# Patient Record
Sex: FEMALE | Race: WHITE | NOT HISPANIC OR LATINO | Employment: UNEMPLOYED | URBAN - METROPOLITAN AREA
[De-identification: names, ages, dates, MRNs, and addresses within clinical notes are randomized per-mention and may not be internally consistent; named-entity substitution may affect disease eponyms.]

---

## 2019-06-17 LAB — MAMMOGRAM: NORMAL

## 2020-05-16 ENCOUNTER — TRANSFERRED RECORDS (OUTPATIENT)
Dept: HEALTH INFORMATION MANAGEMENT | Facility: CLINIC | Age: 55
End: 2020-05-16

## 2020-05-18 ENCOUNTER — TRANSFERRED RECORDS (OUTPATIENT)
Dept: HEALTH INFORMATION MANAGEMENT | Facility: CLINIC | Age: 55
End: 2020-05-18

## 2020-09-30 ENCOUNTER — TRANSFERRED RECORDS (OUTPATIENT)
Dept: HEALTH INFORMATION MANAGEMENT | Facility: CLINIC | Age: 55
End: 2020-09-30

## 2020-10-02 ENCOUNTER — TRANSFERRED RECORDS (OUTPATIENT)
Dept: HEALTH INFORMATION MANAGEMENT | Facility: CLINIC | Age: 55
End: 2020-10-02

## 2020-11-23 ENCOUNTER — TRANSFERRED RECORDS (OUTPATIENT)
Dept: HEALTH INFORMATION MANAGEMENT | Facility: CLINIC | Age: 55
End: 2020-11-23

## 2020-11-24 ENCOUNTER — MEDICAL CORRESPONDENCE (OUTPATIENT)
Dept: HEALTH INFORMATION MANAGEMENT | Facility: CLINIC | Age: 55
End: 2020-11-24

## 2020-11-25 ENCOUNTER — REFERRAL (OUTPATIENT)
Dept: TRANSPLANT | Facility: CLINIC | Age: 55
End: 2020-11-25

## 2020-11-25 DIAGNOSIS — K70.30 ALCOHOLIC CIRRHOSIS (H): Primary | ICD-10-CM

## 2020-11-25 DIAGNOSIS — K74.60 CIRRHOSIS (H): ICD-10-CM

## 2020-11-25 NOTE — LETTER
LIVER TRANSPLANT  EVALUATION SCHEDULE    Patient:   Viviana Schaefer  MR#:    [id number]  Coordinator:  Jose Rafael      [phone]  :     Annika               [phone]  [facility]  Date(s):    December 15, 2020    This is your evaluation schedule, please follow dates and times.  You will   receive reminder phone calls for other tests, but please follow this schedule  only!  If you have any questions about dates and times, please call us on  number listed above.  Thank you, Transplant Services       Day/Date:    Tuesday, December 15, 2020  Time Location Activity   9:00 AM Telephone Visit: You will receive a Telephone Call to: [phone] Appointment with Dr. Bundy,  Hepatologist   10:00 AM Telephone Visit: You will receive a Telephone Call to: [phone] Appointment with Dr. Willett,  Transplant Surgeon   10:30 AM Telephone Visit: You will receive a Telephone Call to: [phone] Appointment with Ashley Dailey,  Registered Dietitian   11:00 PM Telephone Visit: You will receive a Telephone Call to: [phone] Appointment with Brooklyn Swanson,

## 2020-11-25 NOTE — LETTER
LIVER TRANSPLANT  EVALUATION SCHEDULE    Patient:   Viviana Schaefer  MR#:    [id number]  Coordinator:  Jose Rafael       [phone]  :     Annika               [phone]  [facility]  Date(s):    January 19, 2021 & January 25, 2021 March 1, 2021    This is your evaluation schedule, please follow dates and times.  You will   receive reminder phone calls for other tests, but please follow this schedule  only!  If you have any questions about dates and times, please call us on  number listed above.  Thank you, Transplant Services     *NO FOOD or DRINK AFTER 8:00 AM*  (You may only have small amounts of water)    Day/Date:    Tuesday, January 19, 2020  Time Location Activity   2:10 PM Imaging and Lab testing  ([facility] floor [facility]  [address] [place] [address] ) EKG   2:30 PM Imaging and Lab Testing  ([address] [place] [address] and Surgery [place] ) Dexa Scan HOLD Calcium pills & supplements 48 hours before test!   3:00 PM Imaging and Lab testing  ([facility] floor [facility] ) Chest X-ray    3:20 PM Imaging and Lab testing  ([facility] floor [facility] ) CT Chest   3:45 PM Imaging and Lab testing  ([facility] floor [facility] ) Blood tests    4:00 PM Imaging and Lab testing  ([facility] floor [facility] ) Liver Ultrasound with dopplers=NO FOOD or DRINK8 HOURS BEFORE TEST!!!   5:00 PM Imaging and Lab testing  ([address] [place] [address] and Surgery Center ) MRI             Day/Date:    Monday, January 25, 2021  Time Location Activity   11:30 AM Telephone Visit: You will receive a telephone call to: [phone] Appointment with Dr. Parham,  Cardiology           Day/Date:    Monday, March 1, 2021  Time Location Activity   10:50 AM Transplant Services  (3rd floor [facility]) Appointment with Dr. Willett,  Transplant Surgeon   11:30 AM Medicine Specialties  ([facility] floor [facility]) Appointment  with Dr. Bundy,  Hepatologist

## 2020-11-25 NOTE — LETTER
11/27/2020    Viviana Schaefer  [address] [facility] [address]      Dear Viviana,    Thank you for your interest in the Transplant Center at [facility], [facility]. We look forward to being a part of your care team and assisting you through the transplant process.    As we discussed, your transplant coordinator is Landon Cleaning Jr. (Liver).  You may call your coordinator at any time with questions or concerns call [phone].    Please complete the following.    1. Fill out and return the enclosed forms    Authorization for Electronic Communication    Authorization to Discuss Protected Health Information    Authorization for Release of Protected Health Information    Authorization for Care Everywhere Release of Information    2. Sign up for:    MEETiiN, access to your electronic medical record (see enclosed pamphlet)    [email or web address], a transplant education website    You can use these tools to learn more about your transplant, communicate with your care team, and track your medical details      Sincerely,  Solid Organ Transplant  [facility], [facility]    cc: Referring Physician and PCP

## 2020-11-27 VITALS — BODY MASS INDEX: 46.38 KG/M2 | WEIGHT: 252 LBS | HEIGHT: 62 IN

## 2020-11-27 PROBLEM — K70.11 ALCOHOLIC HEPATITIS WITH ASCITES (H): Status: ACTIVE | Noted: 2020-05-16

## 2020-11-27 PROBLEM — R31.0 GROSS HEMATURIA: Status: ACTIVE | Noted: 2019-03-13

## 2020-11-27 PROBLEM — K76.9 PLEURAL EFFUSION ASSOCIATED WITH HEPATIC DISORDER: Status: ACTIVE | Noted: 2019-03-12

## 2020-11-27 PROBLEM — E87.1 ACUTE HYPONATREMIA: Status: ACTIVE | Noted: 2019-04-02

## 2020-11-27 PROBLEM — R09.89 PULMONARY CONGESTION: Status: ACTIVE | Noted: 2020-05-16

## 2020-11-27 PROBLEM — E53.8 FOLIC ACID DEFICIENCY: Status: ACTIVE | Noted: 2019-03-26

## 2020-11-27 PROBLEM — K72.10 END STAGE LIVER DISEASE (H): Status: ACTIVE | Noted: 2020-09-30

## 2020-11-27 PROBLEM — R06.09 EXERTIONAL DYSPNEA: Status: ACTIVE | Noted: 2020-05-16

## 2020-11-27 PROBLEM — R06.02 SOB (SHORTNESS OF BREATH): Status: ACTIVE | Noted: 2020-09-30

## 2020-11-27 PROBLEM — R01.1 HEART MURMUR: Status: ACTIVE | Noted: 2020-05-16

## 2020-11-27 PROBLEM — G25.81 RESTLESS LEG SYNDROME: Status: ACTIVE | Noted: 2019-07-15

## 2020-11-27 PROBLEM — J91.8 PLEURAL EFFUSION ASSOCIATED WITH HEPATIC DISORDER: Status: ACTIVE | Noted: 2019-03-12

## 2020-11-27 SDOH — HEALTH STABILITY: MENTAL HEALTH: HOW OFTEN DO YOU HAVE A DRINK CONTAINING ALCOHOL?: NOT ASKED

## 2020-11-27 SDOH — HEALTH STABILITY: MENTAL HEALTH: HOW OFTEN DO YOU HAVE 6 OR MORE DRINKS ON ONE OCCASION?: NOT ASKED

## 2020-11-27 SDOH — HEALTH STABILITY: MENTAL HEALTH: HOW MANY STANDARD DRINKS CONTAINING ALCOHOL DO YOU HAVE ON A TYPICAL DAY?: NOT ASKED

## 2020-11-27 ASSESSMENT — MIFFLIN-ST. JEOR: SCORE: 1696.31

## 2020-11-27 NOTE — TELEPHONE ENCOUNTER
Patient was asked the following questions during liver intake call.     Referring Provider:Dr. Paula Irwin   Referring Diagnosis: Alcoholic Cirrhosis of the Liver  PCP: Dr. Anahi Grant     1)Do you know why you have liver disease: Yes             If Alcoholic Cirrhosis is present when was your last drink: May 2020             Have you ever been through treatment for alcohol: NA  2) Presence of Ascites: Yes Paracentesis: Yes  3) Presence of Hepatic Encephalopathy: Yes Medications: Yes  4) History of GI Bleeding: No  5) Oxygen Use: None  6) EGD: Yes Where: [facility] When: 1/2/2020  7) Colonoscopy: No   8) MELD Score: No Current Labs   9) Insurance information: [facility]      Policy lock: Self       Subscriber/policy/ID number: [id number]      Group Number: [id number]    Referral intake process completed.  Patient is aware that after financial approval is received, medical records will be requested.   Patient confirmed for a callback from transplant coordinator on 12/2/2020.   Tentative evaluation date TBD.    Confirmed coordinator will discuss evaluation process in more detail at the time of their call.   Patient is aware of the need to arrange age appropriate cancer screening, vaccinations, and dental care.  Reminded patient to complete questionnaire, complete medical records release, and review packet prior to evaluation visit .  Assessed patient for special needs (ie--wheelchair, assistance, guardian, and ):  Walker and Wheelchair   Patient instructed to call [phone] with questions.     Patient gave verbal consent during intake call to obtain medical records and documents outside of [facility]:  Yes    MILA Wilburn, LPN   Solid Organ Transplant

## 2020-12-01 NOTE — TELEPHONE ENCOUNTER
54yo with hx cirrhosis referred for transplant evaluation by Paula Irwin at [facility]. Pt reports having been dx with liver disease ~3/2019 when she was admitted with jaundice and abd distention. Pt has a hx alcohol abuse and obesity. She reports having gone through alcohol treatment in 2002, and described long (~10 year) periods of sobriety between relapses. Pt denies drug use and quit smoking 12 years ago (prior intermittent smoker)  Pt was referred to the [facility] for txp evaluation but has requested [facility] due to location. Pt lives alone. She has a hired care assistant who is per primary source of support. Pt does not have any family that live locally. Pt has not been able to work for multiple years due to her health.    MELD: 20, labs done 11/3/20 at [facility] (see CE)  Imaging: MRI done 9/30/30 at [facility], requested images be pushed  Ascites: on diuretics, hx para and thora, last thora 10/2020, last para 3/2019  HE: none, not on lactulose nor Xifaxan  GIB: no hx  EGD: last 1/2020 at [facility] (see CE)  Colonoscopy: never, she will review with PCP    Medical and Surgical Hx: Snap Shot updated  BMI >40, pt confirmed she is working on weight loss and has recently lost 20lbs. Importance of ongoing weight loss discussed.     Plan:   Virtual transplant evaluation with Dr. Bundy week of 12/15/20 including CD assessment. Pt okay with schedule being emailed.   Pt confirmed she will sign up for NERI asap, help line number provided.   [email or web address] reviewed. Pt confirmed she will watch pre liver transplant videos prior to evaluation.   Pt verbalized understanding and comfort with above plan, contact information for coordinator provided. Pt denied questions.

## 2020-12-07 ENCOUNTER — HOSPITAL ENCOUNTER (OUTPATIENT)
Dept: BEHAVIORAL HEALTH | Facility: CLINIC | Age: 55
Discharge: HOME | End: 2020-12-07
Attending: STUDENT IN AN ORGANIZED HEALTH CARE EDUCATION/TRAINING PROGRAM | Admitting: STUDENT IN AN ORGANIZED HEALTH CARE EDUCATION/TRAINING PROGRAM
Payer: COMMERCIAL

## 2020-12-07 VITALS — HEIGHT: 62 IN | BODY MASS INDEX: 46.01 KG/M2 | WEIGHT: 250 LBS

## 2020-12-07 DIAGNOSIS — K74.60 CIRRHOSIS (H): ICD-10-CM

## 2020-12-07 PROCEDURE — H0001 ALCOHOL AND/OR DRUG ASSESS: HCPCS | Mod: 95

## 2020-12-07 RX ORDER — FOLIC ACID 1 MG/1
1 TABLET ORAL EVERY MORNING
COMMUNITY
Start: 2020-10-23

## 2020-12-07 RX ORDER — SPIRONOLACTONE 50 MG/1
TABLET, FILM COATED ORAL
COMMUNITY
Start: 2020-10-23 | End: 2020-12-15

## 2020-12-07 RX ORDER — FUROSEMIDE 40 MG
60 TABLET ORAL EVERY MORNING
COMMUNITY
Start: 2020-05-19 | End: 2022-03-09

## 2020-12-07 RX ORDER — FERROUS SULFATE 325(65) MG
TABLET ORAL
COMMUNITY
Start: 2020-10-27 | End: 2020-12-15

## 2020-12-07 RX ORDER — FUROSEMIDE 40 MG
TABLET ORAL
COMMUNITY
Start: 2020-10-23 | End: 2020-12-15

## 2020-12-07 RX ORDER — FERROUS SULFATE 325(65) MG
TABLET ORAL
COMMUNITY
Start: 2020-07-10

## 2020-12-07 RX ORDER — FOLIC ACID 1 MG/1
1 TABLET ORAL
COMMUNITY
Start: 2020-07-10 | End: 2020-12-15

## 2020-12-07 RX ORDER — SPIRONOLACTONE 50 MG/1
50 TABLET, FILM COATED ORAL EVERY MORNING
COMMUNITY
Start: 2020-10-23

## 2020-12-07 RX ORDER — PREGABALIN 100 MG/1
100 CAPSULE ORAL 2 TIMES DAILY PRN
COMMUNITY
Start: 2020-11-11

## 2020-12-07 RX ORDER — PREGABALIN 50 MG/1
50 CAPSULE ORAL
COMMUNITY
Start: 2020-11-11 | End: 2020-12-15

## 2020-12-07 ASSESSMENT — COLUMBIA-SUICIDE SEVERITY RATING SCALE - C-SSRS
5. HAVE YOU STARTED TO WORK OUT OR WORKED OUT THE DETAILS OF HOW TO KILL YOURSELF? DO YOU INTEND TO CARRY OUT THIS PLAN?: NO
5. HAVE YOU STARTED TO WORK OUT OR WORKED OUT THE DETAILS OF HOW TO KILL YOURSELF? DO YOU INTEND TO CARRY OUT THIS PLAN?: NO
2. HAVE YOU ACTUALLY HAD ANY THOUGHTS OF KILLING YOURSELF?: NO
TOTAL  NUMBER OF ABORTED OR SELF INTERRUPTED ATTEMPTS PAST 3 MONTHS: NO
1. IN THE PAST MONTH, HAVE YOU WISHED YOU WERE DEAD OR WISHED YOU COULD GO TO SLEEP AND NOT WAKE UP?: NO
6. HAVE YOU EVER DONE ANYTHING, STARTED TO DO ANYTHING, OR PREPARED TO DO ANYTHING TO END YOUR LIFE?: NO
REASONS FOR IDEATION PAST MONTH: DOES NOT APPLY
4. HAVE YOU HAD THESE THOUGHTS AND HAD SOME INTENTION OF ACTING ON THEM?: NO
ATTEMPT LIFETIME: NO
TOTAL  NUMBER OF INTERRUPTED ATTEMPTS PAST 3 MONTHS: NO
ATTEMPT PAST THREE MONTHS: NO
REASONS FOR IDEATION LIFETIME: MOSTLY TO END OR STOP THE PAIN (YOU COULDN'T GO ON LIVING WITH THE PAIN OR HOW YOU WERE FEELING)
TOTAL  NUMBER OF INTERRUPTED ATTEMPTS LIFETIME: NO
1. IN THE PAST MONTH, HAVE YOU WISHED YOU WERE DEAD OR WISHED YOU COULD GO TO SLEEP AND NOT WAKE UP?: YES
3. HAVE YOU BEEN THINKING ABOUT HOW YOU MIGHT KILL YOURSELF?: NO
TOTAL  NUMBER OF ABORTED OR SELF INTERRUPTED ATTEMPTS PAST LIFETIME: NO
6. HAVE YOU EVER DONE ANYTHING, STARTED TO DO ANYTHING, OR PREPARED TO DO ANYTHING TO END YOUR LIFE?: NO
2. HAVE YOU ACTUALLY HAD ANY THOUGHTS OF KILLING YOURSELF LIFETIME?: NO
4. HAVE YOU HAD THESE THOUGHTS AND HAD SOME INTENTION OF ACTING ON THEM?: NO

## 2020-12-07 ASSESSMENT — MIFFLIN-ST. JEOR: SCORE: 1687.24

## 2020-12-07 ASSESSMENT — PAIN SCALES - GENERAL: PAINLEVEL: NO PAIN (0)

## 2020-12-07 NOTE — TELEPHONE ENCOUNTER
Spoke with the patient and confirmed Evals: Pre-Liver Eval appointments on 12/15/20.  Informed patient an itinerary can be accessed via [employer], and will be sent via email.
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
## 2020-12-08 NOTE — ADDENDUM NOTE
Encounter [facility] on: 12/8/2020 8:37 AM   Actions taken: Clinical Note Signed

## 2020-12-09 NOTE — TELEPHONE ENCOUNTER
RECORDS RECEIVED FROM: Internal   Appt Date: 12.15.2020    NOTES STATUS DETAILS   OFFICE NOTE from referring provider Internal 11.25.2020 Consult Razia Bundy MD   OFFICE NOTES from other specialists Care Everywhere 11.23.2020 Paula Irwin, BRAVO, CNP    10.08.2020 Anahi Grant DO      09.09.2019 Luis Serrano MD      03.26.2019 Mae Olmos DO     DISCHARGE SUMMARY from hospital Care Everywhere 09.30.2020 [facility]    05.16.2020 [facility]    04.02.2019 [facility]    03.12.2019   MEDICATION LIST Internal / CE    LIVER BIOSPY (IF APPLICABLE)      PATHOLOGY REPORTS  N/A    IMAGING     ENDOSCOPY (IF AVAILABLE) N/A    COLONOSCOPY (IF AVAILABLE) N/A    ULTRASOUND LIVER Care Everywhere 09.30.2020, 04.02.2019  US Abd Limited    08.17.2020, 05.17.2020,  03.13.2019 US Abd W Complete Doppler    01.26.2020 US Abd Complete    10.01.2019 US Abd RUQ Organs     03.1.13.2019Ultrasound-guided paracentesis   CT OF ABDOMEN Care Everywhere 09.30.2020 CT Abd Pelvis   MRI OF LIVER Care Everywhere 09.30.2020 MRI Liver  W/ WO    FIBROSCAN, US ELASTOGRAPHY, FIBROSIS SCAN, MR ELASTOGRAPHY N/A    LABS     HEPATIC PANEL (LIVER PANEL) Care Everywhere 11.03.2020   BASIC METABOLIC PANEL Care Everywhere 11.03.2020   COMPLETE METABOLIC PANEL Care Everywhere 11.03.2020   COMPLETE BLOOD COUNT (CBC) Care Everywhere 11.03.2020   INTERNATIONAL NORMALIZED RATIO (INR) Care Everywhere 11.03.2020   HEPATITIS C ANTIBODY Care Everywhere 03.19.2019   HEPATITIS C VIRAL LOAD/PCR N/A    HEPATITIS C GENOTYPE N/A    HEPATITIS B SURFACE ANTIGEN N/A    HEPATITIS B SURFACE ANTIBODY N/A    HEPATITIS B DNA QUANT LEVEL N/A    HEPATITIS B CORE ANTIBODY Care Everywhere 03.13.2019     Action 12.09.2020 RM   Action Taken Sent fax request to [employer] for images, fax to [phone] pending for images.     12.11.2020 All images received and uploaded to pt chart.

## 2020-12-12 ENCOUNTER — HEALTH MAINTENANCE LETTER (OUTPATIENT)
Age: 55
End: 2020-12-12

## 2020-12-13 NOTE — PROGRESS NOTES
"Called patient , no answer, left message , I will try to call patient again in 10/ 15 mins.  Sherrell Martinez, HAILEY     Start 9/7/    [facility]     Date of Visit: 12/15/2020    Reason for referral:     Subjective: Ms. 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    Diagnosed with alcoholic related liver disease 3/2019 after being admitted to [facility] with jaundice and abdominal distention. CT showed cirrhosis without a mass, ascites and a pleural effusion. No prior history of liver disease - states she had an US fall 2018, was not told what it showed, but later was told she had \"fatty liver\". Denies being told LFTs elevated in the past. Underwent thoracentesis and paracentesis that were negative for SBP. She apparently had normal A1AT, AMA, AMA, Ferritin. Ceruloplasmin was low at 14, 24 hour urine copper < 1.0 micrograms/L. No KF rings seen with ophthalmology. She was started on diuretics, fluid resolved with lasix 40/nik 50. She tells me that she was diagnosed with PORTER. After this hospitalization, was sober for until Fall 2019 - had a few drinks and stopped. Drank again 5/2020 - had a couple of drinks celebrating son's Bday.     States she abused alcohol after each divorce (1998, 2012 - drinking \"a lot\"), and then would drink regularly after that - ranging from nothing to a few drinks week. Last drink. No DUIs. Did outpatient treatment (2003) because ex- felt she was drinking too much, states that he projected his drinking onto her, did this to appease him. And then AA for several years. Was sober between 0255-1782, started drinking 6 months after her divorce. She has had shakes, no withdrawal. States she was drinking socially prior to 3/2019.      Readmitted 5/2020 with jaundice and abdominal distention. Na was 118, down from 140 1/2020. " "Thought to have alcoholic hepatitis - TBR 10.4 (3 1/2020), , ALT 38, Alk Phos 102, INR 1.9. Patient reported she had been sober except for two mikes hard lemonades prior to admission. Started on steroids. Diuretics held (no ascites) and Na improved. Diuretics restarted at lower dose as an outpatient (lasix 20, nik 25).     BR increased again 8/2020 to 11.5, up from 3.8 6/2020. US with Doppler showed cirrhosis. She denied alcohol use. She was readmitted 9/2020 with SOB, found to have a large pleural effusion - tapped felt to be HH. MELD 26. CT showed an indeterminate liver lesion - 15 mm. Diuretics held for KWABENA, then resumed.     11/3/2020 labs  INR 1.9  Na 138  Creatinine 0.70  TBR 6.0  Alk Phos 106  AST 56  Albumin 3.1  Hgb 11.4   .6  MELD Na 21    Ascites/HH  - On lasix 20/nik 50  - MRI Liver 9/30/2020 showed large HH  - Thinks fluid is better \"feeling quite well\", breathing feels ok    HE   - No history of this, does not recall taking lactulose - maybe took in 2019    Some days she is nauseated    EV  - Last EGD 1/2020 showed normal esophagus, small HH, mil congestive hepatopathy  - No GIB    Obesity/Weight loss  - 9/2020 was 272 lbs, now 252 lbs - states he is not eating as much, does not have much of an appetite. Current BMI ~ 45     Mobility   - Generally uses walker when walking long distances, uses wheelchair for long distances. Not using walker for the past two weeks at home.     On lasix   Recommendations:      1.   Attend 6 individual psychotherapy sessions that offer a chemical dependency component to it.  2. Abstain from all mood-altering chemicals unless prescribed by a licensed provider.   3)  Attend, at minimum, 2 weekly support group meetings, such as 12 step based (AA/NA), SMART Recovery, Health Realizations, and/or Refuge Recovery meetings.       Liver Lesion  - MRI Liver 9/30/2020: Cirrhotic liver, 15 mm lesion in Segment 4/8 - small focus of steatosis v. HCC  - Due for repeat " "MRI 1/2021    Ascites    HE    Varices    HCC screening    Co morbidities  - Cervical Dysplasia - complete hysterectomy at age 41 - only has R ovary. Had cyst on left ovary. Wanted removed because done of L ovarian cyst, cervical dysplasia, done with childbearing  - Depression - has contributed to drinking in the past. Has done counseling in the past, has been on medications in the past (did not think medications were helpful - only klonopin). States it is \"not good\", does not leave the house much because of anxiety related to being around crowds, has been going on her entire life. History of panic attacks  - HSV  - Medullary Sponge Kidney - had lithotripsy x 5 for stones in the past  - RLS  - No known history of heart disease, MI. Has a \"heart murmur\"  - No history of lung disease  - No other history of cancer  - Never had colonoscopy     ROS: 14 point ROS negative except for positives noted in HPI.    PMHx:  Past Medical History:   Diagnosis Date     Arthritis      Congestive heart failure (H)     Cervical CA      Coronary artery disease     Heart Murmer     Depressive disorder      Hyponatremia        PSHx:  Past Surgical History:   Procedure Laterality Date     ABDOMEN SURGERY       CHOLECYSTECTOMY  2001    patient reported     GI SURGERY       GYN SURGERY       HYSTERECTOMY  2009    including cervix     LITHOTRIPSY      multiple times, patient reported       FamHx:  Family History   Problem Relation Age of Onset     Depression Sister      Anxiety Disorder Sister      Substance Abuse Sister      Depression Sister      Anxiety Disorder Sister      Depression Sister      Anxiety Disorder Sister    No history of liver disease    SocHx:  Social History     Socioeconomic History     Marital status:      Spouse name: Not on file     Number of children: Not on file     Years of education: Not on file     Highest education level: Not on file   Occupational History     Not on file   Social Needs     Financial " resource strain: Not on file     Food insecurity     Worry: Not on file     Inability: Not on file     Transportation needs     Medical: Not on file     Non-medical: Not on file   Tobacco Use     Smoking status: Never Smoker     Smokeless tobacco: Never Used   Substance and Sexual Activity     Alcohol use: Not Currently     Comment: 5/2020     Drug use: Never     Sexual activity: Not on file   Lifestyle     Physical activity     Days per week: Not on file     Minutes per session: Not on file     Stress: Not on file   Relationships     Social connections     Talks on phone: Not on file     Gets together: Not on file     Attends Hoahaoism service: Not on file     Active member of club or organization: Not on file     Attends meetings of clubs or organizations: Not on file     Relationship status: Not on file     Intimate partner violence     Fear of current or ex partner: Not on file     Emotionally abused: Not on file     Physically abused: Not on file     Forced sexual activity: Not on file   Other Topics Concern     Parent/sibling w/ CABG, MI or angioplasty before 65F 55M? Not Asked   Social History Narrative     Not on file   4 sons - ex- has 17 year old (high functioning autistic). 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        Allergies:     Allergies   Allergen Reactions     Codeine      Diazepam      Morphine      PN: LW Reaction: Nausea     Penicillins      PN: LW Reaction: Rash, Generalized. Reportedly had a rash with this as a child and has tolerated Augmentin since per patient.  Tolerated Zosyn 3/12/19 in [facility].        Objective:  There were no vitals taken for this visit.  Constitutional: ***  Eyes: ***  Respiratory: Normal respiratory excursion   MSK: normal range of motion of visualized extremities  Skin: ***  Neuro: ***  Psychiatric: normal mood and orientation    Labs:  No results found for: AST  No results found for: ALT  No results found for: BILICONJ   No results found for: BILITOTAL  No results found for: ALBUMIN  No results found for: PROTTOTAL   No results found for: ALKPHOS    Last Renal Panel:  No results found for: NA, POTASSIUM, CHLORIDE, CO2, ANIONGAP, GLC, BUN, CR, GFRESTIMATED, ELIUD, PHOS, ALBUMIN    No results found for: WBC  No results found for: RBC  No results found for: HGB  No results found for: HCT  No results found for: MCV  No results found for: MCH  No results found for: MCHC  No results found for: RDW  No results found for: PLT    No results found for: INR     Computed MELD-Na score unavailable. Necessary lab results were not found in the last year.  Computed MELD score unavailable. Necessary lab results were not found in the last year.    Imaging:    Endoscopy:    Assessment/Plan:    - Get MRI read here and review at Tumor Board  - Weight loss  - Mental Health Referral  - CD treatment    Blood type O. MELD 21    Ascites:  HE:  Varices:   HCC Screening:     RTC *** months.    Labs/imaging *** months.    Razia Bundy MD MSc  Transplant Hepatologist  [facility]

## 2020-12-15 ENCOUNTER — ALLIED HEALTH/NURSE VISIT (OUTPATIENT)
Dept: TRANSPLANT | Facility: CLINIC | Age: 55
End: 2020-12-15
Attending: STUDENT IN AN ORGANIZED HEALTH CARE EDUCATION/TRAINING PROGRAM
Payer: COMMERCIAL

## 2020-12-15 ENCOUNTER — COMMITTEE REVIEW (OUTPATIENT)
Dept: TRANSPLANT | Facility: CLINIC | Age: 55
End: 2020-12-15

## 2020-12-15 ENCOUNTER — PRE VISIT (OUTPATIENT)
Dept: GASTROENTEROLOGY | Facility: CLINIC | Age: 55
End: 2020-12-15

## 2020-12-15 ENCOUNTER — VIRTUAL VISIT (OUTPATIENT)
Dept: GASTROENTEROLOGY | Facility: CLINIC | Age: 55
End: 2020-12-15
Attending: STUDENT IN AN ORGANIZED HEALTH CARE EDUCATION/TRAINING PROGRAM
Payer: COMMERCIAL

## 2020-12-15 VITALS — HEIGHT: 62 IN | WEIGHT: 250 LBS | BODY MASS INDEX: 46.01 KG/M2

## 2020-12-15 DIAGNOSIS — K76.9 LIVER LESION: ICD-10-CM

## 2020-12-15 DIAGNOSIS — F10.21 ALCOHOL USE DISORDER, SEVERE, IN EARLY REMISSION (H): Primary | ICD-10-CM

## 2020-12-15 DIAGNOSIS — R17 ELEVATED BILIRUBIN: ICD-10-CM

## 2020-12-15 DIAGNOSIS — K76.0 NON-ALCOHOLIC FATTY LIVER DISEASE: Primary | ICD-10-CM

## 2020-12-15 DIAGNOSIS — K76.6 PORTAL HYPERTENSION (H): ICD-10-CM

## 2020-12-15 DIAGNOSIS — K70.31 ALCOHOLIC CIRRHOSIS OF LIVER WITH ASCITES (H): ICD-10-CM

## 2020-12-15 DIAGNOSIS — F41.9 ANXIETY: ICD-10-CM

## 2020-12-15 DIAGNOSIS — K70.31 ALCOHOLIC CIRRHOSIS OF LIVER WITH ASCITES (H): Primary | ICD-10-CM

## 2020-12-15 DIAGNOSIS — E66.813 CLASS 3 SEVERE OBESITY WITH SERIOUS COMORBIDITY AND BODY MASS INDEX (BMI) OF 45.0 TO 49.9 IN ADULT, UNSPECIFIED OBESITY TYPE (H): ICD-10-CM

## 2020-12-15 DIAGNOSIS — E66.01 MORBID OBESITY (H): ICD-10-CM

## 2020-12-15 DIAGNOSIS — E66.01 CLASS 3 SEVERE OBESITY WITH SERIOUS COMORBIDITY AND BODY MASS INDEX (BMI) OF 45.0 TO 49.9 IN ADULT, UNSPECIFIED OBESITY TYPE (H): ICD-10-CM

## 2020-12-15 PROCEDURE — 99205 OFFICE O/P NEW HI 60 MIN: CPT | Mod: 95 | Performed by: STUDENT IN AN ORGANIZED HEALTH CARE EDUCATION/TRAINING PROGRAM

## 2020-12-15 PROCEDURE — 99203 OFFICE O/P NEW LOW 30 MIN: CPT | Mod: 95 | Performed by: TRANSPLANT SURGERY

## 2020-12-15 ASSESSMENT — MIFFLIN-ST. JEOR: SCORE: 1687.24

## 2020-12-15 NOTE — PROGRESS NOTES
"Viviana Schaefer is a 55 year old female who is being evaluated via a billable video visit.      The patient has been notified of following:     \"This video visit will be conducted via a call between you and your physician/provider. We have found that certain health care needs can be provided without the need for an in-person physical exam.  This service lets us provide the care you need with a video conversation.  If a prescription is necessary we can send it directly to your pharmacy.  If lab work is needed we can place an order for that and you can then stop by our lab to have the test done at a later time.    Video visits are billed at different rates depending on your insurance coverage.  Please reach out to your insurance provider with any questions.    If during the course of the call the physician/provider feels a video visit is not appropriate, you will not be charged for this service.\"    Patient has given verbal consent for Video visit? Yes  How would you like to obtain your AVS? Mail a copy  If you are dropped from the video visit, the video invite should be resent to: Text to cell phone: xxx  Will anyone else be joining your video visit? No      Assessment and Plan:  1. liver transplant evaluation - patient is a fair candidate overall. Benefits and surgical risks of a liver transplantation were discussed.  2.  End stage liver disease due to suspected PORTER    Computed MELD-Na score unavailable. Necessary lab results were not found in the last year.  Computed MELD score unavailable. Necessary lab results were not found in the last year.    Surgical evaluation:  1. Portal Vein:Patent  2. Hepatic Artery: Open  3. TIPS: absent  4. Previous Abdominal Surgery: Yes Cholecystectomy, lithotripsy and hysterectomy  5. Hepatocellular Carcinoma: needs evaluation, has lesions on MRI  6. Ascites: Present - minimal  7. Costal Angle: UNKNOWN  8. Portopulmonary Hypertension: absent  9. Hepatopulmonary Syndrome: " absent  10. Cardiac Evaluation: has a heart murmur needs evaluation  11. Nutritional Status: BMI 45  12. Diabetes: NO  13.Hypertension NO  14. Smoker:NO  14: Fraility index: YES, not very mobile  15. Meets guidelines to receive Living Donor  No  16. Potential Living donors No    Recommendations:   #1.  Patient needs to be seen in person.  She currently has a BMI of 45.73.  Would recommend lowering the weight to a BMI of 40.  #2.  She gives history of hydrothorax.  She needs evaluation with a chest x-ray/CT of the chest.  #3.  An MRI done in September showed that she has some liver lesions.  She needs a repeat MRI and alpha-fetoprotein to evaluate for any underlying hepatocellular carcinoma.  #4.  Her mobility is very restricted.  She needs outpatient physical therapy.  #5.  She also needs a dietitian to advise regarding dietary intake.          Patients overall evaluation will be discussed at the Liver Transplant selection committee meeting with a final recommendation on the patients suitability for transplant to be made at that time.    Consult Full  Details:  Viviana Schaefer was seen in consultation at the request of Dr. Razia Martin for evaluation as a potential liver transplant recipient.    Reason for Visit:  Viviana Schaefer is a 55 year old year old female with nonalcoholic steatopheatitis, who presents for liver transplant evaluation.    HPI:  Presenting complaint: Abdominal distention    Decompensated in the form of ascites as well as hydrothorax.  She has had fluid drained from her abdomen as well as from her chest.  She also gives history of pedal edema.  Her mobility is very limited because of the obesity and bilateral knee pain.    Past Medical History:   Diagnosis Date     Arthritis      Congestive heart failure (H)     Cervical CA      Coronary artery disease     Heart Murmer     Depressive disorder      Hyponatremia      Past Surgical History:   Procedure Laterality Date     ABDOMEN SURGERY        CHOLECYSTECTOMY  2001    patient reported     GI SURGERY       GYN SURGERY       HYSTERECTOMY  2009    including cervix     LITHOTRIPSY      multiple times, patient reported     Past Surgical History:   Procedure Laterality Date     ABDOMEN SURGERY       CHOLECYSTECTOMY  2001    patient reported     GI SURGERY       GYN SURGERY       HYSTERECTOMY  2009    including cervix     LITHOTRIPSY      multiple times, patient reported     Family History   Problem Relation Age of Onset     Depression Sister      Anxiety Disorder Sister      Substance Abuse Sister      Depression Sister      Anxiety Disorder Sister      Depression Sister      Anxiety Disorder Sister      Allergies   Allergen Reactions     Codeine      Diazepam      Morphine      PN: LW Reaction: Nausea     Penicillins      PN: LW Reaction: Rash, Generalized. Reportedly had a rash with this as a child and has tolerated Augmentin since per patient. Tolerated Zosyn 3/12/19 in [facility].      Prior to Admission medications    Medication Sig Start Date End Date Taking? Authorizing Provider   ferrous sulfate (FEROSUL) 325 (65 Fe) MG tablet TAKE 1 TABLET BY MOUTH DAILY WITH BREAKFAST 7/10/20  Yes Reported, Patient   folic acid (FOLVITE) 1 MG tablet TK 1 T PO D 10/23/20  Yes Reported, Patient   furosemide (LASIX) 40 MG tablet Take 20 mg by mouth 5/19/20 5/19/21 Yes Reported, Patient   omeprazole (PRILOSEC) 20 MG DR capsule Take 20 mg by mouth 11/6/20  Yes Reported, Patient   pregabalin (LYRICA) 50 MG capsule TK 1 C PO BID 11/11/20  Yes Reported, Patient   spironolactone (ALDACTONE) 50 MG tablet Take 50 mg by mouth 10/23/20  Yes Reported, Patient       Previous Transplant Hx: No    Cardiovascular Hx:       h/o Cardiac Issues: Yes -  Cardiac murmur       Exercise Tolerance: shortness of breath with exertion.    Potential Donor(s): No    ROS:    REVIEW OF SYSTEMS (check box if normal)  [x]                GENERAL  [x]                  PULMONARY [x]           "       GENITOURINARY  [x]                 CNS                 [x]                  CARDIAC  [x]                  ENDOCRINE  [x]                 EARS,NOSE,THROAT [x]                  GASTROINTESTINAL [x]                  NEUROLOGIC    [x]                 MUSCLOSKELTAL  [x]                   HEMATOLOGY    Examination:     Vitals:  Ht 1.575 m (5' 2\")   Wt 113.4 kg (250 lb)   BMI 45.73 kg/m          Results: No results found for this or any previous visit (from the past 168 hour(s)).  I had a long discussion with the patient regarding liver transplantation which included but was not limited to  the following points:    1. Liver transplant selection committee process.  2. The federal rules for cadaveric waiting list, the size and blood type matching of the organ. The availability of living-related donor transplantation.  3. The types of donors: brain death donors, non-heart beating donors, partial liver grafts: splits and living donor grafts  4. Extended criteria  Donors (older age, steasosis) and the increased  risk of primary non-function using the extended criteria donors  5. The CDC high risk donors,  Risk of donor transmitted infections and donor transmitted malignancy  6. The liver transplant operation and the associated risks and technical complications which can include intraoperative death, post operative death,  Primary non-function, bleeding requiring re-operations, arterial and biliary complications, bowel perforations, and intra abdominal abscess. Some of these complicaitons may require a second operation.  7. The postoperative course, the ICU stay and risk of postoperative complications which can include sepsis, MI, stroke, brain injury, pneumonia, pleural effusions, and renal dysfunction.  8. The current 1 year and 5 year graft and patient survivals.  9. The need for life long immunosuppressive therapy and the side effects of these medications, including the possibility of toxicity, opportunistic " infections, risk of cancer including lymphoma, and the possibility of rejection even if the patient is taking the medication exactly as prescribed.  10. The need for compliance with medications and follow-up visits in the clinic and thereafter.  11. The patient and family understand these risks and wish to proceed to transplantation       I spent ...30....minutes with the patient and more than 50% of the time was spend in direct face to face counseling.      Video-Visit Details    Type of service:  Video Visit    Video time 30 min    Originating Location (pt. Location): Home    Distant Location (provider location):  [facility]     Platform used for Video Visit: Domenica WEI        HPI      ROS      Physical Exam

## 2020-12-15 NOTE — LETTER
"    12/15/2020         RE: Viviana Schaefer  [address]  [facility] [address]        Dear Colleague,    Thank you for referring your patient, Viviana Schaefer, to the [facility] [place]. Please see a copy of my visit note below.    Viviana Schaefer is being evaluated via a billable video visit.    The patient has been notified of following:   \"This video visit will be conducted via a call between you and your physician/provider. We have found that certain health care needs can be provided without the need for an in-person physical exam.  This service lets us provide the care you need with a video conversation.  If a prescription is necessary we can send it directly to your pharmacy.  If lab work is needed we can place an order for that and you can then stop by our lab to have the test done at a later time.  If during the course of the call the physician/provider feels a video visit is not appropriate, you will not be charged for this service.\"     Patient has given verbal consent for Video visit? Yes    Patient would like the video invitation sent by: Text to cell phone: see demographics    Video Start Time: 9:00  End Time: 9:45    Mode of Communication:  Video Conference via FireScope  I have reviewed and updated the patient's Past Medical History, Social History, Family History and Medication List.      [facility]     Date of Visit: 12/15/2020    Reason for referral: Liver Transplant Evaluation    Subjective: Ms. 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    Diagnosed with alcoholic related liver disease 3/2019 after being admitted to [facility] with jaundice and abdominal distention. CT showed cirrhosis without a mass, ascites and a pleural effusion. No prior history of liver disease - states she had an US " "fall 2018, was not told what it showed, but later was told she had \"fatty liver\". Denies being told LFTs elevated in the past. Underwent thoracentesis and paracentesis that were negative for SBP. She apparently had normal A1AT, AMA, AMA, Ferritin. Ceruloplasmin was low at 14, 24 hour urine copper < 1.0 micrograms/L. No KF rings seen with ophthalmology. She was started on diuretics, fluid resolved with lasix 40/nik 50. She tells me that she was diagnosed with PORTER. After this hospitalization, was sober for until Fall 2019 - around that time she had a few drinks and stopped. States she drank again 5/2020 - had a couple of drinks celebrating son's Bday.     States she abused alcohol after each divorce (1998, 2012 - drinking \"a lot\"), and then would drink regularly after that - ranging from nothing to a few drinks week. Last drink. No DUIs. Did outpatient treatment (2003) because ex- felt she was drinking too much, states that he projected his drinking onto her, did this to appease him. And then AA for several years. Was sober between 0906-3020, started drinking 6 months after her divorce. She has had shakes, no withdrawal. States she was drinking socially prior to 3/2019.  CD Eval done recommend she attend 6 individual psychotherapy sessions, attend 2 weekly support group meetings    Readmitted 5/2020 with jaundice and abdominal distention after drinking related to her son's birthday. Na was 118, down from 140 1/2020. Thought to have alcoholic hepatitis - TBR 10.4 (3 1/2020), , ALT 38, Alk Phos 102, INR 1.9. Patient reported she had been sober except for two mikes hard lemonades prior to admission. Started on steroids. Diuretics held (no ascites) and Na improved. Diuretics restarted at lower dose as an outpatient (lasix 20, nik 25).     BR increased again 8/2020 to 11.5, up from 3.8 6/2020. US with Doppler showed cirrhosis. She denied alcohol use. She was readmitted 9/2020 with SOB, found to have a " "large pleural effusion - tapped felt to be HH. MELD 26. CT showed an indeterminate liver lesion - 15 mm. Diuretics held for KWABENA, then resumed.     Ascites/HH  - On lasix 20/nik 50  - MRI Liver 9/30/2020 showed large HH  - Thinks fluid is better \"feeling quite well\", breathing feels ok    HE   - No history of this, does not recall taking lactulose - maybe took in 2019    EV  - Last EGD 1/2020 showed normal esophagus, small HH, mil congestive hepatopathy  - No GIB    Obesity/Weight loss  - 9/2020 was 272 lbs, now 252 lbs - states he is not eating as much, does not have much of an appetite. Current BMI ~ 45     Mobility   - Generally uses walker when walking long distances, uses wheelchair for long distances. Not using walker for the past two weeks at home.     Liver Lesion  - MRI Liver 9/30/2020: Cirrhotic liver, 15 mm lesion in Segment 4/8 - small focus of steatosis v. HCC  - Due for repeat MRI 1/2021    Co morbidities  - Cervical Dysplasia - complete hysterectomy at age 41 - only has R ovary. Had cyst on left ovary. Wanted removed because done of L ovarian cyst, cervical dysplasia, done with childbearing  - Depression - has contributed to drinking in the past. Has done counseling in the past, has been on medications in the past (did not think medications were helpful - only klonopin). States it is \"not good\", does not leave the house much because of anxiety related to being around crowds, has been going on her entire life. History of panic attacks  - HSV  - Medullary Sponge Kidney - had lithotripsy x 5 for stones in the past  - RLS  - No known history of heart disease, MI. Has a \"heart murmur\"  - No history of lung disease  - No other history of cancer  - Never had colonoscopy     ROS: 14 point ROS negative except for positives noted in HPI.    PMHx:  Past Medical History:   Diagnosis Date     Arthritis      Congestive heart failure (H)     Cervical CA      Coronary artery disease     Heart Murmer     Depressive " disorder      Hyponatremia        PSHx:  Past Surgical History:   Procedure Laterality Date     ABDOMEN SURGERY       CHOLECYSTECTOMY  2001    patient reported     GI SURGERY       GYN SURGERY       HYSTERECTOMY  2009    including cervix     LITHOTRIPSY      multiple times, patient reported       FamHx:  Family History   Problem Relation Age of Onset     Depression Sister      Anxiety Disorder Sister      Substance Abuse Sister      Depression Sister      Anxiety Disorder Sister      Depression Sister      Anxiety Disorder Sister    No history of liver disease    SocHx:  Social History     Socioeconomic History     Marital status:      Spouse name: Not on file     Number of children: Not on file     Years of education: Not on file     Highest education level: Not on file   Occupational History     Not on file   Social Needs     Financial resource strain: Not on file     Food insecurity     Worry: Not on file     Inability: Not on file     Transportation needs     Medical: Not on file     Non-medical: Not on file   Tobacco Use     Smoking status: Never Smoker     Smokeless tobacco: Never Used   Substance and Sexual Activity     Alcohol use: Not Currently     Comment: 5/2020     Drug use: Never     Sexual activity: Not on file   Lifestyle     Physical activity     Days per week: Not on file     Minutes per session: Not on file     Stress: Not on file   Relationships     Social connections     Talks on phone: Not on file     Gets together: Not on file     Attends Mandaen service: Not on file     Active member of club or organization: Not on file     Attends meetings of clubs or organizations: Not on file     Relationship status: Not on file     Intimate partner violence     Fear of current or ex partner: Not on file     Emotionally abused: Not on file     Physically abused: Not on file     Forced sexual activity: Not on file   Other Topics Concern     Parent/sibling w/ CABG, MI or angioplasty before 65F 55M?  Not Asked   Social History Narrative     Not on file   4 sons - ex- has 17 year old (high functioning autistic). 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        Allergies:     Allergies   Allergen Reactions     Codeine      Diazepam      Morphine      PN: LW Reaction: Nausea     Penicillins      PN: LW Reaction: Rash, Generalized. Reportedly had a rash with this as a child and has tolerated Augmentin since per patient. Tolerated Zosyn 3/12/19 in [facility].        Objective:  No vitals taken for this video visit  Constitutional: pleasant woman in NAD  Eyes: non icteric  Respiratory: Normal respiratory excursion   MSK: normal range of motion of visualized extremities  Skin: no jaundice  Psychiatric: normal mood and orientation    Labs:    12/16/2020 labs  Na 136  Creatinine 0.87  Albumin 2.8    ALT 49  TBR 6.9  Alk phos 115  INR 1.9  Platelets 114    11/3/2020 labs  INR 1.9  Na 138  Creatinine 0.70  TBR 6.0  Alk Phos 106  AST 56  Albumin 3.1  Hgb 11.4   .6  MELD Na 21    Imaging:    MRI Liver 9/30/2020    FINDINGS:    Liver: Cirrhotic, nodular configuration of the liver. In the anterolateral aspect of hepatic segment 4/8 (best visualized on series 14 image 36), there is a 15 mm T1 isointense, T1 fat-sat hypointense T2 fat sat hypointense lesion with signal dropout on out of phase imaging suggesting intralesional fat. This appears hypoenhancing on all phases. Of note, arterial phase imaging is late, with contrast visualized in the hepatic veins. No additional suspicious  lesion in the liver.    Remainder of abdomen: Markedly enlarged recanalized umbilical vein. The spleen is at the upper limits of normal for size. Minimal ascites. Moderate to large right pleural effusion. No adenopathy. The adrenal glands are unremarkable. Multiple tiny cysts in the kidneys. No suspicious bony abnormality. No dilated loop of small or large bowel.    IMPRESSION:  1. Cirrhotic configuration of the liver with evidence of portal hypertension including a dilated, recanalized umbilical vein.  2. Lesion in question at the junction of hepatic segments 4A and 8 is indeterminate. It appears hypo-enhancing on all phases, though the arterial phase shows suboptimal timing (closer to portal venous phase). This makes arterial phase hyperenhancement difficult to exclude. Additionally, there is intralesional fat. This could represent a small focus of hepatic steatosis, though adenomas and hepatocellular carcinoma can show intralesional fat is well. For these reasons, follow-up is recommended in 3-6 months to assess for stability.  3. No lesion meeting imaging criteria for hepatocellular carcinoma.  4. 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    Endoscopy:    EGD 1/2020 showed normal esophagus, small HH, mil congestive hepatopathy    Assessment/Plan: Ms. 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 Liver disease complicated by fluid overload - ascites and HH. She has a LR 3 lesion seen on MRI 9/2020.     She has a long history of alcohol use, presented with decompensated 5/2020 related to recurrent drinking. BR morro this summer and she is adamant she has abstained from alcohol. Since 5/2020. BR still in the 6s, most recent MELD is 23. Fortunately she has clinically improved in terms of her fluid overload with diuretics. Discussed the natural history of alcohol related liver disease, hope that her BR will improve with time.      Potential barriers to transplant include obesity, significant anxiety. Given her relapses, agree with assessment that she should engage in alcohol treatment.     - Patient received referral for psychotherapy from PCP - SW recommended she schedule this and I agree  - Recommend PEth testing at    - Get MRI read here and review at [facility], currently has follow up MRI planned for 1/2021  - Continue diuretics per local GI  - Recommend diet and exercise with goal BMI < 40    RTC 3 months with in person visit    Razia Bundy MD MSc  Transplant Hepatologist  [facility]

## 2020-12-15 NOTE — Clinical Note
Good afternoon Please set up for the rest of her evaluation appts attached sometime in mid-January. Please do labs & testing mid-January. Cards any time soon as she has echo in [place] so I did not order one.  Please also set up 3 month follow up for early to mid-march in-person appt with Dr. Razia Bundy & Dr. Willett, on a day both are in clinic, ideally not on evaluation day but can be if needed.  Thank, tk

## 2020-12-15 NOTE — LETTER
"    12/15/2020         RE: Viviana Schaefer  [address]  [facility] [address]        Dear Colleague,    Thank you for referring your patient, Viviana Schaefer, to the [facility]PL[facility]. Please see a copy of my visit note below.    Viviana Schaefer is a 55 year old female who is being evaluated via a billable video visit.      The patient has been notified of following:     \"This video visit will be conducted via a call between you and your physician/provider. We have found that certain health care needs can be provided without the need for an in-person physical exam.  This service lets us provide the care you need with a video conversation.  If a prescription is necessary we can send it directly to your pharmacy.  If lab work is needed we can place an order for that and you can then stop by our lab to have the test done at a later time.    Video visits are billed at different rates depending on your insurance coverage.  Please reach out to your insurance provider with any questions.    If during the course of the call the physician/provider feels a video visit is not appropriate, you will not be charged for this service.\"    Patient has given verbal consent for Video visit? Yes  How would you like to obtain your AVS? Mail a copy  If you are dropped from the video visit, the video invite should be resent to: Text to cell phone: xxx  Will anyone else be joining your video visit? No      Assessment and Plan:  1. liver transplant evaluation - patient is a fair candidate overall. Benefits and surgical risks of a liver transplantation were discussed.  2.  End stage liver disease due to suspected PORTER    Computed MELD-Na score unavailable. Necessary lab results were not found in the last year.  Computed MELD score unavailable. Necessary lab results were not found in the last year.    Surgical evaluation:  1. Portal Vein:Patent  2. Hepatic Artery: Open  3. TIPS: absent  4. Previous Abdominal " Surgery: Yes Cholecystectomy, lithotripsy and hysterectomy  5. Hepatocellular Carcinoma: needs evaluation, has lesions on MRI  6. Ascites: Present - minimal  7. Costal Angle: UNKNOWN  8. Portopulmonary Hypertension: absent  9. Hepatopulmonary Syndrome: absent  10. Cardiac Evaluation: has a heart murmur needs evaluation  11. Nutritional Status: BMI 45  12. Diabetes: NO  13.Hypertension NO  14. Smoker:NO  14: Fraility index: YES, not very mobile  15. Meets guidelines to receive Living Donor  No  16. Potential Living donors No    Recommendations:   #1.  Patient needs to be seen in person.  She currently has a BMI of 45.73.  Would recommend lowering the weight to a BMI of 40.  #2.  She gives history of hydrothorax.  She needs evaluation with a chest x-ray/CT of the chest.  #3.  An MRI done in September showed that she has some liver lesions.  She needs a repeat MRI and alpha-fetoprotein to evaluate for any underlying hepatocellular carcinoma.  #4.  Her mobility is very restricted.  She needs outpatient physical therapy.  #5.  She also needs a dietitian to advise regarding dietary intake.          Patients overall evaluation will be discussed at the Liver Transplant selection committee meeting with a final recommendation on the patients suitability for transplant to be made at that time.    Consult Full  Details:  Viviana Schaefer was seen in consultation at the request of Dr. Razia Martin for evaluation as a potential liver transplant recipient.    Reason for Visit:  Viviana Schaefer is a 55 year old year old female with nonalcoholic steatopheatitis, who presents for liver transplant evaluation.    HPI:  Presenting complaint: Abdominal distention    Decompensated in the form of ascites as well as hydrothorax.  She has had fluid drained from her abdomen as well as from her chest.  She also gives history of pedal edema.  Her mobility is very limited because of the obesity and bilateral knee pain.    Past Medical  History:   Diagnosis Date     Arthritis      Congestive heart failure (H)     Cervical CA      Coronary artery disease     Heart Murmer     Depressive disorder      Hyponatremia      Past Surgical History:   Procedure Laterality Date     ABDOMEN SURGERY       CHOLECYSTECTOMY  2001    patient reported     GI SURGERY       GYN SURGERY       HYSTERECTOMY  2009    including cervix     LITHOTRIPSY      multiple times, patient reported     Past Surgical History:   Procedure Laterality Date     ABDOMEN SURGERY       CHOLECYSTECTOMY  2001    patient reported     GI SURGERY       GYN SURGERY       HYSTERECTOMY  2009    including cervix     LITHOTRIPSY      multiple times, patient reported     Family History   Problem Relation Age of Onset     Depression Sister      Anxiety Disorder Sister      Substance Abuse Sister      Depression Sister      Anxiety Disorder Sister      Depression Sister      Anxiety Disorder Sister      Allergies   Allergen Reactions     Codeine      Diazepam      Morphine      PN: LW Reaction: Nausea     Penicillins      PN: LW Reaction: Rash, Generalized. Reportedly had a rash with this as a child and has tolerated Augmentin since per patient. Tolerated Zosyn 3/12/19 in [facility].      Prior to Admission medications    Medication Sig Start Date End Date Taking? Authorizing Provider   ferrous sulfate (FEROSUL) 325 (65 Fe) MG tablet TAKE 1 TABLET BY MOUTH DAILY WITH BREAKFAST 7/10/20  Yes Reported, Patient   folic acid (FOLVITE) 1 MG tablet TK 1 T PO D 10/23/20  Yes Reported, Patient   furosemide (LASIX) 40 MG tablet Take 20 mg by mouth 5/19/20 5/19/21 Yes Reported, Patient   omeprazole (PRILOSEC) 20 MG DR capsule Take 20 mg by mouth 11/6/20  Yes Reported, Patient   pregabalin (LYRICA) 50 MG capsule TK 1 C PO BID 11/11/20  Yes Reported, Patient   spironolactone (ALDACTONE) 50 MG tablet Take 50 mg by mouth 10/23/20  Yes Reported, Patient       Previous Transplant Hx: No    Cardiovascular Hx:     "   h/o Cardiac Issues: Yes -  Cardiac murmur       Exercise Tolerance: shortness of breath with exertion.    Potential Donor(s): No    ROS:    REVIEW OF SYSTEMS (check box if normal)  [x]                GENERAL  [x]                  PULMONARY [x]                 GENITOURINARY  [x]                 CNS                 [x]                  CARDIAC  [x]                  ENDOCRINE  [x]                 EARS,NOSE,THROAT [x]                  GASTROINTESTINAL [x]                  NEUROLOGIC    [x]                 MUSCLOSKELTAL  [x]                   HEMATOLOGY    Examination:     Vitals:  Ht 1.575 m (5' 2\")   Wt 113.4 kg (250 lb)   BMI 45.73 kg/m          Results: No results found for this or any previous visit (from the past 168 hour(s)).  I had a long discussion with the patient regarding liver transplantation which included but was not limited to  the following points:    1. Liver transplant selection committee process.  2. The federal rules for cadaveric waiting list, the size and blood type matching of the organ. The availability of living-related donor transplantation.  3. The types of donors: brain death donors, non-heart beating donors, partial liver grafts: splits and living donor grafts  4. Extended criteria  Donors (older age, steasosis) and the increased  risk of primary non-function using the extended criteria donors  5. The CDC high risk donors,  Risk of donor transmitted infections and donor transmitted malignancy  6. The liver transplant operation and the associated risks and technical complications which can include intraoperative death, post operative death,  Primary non-function, bleeding requiring re-operations, arterial and biliary complications, bowel perforations, and intra abdominal abscess. Some of these complicaitons may require a second operation.  7. The postoperative course, the ICU stay and risk of postoperative complications which can include sepsis, MI, stroke, brain injury, pneumonia, " pleural effusions, and renal dysfunction.  8. The current 1 year and 5 year graft and patient survivals.  9. The need for life long immunosuppressive therapy and the side effects of these medications, including the possibility of toxicity, opportunistic infections, risk of cancer including lymphoma, and the possibility of rejection even if the patient is taking the medication exactly as prescribed.  10. The need for compliance with medications and follow-up visits in the clinic and thereafter.  11. The patient and family understand these risks and wish to proceed to transplantation       I spent ...30....minutes with the patient and more than 50% of the time was spend in direct face to face counseling.      Video-Visit Details    Type of service:  Video Visit    Video time 30 min    Originating Location (pt. Location): Home    Distant Location (provider location):  [facility]PL[facility]     Platform used for Video Visit: Domenica          Again, thank you for allowing me to participate in the care of your patient.        Sincerely,        Tim Willett MD

## 2020-12-15 NOTE — PROGRESS NOTES
[facility] Solid Organ Transplant  Outpatient MNT: Liver Transplant Evaluation    Current BMI: 45.7 (HT 62 in,  lbs/113 kg)- data from 12/7   BMI is outside recommendation of <40 for liver transplant  BMI of 40 = 218 lbs (32 lb loss)      Time Spent: 45 minutes  Visit Type: Initial   Referring Physician: Tamie   Pt accompanied by: self     History of previous txp: none     Nutrition Assessment  Low sodium diet. Going okay. Eats mostly fish/seafood and leafy veggies. Very little red meat.   Has kept food journal and counted calories, portion size.      Appetite: varies, but recently has been reduced      Vitamins, Supplements, Pertinent Meds: occasional biotin   Herbal Medicines/Supplements: milk thistle    Weight hx:   -- has lost 20 lbs unintentionally d/t reduced appetite/PO; no muscle loss per pt report  -- down to 205 lbs in 2019 after thora/para  -- numerous previous weight loss attempts (prior active with walking/jogging up to 4 miles/day; lowest 170s pre-kids, then had depo- shot and noticed weight gain    Edema: no KARTHIKEYAN    Diet Recall  Breakfast Yogurt or eggs   Lunch Grazes on carrots, veggies, fruits   Dinner Depends on appetite- fish (salmon, scallops, shrimp, tilapia, cod) with leafy greens (lettuce or spinach), low sodium dressings    Snacks Almonds (smoked and salted), olives, pickles (not daily/ watches portion size), sugar free popsicles, rakesh crackers here and there, triscuits with pepperjack cheese    Beverages Water, cranberry juice for kidneys (8 oz), rare occasional, some tea (goes in spurts); protein shake (irregular)   Dining out Very rare      Physical Activity  Has peripheral neuropathy L side of leg; bottoms of feet  Ambulating is challenging, mostly attributed to knee trauma (2012)  Stationary bike-- purchased some sort of exercise bike but difficult to figure out; has treadmill but is difficult with joint issues   No PT outside of a hospital stay    Nutrition  "Diagnosis  Obesity r/t positive energy balance (?) and inadequate physical activity AEB 45.     Nutrition Intervention  Nutrition education provided:  Discussed sodium intake (low sodium foods and drinks, seasoning food without salt and tips for low sodium diet).    Reviewed adequate protein intake. Encouraged receiving protein from both animal and plant based sources.     Discussed BMI and goal weight per surgeon. Pt does eat healthy and portion sizes seem to be in check. Pt attributes lack of mobility as a barrier to weight loss. We discussed considering stationary bike, physical therapy referral, workout video on TV at home (ie easy yoga or chair exercises). Consider keeping food journal again, as she keeps one mentally right now.     Reviewed post txp diet guidelines in brief (will review in further detail post txp):  (1) Review of proper food safety measures d/t immunosuppressant therapy post-op and increased risk for food-borne illness    (2) Avoid the following post txp d/t risk for rejection, unknown effects on the organs, and/or potential interactions with immunosuppressants:  - Herbal, Chinese, holistic, chiropractic, natural, alternative medicines and supplements  - Detoxes and cleanses  - Weight loss pills  - Protein powders or other products with extracts or herbs (ie green tea extract)    (3) Med regimen and possible side effects    Patient Understanding: Pt verbalized understanding of education provided.  Expected Engagement: Good  Follow-Up Plans: PRN     Nutrition Goals  BMI <40 for transplant or per surgeon discretion     Ashley Dailey, RD, LD, CCTD    Viviana Schaefer is a 55 year old female who is being evaluated via a billable telephone visit.      The patient has been notified of following:     \"This telephone visit will be conducted via a call between you and your physician/provider. We have found that certain health care needs can be provided without the need for a physical exam.  This " "service lets us provide the care you need with a short phone conversation.  If a prescription is necessary we can send it directly to your pharmacy.  If lab work is needed we can place an order for that and you can then stop by our lab to have the test done at a later time.    Telephone visits are billed at different rates depending on your insurance coverage. During this emergency period, for some insurers they may be billed the same as an in-person visit.  Please reach out to your insurance provider with any questions.    If during the course of the call the physician/provider feels a telephone visit is not appropriate, you will not be charged for this service.\"    Patient has given verbal consent for Telephone visit? Yes    Phone call duration: 45 minutes                                      "

## 2020-12-20 NOTE — PROGRESS NOTES
"Viviana Schaefer is being evaluated via a billable video visit.    The patient has been notified of following:   \"This video visit will be conducted via a call between you and your physician/provider. We have found that certain health care needs can be provided without the need for an in-person physical exam.  This service lets us provide the care you need with a video conversation.  If a prescription is necessary we can send it directly to your pharmacy.  If lab work is needed we can place an order for that and you can then stop by our lab to have the test done at a later time.  If during the course of the call the physician/provider feels a video visit is not appropriate, you will not be charged for this service.\"     Patient has given verbal consent for Video visit? Yes    Patient would like the video invitation sent by: Text to cell phone: see demographics    Video Start Time: 9:00  End Time: 9:45    Mode of Communication:  Video Conference via [employer]  I have reviewed and updated the patient's Past Medical History, Social History, Family History and Medication List.      [facility]     Date of Visit: 12/15/2020    Reason for referral: Liver Transplant Evaluation    Subjective: Ms. 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    Diagnosed with alcoholic related liver disease 3/2019 after being admitted to [facility] with jaundice and abdominal distention. CT showed cirrhosis without a mass, ascites and a pleural effusion. No prior history of liver disease - states she had an US fall 2018, was not told what it showed, but later was told she had \"fatty liver\". Denies being told LFTs elevated in the past. Underwent thoracentesis and paracentesis that were negative for SBP. She apparently had normal A1AT, AMA, AMA, Ferritin. Ceruloplasmin was low " "at 14, 24 hour urine copper < 1.0 micrograms/L. No KF rings seen with ophthalmology. She was started on diuretics, fluid resolved with lasix 40/nik 50. She tells me that she was diagnosed with PORTER. After this hospitalization, was sober for until Fall 2019 - around that time she had a few drinks and stopped. States she drank again 5/2020 - had a couple of drinks celebrating son's Bday.     States she abused alcohol after each divorce (1998, 2012 - drinking \"a lot\"), and then would drink regularly after that - ranging from nothing to a few drinks week. Last drink. No DUIs. Did outpatient treatment (2003) because ex- felt she was drinking too much, states that he projected his drinking onto her, did this to appease him. And then AA for several years. Was sober between 6432-2483, started drinking 6 months after her divorce. She has had shakes, no withdrawal. States she was drinking socially prior to 3/2019.  CD Eval done recommend she attend 6 individual psychotherapy sessions, attend 2 weekly support group meetings    Readmitted 5/2020 with jaundice and abdominal distention after drinking related to her son's birthday. Na was 118, down from 140 1/2020. Thought to have alcoholic hepatitis - TBR 10.4 (3 1/2020), , ALT 38, Alk Phos 102, INR 1.9. Patient reported she had been sober except for two mikes hard lemonades prior to admission. Started on steroids. Diuretics held (no ascites) and Na improved. Diuretics restarted at lower dose as an outpatient (lasix 20, nik 25).     BR increased again 8/2020 to 11.5, up from 3.8 6/2020. US with Doppler showed cirrhosis. She denied alcohol use. She was readmitted 9/2020 with SOB, found to have a large pleural effusion - tapped felt to be HH. MELD 26. CT showed an indeterminate liver lesion - 15 mm. Diuretics held for KWABENA, then resumed.     Ascites/HH  - On lasix 20/nik 50  - MRI Liver 9/30/2020 showed large HH  - Thinks fluid is better \"feeling quite well\", " "breathing feels ok    HE   - No history of this, does not recall taking lactulose - maybe took in 2019    EV  - Last EGD 1/2020 showed normal esophagus, small HH, mil congestive hepatopathy  - No GIB    Obesity/Weight loss  - 9/2020 was 272 lbs, now 252 lbs - states he is not eating as much, does not have much of an appetite. Current BMI ~ 45     Mobility   - Generally uses walker when walking long distances, uses wheelchair for long distances. Not using walker for the past two weeks at home.     Liver Lesion  - MRI Liver 9/30/2020: Cirrhotic liver, 15 mm lesion in Segment 4/8 - small focus of steatosis v. HCC  - Due for repeat MRI 1/2021    Co morbidities  - Cervical Dysplasia - complete hysterectomy at age 41 - only has R ovary. Had cyst on left ovary. Wanted removed because done of L ovarian cyst, cervical dysplasia, done with childbearing  - Depression - has contributed to drinking in the past. Has done counseling in the past, has been on medications in the past (did not think medications were helpful - only klonopin). States it is \"not good\", does not leave the house much because of anxiety related to being around crowds, has been going on her entire life. History of panic attacks  - HSV  - Medullary Sponge Kidney - had lithotripsy x 5 for stones in the past  - RLS  - No known history of heart disease, MI. Has a \"heart murmur\"  - No history of lung disease  - No other history of cancer  - Never had colonoscopy     ROS: 14 point ROS negative except for positives noted in HPI.    PMHx:  Past Medical History:   Diagnosis Date     Arthritis      Congestive heart failure (H)     Cervical CA      Coronary artery disease     Heart Murmer     Depressive disorder      Hyponatremia        PSHx:  Past Surgical History:   Procedure Laterality Date     ABDOMEN SURGERY       CHOLECYSTECTOMY  2001    patient reported     GI SURGERY       GYN SURGERY       HYSTERECTOMY  2009    including cervix     LITHOTRIPSY      multiple " times, patient reported       FamHx:  Family History   Problem Relation Age of Onset     Depression Sister      Anxiety Disorder Sister      Substance Abuse Sister      Depression Sister      Anxiety Disorder Sister      Depression Sister      Anxiety Disorder Sister    No history of liver disease    SocHx:  Social History     Socioeconomic History     Marital status:      Spouse name: Not on file     Number of children: Not on file     Years of education: Not on file     Highest education level: Not on file   Occupational History     Not on file   Social Needs     Financial resource strain: Not on file     Food insecurity     Worry: Not on file     Inability: Not on file     Transportation needs     Medical: Not on file     Non-medical: Not on file   Tobacco Use     Smoking status: Never Smoker     Smokeless tobacco: Never Used   Substance and Sexual Activity     Alcohol use: Not Currently     Comment: 5/2020     Drug use: Never     Sexual activity: Not on file   Lifestyle     Physical activity     Days per week: Not on file     Minutes per session: Not on file     Stress: Not on file   Relationships     Social connections     Talks on phone: Not on file     Gets together: Not on file     Attends Sikh service: Not on file     Active member of club or organization: Not on file     Attends meetings of clubs or organizations: Not on file     Relationship status: Not on file     Intimate partner violence     Fear of current or ex partner: Not on file     Emotionally abused: Not on file     Physically abused: Not on file     Forced sexual activity: Not on file   Other Topics Concern     Parent/sibling w/ CABG, MI or angioplasty before 65F 55M? Not Asked   Social History Narrative     Not on file   4 sons - ex- has 17 year old (high functioning autistic). 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        Allergies:     Allergies   Allergen Reactions     Codeine      Diazepam      Morphine      PN: LW Reaction: Nausea     Penicillins      PN: LW Reaction: Rash, Generalized. Reportedly had a rash with this as a child and has tolerated Augmentin since per patient. Tolerated Zosyn 3/12/19 in [facility].        Objective:  No vitals taken for this video visit  Constitutional: pleasant woman in NAD  Eyes: non icteric  Respiratory: Normal respiratory excursion   MSK: normal range of motion of visualized extremities  Skin: no jaundice  Psychiatric: normal mood and orientation    Labs:    12/16/2020 labs  Na 136  Creatinine 0.87  Albumin 2.8    ALT 49  TBR 6.9  Alk phos 115  INR 1.9  Platelets 114    11/3/2020 labs  INR 1.9  Na 138  Creatinine 0.70  TBR 6.0  Alk Phos 106  AST 56  Albumin 3.1  Hgb 11.4   .6  MELD Na 21    Imaging:    MRI Liver 9/30/2020    FINDINGS:    Liver: Cirrhotic, nodular configuration of the liver. In the anterolateral aspect of hepatic segment 4/8 (best visualized on series 14 image 36), there is a 15 mm T1 isointense, T1 fat-sat hypointense T2 fat sat hypointense lesion with signal dropout on out of phase imaging suggesting intralesional fat. This appears hypoenhancing on all phases. Of note, arterial phase imaging is late, with contrast visualized in the hepatic veins. No additional suspicious lesion in the liver.    Remainder of abdomen: Markedly enlarged recanalized umbilical vein. The spleen is at the upper limits of normal for size. Minimal ascites. Moderate to large right pleural effusion. No adenopathy. The adrenal glands are unremarkable. Multiple tiny  cysts in the kidneys. No suspicious bony abnormality. No dilated loop of small or large bowel.    IMPRESSION:  1. Cirrhotic configuration of the liver with evidence of portal hypertension including a dilated, recanalized umbilical vein.  2. Lesion in question at the junction of hepatic segments 4A and 8 is indeterminate. It appears hypo-enhancing on all phases, though the arterial phase shows suboptimal timing (closer to portal venous phase). This makes arterial phase hyperenhancement difficult to exclude. Additionally, there is intralesional fat. This could represent a small focus of hepatic steatosis, though adenomas and hepatocellular carcinoma can show intralesional fat is well. For these reasons, follow-up is recommended in 3-6 months to assess for stability.  3. No lesion meeting imaging criteria for hepatocellular carcinoma.  4. 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    Endoscopy:    EGD 1/2020 showed normal esophagus, small HH, mil congestive hepatopathy    Assessment/Plan: Ms. 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 Liver disease complicated by fluid overload - ascites and HH. She has a LR 3 lesion seen on MRI 9/2020.     She has a long history of alcohol use, presented with decompensated 5/2020 related to recurrent drinking. BR morro this summer and she is adamant she has abstained from alcohol. Since 5/2020. BR still in the 6s, most recent MELD is 23. Fortunately she has clinically improved in terms of her fluid overload with diuretics. Discussed the natural history of alcohol related liver disease, hope that her BR will improve with time.     Potential barriers to transplant include obesity, significant anxiety. Given her relapses, agree with assessment that she should engage in alcohol treatment.     - Patient received referral for psychotherapy from PCP - NELDA recommended she schedule this and I agree  -  Recommend PEth testing at    - Get MRI read here and review at [facility], currently has follow up MRI planned for 1/2021  - Continue diuretics per local GI  - Recommend diet and exercise with goal BMI < 40    RTC 3 months with in person visit    Razia Bundy MD MSc  Transplant Hepatologist  [facility]

## 2020-12-23 NOTE — COMMITTEE REVIEW
Abdominal Committee Review Note     Evaluation Date:   Committee Review Date: 12/15/2020    Organ being evaluated for: Liver    Transplant Phase: Referral  Transplant Status: Active    Transplant Coordinator: Landon Cleaning Jr.  Transplant Surgeon:       Referring Physician: Paula Irwin    Primary Diagnosis: Alcoholic cirrhosis  Secondary Diagnosis:     Committee Review Members:  Hepatology Razia Bundy MD   Nutrition Ashley Dailey, RD   Pharmacy Raman Rose, [facility], Ivan Gallardo, [facility]    - Clinical Brooklyn Swanson, [facility], Carisa Zapata, [facility]   Transplant Ovidio Roa MD, Marika Flores, RN, Niurka Calle, RN, Jennifer Thurman, RN, Omar Khan MD, Jr Landon Cleaning RN, Annika Martin MD, Brisa Shanks, REJI, Jacqueline Segal MD, Ross Wang MD, Tim Willett MD, Thomas M. Leventhal, MD, Luis Pretty MD, Joann Sears MD       Transplant Eligibility: Cirrhosis with MELD, ETOH    Committee Review Decision: Needs Re-presentation    Relative Contraindications: Other, pending full eval including CD eval/recs    Absolute Contraindications: None    Committee Chair Razia Bundy MD verbally attested to the committee's decision.    Committee Discussion Details:     Weight loss:  CD eval / recs  Mental health  Full evaluation with in-person visits  - repeat MRI due - in [facility] on 1/11/21  - home PT

## 2020-12-24 NOTE — TELEPHONE ENCOUNTER
Spoke with the patient and confirmed Evals: Pre-Liver Eval appointments on  1/19/21, 1/25/21 and 3/1/21.  Informed patient an itinerary can be accessed via [employer], and will be sent via email.

## 2020-12-28 ENCOUNTER — TELEPHONE (OUTPATIENT)
Dept: TRANSPLANT | Facility: CLINIC | Age: 55
End: 2020-12-28

## 2020-12-30 NOTE — TELEPHONE ENCOUNTER
Returned patient's call.  She has a couple of questions for Jose Rafael about her upcoming liver evaluation appointments:  1.)  MR scheduled at [facility] on 1/19, she also has an MR at [facility] scheduled 1/11/2021. She would like to know if she should cancel the [facility] appt 1/11. I informed her that I think Jose Rafael would recommend her getting imaging here but she would like a call back from him to confirm. Also she has claustrophobia and would like to know if she can take Rx for Lorazepam 2mg prior to test (she has a Rx from referring MD)  2.) Does she need an ECHO scheduled prior to her Dr Parham appt, if so she would like to schedule on 1/19/2021.    Patient is aware Jose Rafael is out on vacation this week and ok with a call early next week from Jose Rafael

## 2021-01-04 ENCOUNTER — DOCUMENTATION ONLY (OUTPATIENT)
Dept: CARE COORDINATION | Facility: CLINIC | Age: 56
End: 2021-01-04

## 2021-01-05 DIAGNOSIS — F40.240 CLAUSTROPHOBIA: Primary | ICD-10-CM

## 2021-01-05 RX ORDER — LORAZEPAM 0.5 MG/1
0.5 TABLET ORAL PRN
Qty: 1 TABLET | Refills: 0 | Status: SHIPPED | OUTPATIENT
Start: 2021-01-05 | End: 2021-03-24

## 2021-01-05 NOTE — TELEPHONE ENCOUNTER
Above done and scheduled    MRI on 1/19/21  Ativan ordered and script sent.  Echo not needed as she had one last summer at [facility]. Will defer further testing to cards for further testing.

## 2021-01-13 NOTE — TELEPHONE ENCOUNTER
Action    Action Taken 1-13: Requested from PN:    CTA Chest    Echo    EKG Strips   9-30-20, 5-17-20 5-18-20 9-30-20, 5-16-20 1-14: Received echo from PN  1-20: confirmed CTAs are in PACS     01/13/21   5:00 PM   Received fax from PN with records, sent to scanning.  Alexia Kaye, CMA

## 2021-01-19 ENCOUNTER — ANCILLARY PROCEDURE (OUTPATIENT)
Dept: BONE DENSITY | Facility: CLINIC | Age: 56
End: 2021-01-19
Attending: STUDENT IN AN ORGANIZED HEALTH CARE EDUCATION/TRAINING PROGRAM
Payer: COMMERCIAL

## 2021-01-19 ENCOUNTER — ANCILLARY PROCEDURE (OUTPATIENT)
Dept: ULTRASOUND IMAGING | Facility: CLINIC | Age: 56
End: 2021-01-19
Attending: STUDENT IN AN ORGANIZED HEALTH CARE EDUCATION/TRAINING PROGRAM
Payer: COMMERCIAL

## 2021-01-19 ENCOUNTER — ANCILLARY PROCEDURE (OUTPATIENT)
Dept: MRI IMAGING | Facility: CLINIC | Age: 56
End: 2021-01-19
Attending: STUDENT IN AN ORGANIZED HEALTH CARE EDUCATION/TRAINING PROGRAM
Payer: COMMERCIAL

## 2021-01-19 ENCOUNTER — ANCILLARY PROCEDURE (OUTPATIENT)
Dept: GENERAL RADIOLOGY | Facility: CLINIC | Age: 56
End: 2021-01-19
Attending: STUDENT IN AN ORGANIZED HEALTH CARE EDUCATION/TRAINING PROGRAM
Payer: COMMERCIAL

## 2021-01-19 DIAGNOSIS — K76.6 PORTAL HYPERTENSION (H): ICD-10-CM

## 2021-01-19 DIAGNOSIS — K70.31 ALCOHOLIC CIRRHOSIS OF LIVER WITH ASCITES (H): ICD-10-CM

## 2021-01-19 LAB
ABO + RH BLD: NORMAL
AFP SERPL-MCNC: 19 UG/L (ref 0–8)
ALBUMIN SERPL-MCNC: 2.4 G/DL (ref 3.4–5)
ALBUMIN UR-MCNC: 30 MG/DL
ALP SERPL-CCNC: 124 U/L (ref 40–150)
ALT SERPL W P-5'-P-CCNC: 25 U/L (ref 0–50)
ANION GAP SERPL CALCULATED.3IONS-SCNC: 9 MMOL/L (ref 3–14)
APPEARANCE UR: ABNORMAL
AST SERPL W P-5'-P-CCNC: 51 U/L (ref 0–45)
BACTERIA #/AREA URNS HPF: ABNORMAL /HPF
BILIRUB DIRECT SERPL-MCNC: 2.2 MG/DL (ref 0–0.2)
BILIRUB SERPL-MCNC: 6.1 MG/DL (ref 0.2–1.3)
BILIRUB UR QL STRIP: NEGATIVE
BLD GP AB SCN SERPL QL: NORMAL
BLOOD BANK CMNT PATIENT-IMP: NORMAL
BUN SERPL-MCNC: 4 MG/DL (ref 7–30)
CALCIUM SERPL-MCNC: 8.4 MG/DL (ref 8.5–10.1)
CHLORIDE SERPL-SCNC: 105 MMOL/L (ref 94–109)
CHOLEST SERPL-MCNC: 253 MG/DL
CO2 SERPL-SCNC: 24 MMOL/L (ref 20–32)
COLOR UR AUTO: YELLOW
CREAT SERPL-MCNC: 0.88 MG/DL (ref 0.52–1.04)
CREAT UR-MCNC: 72 MG/DL
ERYTHROCYTE [DISTWIDTH] IN BLOOD BY AUTOMATED COUNT: 14.8 % (ref 10–15)
GFR SERPL CREATININE-BSD FRML MDRD: 74 ML/MIN/{1.73_M2}
GLUCOSE SERPL-MCNC: 111 MG/DL (ref 70–99)
GLUCOSE UR STRIP-MCNC: NEGATIVE MG/DL
HCG SERPL QL: NEGATIVE
HCT VFR BLD AUTO: 32.2 % (ref 35–47)
HDLC SERPL-MCNC: 43 MG/DL
HGB BLD-MCNC: 11.1 G/DL (ref 11.7–15.7)
HGB UR QL STRIP: ABNORMAL
HYALINE CASTS #/AREA URNS LPF: 4 /LPF (ref 0–2)
INR PPP: 2 (ref 0.86–1.14)
IRON SATN MFR SERPL: 92 % (ref 15–46)
IRON SERPL-MCNC: 161 UG/DL (ref 35–180)
KETONES UR STRIP-MCNC: NEGATIVE MG/DL
LDLC SERPL CALC-MCNC: 180 MG/DL
LEUKOCYTE ESTERASE UR QL STRIP: NEGATIVE
MCH RBC QN AUTO: 35.9 PG (ref 26.5–33)
MCHC RBC AUTO-ENTMCNC: 34.5 G/DL (ref 31.5–36.5)
MCV RBC AUTO: 104 FL (ref 78–100)
NITRATE UR QL: NEGATIVE
NONHDLC SERPL-MCNC: 209 MG/DL
PH UR STRIP: 6 PH (ref 5–7)
PLATELET # BLD AUTO: 114 10E9/L (ref 150–450)
POTASSIUM SERPL-SCNC: 2.8 MMOL/L (ref 3.4–5.3)
PROT SERPL-MCNC: 6.8 G/DL (ref 6.8–8.8)
PROT UR-MCNC: 0.39 G/L
PROT/CREAT 24H UR: 0.55 G/G CR (ref 0–0.2)
RADIOLOGIST FLAGS: ABNORMAL
RBC # BLD AUTO: 3.09 10E12/L (ref 3.8–5.2)
RBC #/AREA URNS AUTO: 160 /HPF (ref 0–2)
SODIUM SERPL-SCNC: 138 MMOL/L (ref 133–144)
SOURCE: ABNORMAL
SP GR UR STRIP: 1.01 (ref 1–1.03)
SPECIMEN EXP DATE BLD: NORMAL
SPECIMEN EXP DATE BLD: NORMAL
SQUAMOUS #/AREA URNS AUTO: 2 /HPF (ref 0–1)
TIBC SERPL-MCNC: 176 UG/DL (ref 240–430)
TRIGL SERPL-MCNC: 148 MG/DL
TSH SERPL DL<=0.005 MIU/L-ACNC: 2.87 MU/L (ref 0.4–4)
UROBILINOGEN UR STRIP-MCNC: 0 MG/DL (ref 0–2)
WBC # BLD AUTO: 6.1 10E9/L (ref 4–11)
WBC #/AREA URNS AUTO: 6 /HPF (ref 0–5)

## 2021-01-19 PROCEDURE — 85610 PROTHROMBIN TIME: CPT | Performed by: PATHOLOGY

## 2021-01-19 PROCEDURE — 86900 BLOOD TYPING SEROLOGIC ABO: CPT | Performed by: PATHOLOGY

## 2021-01-19 PROCEDURE — 86780 TREPONEMA PALLIDUM: CPT | Mod: 90 | Performed by: PATHOLOGY

## 2021-01-19 PROCEDURE — 86850 RBC ANTIBODY SCREEN: CPT | Performed by: PATHOLOGY

## 2021-01-19 PROCEDURE — 80048 BASIC METABOLIC PNL TOTAL CA: CPT | Performed by: PATHOLOGY

## 2021-01-19 PROCEDURE — 80321 ALCOHOLS BIOMARKERS 1OR 2: CPT | Mod: 90 | Performed by: PATHOLOGY

## 2021-01-19 PROCEDURE — 86901 BLOOD TYPING SEROLOGIC RH(D): CPT | Performed by: PATHOLOGY

## 2021-01-19 PROCEDURE — 93975 VASCULAR STUDY: CPT | Mod: GC | Performed by: RADIOLOGY

## 2021-01-19 PROCEDURE — 86886 COOMBS TEST INDIRECT TITER: CPT | Mod: 90 | Performed by: PATHOLOGY

## 2021-01-19 PROCEDURE — A9585 GADOBUTROL INJECTION: HCPCS | Performed by: RADIOLOGY

## 2021-01-19 PROCEDURE — 83550 IRON BINDING TEST: CPT | Performed by: PATHOLOGY

## 2021-01-19 PROCEDURE — 82105 ALPHA-FETOPROTEIN SERUM: CPT | Mod: 90 | Performed by: PATHOLOGY

## 2021-01-19 PROCEDURE — 84703 CHORIONIC GONADOTROPIN ASSAY: CPT | Performed by: PATHOLOGY

## 2021-01-19 PROCEDURE — 86481 TB AG RESPONSE T-CELL SUSP: CPT | Mod: 90 | Performed by: PATHOLOGY

## 2021-01-19 PROCEDURE — 36415 COLL VENOUS BLD VENIPUNCTURE: CPT | Performed by: PATHOLOGY

## 2021-01-19 PROCEDURE — 83540 ASSAY OF IRON: CPT | Performed by: PATHOLOGY

## 2021-01-19 PROCEDURE — 74183 MRI ABD W/O CNTR FLWD CNTR: CPT | Mod: GC | Performed by: RADIOLOGY

## 2021-01-19 PROCEDURE — 80061 LIPID PANEL: CPT | Performed by: PATHOLOGY

## 2021-01-19 PROCEDURE — 80076 HEPATIC FUNCTION PANEL: CPT | Performed by: PATHOLOGY

## 2021-01-19 PROCEDURE — 84443 ASSAY THYROID STIM HORMONE: CPT | Performed by: PATHOLOGY

## 2021-01-19 PROCEDURE — 77080 DXA BONE DENSITY AXIAL: CPT | Performed by: INTERNAL MEDICINE

## 2021-01-19 PROCEDURE — 80307 DRUG TEST PRSMV CHEM ANLYZR: CPT | Mod: 90 | Performed by: PATHOLOGY

## 2021-01-19 PROCEDURE — 86665 EPSTEIN-BARR CAPSID VCA: CPT | Mod: 90 | Performed by: PATHOLOGY

## 2021-01-19 PROCEDURE — 71046 X-RAY EXAM CHEST 2 VIEWS: CPT | Mod: GC | Performed by: RADIOLOGY

## 2021-01-19 PROCEDURE — 82306 VITAMIN D 25 HYDROXY: CPT | Mod: 90 | Performed by: PATHOLOGY

## 2021-01-19 PROCEDURE — 85027 COMPLETE CBC AUTOMATED: CPT | Performed by: PATHOLOGY

## 2021-01-19 PROCEDURE — 86644 CMV ANTIBODY: CPT | Mod: 90 | Performed by: PATHOLOGY

## 2021-01-19 PROCEDURE — 99000 SPECIMEN HANDLING OFFICE-LAB: CPT | Performed by: PATHOLOGY

## 2021-01-19 RX ORDER — GADOBUTROL 604.72 MG/ML
10 INJECTION INTRAVENOUS ONCE
Status: COMPLETED | OUTPATIENT
Start: 2021-01-19 | End: 2021-01-19

## 2021-01-19 RX ADMIN — GADOBUTROL 10 ML: 604.72 INJECTION INTRAVENOUS at 15:45

## 2021-01-19 NOTE — DISCHARGE INSTRUCTIONS
MRI Contrast Discharge Instructions    The IV contrast you received today will pass out of your body in your  urine. This will happen in the next 24 hours. You will not feel this process.  Your urine will not change color.    Drink at least 4 extra glasses of water or juice today (unless your doctor  has restricted your fluids). This reduces the stress on your kidneys.  You may take your regular medicines.    If you are on dialysis: It is best to have dialysis today.    If you have a reaction: Most reactions happen right away. If you have  any new symptoms after leaving the hospital (such as hives or swelling),  call your hospital at the correct number below. Or call your family doctor.  If you have breathing distress or wheezing, call 911.    Special instructions: ***    I have read and understand the above information.    Signature:______________________________________ Date:___________    Staff:__________________________________________ Date:___________     Time:__________    [place] [facility] Departments:    ___[place]: [phone]  ___[facility]: [phone]  ___[place]: [phone] ___[facility]: [phone]  ___[facility]: [phone]  ___[facility]: [phone]  ___[phone]  ___[facility]: [phone]  ___[phone]

## 2021-01-20 DIAGNOSIS — E87.6 HYPOKALEMIA: ICD-10-CM

## 2021-01-20 DIAGNOSIS — E55.9 VITAMIN D DEFICIENCY: Primary | ICD-10-CM

## 2021-01-20 LAB
BLD GP AB SCN TITR SERPL: NORMAL {TITER}
CMV IGG SERPL QL IA: >8 AI (ref 0–0.8)
DEPRECATED CALCIDIOL+CALCIFEROL SERPL-MC: 5 UG/L (ref 20–75)
EBV VCA IGG SER QL IA: >8 AI (ref 0–0.8)
HIV 1+2 AB+HIV1 P24 AG SERPL QL IA: NONREACTIVE
T PALLIDUM AB SER QL: NONREACTIVE

## 2021-01-20 RX ORDER — POTASSIUM CHLORIDE 1500 MG/1
20 TABLET, EXTENDED RELEASE ORAL DAILY
Qty: 30 TABLET | Refills: 1 | Status: SHIPPED | OUTPATIENT
Start: 2021-01-20

## 2021-01-21 LAB
ETHYL GLUCURONIDE UR QL: NEGATIVE
GAMMA INTERFERON BACKGROUND BLD IA-ACNC: 0.16 IU/ML
INTERPRETATION ECG - MUSE: NORMAL
M TB IFN-G CD4+ BCKGRND COR BLD-ACNC: 4.51 IU/ML
M TB TUBERC IFN-G BLD QL: NEGATIVE
MITOGEN IGNF BCKGRD COR BLD-ACNC: 0 IU/ML
MITOGEN IGNF BCKGRD COR BLD-ACNC: 0.02 IU/ML

## 2021-01-22 RX ORDER — CHOLECALCIFEROL (VITAMIN D3) 50 MCG
1 TABLET ORAL DAILY
Qty: 90 TABLET | Refills: 4 | Status: SHIPPED | OUTPATIENT
Start: 2021-04-17

## 2021-01-22 RX ORDER — ERGOCALCIFEROL 1.25 MG/1
50000 CAPSULE, LIQUID FILLED ORAL WEEKLY
Qty: 12 CAPSULE | Refills: 0 | Status: SHIPPED | OUTPATIENT
Start: 2021-01-23 | End: 2021-03-24

## 2021-01-22 NOTE — PROGRESS NOTES
"Per Dr. Bundy in response to K+ level    \"start her on 20 mEq Kcl\"  "
Following note from Dr. Gregor Bundy, MD Black Randle Thomas Jr., RN             Yep sounds great    Previous Messages    ----- Message -----   From: Landon Cleaning Jr., RN   Sent: 1/21/2021   2:16 PM CST   To: Razia Bundy MD   Subject: Vit D supplement???                               Good afternoon Dr. Bundy,     Patient has Vit D level of 5.     Do you want to treat?     50K international unit(s) weekly for 12 weeks???     Thanks, tk           
 used

## 2021-01-25 ENCOUNTER — MYC MEDICAL ADVICE (OUTPATIENT)
Dept: CARDIOLOGY | Facility: CLINIC | Age: 56
End: 2021-01-25

## 2021-01-25 ENCOUNTER — VIRTUAL VISIT (OUTPATIENT)
Dept: CARDIOLOGY | Facility: CLINIC | Age: 56
End: 2021-01-25
Attending: INTERNAL MEDICINE
Payer: COMMERCIAL

## 2021-01-25 ENCOUNTER — PRE VISIT (OUTPATIENT)
Dept: CARDIOLOGY | Facility: CLINIC | Age: 56
End: 2021-01-25

## 2021-01-25 DIAGNOSIS — K70.31 ALCOHOLIC CIRRHOSIS OF LIVER WITH ASCITES (H): ICD-10-CM

## 2021-01-25 DIAGNOSIS — K76.6 PORTAL HYPERTENSION (H): ICD-10-CM

## 2021-01-25 DIAGNOSIS — Z11.59 ENCOUNTER FOR SCREENING FOR OTHER VIRAL DISEASES: Primary | ICD-10-CM

## 2021-01-25 LAB — PETH BLD-MCNC: NEGATIVE NG/ML

## 2021-01-25 PROCEDURE — 99204 OFFICE O/P NEW MOD 45 MIN: CPT | Mod: 95 | Performed by: INTERNAL MEDICINE

## 2021-01-25 RX ORDER — ASPIRIN 81 MG/1
81 TABLET ORAL DAILY
Status: CANCELLED | OUTPATIENT
Start: 2021-01-25 | End: 2021-01-27

## 2021-01-25 RX ORDER — SODIUM CHLORIDE 9 MG/ML
INJECTION, SOLUTION INTRAVENOUS CONTINUOUS
Status: CANCELLED | OUTPATIENT
Start: 2021-01-25

## 2021-01-25 RX ORDER — POTASSIUM CHLORIDE 1500 MG/1
20 TABLET, EXTENDED RELEASE ORAL
Status: CANCELLED | OUTPATIENT
Start: 2021-01-25

## 2021-01-25 RX ORDER — POTASSIUM CHLORIDE 1500 MG/1
40 TABLET, EXTENDED RELEASE ORAL
Status: CANCELLED | OUTPATIENT
Start: 2021-01-25

## 2021-01-25 RX ORDER — LIDOCAINE 40 MG/G
CREAM TOPICAL
Status: CANCELLED | OUTPATIENT
Start: 2021-01-25

## 2021-01-25 NOTE — LETTER
"1/25/2021      RE: Viviana Shcaefer  [address]  [facility] [address]       Dear Colleague,    Thank you for the opportunity to participate in the care of your patient, Viviana Schaefer, at the [facility]. Please see a copy of my visit note below.    Viviana is a 55 year old who is being evaluated via a billable telephone visit.      What phone number would you like to be contacted at? [phone]    How would you like to obtain your AVS? MyChart  Phone call duration: 35 minutes      Viviana Schaefer is a 55 year old female who is being evaluated via a billable telephone visit.      The patient has been notified of following:     \"This telephone visit will be conducted via a call between you and your physician/provider. We have found that certain health care needs can be provided without the need for a physical exam.  This service lets us provide the care you need with a short phone conversation.  If a prescription is necessary we can send it directly to your pharmacy.  If lab work is needed we can place an order for that and you can then stop by our lab to have the test done at a later time.    Telephone visits are billed at different rates depending on your insurance coverage. During this emergency period, for some insurers they may be billed the same as an in-person visit.  Please reach out to your insurance provider with any questions.    If during the course of the call the physician/provider feels a telephone visit is not appropriate, you will not be charged for this service.\"    Patient has given verbal consent for Telephone visit?  Yes    How would you like to obtain your AVS? Mail a copy      [facility]  CARDIOVASCULAR MEDICINE TELEPHONE VISIT NOTE    Referring Provider: Thomas Michael Leventhal   Primary Care Provider: Paula Irwin     Patient Name: Viviana Schaefer   MRN: [id number]     PERTINENT " CLINICAL HISTORY:   Viviana Schaefer is a 55 year old female w/ h/o steatohepatitis pursuing liver transplant evaluation referred for cardiac evaluation.  She has no cardiac history other than prior pericardial effusion (based on patients recollection).  Her last TTE in 5/2020 demonstrated normal function.  She has had a murmur since childhood and has mild aortic stenosis.  She does not exercise due to fatigue, muscle weakness, and knee issues.  She is able to ambulate, but not to the extent of raising her HR substantially.  Her BP runs normal or low.  She has obesity.  She has no family history of premature CAD or other heart issues.  She has had prior surgeries including gall bladder and lithotripsy without any anesthesia issues.  She smoked tobacco previously - last 8-9 years ago.  She does not drink alcohol or use illegal drugs.  She denies any chest pain, palpitations, SOB, edema, orthopnea, PND, syncope or near syncope.     PAST MEDICAL HISTORY:     Past Medical History:   Diagnosis Date     Arthritis      Congestive heart failure (H)     Cervical CA      Coronary artery disease     Heart Murmer     Depressive disorder      Hyponatremia         PAST SURGICAL HISTORY:     Past Surgical History:   Procedure Laterality Date     ABDOMEN SURGERY       CHOLECYSTECTOMY  2001    patient reported     GI SURGERY       GYN SURGERY       HYSTERECTOMY  2009    including cervix     LITHOTRIPSY      multiple times, patient reported        CURRENT MEDICATIONS:     Current Outpatient Medications   Medication Sig Dispense Refill     calcium carbonate-vitamin D (OSCAL W/D) 500-200 MG-UNIT tablet Take 1 tablet by mouth 2 times daily (with meals) 180 tablet 3     ferrous sulfate (FEROSUL) 325 (65 Fe) MG tablet TAKE 1 TABLET BY MOUTH DAILY WITH BREAKFAST       folic acid (FOLVITE) 1 MG tablet TK 1 T PO D       furosemide (LASIX) 40 MG tablet Take 20 mg by mouth       LORazepam (ATIVAN) 0.5 MG tablet Take 1 tablet (0.5 mg) by  mouth as needed for anxiety (As needed prior to MRI) 1 tablet 0     omeprazole (PRILOSEC) 20 MG DR capsule Take 20 mg by mouth       potassium chloride ER (KLOR-CON M) 20 MEQ CR tablet Take 1 tablet (20 mEq) by mouth daily 30 tablet 1     pregabalin (LYRICA) 50 MG capsule TK 1 C PO BID       spironolactone (ALDACTONE) 50 MG tablet Take 50 mg by mouth       vitamin D2 (ERGOCALCIFEROL) 47515 units (1250 mcg) capsule Take 1 capsule (50,000 Units) by mouth once a week for 12 doses 12 capsule 0     [START ON 4/17/2021] vitamin D3 (CHOLECALCIFEROL) 50 mcg (2000 units) tablet Take 1 tablet (50 mcg) by mouth daily 90 tablet 4        ALLERGIES:     Allergies   Allergen Reactions     Codeine      Diazepam      Morphine      PN: LW Reaction: Nausea     Penicillins      PN: LW Reaction: Rash, Generalized. Reportedly had a rash with this as a child and has tolerated Augmentin since per patient. Tolerated Zosyn 3/12/19 in [facility].         FAMILY HISTORY:     Family History   Problem Relation Age of Onset     Depression Sister      Anxiety Disorder Sister      Substance Abuse Sister      Depression Sister      Anxiety Disorder Sister      Depression Sister      Anxiety Disorder Sister         SOCIAL HISTORY:     Social History     Socioeconomic History     Marital status:      Spouse name: Not on file     Number of children: Not on file     Years of education: Not on file     Highest education level: Not on file   Occupational History     Not on file   Social Needs     Financial resource strain: Not on file     Food insecurity     Worry: Not on file     Inability: Not on file     Transportation needs     Medical: Not on file     Non-medical: Not on file   Tobacco Use     Smoking status: Never Smoker     Smokeless tobacco: Never Used   Substance and Sexual Activity     Alcohol use: Not Currently     Comment: 5/2020     Drug use: Never     Sexual activity: Not on file   Lifestyle     Physical activity     Days  per week: Not on file     Minutes per session: Not on file     Stress: Not on file   Relationships     Social connections     Talks on phone: Not on file     Gets together: Not on file     Attends Buddhism service: Not on file     Active member of club or organization: Not on file     Attends meetings of clubs or organizations: Not on file     Relationship status: Not on file     Intimate partner violence     Fear of current or ex partner: Not on file     Emotionally abused: Not on file     Physically abused: Not on file     Forced sexual activity: Not on file   Other Topics Concern     Parent/sibling w/ CABG, MI or angioplasty before 65F 55M? Not Asked   Social History Narrative     Not on file        REVIEW OF SYSTEMS:   A comprehensive review of systems was performed and negative unless otherwise noted in the HPI above.      PHYSICAL EXAMINATION:   No vitals were collected as this was a telephone visit.    Constitutional: no acute distress, pleasant and cooperative, appears overall well.   Neurologic: AOx3  Psychiatric: appropriate affect, intact thought and speech       LABORATORY DATA:     LIPID RESULTS:  Recent Labs   Lab Test 01/19/21  1653   CHOL 253*   HDL 43*   *   TRIG 148        LIVER ENZYME RESULTS:  Recent Labs   Lab Test 01/19/21  1653   AST 51*   ALT 25       CBC RESULTS:  Recent Labs   Lab Test 01/19/21  1653   WBC 6.1   HGB 11.1*   HCT 32.2*   *       BMP RESULTS:  Recent Labs   Lab Test 01/19/21  1653      POTASSIUM 2.8*   CHLORIDE 105   CO2 24   ANIONGAP 9   *   BUN 4*   CR 0.88   ELIUD 8.4*       INR RESULTS:  Recent Labs   Lab Test 01/19/21  1653   INR 2.00*          PROCEDURES & FURTHER ASSESSMENTS:     ECHO: 5/18/2020  LVEF 55%  RV appeared normal  Mild aortic stenosis, mean gradient 12 mmHg  Could not assess PA pressures  No pericardial effusion       CLINICAL IMPRESSION:   Viviana Schaefer is a 55 year old female w/ h/o HLD, obesity, and steatohepatitis pursuing  liver transplant evaluation referred for cardiac evaluation.  She has no symptoms of cardiac disease or family history.  However, she is unable to exercise.  No PA pressures could be demonstrated on her last TTE.    PLAN:  --Coronary angiogram and RHC; possible PCI if severe CAD is present.  We discussed the possible need to delay transplant if PCI is performed  --Given high INRs in the past, would like her to have INR checked on day prior to procedures.  Will need FFP on the day of procedure if INR>2.  Will proceed with radial access for angiogram if possible to minimize bleeding risk.  --No changes to medications at this time    Follow-up: PRN after angiogram    Thank you for allowing us to take part in the care of this very pleasant patient.  Please do not hesitate to call if any further questions or concerns arise.    Deyvi Parham MD, PhD  Interventional/Critical Care Cardiology  [phone]    January 25, 2021      CC  Patient Care Team:  Paula Irwin NP as PCP - General  Tim Willett MD as Assigned Surgical Provider  Razia Bundy MD as Assigned Gastroenterology Provider  Landon Cleaning Jr., RN as Nurse Coordinator (Transplant)  LEVENTHAL, THOMAS MICHAEL      Phone call duration: 35 minutes    Deyvi Parham MD, PhD        Please do not hesitate to contact me if you have any questions/concerns.     Sincerely,     Deyvi Parham MD

## 2021-01-25 NOTE — PROGRESS NOTES
Viviana is a 55 year old who is being evaluated via a billable telephone visit.      What phone number would you like to be contacted at? [phone]    How would you like to obtain your AVS? Nuris  Phone call duration: 35 minutes

## 2021-01-25 NOTE — PATIENT INSTRUCTIONS
You have been scheduled for a coronary angiogram and right heart catheterization on Friday February 5, 2021  Please arrive at the [facility] Waiting Room at 10:30 am.  [facility], [address] [address]    You will be contacted by the COVID team 3 -4 days prior to the procedure for testing. They will be responsible for getting you the test results.    Because of COVID, NO visitors are allowed in the hospital.    Please do not eat or drink anything after midnight. Your will need to take a baby aspirin  (81 mg ) the day before and the day of the procedure. You should take all your morning medications as prescribed with sips of water.       When you arrive you will be escorted back to the pre procedure area called 2A. Here they will insert an IV, draw labs, and obtain a short medical history. Please bring an updated list of your current medications.    A physician will come and talk with you about the procedure and obtain consent.    A nurse from the Cardiac Catheterization Lab will then escort you to the procedure area. You will be receiving sedation during the procedure so you will need someone to drive you to and from the hospital.    After the procedure you will recover for a short period (2 - 6 hours) on 6B. You will be discharged with instructions for post procedure care.  However, depending on what the angiogram shows you may have to have stents placed and this might require an overnight stay. We ask that you bring a small bag of necessities for your comfort if you would need to stay overnight. DO NOT BRING ANY VALUABLES!

## 2021-01-25 NOTE — PROGRESS NOTES
"Viviana Schaefer is a 55 year old female who is being evaluated via a billable telephone visit.      The patient has been notified of following:     \"This telephone visit will be conducted via a call between you and your physician/provider. We have found that certain health care needs can be provided without the need for a physical exam.  This service lets us provide the care you need with a short phone conversation.  If a prescription is necessary we can send it directly to your pharmacy.  If lab work is needed we can place an order for that and you can then stop by our lab to have the test done at a later time.    Telephone visits are billed at different rates depending on your insurance coverage. During this emergency period, for some insurers they may be billed the same as an in-person visit.  Please reach out to your insurance provider with any questions.    If during the course of the call the physician/provider feels a telephone visit is not appropriate, you will not be charged for this service.\"    Patient has given verbal consent for Telephone visit?  Yes    How would you like to obtain your AVS? Mail a copy      [facility]  CARDIOVASCULAR MEDICINE TELEPHONE VISIT NOTE    Referring Provider: Thomas Michael Leventhal   Primary Care Provider: Paula Irwin     Patient Name: Viviana Schaefer   MRN: [id number]     PERTINENT CLINICAL HISTORY:   Viviana Schaefer is a 55 year old female w/ h/o steatohepatitis pursuing liver transplant evaluation referred for cardiac evaluation.  She has no cardiac history other than prior pericardial effusion (based on patients recollection).  Her last TTE in 5/2020 demonstrated normal function.  She has had a murmur since childhood and has mild aortic stenosis.  She does not exercise due to fatigue, muscle weakness, and knee issues.  She is able to ambulate, but not to the extent of raising her HR substantially.  Her BP runs normal or low.  She has obesity.  " She has no family history of premature CAD or other heart issues.  She has had prior surgeries including gall bladder and lithotripsy without any anesthesia issues.  She smoked tobacco previously - last 8-9 years ago.  She does not drink alcohol or use illegal drugs.  She denies any chest pain, palpitations, SOB, edema, orthopnea, PND, syncope or near syncope.     PAST MEDICAL HISTORY:     Past Medical History:   Diagnosis Date     Arthritis      Congestive heart failure (H)     Cervical CA      Coronary artery disease     Heart Murmer     Depressive disorder      Hyponatremia         PAST SURGICAL HISTORY:     Past Surgical History:   Procedure Laterality Date     ABDOMEN SURGERY       CHOLECYSTECTOMY  2001    patient reported     GI SURGERY       GYN SURGERY       HYSTERECTOMY  2009    including cervix     LITHOTRIPSY      multiple times, patient reported        CURRENT MEDICATIONS:     Current Outpatient Medications   Medication Sig Dispense Refill     calcium carbonate-vitamin D (OSCAL W/D) 500-200 MG-UNIT tablet Take 1 tablet by mouth 2 times daily (with meals) 180 tablet 3     ferrous sulfate (FEROSUL) 325 (65 Fe) MG tablet TAKE 1 TABLET BY MOUTH DAILY WITH BREAKFAST       folic acid (FOLVITE) 1 MG tablet TK 1 T PO D       furosemide (LASIX) 40 MG tablet Take 20 mg by mouth       LORazepam (ATIVAN) 0.5 MG tablet Take 1 tablet (0.5 mg) by mouth as needed for anxiety (As needed prior to MRI) 1 tablet 0     omeprazole (PRILOSEC) 20 MG DR capsule Take 20 mg by mouth       potassium chloride ER (KLOR-CON M) 20 MEQ CR tablet Take 1 tablet (20 mEq) by mouth daily 30 tablet 1     pregabalin (LYRICA) 50 MG capsule TK 1 C PO BID       spironolactone (ALDACTONE) 50 MG tablet Take 50 mg by mouth       vitamin D2 (ERGOCALCIFEROL) 12341 units (1250 mcg) capsule Take 1 capsule (50,000 Units) by mouth once a week for 12 doses 12 capsule 0     [START ON 4/17/2021] vitamin D3 (CHOLECALCIFEROL) 50 mcg (2000 units) tablet Take 1  tablet (50 mcg) by mouth daily 90 tablet 4        ALLERGIES:     Allergies   Allergen Reactions     Codeine      Diazepam      Morphine      PN: LW Reaction: Nausea     Penicillins      PN: LW Reaction: Rash, Generalized. Reportedly had a rash with this as a child and has tolerated Augmentin since per patient. Tolerated Zosyn 3/12/19 in [facility].         FAMILY HISTORY:     Family History   Problem Relation Age of Onset     Depression Sister      Anxiety Disorder Sister      Substance Abuse Sister      Depression Sister      Anxiety Disorder Sister      Depression Sister      Anxiety Disorder Sister         SOCIAL HISTORY:     Social History     Socioeconomic History     Marital status:      Spouse name: Not on file     Number of children: Not on file     Years of education: Not on file     Highest education level: Not on file   Occupational History     Not on file   Social Needs     Financial resource strain: Not on file     Food insecurity     Worry: Not on file     Inability: Not on file     Transportation needs     Medical: Not on file     Non-medical: Not on file   Tobacco Use     Smoking status: Never Smoker     Smokeless tobacco: Never Used   Substance and Sexual Activity     Alcohol use: Not Currently     Comment: 5/2020     Drug use: Never     Sexual activity: Not on file   Lifestyle     Physical activity     Days per week: Not on file     Minutes per session: Not on file     Stress: Not on file   Relationships     Social connections     Talks on phone: Not on file     Gets together: Not on file     Attends [employer] service: Not on file     Active member of club or organization: Not on file     Attends meetings of clubs or organizations: Not on file     Relationship status: Not on file     Intimate partner violence     Fear of current or ex partner: Not on file     Emotionally abused: Not on file     Physically abused: Not on file     Forced sexual activity: Not on file   Other Topics  Concern     Parent/sibling w/ CABG, MI or angioplasty before 65F 55M? Not Asked   Social History Narrative     Not on file        REVIEW OF SYSTEMS:   A comprehensive review of systems was performed and negative unless otherwise noted in the HPI above.      PHYSICAL EXAMINATION:   No vitals were collected as this was a telephone visit.    Constitutional: no acute distress, pleasant and cooperative, appears overall well.   Neurologic: AOx3  Psychiatric: appropriate affect, intact thought and speech       LABORATORY DATA:     LIPID RESULTS:  Recent Labs   Lab Test 01/19/21  1653   CHOL 253*   HDL 43*   *   TRIG 148        LIVER ENZYME RESULTS:  Recent Labs   Lab Test 01/19/21  1653   AST 51*   ALT 25       CBC RESULTS:  Recent Labs   Lab Test 01/19/21  1653   WBC 6.1   HGB 11.1*   HCT 32.2*   *       BMP RESULTS:  Recent Labs   Lab Test 01/19/21  1653      POTASSIUM 2.8*   CHLORIDE 105   CO2 24   ANIONGAP 9   *   BUN 4*   CR 0.88   ELIUD 8.4*       INR RESULTS:  Recent Labs   Lab Test 01/19/21  1653   INR 2.00*          PROCEDURES & FURTHER ASSESSMENTS:     ECHO: 5/18/2020  LVEF 55%  RV appeared normal  Mild aortic stenosis, mean gradient 12 mmHg  Could not assess PA pressures  No pericardial effusion       CLINICAL IMPRESSION:   Viviana Schaefer is a 55 year old female w/ h/o HLD, obesity, and steatohepatitis pursuing liver transplant evaluation referred for cardiac evaluation.  She has no symptoms of cardiac disease or family history.  However, she is unable to exercise.  No PA pressures could be demonstrated on her last TTE.    PLAN:  --Coronary angiogram and RHC; possible PCI if severe CAD is present.  We discussed the possible need to delay transplant if PCI is performed  --Given high INRs in the past, would like her to have INR checked on day prior to procedures.  Will need FFP on the day of procedure if INR>2.  Will proceed with radial access for angiogram if possible to minimize  bleeding risk.  --No changes to medications at this time    Follow-up: PRN after angiogram    Thank you for allowing us to take part in the care of this very pleasant patient.  Please do not hesitate to call if any further questions or concerns arise.    Deyvi Parham MD, PhD  Interventional/Critical Care Cardiology  [phone]    January 25, 2021      ADDENDUM:  2/7/2021    RHC and coronary angiogram results (2/5/2021):    Mild non-obstructive coronary artery disease    Normal PA pressures.    Left sided filling pressures are normal.    Right sided filling pressures are normal.    Normal cardiac output level.      Viviana Schaefer is low risk for life threatening cardiovascular events in the setting of surgery.  The transplant workup and subsequent surgery should continue as determined by his transplant team.    Deyvi Parham MD, PhD  Interventional/Critical Care Cardiology  [phone]    February 7, 2021      CC  Patient Care Team:  Paula Irwin NP as PCP - General  Tim Willett MD as Assigned Surgical Provider  Razia Bundy MD as Assigned Gastroenterology Provider  Landon Cleaning Jr., RN as Nurse Coordinator (Transplant)  LEVENTHAL, THOMAS MICHAEL      Phone call duration: 35 minutes    Deyvi Parham MD, PhD

## 2021-02-01 DIAGNOSIS — Z11.59 ENCOUNTER FOR SCREENING FOR OTHER VIRAL DISEASES: ICD-10-CM

## 2021-02-01 LAB
SARS-COV-2 RNA RESP QL NAA+PROBE: NORMAL
SPECIMEN SOURCE: NORMAL

## 2021-02-01 PROCEDURE — U0003 INFECTIOUS AGENT DETECTION BY NUCLEIC ACID (DNA OR RNA); SEVERE ACUTE RESPIRATORY SYNDROME CORONAVIRUS 2 (SARS-COV-2) (CORONAVIRUS DISEASE [COVID-19]), AMPLIFIED PROBE TECHNIQUE, MAKING USE OF HIGH THROUGHPUT TECHNOLOGIES AS DESCRIBED BY CMS-2020-01-R: HCPCS | Performed by: INTERNAL MEDICINE

## 2021-02-01 PROCEDURE — U0005 INFEC AGEN DETEC AMPLI PROBE: HCPCS | Performed by: INTERNAL MEDICINE

## 2021-02-02 LAB
LABORATORY COMMENT REPORT: NORMAL
SARS-COV-2 RNA RESP QL NAA+PROBE: NEGATIVE
SPECIMEN SOURCE: NORMAL

## 2021-02-04 ENCOUNTER — TELEPHONE (OUTPATIENT)
Dept: TRANSPLANT | Facility: CLINIC | Age: 56
End: 2021-02-04

## 2021-02-04 ENCOUNTER — TELEPHONE (OUTPATIENT)
Dept: CARDIOLOGY | Facility: CLINIC | Age: 56
End: 2021-02-04

## 2021-02-04 NOTE — TELEPHONE ENCOUNTER
[facility] Call Center    Phone Message    May a detailed message be left on voicemail: yes     Reason for Call: Other: Pt would like a call back from Melania, because she has a procedure tomorrow and they needed her INR.     Action Taken: Message routed to:  [facility] cardio    Travel Screening: Not Applicable

## 2021-02-04 NOTE — TELEPHONE ENCOUNTER
Transplant Social Work Services Progress Note      Date of Initial Social Work Evaluation: 12/15//20  Collaborated with: Liver Transplant Team    Data:  Viviana is being evaluated for Liver Transplantation.  Intervention: I called Viviana and evaluated her progress with CD assessment recommendations.    Assessment: Viviana reports she will be establishing care with a psychiatrist (appointment on 2/17/21) and psychologist (appointment on 2/15/21) through [facility].  She understands the importantance of attending to her mental health, and she understands the minimum recommendation is for her to complete six psychotherapy sessions.  Viviana reports sobriety since May of 2020.  PEth on 1/19/21 was negative.  Education provided by SW: Caregiver Agreement for Liver Transplantation-patient has identified her sister Manju ([place], [place]) as her primary care giver.  Her niece Shelly and sister Felicia will also likely be involved.  Plan: I will continue to monitor patient's progress with the above recommendations.  Ongoing PEth testing is recommended per our team's guidelines.    Viviana will talk with her sister Manju about post transplant care giving and request Manju call me to discuss post transplant care giving.      YOVANY Olivares, [facility]  Liver Transplant   Phone [phone]  Pager [phone]

## 2021-02-04 NOTE — TELEPHONE ENCOUNTER
Patient called in with INR  1.6. She has a procedure tomorrow, RHC and angiogram 2/5/21. She has a ride to the hospital (with son) and won't be able to pick her up until 8:30. Spoke with patient care placement and arrangements can be made for patient to stay in a room on 3 C even after discharge until son picks her up.

## 2021-02-05 ENCOUNTER — APPOINTMENT (OUTPATIENT)
Dept: MEDSURG UNIT | Facility: CLINIC | Age: 56
End: 2021-02-05
Attending: INTERNAL MEDICINE
Payer: COMMERCIAL

## 2021-02-05 ENCOUNTER — APPOINTMENT (OUTPATIENT)
Dept: LAB | Facility: CLINIC | Age: 56
End: 2021-02-05
Attending: INTERNAL MEDICINE
Payer: COMMERCIAL

## 2021-02-05 ENCOUNTER — HOSPITAL ENCOUNTER (OUTPATIENT)
Facility: CLINIC | Age: 56
Discharge: HOME | End: 2021-02-05
Attending: INTERNAL MEDICINE | Admitting: INTERNAL MEDICINE
Payer: COMMERCIAL

## 2021-02-05 VITALS
SYSTOLIC BLOOD PRESSURE: 131 MMHG | BODY MASS INDEX: 43.35 KG/M2 | RESPIRATION RATE: 16 BRPM | OXYGEN SATURATION: 93 % | TEMPERATURE: 97.8 F | WEIGHT: 237 LBS | HEART RATE: 95 BPM | DIASTOLIC BLOOD PRESSURE: 69 MMHG

## 2021-02-05 DIAGNOSIS — K70.31 ALCOHOLIC CIRRHOSIS OF LIVER WITH ASCITES (H): ICD-10-CM

## 2021-02-05 DIAGNOSIS — K76.6 PORTAL HYPERTENSION (H): ICD-10-CM

## 2021-02-05 PROBLEM — Z98.890 STATUS POST CORONARY ANGIOGRAM: Status: ACTIVE | Noted: 2021-02-05

## 2021-02-05 LAB
ANION GAP SERPL CALCULATED.3IONS-SCNC: 10 MMOL/L (ref 3–14)
APTT PPP: 44 SEC (ref 22–37)
BUN SERPL-MCNC: 6 MG/DL (ref 7–30)
CALCIUM SERPL-MCNC: 9.8 MG/DL (ref 8.5–10.1)
CHLORIDE SERPL-SCNC: 107 MMOL/L (ref 94–109)
CO2 SERPL-SCNC: 22 MMOL/L (ref 20–32)
CREAT SERPL-MCNC: 0.97 MG/DL (ref 0.52–1.04)
ERYTHROCYTE [DISTWIDTH] IN BLOOD BY AUTOMATED COUNT: 15.7 % (ref 10–15)
GFR SERPL CREATININE-BSD FRML MDRD: 66 ML/MIN/{1.73_M2}
GLUCOSE SERPL-MCNC: 125 MG/DL (ref 70–99)
HCT VFR BLD AUTO: 38.1 % (ref 35–47)
HGB BLD-MCNC: 12.6 G/DL (ref 11.7–15.7)
INR PPP: 1.74 (ref 0.86–1.14)
INTERPRETATION ECG - MUSE: NORMAL
MCH RBC QN AUTO: 35.8 PG (ref 26.5–33)
MCHC RBC AUTO-ENTMCNC: 33.1 G/DL (ref 31.5–36.5)
MCV RBC AUTO: 108 FL (ref 78–100)
PLATELET # BLD AUTO: 114 10E9/L (ref 150–450)
POTASSIUM SERPL-SCNC: 3.8 MMOL/L (ref 3.4–5.3)
RBC # BLD AUTO: 3.52 10E12/L (ref 3.8–5.2)
SODIUM SERPL-SCNC: 140 MMOL/L (ref 133–144)
WBC # BLD AUTO: 6.3 10E9/L (ref 4–11)

## 2021-02-05 PROCEDURE — 250N000011 HC RX IP 250 OP 636: Performed by: INTERNAL MEDICINE

## 2021-02-05 PROCEDURE — 93010 ELECTROCARDIOGRAM REPORT: CPT | Performed by: INTERNAL MEDICINE

## 2021-02-05 PROCEDURE — C1894 INTRO/SHEATH, NON-LASER: HCPCS | Performed by: INTERNAL MEDICINE

## 2021-02-05 PROCEDURE — 250N000013 HC RX MED GY IP 250 OP 250 PS 637: Performed by: INTERNAL MEDICINE

## 2021-02-05 PROCEDURE — 99152 MOD SED SAME PHYS/QHP 5/>YRS: CPT | Performed by: INTERNAL MEDICINE

## 2021-02-05 PROCEDURE — 272N000001 HC OR GENERAL SUPPLY STERILE: Performed by: INTERNAL MEDICINE

## 2021-02-05 PROCEDURE — 250N000009 HC RX 250: Performed by: INTERNAL MEDICINE

## 2021-02-05 PROCEDURE — 93456 R HRT CORONARY ARTERY ANGIO: CPT | Performed by: INTERNAL MEDICINE

## 2021-02-05 PROCEDURE — 85730 THROMBOPLASTIN TIME PARTIAL: CPT | Performed by: INTERNAL MEDICINE

## 2021-02-05 PROCEDURE — 85610 PROTHROMBIN TIME: CPT | Performed by: INTERNAL MEDICINE

## 2021-02-05 PROCEDURE — 36415 COLL VENOUS BLD VENIPUNCTURE: CPT | Performed by: INTERNAL MEDICINE

## 2021-02-05 PROCEDURE — 999N000142 HC STATISTIC PROCEDURE PREP ONLY

## 2021-02-05 PROCEDURE — 85027 COMPLETE CBC AUTOMATED: CPT | Performed by: INTERNAL MEDICINE

## 2021-02-05 PROCEDURE — 80048 BASIC METABOLIC PNL TOTAL CA: CPT | Performed by: INTERNAL MEDICINE

## 2021-02-05 RX ORDER — POTASSIUM CHLORIDE 750 MG/1
20 TABLET, EXTENDED RELEASE ORAL
Status: DISCONTINUED | OUTPATIENT
Start: 2021-02-05 | End: 2021-02-05 | Stop reason: HOSPADM

## 2021-02-05 RX ORDER — SODIUM CHLORIDE 9 MG/ML
INJECTION, SOLUTION INTRAVENOUS CONTINUOUS
Status: DISCONTINUED | OUTPATIENT
Start: 2021-02-05 | End: 2021-02-05 | Stop reason: HOSPADM

## 2021-02-05 RX ORDER — HEPARIN SODIUM 10000 [USP'U]/100ML
100-1000 INJECTION, SOLUTION INTRAVENOUS CONTINUOUS PRN
Status: DISCONTINUED | OUTPATIENT
Start: 2021-02-05 | End: 2021-02-05 | Stop reason: HOSPADM

## 2021-02-05 RX ORDER — NITROGLYCERIN 5 MG/ML
VIAL (ML) INTRAVENOUS
Status: DISCONTINUED | OUTPATIENT
Start: 2021-02-05 | End: 2021-02-05 | Stop reason: HOSPADM

## 2021-02-05 RX ORDER — NALOXONE HYDROCHLORIDE 0.4 MG/ML
0.2 INJECTION, SOLUTION INTRAMUSCULAR; INTRAVENOUS; SUBCUTANEOUS
Status: DISCONTINUED | OUTPATIENT
Start: 2021-02-05 | End: 2021-02-05 | Stop reason: HOSPADM

## 2021-02-05 RX ORDER — ARGATROBAN 1 MG/ML
150 INJECTION, SOLUTION INTRAVENOUS
Status: DISCONTINUED | OUTPATIENT
Start: 2021-02-05 | End: 2021-02-05 | Stop reason: HOSPADM

## 2021-02-05 RX ORDER — ASPIRIN 81 MG/1
81 TABLET ORAL DAILY
Status: DISCONTINUED | OUTPATIENT
Start: 2021-02-05 | End: 2021-02-05 | Stop reason: HOSPADM

## 2021-02-05 RX ORDER — NICARDIPINE HYDROCHLORIDE 2.5 MG/ML
INJECTION INTRAVENOUS
Status: DISCONTINUED | OUTPATIENT
Start: 2021-02-05 | End: 2021-02-05 | Stop reason: HOSPADM

## 2021-02-05 RX ORDER — NALOXONE HYDROCHLORIDE 0.4 MG/ML
0.4 INJECTION, SOLUTION INTRAMUSCULAR; INTRAVENOUS; SUBCUTANEOUS
Status: DISCONTINUED | OUTPATIENT
Start: 2021-02-05 | End: 2021-02-05 | Stop reason: HOSPADM

## 2021-02-05 RX ORDER — HEPARIN SODIUM 1000 [USP'U]/ML
INJECTION, SOLUTION INTRAVENOUS; SUBCUTANEOUS
Status: DISCONTINUED | OUTPATIENT
Start: 2021-02-05 | End: 2021-02-05 | Stop reason: HOSPADM

## 2021-02-05 RX ORDER — ARGATROBAN 1 MG/ML
350 INJECTION, SOLUTION INTRAVENOUS
Status: DISCONTINUED | OUTPATIENT
Start: 2021-02-05 | End: 2021-02-05 | Stop reason: HOSPADM

## 2021-02-05 RX ORDER — ATROPINE SULFATE 0.1 MG/ML
0.5 INJECTION INTRAVENOUS
Status: DISCONTINUED | OUTPATIENT
Start: 2021-02-05 | End: 2021-02-05 | Stop reason: HOSPADM

## 2021-02-05 RX ORDER — IOPAMIDOL 755 MG/ML
INJECTION, SOLUTION INTRAVASCULAR
Status: DISCONTINUED | OUTPATIENT
Start: 2021-02-05 | End: 2021-02-05 | Stop reason: HOSPADM

## 2021-02-05 RX ORDER — FENTANYL CITRATE 50 UG/ML
25-50 INJECTION, SOLUTION INTRAMUSCULAR; INTRAVENOUS
Status: ACTIVE | OUTPATIENT
Start: 2021-02-05 | End: 2021-02-05

## 2021-02-05 RX ORDER — LIDOCAINE 40 MG/G
CREAM TOPICAL
Status: DISCONTINUED | OUTPATIENT
Start: 2021-02-05 | End: 2021-02-05 | Stop reason: HOSPADM

## 2021-02-05 RX ORDER — DOBUTAMINE HYDROCHLORIDE 200 MG/100ML
2-20 INJECTION INTRAVENOUS CONTINUOUS PRN
Status: DISCONTINUED | OUTPATIENT
Start: 2021-02-05 | End: 2021-02-05 | Stop reason: HOSPADM

## 2021-02-05 RX ORDER — NITROGLYCERIN 20 MG/100ML
10-200 INJECTION INTRAVENOUS CONTINUOUS PRN
Status: DISCONTINUED | OUTPATIENT
Start: 2021-02-05 | End: 2021-02-05 | Stop reason: HOSPADM

## 2021-02-05 RX ORDER — FENTANYL CITRATE 50 UG/ML
INJECTION, SOLUTION INTRAMUSCULAR; INTRAVENOUS
Status: DISCONTINUED | OUTPATIENT
Start: 2021-02-05 | End: 2021-02-05 | Stop reason: HOSPADM

## 2021-02-05 RX ORDER — NALOXONE HYDROCHLORIDE 0.4 MG/ML
0.4 INJECTION, SOLUTION INTRAMUSCULAR; INTRAVENOUS; SUBCUTANEOUS
Status: DISCONTINUED | OUTPATIENT
Start: 2021-02-05 | End: 2021-02-05

## 2021-02-05 RX ORDER — NALOXONE HYDROCHLORIDE 0.4 MG/ML
0.2 INJECTION, SOLUTION INTRAMUSCULAR; INTRAVENOUS; SUBCUTANEOUS
Status: DISCONTINUED | OUTPATIENT
Start: 2021-02-05 | End: 2021-02-05

## 2021-02-05 RX ORDER — EPTIFIBATIDE 2 MG/ML
180 INJECTION, SOLUTION INTRAVENOUS EVERY 10 MIN PRN
Status: DISCONTINUED | OUTPATIENT
Start: 2021-02-05 | End: 2021-02-05 | Stop reason: HOSPADM

## 2021-02-05 RX ORDER — DOPAMINE HYDROCHLORIDE 160 MG/100ML
2-20 INJECTION, SOLUTION INTRAVENOUS CONTINUOUS PRN
Status: DISCONTINUED | OUTPATIENT
Start: 2021-02-05 | End: 2021-02-05 | Stop reason: HOSPADM

## 2021-02-05 RX ORDER — FLUMAZENIL 0.1 MG/ML
0.2 INJECTION, SOLUTION INTRAVENOUS
Status: DISCONTINUED | OUTPATIENT
Start: 2021-02-05 | End: 2021-02-05 | Stop reason: HOSPADM

## 2021-02-05 RX ORDER — POTASSIUM CHLORIDE 750 MG/1
40 TABLET, EXTENDED RELEASE ORAL
Status: DISCONTINUED | OUTPATIENT
Start: 2021-02-05 | End: 2021-02-05 | Stop reason: HOSPADM

## 2021-02-05 RX ORDER — EPTIFIBATIDE 2 MG/ML
2 INJECTION, SOLUTION INTRAVENOUS CONTINUOUS PRN
Status: DISCONTINUED | OUTPATIENT
Start: 2021-02-05 | End: 2021-02-05 | Stop reason: HOSPADM

## 2021-02-05 RX ADMIN — ASPIRIN 81 MG: 81 TABLET, COATED ORAL at 11:58

## 2021-02-05 NOTE — PRE-PROCEDURE
GENERAL PRE-PROCEDURE:   Procedure:  Coronary angiography, right heart catheterization  Date/Time:  2/5/2021 1:10 PM    Written consent obtained?: Yes    Risks and benefits: Risks, benefits and alternatives were discussed    Consent given by:  Patient  Patient states understanding of procedure being performed: Yes    Patient's understanding of procedure matches consent: Yes    Procedure consent matches procedure scheduled: Yes    Expected level of sedation:  Moderate  Appropriately NPO:  Yes  ASA Class:  Class 3- Severe systemic disease, definite functional limitations  Mallampati  :  Grade 2- soft palate, base of uvula, tonsillar pillars, and portion of posterior pharyngeal wall visible  Lungs:  Lungs clear with good breath sounds bilaterally  Heart:  Normal heart sounds and rate  History & Physical reviewed:  History and physical reviewed and no updates needed  Statement of review:  I have reviewed the lab findings, diagnostic data, medications, and the plan for sedation

## 2021-02-05 NOTE — Clinical Note
Lab results reviewed and abnormals discussed with physician.  grf 66    244/183 contrast 100/75%

## 2021-02-05 NOTE — PROGRESS NOTES
D/I/A: Pt roomed on 3C in bay 32.  Arrived via litter and accompanied by monitor.  VSSA.  Rhythm upon arrival off monitor.  Denies pain or sob.  Reviewed activity restrictions and when to notify RN, ie-changes to breathing or increased chest pressure or chest pain.  CCL access: Left ulnar TR band.  P: Continue to monitor status.  Discharge to home once meeting criteria.

## 2021-02-05 NOTE — DISCHARGE INSTRUCTIONS
Going Home after an Angioplasty or Stent Placement (Cardiac)  ______________________________________________      After you go home:    Have an adult stay with you for 24 hours.    Drink plenty of fluids.    You may eat your normal diet, unless your doctor tells you otherwise.    For 24 hours:    Relax and take it easy.    Do NOT smoke.    Do NOT make any important or legal decisions.    Do NOT drive or operate machines at home or at work.    Do NOT drink alcohol.    Remove the Band-Aid after 24 hours. If there is minor oozing, apply another Band-aid and remove it after 12 hours.    For 2 days, do NOT have sex or do any heavy exercise.    Do NOT take a bath, or use a hot tub or pool for at least 3 days. You may shower.    Care of wrist or arm site  It is normal to have soreness at the puncture site and mild tingling in your hand for up to 3 days.    For 2 days, do not use your hand or arm to support your weight (such as rising from a chair) or bend your wrist (such as lifting a garage door).    For 2 days, do not lift more than 5 pounds or exercise your arm (tennis, golf or bowling).    If you start bleeding from the site in your arm:    Sit down and press firmly on the site with your fingers for 10 minutes. Call your doctor as soon as you can.    If the bleeding stops, sit still and keep your wrist straight for 2 hours.    Medicines    If you have started taking Plavix or Effient, do not stop taking it until you talk to your heart doctor (cardiologist).    If you are on metformin (Glucophage), do not restart it until you have blood tests (within 2 to 3 days after discharge). When your doctor tells you it is safe, you may restart the metformin.    If you have stopped any other medicines, check with your nurse or provider about when to restart them.    Call 911 right away if you have bleeding that is heavy or does not stop.    Call your doctor if:    You have a large or growing hard lump around the site.    The  site is red, swollen, hot or tender.    Blood or fluid is draining from the site.    You have chills or a fever greater than 101 F (38 C).    Your leg or arm feels numb or cool.    You have hives, a rash or unusual itching.    [facility] [place]:  [phone] (7 days a week)

## 2021-02-06 NOTE — PROGRESS NOTES
D/I/A:  Patient is tolerating liquids and foods, ambulating, urinating, puncture sites are stable ( no bleeding and no hematoma) and patient has a .  A+O x4 and making needs known.  CCL access sites C/D/I; no bleeding or hematoma; CMS intact.  VSSA. Sinus rhythm on monitor.  IV access removed. Education completed and outlined in AVS or handout: medications reviewed with patient.  Questions answered prior to discharge.  Belongings returned to patient at discharge.    P: Discharged to self care.  Patient to follow up with appts as per discharge instruction.

## 2021-02-08 ENCOUNTER — TELEPHONE (OUTPATIENT)
Dept: CARDIOLOGY | Facility: CLINIC | Age: 56
End: 2021-02-08

## 2021-02-08 ENCOUNTER — DOCUMENTATION ONLY (OUTPATIENT)
Dept: TRANSPLANT | Facility: CLINIC | Age: 56
End: 2021-02-08

## 2021-02-08 DIAGNOSIS — E55.9 VITAMIN D DEFICIENCY: ICD-10-CM

## 2021-02-08 DIAGNOSIS — K70.30 ALCOHOLIC CIRRHOSIS (H): Primary | ICD-10-CM

## 2021-02-08 RX ORDER — ERGOCALCIFEROL 1.25 MG/1
50000 CAPSULE, LIQUID FILLED ORAL WEEKLY
Qty: 12 CAPSULE | Refills: 0 | Status: SHIPPED | OUTPATIENT
Start: 2021-02-08

## 2021-02-11 NOTE — TELEPHONE ENCOUNTER
Addendum on 2/11/21: I received a voice message from Manju Schreiber ([phone]) requesting to discuss post transplant care giving.  I attempted to reach Manju but was unable to reach her.  I left her a voice message and provided my availability.        YOVANY Olivares, [facility]  Liver Transplant   Phone [phone]  Pager [phone]

## 2021-02-16 ENCOUNTER — DOCUMENTATION ONLY (OUTPATIENT)
Dept: TRANSPLANT | Facility: CLINIC | Age: 56
End: 2021-02-16

## 2021-02-16 DIAGNOSIS — I25.10 CORONARY ARTERY DISEASE: Primary | ICD-10-CM

## 2021-02-16 RX ORDER — ATORVASTATIN CALCIUM 20 MG/1
20 TABLET, FILM COATED ORAL AT BEDTIME
Qty: 30 TABLET | Refills: 11 | Status: SHIPPED | OUTPATIENT
Start: 2021-02-16 | End: 2021-03-02

## 2021-02-17 NOTE — TELEPHONE ENCOUNTER
Addendum on 2/17/21: I received a call back from patient's sister Manju Schreiber.  I provided education about post transplant care giving.  Manju confirms her ability and willingness to provide post transplant care giving.  She would stay with patient at least 2 weeks after Viviana's discharge from the hospital.    I encouraged Manju to engage in education on [facility] and liver transplant support group.  I also suggested Manju consider attending Viviana's appointments on 3/1/21 or being available by phone if Viviana is willing to include her.    .      YOVANY Olivares, [facility]  Liver Transplant   Phone [phone]  Pager [phone]

## 2021-03-01 ENCOUNTER — OFFICE VISIT (OUTPATIENT)
Dept: GASTROENTEROLOGY | Facility: CLINIC | Age: 56
End: 2021-03-01
Attending: STUDENT IN AN ORGANIZED HEALTH CARE EDUCATION/TRAINING PROGRAM
Payer: COMMERCIAL

## 2021-03-01 ENCOUNTER — OFFICE VISIT (OUTPATIENT)
Dept: TRANSPLANT | Facility: CLINIC | Age: 56
End: 2021-03-01
Attending: TRANSPLANT SURGERY
Payer: COMMERCIAL

## 2021-03-01 VITALS
DIASTOLIC BLOOD PRESSURE: 85 MMHG | WEIGHT: 116.5 LBS | OXYGEN SATURATION: 96 % | TEMPERATURE: 98.2 F | BODY MASS INDEX: 21.44 KG/M2 | HEART RATE: 111 BPM | SYSTOLIC BLOOD PRESSURE: 135 MMHG | HEIGHT: 62 IN

## 2021-03-01 VITALS
HEART RATE: 104 BPM | OXYGEN SATURATION: 97 % | WEIGHT: 256.9 LBS | DIASTOLIC BLOOD PRESSURE: 88 MMHG | BODY MASS INDEX: 47.28 KG/M2 | SYSTOLIC BLOOD PRESSURE: 149 MMHG | HEIGHT: 62 IN

## 2021-03-01 DIAGNOSIS — E55.9 VITAMIN D DEFICIENCY: ICD-10-CM

## 2021-03-01 DIAGNOSIS — Z76.82 AWAITING ORGAN TRANSPLANT STATUS: Primary | ICD-10-CM

## 2021-03-01 DIAGNOSIS — K70.31 ALCOHOLIC CIRRHOSIS OF LIVER WITH ASCITES (H): ICD-10-CM

## 2021-03-01 DIAGNOSIS — K76.9 LIVER LESION: ICD-10-CM

## 2021-03-01 DIAGNOSIS — F41.9 ANXIETY: ICD-10-CM

## 2021-03-01 DIAGNOSIS — R31.9 HEMATURIA, UNSPECIFIED TYPE: ICD-10-CM

## 2021-03-01 DIAGNOSIS — K70.30 ALCOHOLIC CIRRHOSIS (H): ICD-10-CM

## 2021-03-01 DIAGNOSIS — Z01.818 ENCOUNTER FOR PRE-TRANSPLANT EVALUATION FOR LIVER TRANSPLANT: Primary | ICD-10-CM

## 2021-03-01 LAB
ALBUMIN SERPL-MCNC: 2.6 G/DL (ref 3.4–5)
ALBUMIN UR-MCNC: 100 MG/DL
ALP SERPL-CCNC: 192 U/L (ref 40–150)
ALT SERPL W P-5'-P-CCNC: 91 U/L (ref 0–50)
ANION GAP SERPL CALCULATED.3IONS-SCNC: 11 MMOL/L (ref 3–14)
APPEARANCE UR: ABNORMAL
AST SERPL W P-5'-P-CCNC: 168 U/L (ref 0–45)
BACTERIA #/AREA URNS HPF: ABNORMAL /HPF
BILIRUB DIRECT SERPL-MCNC: 2.8 MG/DL (ref 0–0.2)
BILIRUB SERPL-MCNC: 7 MG/DL (ref 0.2–1.3)
BILIRUB UR QL STRIP: ABNORMAL
BUN SERPL-MCNC: 7 MG/DL (ref 7–30)
CALCIUM SERPL-MCNC: 9.1 MG/DL (ref 8.5–10.1)
CHLORIDE SERPL-SCNC: 104 MMOL/L (ref 94–109)
CO2 SERPL-SCNC: 24 MMOL/L (ref 20–32)
COLOR UR AUTO: ABNORMAL
CREAT SERPL-MCNC: 0.98 MG/DL (ref 0.52–1.04)
ERYTHROCYTE [DISTWIDTH] IN BLOOD BY AUTOMATED COUNT: 16.3 % (ref 10–15)
GFR SERPL CREATININE-BSD FRML MDRD: 65 ML/MIN/{1.73_M2}
GLUCOSE SERPL-MCNC: 118 MG/DL (ref 70–99)
GLUCOSE UR STRIP-MCNC: NEGATIVE MG/DL
HCT VFR BLD AUTO: 33.6 % (ref 35–47)
HGB BLD-MCNC: 11.2 G/DL (ref 11.7–15.7)
HGB UR QL STRIP: ABNORMAL
HYALINE CASTS #/AREA URNS LPF: 34 /LPF (ref 0–2)
INR PPP: 2.35 (ref 0.86–1.14)
KETONES UR STRIP-MCNC: NEGATIVE MG/DL
LEUKOCYTE ESTERASE UR QL STRIP: NEGATIVE
MCH RBC QN AUTO: 35 PG (ref 26.5–33)
MCHC RBC AUTO-ENTMCNC: 33.3 G/DL (ref 31.5–36.5)
MCV RBC AUTO: 105 FL (ref 78–100)
MUCOUS THREADS #/AREA URNS LPF: PRESENT /LPF
NITRATE UR QL: NEGATIVE
PH UR STRIP: 5 PH (ref 5–7)
PLATELET # BLD AUTO: 105 10E9/L (ref 150–450)
POTASSIUM SERPL-SCNC: 3.8 MMOL/L (ref 3.4–5.3)
PROT SERPL-MCNC: 6.7 G/DL (ref 6.8–8.8)
RBC # BLD AUTO: 3.2 10E12/L (ref 3.8–5.2)
RBC #/AREA URNS AUTO: 37 /HPF (ref 0–2)
SODIUM SERPL-SCNC: 139 MMOL/L (ref 133–144)
SOURCE: ABNORMAL
SP GR UR STRIP: 1.01 (ref 1–1.03)
SQUAMOUS #/AREA URNS AUTO: 16 /HPF (ref 0–1)
UROBILINOGEN UR STRIP-MCNC: 4 MG/DL (ref 0–2)
WBC # BLD AUTO: 8.9 10E9/L (ref 4–11)
WBC #/AREA URNS AUTO: 12 /HPF (ref 0–5)

## 2021-03-01 PROCEDURE — 99214 OFFICE O/P EST MOD 30 MIN: CPT | Performed by: TRANSPLANT SURGERY

## 2021-03-01 PROCEDURE — 80048 BASIC METABOLIC PNL TOTAL CA: CPT | Performed by: PATHOLOGY

## 2021-03-01 PROCEDURE — 85610 PROTHROMBIN TIME: CPT | Performed by: PATHOLOGY

## 2021-03-01 PROCEDURE — 85027 COMPLETE CBC AUTOMATED: CPT | Performed by: PATHOLOGY

## 2021-03-01 PROCEDURE — 36415 COLL VENOUS BLD VENIPUNCTURE: CPT | Performed by: PATHOLOGY

## 2021-03-01 PROCEDURE — 99000 SPECIMEN HANDLING OFFICE-LAB: CPT | Performed by: PATHOLOGY

## 2021-03-01 PROCEDURE — 87086 URINE CULTURE/COLONY COUNT: CPT | Performed by: STUDENT IN AN ORGANIZED HEALTH CARE EDUCATION/TRAINING PROGRAM

## 2021-03-01 PROCEDURE — 80076 HEPATIC FUNCTION PANEL: CPT | Performed by: PATHOLOGY

## 2021-03-01 PROCEDURE — 80321 ALCOHOLS BIOMARKERS 1OR 2: CPT | Mod: 90 | Performed by: PATHOLOGY

## 2021-03-01 PROCEDURE — 99215 OFFICE O/P EST HI 40 MIN: CPT | Performed by: STUDENT IN AN ORGANIZED HEALTH CARE EDUCATION/TRAINING PROGRAM

## 2021-03-01 PROCEDURE — G0463 HOSPITAL OUTPT CLINIC VISIT: HCPCS

## 2021-03-01 PROCEDURE — 81003 URINALYSIS AUTO W/O SCOPE: CPT | Performed by: PATHOLOGY

## 2021-03-01 RX ORDER — BUPROPION HYDROCHLORIDE 150 MG/1
150 TABLET ORAL EVERY MORNING
COMMUNITY
Start: 2021-02-15 | End: 2022-03-09

## 2021-03-01 RX ORDER — BUSPIRONE HYDROCHLORIDE 15 MG/1
15 TABLET ORAL 2 TIMES DAILY
COMMUNITY
Start: 2021-02-15 | End: 2022-03-09

## 2021-03-01 ASSESSMENT — PAIN SCALES - GENERAL: PAINLEVEL: MILD PAIN (3)

## 2021-03-01 ASSESSMENT — MIFFLIN-ST. JEOR
SCORE: 1081.69
SCORE: 1718.54

## 2021-03-01 NOTE — NURSING NOTE
"Chief Complaint   Patient presents with     Transplant Evaluation     Liver Eval     Blood pressure (!) 149/88, pulse 104, height 1.575 m (5' 2\"), weight 116.5 kg (256 lb 14.4 oz), SpO2 97 %.    Cristina Lal, HAILEY    "

## 2021-03-01 NOTE — PROGRESS NOTES
"Please see my previous notes; she has improved, now walking in the hours, Cardiac cath was clear  MELD-Na score: 21 at 1/19/2021  4:53 PM  MELD score: 21 at 1/19/2021  4:53 PM  Calculated from:  Serum Creatinine: 0.88 mg/dL (Rounded to 1 mg/dL) at 1/19/2021  4:53 PM  Serum Sodium: 138 mmol/L (Rounded to 137 mmol/L) at 1/19/2021  4:53 PM  Total Bilirubin: 6.1 mg/dL at 1/19/2021  4:53 PM  INR(ratio): 2.00 at 1/19/2021  4:53 PM  Age: 55 years 1 month    Vital signs:      BP: (!) 149/88 Pulse: 104     SpO2: 97 %     Height: 157.5 cm (5' 2\") Weight: 116.5 kg (256 lb 14.4 oz)  Estimated body mass index is 46.99 kg/m  as calculated from the following:    Height as of this encounter: 1.575 m (5' 2\").    Weight as of this encounter: 116.5 kg (256 lb 14.4 oz).     Abdomen; obesity ++, most of the adipose tissue is NOT in the upper abdomen, thus transplant is possible; we could consider a sleeve gastrectomy after the transplant recommend lisiting for liver transplant if things work out    Clinical diagnosis:  Cirrhosis of the liver with decompensation  Morbid obesity.    I had a long discussion with the patient regarding liver transplantation which included but was not limited to  the following points:    1. Liver transplant selection committee process.  2. The federal rules for cadaveric waiting list, the size and blood type matching of the organ. The availability of living-related donor transplantation.  3. The types of donors: brain death donors, non-heart beating donors, partial liver grafts: splits and living donor grafts  4. Extended criteria  Donors (older age, steasosis) and the increased  risk of primary non-function using the extended criteria donors  5. The CDC high risk donors,  Risk of donor transmitted infections and donor transmitted malignancy  6. The liver transplant operation and the associated risks and technical complications which can include intraoperative death, post operative death,  Primary " non-function, bleeding requiring re-operations, arterial and biliary complications, bowel perforations, and intra abdominal abscess. Some of these complicaitons may require a second operation.  7. The postoperative course, the ICU stay and risk of postoperative complications which can include sepsis, MI, stroke, brain injury, pneumonia, pleural effusions, and renal dysfunction.  8. The current 1 year and 5 year graft and patient survivals.  9. The need for life long immunosuppressive therapy and the side effects of these medications, including the possibility of toxicity, opportunistic infections, risk of cancer including lymphoma, and the possibility of rejection even if the patient is taking the medication exactly as prescribed.  10. The need for compliance with medications and follow-up visits in the clinic and thereafter.  11. The patient and family understand these risks and wish to proceed to transplantation    Time spent direct face to face counsellin min total time 30 min      HPI      ROS      Physical Exam

## 2021-03-01 NOTE — NURSING NOTE
"Chief Complaint   Patient presents with     RECHECK     follow up       /85   Pulse 111   Temp 98.2  F (36.8  C)   Ht 1.575 m (5' 2\")   Wt 52.8 kg (116 lb 8 oz)   SpO2 96%   BMI 21.31 kg/m        Radha SHELTON CMA  "
no complications

## 2021-03-01 NOTE — PROGRESS NOTES
"[facility]     Date of Visit: 3/1/2021    Reason for referral: Liver Transplant Evaluation    Subjective: Ms. 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 CT showed cirrhosis without a mass, ascites and a pleural effusion. No prior history of liver disease - states she had an US fall 2018, was not told what it showed, but later was told she had \"fatty liver\". Denies being told LFTs elevated in the past. Underwent thoracentesis and paracentesis that were negative for SBP. She apparently had normal A1AT, AMA, AMA, Ferritin. Ceruloplasmin was low at 14, 24 hour urine copper < 1.0 micrograms/L. No KF rings seen with ophthalmology. She was started on diuretics, fluid resolved with lasix 40/nik 50. She tells me that she was diagnosed with PORTER. After this hospitalization, was sober for until Fall 2019 - around that time she had a few drinks and stopped. States she drank again 5/2020 - had a couple of drinks celebrating son's Bday.     States she abused alcohol after each divorce (1998, 2012 - drinking \"a lot\"), and then would drink regularly after that - ranging from nothing to a few drinks a week. Last drink. No DUIs. Did outpatient treatment (2003) because ex- felt she was drinking too much, states that he projected his drinking onto her, did this to appease him. And then AA for several years. Was sober between 1651-0299, started drinking 6 months after her divorce. She has had shakes, no withdrawal. States she was drinking socially prior to 3/2019.  CD Eval done recommend she attend 6 individual psychotherapy sessions, attend 2 weekly support group meetings    Readmitted 5/2020 with jaundice and abdominal distention after drinking related to her son's " "birthday. Na was 118, down from 140 1/2020. Thought to have alcoholic hepatitis - TBR 10.4 (3 1/2020), , ALT 38, Alk Phos 102, INR 1.9. Patient reported she had been sober except for two mikes hard lemonades prior to admission. Started on steroids. Diuretics held (no ascites) and Na improved. Diuretics restarted at lower dose as an outpatient (lasix 20, nik 25).     BR increased again 8/2020 to 11.5, up from 3.8 6/2020. US with Doppler showed cirrhosis. She denied alcohol use. She was readmitted 9/2020 with SOB, found to have a large pleural effusion - tapped felt to be HH. MELD 26. CT showed an indeterminate liver lesion - 15 mm. Diuretics held for KWABENA, then resumed.     Interval Events:  - Had MRI 1/19/21 to follow up LR3 lesion - showed 1.4 cm LR 2 lesion. Also showed ascites and pleural effusion  - CXR showed new retrolithesis, not having back pain. Having some numbness in her fingers, no other neuro symptoms  - She has a long history of hematuria - history of recurrent kidney stones and medullary sponge kidney. When she last saw Nephrology they noted she did not have evidence of medullary sponge kidney on IVP in 2001. Currently having some flank pain, denies s/s of UTI.   - Met with Psychiatrist - just had her intake visit, started on buspar is optimistic about this. Has follow up visit 3/9. Met with a new therapist, therapist gave her \"Homework\" with podcasts and things to ready and this overwhelmed her and she states she almost had a panic attack.   - Taking lasix 20/nik 50 - fluid well controlled on this  - No issues with confusion  - Underwent cardiac testing - no barrier to transplant    ROS: 14 point ROS negative except for positives noted in HPI.    PMHx:  Past Medical History:   Diagnosis Date     Alcoholic cirrhosis (H)      Arthritis      Congestive heart failure (H)     Cervical CA      Coronary artery disease     Heart Murmer     Depressive disorder      Hyponatremia        PSHx:  Past " Surgical History:   Procedure Laterality Date     ABDOMEN SURGERY       CHOLECYSTECTOMY  2001    patient reported     CV CORONARY ANGIOGRAM N/A 2/5/2021    Procedure: CV CORONARY ANGIOGRAM;  Surgeon: Deyvi Parham MD;  Location: [facility]     CV RIGHT HEART CATH MEASUREMENTS RECORDED N/A 2/5/2021    Procedure: CV RIGHT HEART CATH;  Surgeon: Deyvi Parham MD;  Location: [facility]     GI SURGERY       GYN SURGERY       HYSTERECTOMY  2009    including cervix     LITHOTRIPSY      multiple times, patient reported       FamHx:  Family History   Problem Relation Age of Onset     Depression Sister      Anxiety Disorder Sister      Substance Abuse Sister      Depression Sister      Anxiety Disorder Sister      Depression Sister      Anxiety Disorder Sister    No history of liver disease    SocHx:  Social History     Socioeconomic History     Marital status:      Spouse name: Not on file     Number of children: Not on file     Years of education: Not on file     Highest education level: Not on file   Occupational History     Not on file   Social Needs     Financial resource strain: Not on file     Food insecurity     Worry: Not on file     Inability: Not on file     Transportation needs     Medical: Not on file     Non-medical: Not on file   Tobacco Use     Smoking status: Never Smoker     Smokeless tobacco: Never Used   Substance and Sexual Activity     Alcohol use: Not Currently     Comment: 5/2020     Drug use: Never     Sexual activity: Not on file   Lifestyle     Physical activity     Days per week: Not on file     Minutes per session: Not on file     Stress: Not on file   Relationships     Social connections     Talks on phone: Not on file     Gets together: Not on file     Attends Hindu service: Not on file     Active member of club or organization: Not on file     Attends meetings of clubs or organizations: Not on file     Relationship status: Not on file     Intimate  "partner violence     Fear of current or ex partner: Not on file     Emotionally abused: Not on file     Physically abused: Not on file     Forced sexual activity: Not on file   Other Topics Concern     Parent/sibling w/ CABG, MI or angioplasty before 65F 55M? Not Asked   Social History Narrative     Not on file   4 sons - ex- has 17 year old (high functioning autistic). 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    Medications:  Current Outpatient Medications   Medication     atorvastatin (LIPITOR) 20 MG tablet     buPROPion (WELLBUTRIN XL) 150 MG 24 hr tablet     busPIRone (BUSPAR) 15 MG tablet     calcium carbonate-vitamin D (OSCAL W/D) 500-200 MG-UNIT tablet     ferrous sulfate (FEROSUL) 325 (65 Fe) MG tablet     folic acid (FOLVITE) 1 MG tablet     furosemide (LASIX) 40 MG tablet     LORazepam (ATIVAN) 0.5 MG tablet     omeprazole (PRILOSEC) 20 MG DR capsule     potassium chloride ER (KLOR-CON M) 20 MEQ CR tablet     pregabalin (LYRICA) 50 MG capsule     spironolactone (ALDACTONE) 50 MG tablet     UNABLE TO FIND     vitamin D2 (ERGOCALCIFEROL) 09082 units (1250 mcg) capsule     vitamin D2 (ERGOCALCIFEROL) 45920 units (1250 mcg) capsule     [START ON 4/17/2021] vitamin D3 (CHOLECALCIFEROL) 50 mcg (2000 units) tablet     No current facility-administered medications for this visit.      Ativan was just for MRI    Allergies:     Allergies   Allergen Reactions     Codeine      Diazepam      Morphine      PN: LW Reaction: Nausea     Penicillins      PN: LW Reaction: Rash, Generalized. Reportedly had a rash with this as a child and has tolerated Augmentin since per patient. Tolerated Zosyn 3/12/19 in [facility].        Objective:  /85   Pulse 111   Temp 98.2  F (36.8  C)   Ht 1.575 m (5' 2\")   Wt 52.8 kg (116 lb 8 oz)   SpO2 96%   BMI 21.31 kg/m    Constitutional: pleasant woman in NAD  Eyes: non icteric  Respiratory: Normal respiratory excursion "   Back: No spinal or CVA tenderness  MSK: normal range of motion of visualized extremities  Skin: no jaundice  Psychiatric: normal mood and orientation    Labs:  Last Comprehensive Metabolic Panel:  Sodium   Date Value Ref Range Status   03/01/2021 139 133 - 144 mmol/L Final     Potassium   Date Value Ref Range Status   03/01/2021 3.8 3.4 - 5.3 mmol/L Final     Chloride   Date Value Ref Range Status   03/01/2021 104 94 - 109 mmol/L Final     Carbon Dioxide   Date Value Ref Range Status   03/01/2021 24 20 - 32 mmol/L Final     Anion Gap   Date Value Ref Range Status   03/01/2021 11 3 - 14 mmol/L Final     Glucose   Date Value Ref Range Status   03/01/2021 118 (H) 70 - 99 mg/dL Final     Urea Nitrogen   Date Value Ref Range Status   03/01/2021 7 7 - 30 mg/dL Final     Creatinine   Date Value Ref Range Status   03/01/2021 0.98 0.52 - 1.04 mg/dL Final     GFR Estimate   Date Value Ref Range Status   03/01/2021 65 >60 mL/min/[1.73_m2] Final     Comment:     Non  GFR Calc  Starting 12/18/2018, serum creatinine based estimated GFR (eGFR) will be   calculated using the Chronic Kidney Disease Epidemiology Collaboration   (CKD-EPI) equation.       Calcium   Date Value Ref Range Status   03/01/2021 9.1 8.5 - 10.1 mg/dL Final     Bilirubin Total   Date Value Ref Range Status   03/01/2021 7.0 (H) 0.2 - 1.3 mg/dL Final     Alkaline Phosphatase   Date Value Ref Range Status   03/01/2021 192 (H) 40 - 150 U/L Final     ALT   Date Value Ref Range Status   03/01/2021 91 (H) 0 - 50 U/L Final     AST   Date Value Ref Range Status   03/01/2021 168 (H) 0 - 45 U/L Final     Lab Results   Component Value Date    WBC 8.9 03/01/2021     Lab Results   Component Value Date    RBC 3.20 03/01/2021     Lab Results   Component Value Date    HGB 11.2 03/01/2021     Lab Results   Component Value Date    HCT 33.6 03/01/2021     No components found for: MCT  Lab Results   Component Value Date     03/01/2021     Lab Results    Component Value Date    MCH 35.0 03/01/2021     Lab Results   Component Value Date    MCHC 33.3 03/01/2021     Lab Results   Component Value Date    RDW 16.3 03/01/2021     Lab Results   Component Value Date     03/01/2021     MELD-Na score: 24 at 3/1/2021 10:35 AM  MELD score: 24 at 3/1/2021 10:35 AM  Calculated from:  Serum Creatinine: 0.98 mg/dL (Rounded to 1 mg/dL) at 3/1/2021 10:35 AM  Serum Sodium: 139 mmol/L (Rounded to 137 mmol/L) at 3/1/2021 10:35 AM  Total Bilirubin: 7.0 mg/dL at 3/1/2021 10:35 AM  INR(ratio): 2.35 at 3/1/2021 10:35 AM  Age: 55 years 3 months    Imaging:    MRI Liver 1/19/2021    Liver: Cirrhotic configuration of the liver parenchyma with nodular  contours, irregular surface, hypertrophy of the left and caudate  lobes, lacy T2 signal with reticular delayed pattern of contrast  enhancement due to fibrosis as well as innumerable regenerative  nodules. No significant hepatic steatosis or iron deposition.      No suspicious arterial enhancing focal mass is identified.  Redemonstration of 1.4 cm nonenhancing T2 hyperintense focus in the  segment 8 since 9/3/2020 (series 18 image 17).     Gallbladder: Surgically absent.     Spleen: Enlarged, measuring up to 14.8 cm in sagittal dimension. A few  scattered cysts are demonstrated with the largest measuring up to 9  mm, series 24 image 16.     Kidneys: Bilateral tiny simple appearing cortical renal cysts. No  kidney stone, cysts or masses. No pelvocaliectasis.       Adrenal glands: Normal.      Pancreas:  Normal appearance and signal characteristics of the  pancreatic parenchyma. No focal masses. Pancreatic duct normal in  caliber. No side branch ductal dilatation.      Bowel: Unremarkable, with no evidence of bowel dilatation or abnormal  wall enhancement.        Lymph nodes: No abdominal lymphadenopathy by size criteria. Several  prominent portocaval lymph nodes, measuring up to 1.5 cm, likely  reactive.     Blood vessels: Major blood  vessels are patent with no evidence of clot  or obstruction. Prominent upper abdominal varices including  recanalized paraumbilical veins.      Lung bases: No abnormal T2 hyperintensity in the lung bases.  Right-sided pleural effusion. Basilar atelectasis.     Bones and soft tissues: No evidence of acute bony abnormality or  abnormal soft tissue enhancement.        Mesentery and abdominal wall: Mild anasarca.     Ascites: Trace perihepatic, perisplenic and mesenteric ascites.                                                                      IMPRESSION:    1. Cirrhosis and evidence of portal hypertension including  splenomegaly and ascites.  2. No suspicious arterial enhancing focal liver mass.   3. Stable 1.4 cm nonenhancing T2 hyperintense focus in the segment 8  since 9/3/2020, nonspecific but probably benign and may represent a  fibroinflammatory focus.  LIRADS 2   4. Based on this exam only, the patient is within Bebo criteria.  5. Right pleural effusion.    Endoscopy:    EGD 1/2020 showed normal esophagus, small HH, mil congestive hepatopathy    Assessment/Plan: Ms. 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 Liver disease complicated by fluid overload - ascites and HH. She has a LR 3 lesion seen on MRI 9/2020 - follow up MRI only showed LR2 lesion.     She has a long history of alcohol use, presented with decompensated 5/2020 related to recurrent drinking. BR morro this summer and she is adamant she has abstained from alcohol since 5/2020. MELD still high, but fortunately she has clinically improved in terms of her fluid overload with diuretics.     In the interim, she has been completing her transplant evaluation.     BR/AST slightly higher on today's labs. Peth after her visit was positive. Called and talked to the patient - she reported that she was anxious after meeting with her new therapist, and because of this drank 2 glasses of wine.  She reports not drinking since then and is upset she made this mistake.     Discussed that because of this, will close her liver transplant evaluation. She would require 6 months sobriety and acceptable engagement in treatment to be a candidate in the future. Discussed that medical issues required for transplant in the future include urology evaluation for hematuria, history of kidney stones, and she needs a screening colonoscopy.    - Social work will reach out to her to discuss alcohol treatment. She should continue to work with her psychiatrist and find another therapist as her anxiety is a big trigger for drinking for her and treating this will be important if she is to be considered for liver transplant in the future  - Will follow her for hepatology care - needs MRI ~ 4/2021. Can hold on colonoscopy at the time, would need to be done prior to listing in the future if she meets alcohol requirements. She will also need to see urology as well.   - Continue diuretics per local GI. Recommend she have an updated screening EGD for varices given decompensated cirrhosis with alcohol use    RTC 2 months after MRI    Razia Bundy MD MSc  Transplant Hepatologist  [facility]

## 2021-03-01 NOTE — LETTER
"    3/1/2021         RE: Viviana Schaefer  [address]  [facility] [address]        Dear Colleague,    Thank you for referring your patient, Viviana Schaefer, to the [facility] TRANSPLANT [facility]INIC. Please see a copy of my visit note below.    Please see my previous notes; she has improved, now walking in the hours, Cardiac cath was clear  MELD-Na score: 21 at 1/19/2021  4:53 PM  MELD score: 21 at 1/19/2021  4:53 PM  Calculated from:  Serum Creatinine: 0.88 mg/dL (Rounded to 1 mg/dL) at 1/19/2021  4:53 PM  Serum Sodium: 138 mmol/L (Rounded to 137 mmol/L) at 1/19/2021  4:53 PM  Total Bilirubin: 6.1 mg/dL at 1/19/2021  4:53 PM  INR(ratio): 2.00 at 1/19/2021  4:53 PM  Age: 55 years 1 month    Vital signs:      BP: (!) 149/88 Pulse: 104     SpO2: 97 %     Height: 157.5 cm (5' 2\") Weight: 116.5 kg (256 lb 14.4 oz)  Estimated body mass index is 46.99 kg/m  as calculated from the following:    Height as of this encounter: 1.575 m (5' 2\").    Weight as of this encounter: 116.5 kg (256 lb 14.4 oz).     Abdomen; obesity ++, most of the adipose tissue is NOT in the upper abdomen, thus transplant is possible; we could consider a sleeve gastrectomy after the transplant recommend lisiting for liver transplant if things work out    Clinical diagnosis:  Cirrhosis of the liver with decompensation  Morbid obesity.    I had a long discussion with the patient regarding liver transplantation which included but was not limited to  the following points:    1. Liver transplant selection committee process.  2. The federal rules for cadaveric waiting list, the size and blood type matching of the organ. The availability of living-related donor transplantation.  3. The types of donors: brain death donors, non-heart beating donors, partial liver grafts: splits and living donor grafts  4. Extended criteria  Donors (older age, steasosis) and the increased  risk of primary non-function using the extended criteria donors  5. The CDC " high risk donors,  Risk of donor transmitted infections and donor transmitted malignancy  6. The liver transplant operation and the associated risks and technical complications which can include intraoperative death, post operative death,  Primary non-function, bleeding requiring re-operations, arterial and biliary complications, bowel perforations, and intra abdominal abscess. Some of these complicaitons may require a second operation.  7. The postoperative course, the ICU stay and risk of postoperative complications which can include sepsis, MI, stroke, brain injury, pneumonia, pleural effusions, and renal dysfunction.  8. The current 1 year and 5 year graft and patient survivals.  9. The need for life long immunosuppressive therapy and the side effects of these medications, including the possibility of toxicity, opportunistic infections, risk of cancer including lymphoma, and the possibility of rejection even if the patient is taking the medication exactly as prescribed.  10. The need for compliance with medications and follow-up visits in the clinic and thereafter.  11. The patient and family understand these risks and wish to proceed to transplantation    Time spent direct face to face counsellin min total time 30 min      Again, thank you for allowing me to participate in the care of your patient.        Sincerely,        Tim Willett MD

## 2021-03-01 NOTE — LETTER
"    3/1/2021         RE: Viviana Schaefer  [address]  [facility] [address]        Dear Colleague,    Thank you for referring your patient, Viviana Schaefer, to the [facility]PAT[facility] [place]. Please see a copy of my visit note below.    [facility] Liver Transplant [facility]     Date of Visit: 3/1/2021    Reason for referral: Liver Transplant Evaluation    Subjective: Ms. 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 CT showed cirrhosis without a mass, ascites and a pleural effusion. No prior history of liver disease - states she had an US fall 2018, was not told what it showed, but later was told she had \"fatty liver\". Denies being told LFTs elevated in the past. Underwent thoracentesis and paracentesis that were negative for SBP. She apparently had normal A1AT, AMA, AMA, Ferritin. Ceruloplasmin was low at 14, 24 hour urine copper < 1.0 micrograms/L. No KF rings seen with ophthalmology. She was started on diuretics, fluid resolved with lasix 40/nik 50. She tells me that she was diagnosed with PORTER. After this hospitalization, was sober for until Fall 2019 - around that time she had a few drinks and stopped. States she drank again 5/2020 - had a couple of drinks celebrating son's Bday.     States she abused alcohol after each divorce (1998, 2012 - drinking \"a lot\"), and then would drink regularly after that - ranging from nothing to a few drinks a week. Last drink. No DUIs. Did outpatient treatment (2003) because ex- felt she was drinking too much, states that he projected his drinking onto her, did this to appease him. And then AA for several years. Was sober between 1412-5838, started drinking 6 months after her divorce. She has had shakes, no " "withdrawal. States she was drinking socially prior to 3/2019.  CD Darline done recommend she attend 6 individual psychotherapy sessions, attend 2 weekly support group meetings    Readmitted 5/2020 with jaundice and abdominal distention after drinking related to her son's birthday. Na was 118, down from 140 1/2020. Thought to have alcoholic hepatitis - TBR 10.4 (3 1/2020), , ALT 38, Alk Phos 102, INR 1.9. Patient reported she had been sober except for two mikes hard lemonades prior to admission. Started on steroids. Diuretics held (no ascites) and Na improved. Diuretics restarted at lower dose as an outpatient (lasix 20, nik 25).     BR increased again 8/2020 to 11.5, up from 3.8 6/2020. US with Doppler showed cirrhosis. She denied alcohol use. She was readmitted 9/2020 with SOB, found to have a large pleural effusion - tapped felt to be HH. MELD 26. CT showed an indeterminate liver lesion - 15 mm. Diuretics held for KWABENA, then resumed.     Interval Events:  - Had MRI 1/19/21 to follow up LR3 lesion - showed 1.4 cm LR 2 lesion. Also showed ascites and pleural effusion  - CXR showed new retrolithesis, not having back pain. Having some numbness in her fingers, no other neuro symptoms  - She has a long history of hematuria - history of recurrent kidney stones and medullary sponge kidney. When she last saw Nephrology they noted she did not have evidence of medullary sponge kidney on IVP in 2001. Currently having some flank pain, denies s/s of UTI.   - Met with Psychiatrist - just had her intake visit, started on buspar is optimistic about this. Has follow up visit 3/9. Met with a new therapist, therapist gave her \"Homework\" with podcasts and things to ready and this overwhelmed her and she states she almost had a panic attack.   - Taking lasix 20/nik 50 - fluid well controlled on this  - No issues with confusion  - Underwent cardiac testing - no barrier to transplant    ROS: 14 point ROS negative except for " positives noted in HPI.    PMHx:  Past Medical History:   Diagnosis Date     Alcoholic cirrhosis (H)      Arthritis      Congestive heart failure (H)     Cervical CA      Coronary artery disease     Heart Murmer     Depressive disorder      Hyponatremia        PSHx:  Past Surgical History:   Procedure Laterality Date     ABDOMEN SURGERY       CHOLECYSTECTOMY  2001    patient reported     CV CORONARY ANGIOGRAM N/A 2/5/2021    Procedure: CV CORONARY ANGIOGRAM;  Surgeon: Deyvi Parham MD;  Location: [facility]     CV RIGHT HEART CATH MEASUREMENTS RECORDED N/A 2/5/2021    Procedure: CV RIGHT HEART CATH;  Surgeon: Deyvi Parham MD;  Location: [facility]     GI SURGERY       GYN SURGERY       HYSTERECTOMY  2009    including cervix     LITHOTRIPSY      multiple times, patient reported       FamHx:  Family History   Problem Relation Age of Onset     Depression Sister      Anxiety Disorder Sister      Substance Abuse Sister      Depression Sister      Anxiety Disorder Sister      Depression Sister      Anxiety Disorder Sister    No history of liver disease    SocHx:  Social History     Socioeconomic History     Marital status:      Spouse name: Not on file     Number of children: Not on file     Years of education: Not on file     Highest education level: Not on file   Occupational History     Not on file   Social Needs     Financial resource strain: Not on file     Food insecurity     Worry: Not on file     Inability: Not on file     Transportation needs     Medical: Not on file     Non-medical: Not on file   Tobacco Use     Smoking status: Never Smoker     Smokeless tobacco: Never Used   Substance and Sexual Activity     Alcohol use: Not Currently     Comment: 5/2020     Drug use: Never     Sexual activity: Not on file   Lifestyle     Physical activity     Days per week: Not on file     Minutes per session: Not on file     Stress: Not on file   Relationships     Social  connections     Talks on phone: Not on file     Gets together: Not on file     Attends Sabianism service: Not on file     Active member of club or organization: Not on file     Attends meetings of clubs or organizations: Not on file     Relationship status: Not on file     Intimate partner violence     Fear of current or ex partner: Not on file     Emotionally abused: Not on file     Physically abused: Not on file     Forced sexual activity: Not on file   Other Topics Concern     Parent/sibling w/ CABG, MI or angioplasty before 65F 55M? Not Asked   Social History Narrative     Not on file   4 sons - ex- has 17 year old (high functioning autistic). 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    Medications:  Current Outpatient Medications   Medication     atorvastatin (LIPITOR) 20 MG tablet     buPROPion (WELLBUTRIN XL) 150 MG 24 hr tablet     busPIRone (BUSPAR) 15 MG tablet     calcium carbonate-vitamin D (OSCAL W/D) 500-200 MG-UNIT tablet     ferrous sulfate (FEROSUL) 325 (65 Fe) MG tablet     folic acid (FOLVITE) 1 MG tablet     furosemide (LASIX) 40 MG tablet     LORazepam (ATIVAN) 0.5 MG tablet     omeprazole (PRILOSEC) 20 MG DR capsule     potassium chloride ER (KLOR-CON M) 20 MEQ CR tablet     pregabalin (LYRICA) 50 MG capsule     spironolactone (ALDACTONE) 50 MG tablet     UNABLE TO FIND     vitamin D2 (ERGOCALCIFEROL) 83553 units (1250 mcg) capsule     vitamin D2 (ERGOCALCIFEROL) 15810 units (1250 mcg) capsule     [START ON 4/17/2021] vitamin D3 (CHOLECALCIFEROL) 50 mcg (2000 units) tablet     No current facility-administered medications for this visit.      Ativan was just for MRI    Allergies:     Allergies   Allergen Reactions     Codeine      Diazepam      Morphine      PN: LW Reaction: Nausea     Penicillins      PN: LW Reaction: Rash, Generalized. Reportedly had a rash with this as a child and has tolerated Augmentin since per patient. Tolerated Zosyn  "3/12/19 in [facility].        Objective:  /85   Pulse 111   Temp 98.2  F (36.8  C)   Ht 1.575 m (5' 2\")   Wt 52.8 kg (116 lb 8 oz)   SpO2 96%   BMI 21.31 kg/m    Constitutional: pleasant woman in NAD  Eyes: non icteric  Respiratory: Normal respiratory excursion   Back: No spinal or CVA tenderness  MSK: normal range of motion of visualized extremities  Skin: no jaundice  Psychiatric: normal mood and orientation    Labs:  Last Comprehensive Metabolic Panel:  Sodium   Date Value Ref Range Status   03/01/2021 139 133 - 144 mmol/L Final     Potassium   Date Value Ref Range Status   03/01/2021 3.8 3.4 - 5.3 mmol/L Final     Chloride   Date Value Ref Range Status   03/01/2021 104 94 - 109 mmol/L Final     Carbon Dioxide   Date Value Ref Range Status   03/01/2021 24 20 - 32 mmol/L Final     Anion Gap   Date Value Ref Range Status   03/01/2021 11 3 - 14 mmol/L Final     Glucose   Date Value Ref Range Status   03/01/2021 118 (H) 70 - 99 mg/dL Final     Urea Nitrogen   Date Value Ref Range Status   03/01/2021 7 7 - 30 mg/dL Final     Creatinine   Date Value Ref Range Status   03/01/2021 0.98 0.52 - 1.04 mg/dL Final     GFR Estimate   Date Value Ref Range Status   03/01/2021 65 >60 mL/min/[1.73_m2] Final     Comment:     Non  GFR Calc  Starting 12/18/2018, serum creatinine based estimated GFR (eGFR) will be   calculated using the Chronic Kidney Disease Epidemiology Collaboration   (CKD-EPI) equation.       Calcium   Date Value Ref Range Status   03/01/2021 9.1 8.5 - 10.1 mg/dL Final     Bilirubin Total   Date Value Ref Range Status   03/01/2021 7.0 (H) 0.2 - 1.3 mg/dL Final     Alkaline Phosphatase   Date Value Ref Range Status   03/01/2021 192 (H) 40 - 150 U/L Final     ALT   Date Value Ref Range Status   03/01/2021 91 (H) 0 - 50 U/L Final     AST   Date Value Ref Range Status   03/01/2021 168 (H) 0 - 45 U/L Final     Lab Results   Component Value Date    WBC 8.9 03/01/2021     Lab " Results   Component Value Date    RBC 3.20 03/01/2021     Lab Results   Component Value Date    HGB 11.2 03/01/2021     Lab Results   Component Value Date    HCT 33.6 03/01/2021     No components found for: MCT  Lab Results   Component Value Date     03/01/2021     Lab Results   Component Value Date    MCH 35.0 03/01/2021     Lab Results   Component Value Date    MCHC 33.3 03/01/2021     Lab Results   Component Value Date    RDW 16.3 03/01/2021     Lab Results   Component Value Date     03/01/2021     MELD-Na score: 24 at 3/1/2021 10:35 AM  MELD score: 24 at 3/1/2021 10:35 AM  Calculated from:  Serum Creatinine: 0.98 mg/dL (Rounded to 1 mg/dL) at 3/1/2021 10:35 AM  Serum Sodium: 139 mmol/L (Rounded to 137 mmol/L) at 3/1/2021 10:35 AM  Total Bilirubin: 7.0 mg/dL at 3/1/2021 10:35 AM  INR(ratio): 2.35 at 3/1/2021 10:35 AM  Age: 55 years 3 months    Imaging:    MRI Liver 1/19/2021    Liver: Cirrhotic configuration of the liver parenchyma with nodular  contours, irregular surface, hypertrophy of the left and caudate  lobes, lacy T2 signal with reticular delayed pattern of contrast  enhancement due to fibrosis as well as innumerable regenerative  nodules. No significant hepatic steatosis or iron deposition.      No suspicious arterial enhancing focal mass is identified.  Redemonstration of 1.4 cm nonenhancing T2 hyperintense focus in the  segment 8 since 9/3/2020 (series 18 image 17).     Gallbladder: Surgically absent.     Spleen: Enlarged, measuring up to 14.8 cm in sagittal dimension. A few  scattered cysts are demonstrated with the largest measuring up to 9  mm, series 24 image 16.     Kidneys: Bilateral tiny simple appearing cortical renal cysts. No  kidney stone, cysts or masses. No pelvocaliectasis.       Adrenal glands: Normal.      Pancreas:  Normal appearance and signal characteristics of the  pancreatic parenchyma. No focal masses. Pancreatic duct normal in  caliber. No side branch ductal  dilatation.      Bowel: Unremarkable, with no evidence of bowel dilatation or abnormal  wall enhancement.        Lymph nodes: No abdominal lymphadenopathy by size criteria. Several  prominent portocaval lymph nodes, measuring up to 1.5 cm, likely  reactive.     Blood vessels: Major blood vessels are patent with no evidence of clot  or obstruction. Prominent upper abdominal varices including  recanalized paraumbilical veins.      Lung bases: No abnormal T2 hyperintensity in the lung bases.  Right-sided pleural effusion. Basilar atelectasis.     Bones and soft tissues: No evidence of acute bony abnormality or  abnormal soft tissue enhancement.        Mesentery and abdominal wall: Mild anasarca.     Ascites: Trace perihepatic, perisplenic and mesenteric ascites.                                                                      IMPRESSION:    1. Cirrhosis and evidence of portal hypertension including  splenomegaly and ascites.  2. No suspicious arterial enhancing focal liver mass.   3. Stable 1.4 cm nonenhancing T2 hyperintense focus in the segment 8  since 9/3/2020, nonspecific but probably benign and may represent a  fibroinflammatory focus.  LIRADS 2   4. Based on this exam only, the patient is within Bebo criteria.  5. Right pleural effusion.    Endoscopy:    EGD 1/2020 showed normal esophagus, small HH, mil congestive hepatopathy    Assessment/Plan: Ms. 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 Liver disease complicated by fluid overload - ascites and HH. She has a LR 3 lesion seen on MRI 9/2020 - follow up MRI only showed LR2 lesion.     She has a long history of alcohol use, presented with decompensated 5/2020 related to recurrent drinking. BR morro this summer and she is adamant she has abstained from alcohol since 5/2020. MELD still high, but fortunately she has clinically improved in terms of her fluid overload with diuretics.     In the  interim, she has been completing her transplant evaluation.     BR/AST slightly higher on today's labs. Peth after her visit was positive. Called and talked to the patient - she reported that she was anxious after meeting with her new therapist, and because of this drank 2 glasses of wine. She reports not drinking since then and is upset she made this mistake.     Discussed that because of this, will close her liver transplant evaluation. She would require 6 months sobriety and acceptable engagement in treatment to be a candidate in the future. Discussed that medical issues required for transplant in the future include urology evaluation for hematuria, history of kidney stones, and she needs a screening colonoscopy.    - Social work will reach out to her to discuss alcohol treatment. She should continue to work with her psychiatrist and find another therapist as her anxiety is a big trigger for drinking for her and treating this will be important if she is to be considered for liver transplant in the future  - Will follow her for hepatology care - needs MRI ~ 4/2021. Can hold on colonoscopy at the time, would need to be done prior to listing in the future if she meets alcohol requirements. She will also need to see urology as well.   - Continue diuretics per local GI. Recommend she have an updated screening EGD for varices given decompensated cirrhosis with alcohol use    RTC 2 months after MRI    Razia Bundy MD MSc  Transplant Hepatologist  [facility]

## 2021-03-02 ENCOUNTER — COMMITTEE REVIEW (OUTPATIENT)
Dept: TRANSPLANT | Facility: CLINIC | Age: 56
End: 2021-03-02

## 2021-03-02 LAB
BACTERIA SPEC CULT: NO GROWTH
Lab: NORMAL
SPECIMEN SOURCE: NORMAL

## 2021-03-02 NOTE — COMMITTEE REVIEW
Abdominal Committee Review Note     Evaluation Date: 12/23/2020  Committee Review Date: 3/2/2021    Organ being evaluated for: Liver    Transplant Phase: Evaluation  Transplant Status: Active    Transplant Coordinator: Landon Cleaning Jr.  Transplant Surgeon:   Tim Willett    Referring Physician: Paula Irwin    Primary Diagnosis: Alcoholic Cirrhosis  Secondary Diagnosis:     Committee Review Members:  Hepatology Razia Bundy MD   Nutrition Ashley Dailey,    Pharmacy Lupe Olivarez, [facility]    - Clinical Brooklyn Swanson, MSW, Carisa Zapata, [facility]   Transplant Ovidio Roa MD, Marika Flores, RN, Donya Russell, RN, Benedict Perry MD, Niurka Calle, RN, Jeninfer Thurman, REJI, Omar Khan MD, Jr Landon Cleaning, REJI, Annika Martin MD, Roya Anderson, APRN CNP, Brisa Shanks, REJI, Jacqueline Segal MD, Ross Wang MD, Tim Willett MD, Thomas M. Leventhal, MD, Luis Pretty MD, Joann Sears MD       Transplant Eligibility: Cirrhosis with MELD, ETOH    Committee Review Decision: Approved    Relative Contraindications: None    Absolute Contraindications: None    Committee Chair Mario, Annika Valenzuela MD verbally attested to the committee's decision.    Committee Discussion Details:     Approved pending CD recs & SW approval     Urology  Colon  CD / SW    Change her weight in our EPIC

## 2021-03-03 ENCOUNTER — TELEPHONE (OUTPATIENT)
Dept: GASTROENTEROLOGY | Facility: CLINIC | Age: 56
End: 2021-03-03

## 2021-03-03 ENCOUNTER — TELEPHONE (OUTPATIENT)
Dept: UROLOGY | Facility: CLINIC | Age: 56
End: 2021-03-03

## 2021-03-03 ENCOUNTER — HOSPITAL ENCOUNTER (OUTPATIENT)
Facility: CLINIC | Age: 56
End: 2021-03-03
Attending: INTERNAL MEDICINE | Admitting: INTERNAL MEDICINE
Payer: COMMERCIAL

## 2021-03-03 NOTE — TELEPHONE ENCOUNTER
[facility] Call Center    Phone Message    May a detailed message be left on voicemail: yes     Reason for Call: Other: Pt has a referral for   Encounter for pre-transplant evaluation for liver transplant [Z01.818] and  Hematuria, unspecified type.  Please review for which to schedule for.  Thank you    Action Taken: Message routed to:  [facility] ([facility] uro    Travel Screening: Not Applicable

## 2021-03-03 NOTE — TELEPHONE ENCOUNTER
Patient is scheduled for EGD & COLONOSCOPY with Dr. SANTOS    Spoke with: SUZIE    Date of Procedure: 03/25/2021    Location: UNIT J    Sedation Type MAC    Conscious Sedation- Needs  for 6 hours after the procedure  MAC/General-Needs  for 24 hours after procedure    Pre-op for Unit J MAC and OR YES    (if yes advise patient they will need a pre-op prior to procedure)      Is patient on blood thinners? -NO (If yes- inform patient to follow up with PCP or provider for follow up instructions)     Informed patient they will need an adult  YES  Cannot take any type of public or medical transportation alone    Informed Patient of COVID Test Requirement YES    Preferred Pharmacy for Pre Prescription NA    Confirmed Nurse will call to complete assessment YES    Additional comments: NA

## 2021-03-03 NOTE — TELEPHONE ENCOUNTER
Please add this patient to eddie lawson on march 12th at 4pm for hematuria for possible liver transplant  I have not called patient please take hold off as well

## 2021-03-03 NOTE — TELEPHONE ENCOUNTER
Put hold on for eddie lawson on march 12th at 4pm to discuss hematuria sent to scheduling Shirley Garcia LPN Staff Nurse

## 2021-03-04 LAB — PETH BLD-MCNC: 61 NG/ML

## 2021-03-07 DIAGNOSIS — Z11.59 ENCOUNTER FOR SCREENING FOR OTHER VIRAL DISEASES: ICD-10-CM

## 2021-03-08 ENCOUNTER — TELEPHONE (OUTPATIENT)
Dept: TRANSPLANT | Facility: CLINIC | Age: 56
End: 2021-03-08

## 2021-03-08 RX ORDER — LORAZEPAM 0.5 MG/1
0.5 TABLET ORAL
Qty: 1 TABLET | Refills: 0 | Status: SHIPPED | OUTPATIENT
Start: 2021-03-08 | End: 2021-03-24

## 2021-03-08 NOTE — TELEPHONE ENCOUNTER
"Transplant Social Work Services Phone Call      Data: Viviana was being evaluated for liver transplantation, and was found to have a positive PEth test on 3/1/21 (61).  Intervention: I called Viviana to follow up on her positive PEth test.   Assessment: Viviana reports she drank a few glasses of wine on February 20-21, 2021.  She states, \"I cannot change it, I can only do better.\"  Viviana acknowledges needing additional mental health support for her anxiety.  She established care with a therapist at [facility] in February but has not continued with this provider because it was not a good fit. Viviana also established care with Dr. Gutiérrez (psychiatrist) on February 15th at [facility].  She was started on Buproprione  XL 150mg/daily and Buspirone 15mg (1/2 tab twice daily).  She follows up with this provider tomorrow.  Viviana does not feel her anxiety is well controlled at this time.  Education provided by NELDA: [facility]  Plan: I will request an appointment for a new Substance Use Assessment for Viviana here at [facility].  She is aware and in agreement.  I will review recommendations and expectations with Viviana after this is completed.    Viviana will discuss mental health therapy options with her psychiatrist at [facility] during her follow up tomorrow.  She will contact me if additional resources are needed.      YOVANY Olivares, [facility]  Liver Transplant   Phone [phone]  Pager [phone]   "

## 2021-03-09 ENCOUNTER — COMMITTEE REVIEW (OUTPATIENT)
Dept: TRANSPLANT | Facility: CLINIC | Age: 56
End: 2021-03-09

## 2021-03-09 NOTE — LETTER
March 17, 2021    Viviana Schaefer  [address]  [facility] [address]    Dear Ms. Schaefer,   The purpose of this letter is to let you know that on 3/9/21 the [facility] Multi-Disciplinary Selection Team reviewed the results of your transplant evaluation.  Based on the results of your evaluation and the selection criteria used by our program , the decision was made to not list you on the liver transplant list. This is because of your recent slip with alcohol use late last month.   Important things you should know:    If you would like to discuss the decision, or if your medical status changes you may schedule a return visits with your doctor by calling [phone] and asking to speak to your transplant coordinator.    We recommend that you continue to follow up with your primary care doctor in order to manage your health concerns.    As we recently discussed, please continue to work with your  and the chem dep program you are starting.     You also have your next Dr. Bundy follow up on 5/17/21  Enclosed is a letter from [facility] which describes the services offered to patients by [facility] and the Organ [facility]urement [facility] Transplantation Network.  Thank you for allowing us to participate in your care.  We wish you well.  Sincerely,    Landon Cleaning Jr., BSN, RN  Liver Transplant Coordinator  [phone]    Enclosures: [facility] Letter  cc: Care Team            The [facility] Network  Toll-free patient services line:     Your resource for organ transplant information    If you have a question regarding your own medical care, you always should call your transplant hospital first. However, for general organ transplant-related information, you can call the Organ Procurement and Transplantation Network (OPTN) toll-free patient services line at [phone]- [phone]. Anyone, including potential transplant candidates, candidates, recipients, family  members, friends, living donors, and donor family members, can call this number to:          Talk about organ donation, living donation, the transplant process, the donation process, and transplant policies.    Get a free patient information kit with helpful booklets, waiting list and transplant information, and a list of all transplant hospitals.    Ask questions about the OPTN website (https://optn.transplant.hrsa.gov/), the [employer] Network for Organ Sharing s (UNOS) website (https://unos.org/), or the UNOS website for living donors and transplant recipients. (https://www.transplantliving.org/).    Learn how the OPTN can help you.    Talk about any concerns that you may have with a transplant hospital.    The [facility] s transplant system, the OPTN, is managed under federal contract by the [employer] for Organ Sharing (UNOS), which is a non-profit charitable organization. The OPTN helps create and define organ sharing policies that make the best use of donated organs. This process continuously evaluating new advances and discoveries so policies can be adapted to best serve patients waiting for transplants. To do so, the OPTN works closely with transplant professionals, transplant patients, transplant candidates, donor families, living donors, and the public. All transplant programs and organ procurement organizations throughout the country are OPTN members and are obligated to follow the policies the OPTN creates for allocating organs.    The OPTN also is responsible for:      Providing educational material for patients, the public, and professionals.    Raising awareness of the need for donated organs and tissue.    Coordinating organ procurement, matching, and placement.    Collecting information about every organ transplant and donation that occurs in the [place].    Remember, you should contact your transplant hospital directly if you have questions or concerns about your own medical care including  medical records, work-up progress, and test results.    We are not your transplant hospital, and our staff will not be able to answer questions about your case, so please keep your transplant hospital s phone number handy.    However, while you research your transplant needs and learn as much as you can about transplantation and donation, we welcome your call to our toll-free patient services line at [phone].          Updated 4/1/2019

## 2021-03-09 NOTE — COMMITTEE REVIEW
Abdominal Committee Review Note     Evaluation Date: 12/23/2020  Committee Review Date: 3/9/2021    Organ being evaluated for: Liver    Transplant Phase: Evaluation  Transplant Status: Active    Transplant Coordinator: Landon Cleaning Jr.  Transplant Surgeon:   Tim Willett    Referring Physician: Paula Irwin    Primary Diagnosis: Alcoholic Cirrhosis  Secondary Diagnosis:     Committee Review Members:  Hepatology Razia Bundy MD   Nutrition Ashley Dailey, RD   Pharmacy Lupe Olivarez, [facility], Carisa Zapata, [facility]    - Clinical Brooklyn Swanson, [facility]   Transplant Donya Russell, REJI, Benedict Perry MD, Niurka Calle, REJI, Jennifer Thurman, REJI, Omar Khan MD, Jr Landon Cleaning, REJI, Roya Anderson, APRN CNP, Brisa Shanks, REJI, Jacqueline Segal MD, Ross Wang MD, Hina Mccracken MD, Tim Willett MD, Thomas M. Leventhal, MD, Luis Pretty MD, Michelle Craig MD, MD       Transplant Eligibility: ETOH, Cirrhosis with MELD, PORTER    Committee Review Decision: Declined    Relative Contraindications: Other    Absolute Contraindications: Active alcoholism    Committee Chair Ross Pantoja MD verbally attested to the committee's decision.    Committee Discussion Details:     Close eval due to PEth +

## 2021-03-10 ENCOUNTER — HOSPITAL ENCOUNTER (OUTPATIENT)
Dept: BEHAVIORAL HEALTH | Facility: CLINIC | Age: 56
Discharge: HOME | End: 2021-03-10
Attending: FAMILY MEDICINE | Admitting: FAMILY MEDICINE
Payer: COMMERCIAL

## 2021-03-10 VITALS — WEIGHT: 256 LBS | BODY MASS INDEX: 47.11 KG/M2 | HEIGHT: 62 IN

## 2021-03-10 PROCEDURE — H0001 ALCOHOL AND/OR DRUG ASSESS: HCPCS | Mod: GT,TEL

## 2021-03-10 ASSESSMENT — COLUMBIA-SUICIDE SEVERITY RATING SCALE - C-SSRS
ATTEMPT LIFETIME: NO
5. HAVE YOU STARTED TO WORK OUT OR WORKED OUT THE DETAILS OF HOW TO KILL YOURSELF? DO YOU INTEND TO CARRY OUT THIS PLAN?: NO
TOTAL  NUMBER OF INTERRUPTED ATTEMPTS LIFETIME: NO
REASONS FOR IDEATION LIFETIME: MOSTLY TO END OR STOP THE PAIN (YOU COULDN'T GO ON LIVING WITH THE PAIN OR HOW YOU WERE FEELING)
3. HAVE YOU BEEN THINKING ABOUT HOW YOU MIGHT KILL YOURSELF?: YES
4. HAVE YOU HAD THESE THOUGHTS AND HAD SOME INTENTION OF ACTING ON THEM?: NO
1. IN THE PAST MONTH, HAVE YOU WISHED YOU WERE DEAD OR WISHED YOU COULD GO TO SLEEP AND NOT WAKE UP?: NO
TOTAL  NUMBER OF ABORTED OR SELF INTERRUPTED ATTEMPTS PAST 3 MONTHS: NO
2. HAVE YOU ACTUALLY HAD ANY THOUGHTS OF KILLING YOURSELF?: NO
6. HAVE YOU EVER DONE ANYTHING, STARTED TO DO ANYTHING, OR PREPARED TO DO ANYTHING TO END YOUR LIFE?: NO
1. IN THE PAST MONTH, HAVE YOU WISHED YOU WERE DEAD OR WISHED YOU COULD GO TO SLEEP AND NOT WAKE UP?: YES
6. HAVE YOU EVER DONE ANYTHING, STARTED TO DO ANYTHING, OR PREPARED TO DO ANYTHING TO END YOUR LIFE?: NO
ATTEMPT PAST THREE MONTHS: NO
5. HAVE YOU STARTED TO WORK OUT OR WORKED OUT THE DETAILS OF HOW TO KILL YOURSELF? DO YOU INTEND TO CARRY OUT THIS PLAN?: NO
2. HAVE YOU ACTUALLY HAD ANY THOUGHTS OF KILLING YOURSELF LIFETIME?: YES
TOTAL  NUMBER OF ABORTED OR SELF INTERRUPTED ATTEMPTS PAST LIFETIME: NO
TOTAL  NUMBER OF INTERRUPTED ATTEMPTS PAST 3 MONTHS: NO
4. HAVE YOU HAD THESE THOUGHTS AND HAD SOME INTENTION OF ACTING ON THEM?: NO

## 2021-03-10 ASSESSMENT — PAIN SCALES - GENERAL: PAINLEVEL: NO PAIN (0)

## 2021-03-10 ASSESSMENT — MIFFLIN-ST. JEOR: SCORE: 1714.46
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
## 2021-03-22 ENCOUNTER — HOSPITAL ENCOUNTER (OUTPATIENT)
Dept: BEHAVIORAL HEALTH | Facility: CLINIC | Age: 56
End: 2021-03-22
Attending: FAMILY MEDICINE
Payer: COMMERCIAL

## 2021-03-22 PROCEDURE — H2035 A/D TX PROGRAM, PER HOUR: HCPCS | Mod: HQ,95
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
## 2021-03-22 NOTE — GROUP NOTE
"Group Therapy Documentation    PATIENT'S NAME: Viviana Schaefer  MRN:   [id number]  :   1965  ACCT. NUMBER: [id number]  DATE OF SERVICE: 3/22/21  START TIME: 12:00 PM  END TIME:  2:00 PM  FACILITATOR(S): Peri Haynes LADC  TOPIC: BEH Group Therapy  Number of patients attending the group:  9  Group Length:  2 Hours    Group Therapy Type: Addiction    Summary of Group / Topics Discussed:    Anger/Conflict Management , Choices in Recovery, Self-Care Activities, and Denial.  We discussed Addictive thinking and understanding the self deception that denial brings. Clients participated in answering questions related to times they used denial.  It was emphasized that the function of addictive thinking is to permit the person to continue their destructive habit.  I discussed that often the chemically dependent person comes up with elaborate and numerous rationales that have brought them to this point.  Intrictate defense mechanisms help to keep a person from being exposed as week or powerless.  Denial allows chemical dependency to live on and I made clients aware that by discussing their denial and talking about the vulnerable excuses, rationale, they can use it as a way to not \"get on one's own case\", but to become more aware and make good choices.  Phillip members of Group also did weekly check in .  Clients participated in short breathing exercise to get grounded.      Group Attendance:  Attended group session    Patient's response to the group topic/interactions:  discussed personal experience with topic and expressed readiness to alter behaviors    Patient appeared to be Attentive and Engaged.        Client specific details:  THis session focused on client introduction, dimension 2 and dimension 5.  Client introduced herself to group, as this is the first day.  She told group she wants to do group for herself, but also needs to complete it in order to get on transplant list.  She said overall she is " "feeling somewhat week, but good and looks forward to attending group and learning about self.      Telemedicine Visit: The patient's condition can be safely assessed and treated via synchronous audio and visual telemedicine encounter.      Reason for Telemedicine Visit: COVID-19     Originating Location (pt. Location): Home     Type of service:  Video Visit  GROUP    Originating Location (pt. Location): Home     Distant Location (provider location):  [facility]      Consent:  The patient/guardian has verbally consented to: the potential risks and benefits of telemedicine (video visit) versus in person care; billing of their insurance or make self-payment for services provided; and responsibility for payment of non-covered services.      Mode of Communication:  Video Conference via [employer]is    Client specific details: She participated in discussion of daily reading   In todays session Client reported:  \"I am doing well\"  \"I know I cannot use, it is just a fact I have to live with and my health issues prove that.  She told group she is here to to own desire to learn, but also that she needs to do tx to get on transplant list.  This was her first day in group and she said she felt like it would be a good fit.  Session focused on dim 2, medical issues and denial as a defense mechanism, dim 4  .  \"I have definitely used denial as a defense.  It is easy to blame other people or things                     Scale of denial for chemical dependency and need for tx  Prior to start: 5    Now on first day of tx: 2  Client discussed past statements used:  \"my problems caused me to use I only drink because \"I drink at others, when they are harmful to me, like my ex \"  \"I can't be an addict because:\"I am always in control when I use\"  She discussed distorted thinking she used to have and how not using enables one to see more clearly.  \"My health issues and the reality of them " "really woke me up\"  I asked her if she is able to see positives, even with the hard part of her liver disease\"  \"Yes, I am more in touch with myself and realize how much I was drinking 'at others'  And that is no way to live    Viviana was able to Identify strategies to effectively work through denial when it arises, or better, preventing it: talking to someone in the program, being honest with self.  I discussed that we forget there is choice, often impulse to use says there isn't.  I stressed this is important to remember going forward  She also said she understands that having others to support and discuss things with is helpful.   ratings 0f 1=10 on likert scale, where 10 is high:  Hunger=  0Anger= 0 Lonely=  0Tired= 0     Anxiety=  0  Stress= 1   Depression=0   \"no thoughts of self harm or harm to others\"       P) Tx planning session 3/23  check in on dimensions       assertiveness    Peri Haynes, MS, LADC, LPC               "

## 2021-03-23 ENCOUNTER — HOSPITAL ENCOUNTER (OUTPATIENT)
Dept: BEHAVIORAL HEALTH | Facility: CLINIC | Age: 56
End: 2021-03-23
Attending: FAMILY MEDICINE
Payer: COMMERCIAL

## 2021-03-23 PROCEDURE — H2035 A/D TX PROGRAM, PER HOUR: HCPCS | Mod: GT

## 2021-03-23 NOTE — ADDENDUM NOTE
Encounter addended by: Peri Haynes LADC on: 3/23/2021 11:50 AM   Actions taken: Clinical Note Signed

## 2021-03-23 NOTE — PROGRESS NOTES
"NDIVIDUAL SESSION     Telemedicine Visit: The patient's condition can be safely assessed and treated via synchronous audio and visual telemedicine encounter.       Reason for Telemedicine Visit: Patient has requested telehealth visit     Originating Site (Patient Location): Patient's home     Distant Site (Provider Location): [facility] [place]     Consent:  The patient/guardian has verbally consented to: the potential risks and benefits of telemedicine (video visit) versus in person care; bill my insurance or make self-payment for services provided; and responsibility for payment of non-covered services.      Mode of Communication:  Video Conference via [employer]     As the provider I attest to compliance with applicable laws and regulations related to telemedicine.     INDIVIDUAL THERAPY NOTE  TIME START:11:31  TIME END:12:32  61 minutes     D-conducted tx planning session with client 1:1.  She rpeorts no thoughts of self harm.  We reviewed Client concerns regarding her  \"anxiety\" about group times and if there will be new people, as well as who attends Thursday groups. She said it just helps her to not too anxious.  I asked her what she does to calm her anxiety that is helpful and she said \"just what I did, try to find out what I can so my mind doesn't make up a story\".  I told her about the groups and also stressed that is an excellent way to deal with some of the anxiety, to just ask.  She said she sometimes calls her oldest sister, also, and this is calming to her, as well as they even do some breathing exercises on the phone.    We went over her goals and completed her treatment plan. When discussing her relationships, I told her about the Family program meeting day.  She was relieved as she still feels she is just getting her bearings with tx.  Client reports she was quite sick and sometimes needs to use a walker for strength, but after being in the hospital this last time, she isn't really " "feeling very sick any more.  \"I know I have liver disease, but I don't feel very sickly\".  She said she sometimes feels confuses about the whol transplant, team, and other things, but overall has a good picture.  \"I even know my liver can heal itself and hang on to some hope of that\"   and wants to work on small ways of building her strength and stamina for life, as well as \"in  Looking to the future, plan on taking two tests that will help me possible go back to work at some point\".  We  put that as a goal in her Tx plan, as it is important to her.    She reports Anxiety and depression symptoms to be the biggest triggers for her use and we put this on tx plan, also.  She said those and \"eating certain take out foods, so I will not be ordering anymore from those 2 places\"  I told her that is good awreness and a good plan.  I suggested she check in somewhat regularly, especially at first, about these with her group      I-supportive listening, resources, went over her goals, helpful coping, family week, and   A-client appears motivated and cooperative.  She seems relieved about logistics of group times, longevity, and make up of gorup   She  feels hopeful and is working on getting stronger.  P-begin working on goals.  Attend group.  Consider asking about transplant support group.     I have reviewed the note as documented above.  This accurately captures the substance of my conversation with the patient.  Peri Haynes, MS, [facility], LPC  "

## 2021-03-23 NOTE — PROGRESS NOTES
Patient Safety Plan Template    Name:   Viviana Schaefer YOB: 1965 Age:  55 year old MR Number:  [id number]   Step 1: Warning signs (Thoughts, images, mood, situation, behavior) that a crisis may be developin. High Anxiety     2.Too much interaction with 2nd ( ex )     3. Eating certain foods that trigger wanting to use     Step 2: Internal coping strategies - Things I can do to take my mind off of my problems without contacting another person (relaxation technique, physical activity):     1. Puzzles on paper or computer     2. Get busy with something in the house     3. Time with cats     Step 3: People and social settings that provide distraction:     1. Name:  (Son) Clint    Phone: [phone]   2. Name: (sister) Cristina Rapp   Phone: [phone]   3. Place: coffee shop   4. Place: shopping     The one thing that is most important to me and worth living for is: my children and my extended family     Step 4: People whom I can ask for help:     1. Name: (sister) Cristina Rapp   Phone:[phone]     2. Name: (Son) Clint    Phone: [phone]     3. Name: (friend) Shelly Keenan   Phone: [phone]     Step 5: Professionals or agencies I can contact during a crisis:     1. Clinician Name :Sanna Flores (psychiatrist)  [facility]   Phone: [phone]   Clinician Pager or Emergency Contact #:      2. Clinician Name: Paula Woo (GI)  [facility] Phone: [phone]     Clinician Pager or Emergency Contact #:  NA     3. Local Urgent Care Services:    Urgent Care Services Address: [facility]    Urgent Care Services Phone: [phone]     4. Suicide Prevention Lifeline Phone: [phone]-BUPO ([phone])     Step 6: Making the environment safe:     1. No alcohol in the home     2. Take antidepressant and antianxiety medications as prescribed     Safety Plan Template 2008 Mildred Vega and Roshan Meléndez is  reprinted with the express permission of the authors.  No portion of the Safety Plan Template may be reproduced without the express, written permission.  You can contact the authors at [email or web address] or [email or web address].

## 2021-03-24 ENCOUNTER — HOSPITAL ENCOUNTER (OUTPATIENT)
Dept: BEHAVIORAL HEALTH | Facility: CLINIC | Age: 56
End: 2021-03-24
Attending: FAMILY MEDICINE
Payer: COMMERCIAL

## 2021-03-24 PROBLEM — F19.20 CHEMICAL DEPENDENCY (H): Status: ACTIVE | Noted: 2021-03-24

## 2021-03-24 PROCEDURE — H2035 A/D TX PROGRAM, PER HOUR: HCPCS | Mod: HQ,GT
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
## 2021-03-25 ENCOUNTER — HOSPITAL ENCOUNTER (OUTPATIENT)
Dept: BEHAVIORAL HEALTH | Facility: CLINIC | Age: 56
End: 2021-03-25
Attending: FAMILY MEDICINE
Payer: COMMERCIAL

## 2021-03-25 PROCEDURE — H2035 A/D TX PROGRAM, PER HOUR: HCPCS | Mod: HQ,GT

## 2021-03-25 NOTE — ADDENDUM NOTE
Encounter addended by: Peri Haynes LADC on: 3/25/2021 11:50 AM   Actions taken: Clinical Note Signed
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
## 2021-03-29 ENCOUNTER — HOSPITAL ENCOUNTER (OUTPATIENT)
Dept: BEHAVIORAL HEALTH | Facility: CLINIC | Age: 56
End: 2021-03-29
Attending: FAMILY MEDICINE
Payer: COMMERCIAL

## 2021-03-29 PROCEDURE — H2035 A/D TX PROGRAM, PER HOUR: HCPCS | Mod: HQ,95

## 2021-03-29 NOTE — GROUP NOTE
"Group Therapy Documentation    PATIENT'S NAME: Viviana Schaefer  MRN:   [id number]  :   1965  ACCT. NUMBER: [id number]  DATE OF SERVICE: 3/29/21  START TIME: 12:00 PM  END TIME:  2:00 PM  FACILITATOR(S): Peri Haynes LADC  TOPIC: BEH Group Therapy  Number of patients attending the group:  8  Group Length:  2 Hours    Group Therapy Type: Addiction    Summary of Group / Topics Discussed:    Boundaries, Communication, Journaling, and Thinking Errors/Negative Self-Talk  Today we reviewed styles of communication and clients gave examples of what they noticed in interactions over the weekend.  We also discussed feedback, it's importance in group process and how that carries over to personal life.  I asked them to give reasons of why giving and receiving feedback is important.  As a way to practice, they each gave feedback to another group member today.  I stressed that feedback is given with the  of the information in mind, that it can be helpful to ask self \"what are the reasons I feel this feedback is important to give\"  And I also stressed that one can even give hard information but with sincere desire to support and show care.      I reminded clients that change can be difficult, but sometimes reviewing how things are working can add to our growth or better understanding.  We discussed communiction is about both parties and it is not about perfection.  I said it is important to be looking at communication to see where it plays into difficult self talk, relationships with others and also sometimes into tht addiction itself, due to being a trigger.    I told clients to use self talk as a helpful tool and not a negative way to \"get on one's case or on the case of another.    I recommended clients self reflect and journal about what they are learning about communication this last week.      Group Attendance:  Attended group session    Patient's response to the group topic/interactions:  " cooperative with task and discussed personal experience with topic    Patient appeared to be Attentive and Engaged.          Telemedicine Visit: The patient's condition can be safely assessed and treated via synchronous audio and visual telemedicine encounter.      Reason for Telemedicine Visit: COVID-19     Originating Location (pt. Location): Home     Type of service:  Video Visit  GROUP    Originating Location (pt. Location): Home     Distant Location (provider location):  [facility] MENTAL HEALTH & ADDICTION SERVICES      Consent:  The patient/guardian has verbally consented to: the potential risks and benefits of telemedicine (video visit) versus in person care; billing of their insurance or make self-payment for services provided; and responsibility for payment of non-covered services.      Mode of Communication:  Video Conference via Into [employer]oss    Client specific details: todays session focused on dimension 6 communiction and dimension 5 healthy coping.    client gave examples of what they noticed in interactions over the weekend with styles of communication passive:     aggressive:  She discussed feedback and how she sees it as  important in group process:  staying connected, showing people we are listening, giving support, helping people to learn and grow.  Also gave reasons of   how that carries over to personal life: Learn empathy, learn new ways to do things, practice in group to be able to use outside of group, share what I feel or notice or think, knowing my needs and voice are important.    Client reports no thoughts of self harm  ratings 0f 1=10 on likert scale, where 10 is high:  Hunger=  0  Anger= 0 Lonely=  0Tired= 0     Anxiety=  0  Stress= 1   Depression=0    I asked client if he journals much and he said:  I encouraged client to journal for 5-10 minutes about what learning about communication and more so, self, over the last week.       P) journal, discus assertiveness and give an  example.    Peri Haynes, MS, LADC, LPC

## 2021-03-31 ENCOUNTER — HOSPITAL ENCOUNTER (OUTPATIENT)
Dept: BEHAVIORAL HEALTH | Facility: CLINIC | Age: 56
End: 2021-03-31
Attending: FAMILY MEDICINE
Payer: COMMERCIAL

## 2021-03-31 PROCEDURE — H2035 A/D TX PROGRAM, PER HOUR: HCPCS | Mod: HQ,GT
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
## 2021-04-01 ENCOUNTER — HOSPITAL ENCOUNTER (OUTPATIENT)
Dept: BEHAVIORAL HEALTH | Facility: CLINIC | Age: 56
End: 2021-04-01
Attending: FAMILY MEDICINE
Payer: COMMERCIAL

## 2021-04-01 PROCEDURE — H2035 A/D TX PROGRAM, PER HOUR: HCPCS | Mod: HQ,GT

## 2021-04-01 NOTE — GROUP NOTE
Group Therapy Documentation    PATIENT'S NAME: Viviana Schaefer  MRN:   [id number]  :   1965  ACCT. NUMBER: [id number]  DATE OF SERVICE: 21  START TIME: 12:00 PM  END TIME:  2:00 PM  FACILITATOR(S): Deyvi Jama LMFT, LADC  TOPIC: BEH Group Therapy  Number of patients attending the group:  4  Group Length:  2 Hours    Group Therapy Type: Addiction, Life skill(s), and Psychoeducation    Summary of Group / Topics Discussed:    Cognitive Therapy Techniques, Co-occurring Illness, Thinking Errors/Negative Self-Talk, and Psychoeducation/Skills Cognitive Restructuring (MH)  This topic will give a general overview of maladaptive patterns of thinking and techniques to change their thinking patterns to be supportive of health and recovery.    Objective(s):     Patients will identify three cognitive distortions    Patients will identify two ways to dispute distortions    Structure (modalities, homework, worksheets, etc):     Provide psychoeducation on cognitive distortions and disputations    Facilitate group discussion around each patient s cognitive distortions and impact of those thinking patterns    Expected therapeutic outcome(s):   Patient will:    Recognize and dispute distortions    Experience improved thinking and behavior    Therapeutic outcome(s) measured by:     Patient will name 2-3 cognitive distortions and ways to dispute them  _______________________________________________________________________________________  Telemedicine Visit: The patient's condition can be safely assessed and treated via synchronous audio and visual telemedicine encounter.      Reason for Telemedicine Visit: COVID-19, serviced only offered via tele-health.    Originating Site (Patient Location): Patient's home    Distant Site (Provider Location): Provider Remote Setting    Consent:  The patient/guardian has verbally consented to: the potential risks and benefits of telemedicine (video visit) versus in person  "care; billing of their insurance or make self-payment for services provided; and responsibility for payment of non-covered services.     Mode of Communication:  Video Conference via [employer] or Zoom.  Session Successfully completed: Yes  If session was not successful what alternative options were discussed: NA    As the provider I attest to compliance with applicable laws and regulations related to telemedicine.    Group Attendance:  Attended group session    Patient's response to the group topic/interactions:  cooperative with task, discussed personal experience with topic, expressed understanding of topic, gave appropriate feedback to peers and listened actively    Patient appeared to be Actively participating, Attentive and Engaged.  Client specific details:  On a 0-10 Likert Scale (0=No issues & 10=worst symptoms), client checked-in with rating her depression as a \"0\", anxiety as a \"1\", and lastly client rated her cravings as a \"0\".  Overall client reported feeling \"happy\" today. Client reported being grateful for \"my children\". Today's group session focused on Dimension(s) III & V. Client showed progress by being able to teach-back the skills she learned during today's group session. Please see additional details below for further specific details on group topic matter.  Client learned about today's group topic on  Out-Thinking Negativity .   >Client learned how negative thoughts including inaccurate exaggerations, irrelevant thoughts, misperceptions, doubts, deceptions, and lies you tell yourself influence your feelings and actions and can undermine your emotional health. With mental health disorders such as anxiety, depression, and bipolar illness, these thoughts, feelings, and beliefs often become confused and distorted, leading to unhealthy behavioral patterns.  Automatic Thoughts  >Client learned that those who struggle with anxiety, depression, and other mental health disorders often have distorted " thoughts. Automatic Negative Thoughts (ANTs for short) are those deeply-held, often-repeated, pessimistic views that lead to intense emotions and unpredictable actions. ANTs can ruin any picnic, spoil the fun, and derail a positive mood as they distort the truth. Yet every ANT carries a crumb of truth. You may think you are dumb because you failed a test. The crumb of truth is that you failed the test. Calling yourself dumb is the distorted and inaccurate thought. When outcomes support these thoughts, feelings, and behaviors, the negative responses are reinforced and can become deeply held, yet irrational, ways of perceiving the world. The truth is, it may be difficult to identify the distorted thoughts. Negative thoughts are often unreasonable and blown out of proportion, fueling emotional problems, and making one resistant to change. They lower self-esteem, decrease judgment, increase fear, and warp reality.  Recognizing ANTs  >Client was taught that learning to spot negative thoughts will help you to challenge them and change the way you feel and behave. Unhealthy emotional reactions are not an automatic behavioral response to painful or stressful life events, but those reactions are driven by perceptions of those events and their impact on life. In short, we over-react emotionally to many difficult circumstances and end up making matters worse.  Looking Back  >Client learned that you cannot change what happened to you because it has already happened and is in the past. But you can change the way you think about it and how it impacts your life. It takes great strength to reflect on your life and begin to identify negative thoughts. These negative thoughts are habits that have developed over a long period of time. They may be difficult to recognize or identify, and it takes practice to change them. Client then learned about the following progression: A situation happens which leads to thoughts or beliefs about the  "event which then leads into the thoughts may be irrational, illogical, and distorted which then leads into feelings follow the thoughts and lastly leading into actions follow the feelings and thoughts.   Understanding Negative Thoughts  >Client learned that not all thoughts are true or accurate; many are distorted and false. Consider how irrational thoughts:    Allow you to minimize your emotional problems   Keep you from seeing how you contribute to your own problems     Keep you stuck   Stop you from remembering your motivations for health     Minimize health problems   Minimize the risks and complications of mental health disorders     Are anti-treatment    Normalize a dysfunctional lifestyle     Allow you to blame others and defend yourself     Postpone responsibility   Recognizing the ANTs for what they are, and committing to changing them, is critical to your mental health.  Common Automatic Negative Thoughts (ANTs)  >Client was asked to provide an example of Automatic Negative Thoughts. Client stated they have the following ANTs, \"no one will listen to me, I can't do anything right, feel like I messed some things up this time, and I can't tolerate the anxiety or depression anymore.\"  The 5R's of Changed Thinking  >Client was asked to consider the following five step process to change your thinking.   1. Recognize negative thoughts. We often have difficulty recognizing our negative thoughts for what they are. As you become emotionally stronger, you may be more able to recognize pessimistic and destructive thoughts as they come. Recognition of a negative and upsetting thought allows you to take it captive, weight its validity, and accept or reject it.   2. Refute negative thoughts. As you analyze a thought, determine if it is destructive and damaging, and if it is, you can refuse to believe it, decline its value, and discard it, telling yourself,  That is not true, and I will not accept it.  You can kick it to " "the side and refuse to entertain it as legitimate. Do not believe every thought you think. Determine if it is true and reject it when it is not.   3. Replace negative thoughts. After you have refused a negative thought, find a positive alternative or substitute. Think about things that are true, right, and wholesome.  I can't do this  becomes  Just one step at a time--I am making progress.    4. Rehearse positive substitutions. Because you have told yourself a negative thought or repeated line of thinking so many times in the past, it will be necessary to intentionally repeat the positive substitution as well. You can replace negative, biased, and fear-based self-talk with positive, realistic, and empowering statements.   5. Repeat the process, as necessary. As you work through the process of recognizing, refuting, and replacing negative thoughts while practicing positive substitutions, it is likely another negative thought is on its way. Repeat the process as often as necessary in order to stay at peace and upbeat. Challenge your worries and fears as often as it takes.  Developing Challenges  >Client learned that we are always moving in the direction of how we think and speak. In order to consistently move in a positive direction, find positive statements, commit them to memory, and repeat them frequently. Client was asked to provide an example of a positive statement that they will use and repeat to themselves daily. Client stated, \"I'm valuable, I have good people around me, and I am smart.\"  >As a teach-back client was asked what are some irrational thoughts that they have had. Client stated, \"I always feel like I did something wrong and have lot's of fear based thoughts.\" Client was then asked to write a challenge for each irrational thought they just listed. Client stated, \"I am worth working on and I can face my fears as everything will work out like it usually does.\"  Change Your Thinking to Change Your " "Life  >Client learned that recognizing and replacing negative thoughts is essential to achieve long-term success. Find positive thoughts and repeat them regularly. Stay focused on what you have, not what you have lost. As you change the way you think, your feelings and behaviors will also change. It is good to remember the challenges you have overcome and the progress you have made. Use the days and weeks ahead to get stronger, accomplish your goals, and develop the long-term emotional stability you want.  >On a 0-10 Likert Scale (0=lowest confidence & 10=most confidence), client was asked to rate her confidence with using the skills learned on  Out-Thinking Negativity  to help her strengthen her recovery both with MH and TIFFANIE. Client stated their rating was a \"6 just because I will need to first catch when I do the ANT's and then this number would be a 10\".   Plan: Client will practice  The 5R's of Changed Thinking  (Recognize, Refute, Replace, Rehearse, and Repeat) daily to correct all ANTs they are experiencing throughout the day. Client will then journal about their progress.    "

## 2021-04-02 NOTE — ADDENDUM NOTE
Encounter addended by: Deyvi aJma LMFT, [facility] on: 4/2/2021 10:05 AM   Actions taken: Clinical Note Signed

## 2021-04-05 ENCOUNTER — HOSPITAL ENCOUNTER (OUTPATIENT)
Dept: BEHAVIORAL HEALTH | Facility: CLINIC | Age: 56
End: 2021-04-05
Attending: FAMILY MEDICINE
Payer: COMMERCIAL

## 2021-04-05 PROCEDURE — H2035 A/D TX PROGRAM, PER HOUR: HCPCS | Mod: HQ,95

## 2021-04-05 NOTE — GROUP NOTE
"Group Therapy Documentation    PATIENT'S NAME: Viviana Schaefer  MRN:   [id number]  :   1965  ACCT. NUMBER: [id number]  DATE OF SERVICE: 21  START TIME: 12:00 PM  END TIME:  2:00 PM  FACILITATOR(S): Peri Haynes LADC  TOPIC: BEH Group Therapy  Number of patients attending the group: 7  Group Length:  2 Hours    Group Therapy Type: Addiction    Summary of Group / Topics Discussed:    Sober Support, worry, regrets.  Clients participated in reading and discussion lead by a group member and answered questions about what worries they have and what regrets they have. We discussed this in terms of having two suit cases.  This counselor expounded on the idea by saying, it is not a \"bad thing\" to have either of these, but how heavy are they and what are they doing to our present moments, with self or others.  We discussed healthy ways of lightening the suit cases.    We also discussed the topic of NA beers.  I told clients most have a small percentage of alcohol in.  Many clients reported on their experiences.  We also discussed other healthy ways and creating new and fun drinks, which aren't likely to trigger a person.4 clients checked in and discussed dimension ratings.      Group Attendance:  Attended group session    Patient's response to the group topic/interactions:  expressed readiness to alter behaviors    Patient appeared to be Actively participating, Attentive and Engaged.          Telemedicine Visit: The patient's condition can be safely assessed and treated via synchronous audio and visual telemedicine encounter.      Reason for Telemedicine Visit: COVID-19     Originating Location (pt. Location): Home     Type of service:  Video Visit  GROUP    Originating Location (pt. Location): Home     Distant Location (provider location):  [facility]      Consent:  The patient/guardian has verbally consented to: the potential risks and benefits of " "telemedicine (video visit) versus in person care; billing of their insurance or make self-payment for services provided; and responsibility for payment of non-covered services.      Mode of Communication:  Video Conference via [employer]    Client specific details: todays session focused on  dimension  6  sober support and assertiveness, as well as family support.  It also focused on dim 3 Anxiety and Depression symptoms  Viviana said she is sad about no contact with her 17 year old son.  Group members that have been involved with this type of situation gave her feedback on her feelings and she expressed gratefulness.  Viviana told group she felt better after discussing this.  Client was able to name 3 parts of assertiveness: Honest, Direct, Respectful.  And also said building confidence and trust with others seems important.     She answered questions about what worries she  Has:  That she did not seek better legal [place] for custody of her 17 year old son and what regrets they have: money issues \"I am living off of my savings and doing fine, but I worry about the future.  I encouraged client to seek support from trusted finance support and she said she has been considering that,  I asked her what she finds helpful for anxiety or stress:  She said her bg and support from group and her other kids.        We also discussed the topic of NA beers nd he said \"It is a slippery slope, no point in NA beers for me, if I am going to drink, I am going to drink\"  I encouraged her to discuss ways/drinks she has created and she said she likes finding fun new things that don't have alcohol.    Discussed that the part about not having expectations and doing it because it is about self and demonstrating giving expression to my own thoughts makes sense.    Client was able to name 2 things she is grateful for: her kids, sobriety and this group       P) she will journal her feelings about regrets regarding he 17 year " aydee Haynes, MS, LADC, LPC

## 2021-04-07 ENCOUNTER — HOSPITAL ENCOUNTER (OUTPATIENT)
Dept: BEHAVIORAL HEALTH | Facility: CLINIC | Age: 56
End: 2021-04-07
Attending: FAMILY MEDICINE
Payer: COMMERCIAL

## 2021-04-07 PROCEDURE — H2035 A/D TX PROGRAM, PER HOUR: HCPCS | Mod: HQ,95

## 2021-04-08 ENCOUNTER — HOSPITAL ENCOUNTER (OUTPATIENT)
Dept: BEHAVIORAL HEALTH | Facility: CLINIC | Age: 56
End: 2021-04-08
Attending: FAMILY MEDICINE
Payer: COMMERCIAL

## 2021-04-08 PROCEDURE — H2035 A/D TX PROGRAM, PER HOUR: HCPCS | Mod: HQ,GT
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
## 2021-04-08 NOTE — GROUP NOTE
Group Therapy Documentation    PATIENT'S NAME: Viviana Schaefer  MRN:   [id number]  :   1965  ACCT. NUMBER: [id number]  DATE OF SERVICE: 21  START TIME: 12:00 PM  END TIME:  2:00 PM  FACILITATOR(S): Andrea Ritchie  TOPIC: BEH Group Therapy  Number of patients attending the group:  8  Group Length:  2 Hours    Group Therapy Type: Addiction and Psychoeducation    Summary of Group / Topics Discussed:    Readiness to Change - Stages of Change  Intro Session       Psychoeducation/Skills Readiness to change introduction (stages of change) [TIFFANIE]  This topic will give a general overview of stage of change models and how they apply to the personal experience of each patient.    Objective(s):    Patient will identify at least 2 stages of change     Patient will identify the stage of change they identify they are in    Structure:    Provide psychoeducation on readiness to change and stages of change.    Use teach-back techniques to ensure patients' understanding.    Provide patients with video education to enhance learning.    Expected therapeutic outcomes:      Understand change as an essential and non-linear process.    Increase understanding that ambivalence.is part of progressing in the stages of change    Enhanced motivation to change.    Ability to maintain or resume sobriety after a lapse     Therapeutic outcome(s) measured by:    Patient's ability to understand and acknowledge where they are at in their own process of change and personal pathway to achieving and maintaining long term sobriety in sustained remission    Patient's demonstration of learning by identification of their own stage of change      Telemedicine Visit: The patient's condition can be safely assessed and treated via synchronous audio and visual telemedicine encounter.      Reason for Telemedicine Visit: Services only offered telehealth     Originating Site (Patient Location): Patient's home     Distant Site (Provider  "Location): [facility] Outpatient Setting: [place]     Consent:  The patient/guardian has verbally consented to: the potential risks and benefits of telemedicine (video visit) versus in person care; bill my insurance or make self-payment for services provided; and responsibility for payment of non-covered services.      Mode of Communication:  Video Conference via Zoom     As the provider I attest to compliance with applicable laws and regulations related to telemedicine.         Group Attendance:  Attended group session    Patient's response to the group topic/interactions:  discussed personal experience with topic    Patient appeared to be Actively participating, Attentive and Engaged.        Client specific details: In this session client checked in reporting ongoing sobriety no change in date of last use. Client read a daily reading and shared her thoughts on the gift of roberto and being given another chance to recover one's life. She expressed that she is finding in treatment that she \"can't sit and dwell in the regrets as they are a catalyst to use again.\"  She shared that the reading reminded her that there is enough \"roberto for each day\", every day is a new day, keep on doing your best.\"      Throughout this session client worked on her dimension four goals of; Gain insight and understanding of the disease concept and coping for long term sobriety.    Increase insight into how use has affected you and those around you. Client participated in an interactive learning experience/discussion on the stages of change, as she and peers taught back in their own words a definition of each stage of change. Client then watched a short video on how an individual in the telling of their own addiction story from active addiction to having achieved long term sobriety, showed by example the progression that can happen going through the stages of change, followed by discussion. Client then shared that she related to the " "video. She shared at one time she was sober for 11 years the first three in active recovery in community and attending meetings with others, and eight years of being sober and more isolated from others. She expressed the three years being connected with others in recovery were the best years being sober.  Client shared that she identifies with being in the \"action stage\" of the stages of change.      Plan: Share in a session next week one change in your life you plan on implementing soon, to help you in achieving your goal, one day at a time, of sustaining long term sobriety for the long haul.  "
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
## 2021-04-12 ENCOUNTER — HOSPITAL ENCOUNTER (OUTPATIENT)
Dept: BEHAVIORAL HEALTH | Facility: CLINIC | Age: 56
End: 2021-04-12
Attending: FAMILY MEDICINE
Payer: COMMERCIAL

## 2021-04-12 PROCEDURE — H2035 A/D TX PROGRAM, PER HOUR: HCPCS | Mod: HQ,95
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
## 2021-04-14 ENCOUNTER — HOSPITAL ENCOUNTER (OUTPATIENT)
Dept: BEHAVIORAL HEALTH | Facility: CLINIC | Age: 56
End: 2021-04-14
Attending: FAMILY MEDICINE
Payer: COMMERCIAL

## 2021-04-14 PROCEDURE — H2035 A/D TX PROGRAM, PER HOUR: HCPCS | Mod: HQ,GT
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
## 2021-04-15 ENCOUNTER — HOSPITAL ENCOUNTER (OUTPATIENT)
Dept: BEHAVIORAL HEALTH | Facility: CLINIC | Age: 56
End: 2021-04-15
Attending: FAMILY MEDICINE
Payer: COMMERCIAL

## 2021-04-15 PROCEDURE — H2035 A/D TX PROGRAM, PER HOUR: HCPCS | Mod: 95
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
## 2021-04-15 NOTE — ADDENDUM NOTE
Encounter addended by: Peri Haynes LADC on: 4/15/2021 10:50 AM   Actions taken: Clinical Note Signed

## 2021-04-16 NOTE — PROGRESS NOTES
"Individual Session in Lieu of Group   Note: Due to only two clients showing for group we had to do an individual session in place of group.    TIME: 12:00PM  Duration: 1 Hour    Viviana Schaefer is a 55 year old female who is being evaluated via a billable video visit.      Telemedicine Visit: The patient's condition can be safely assessed and treated via synchronous audio and visual telemedicine encounter.      Reason for Telemedicine Visit: COVID-19    Originating Site (Patient Location): Patient's home    Distant Site (Provider Location): [facility] Outpatient Setting: [place]    Consent:  The patient/guardian has verbally consented to: the potential risks and benefits of telemedicine (video visit) versus in person care; billing of their insurance or make self-payment for services provided; and responsibility for payment of non-covered services.     Mode of Communication:  Video Conference via [employer]  Session Successfully completed: Yes  If session was not successful what alternative options were discussed: NA    As the provider I attest to compliance with applicable laws and regulations related to telemedicine.     Data:  Met with client on this date for an individual session in place of group session due to not enough clients showing for group session. Writer still did the planned topic with the client. Please see below for session specific information.     Client specific details:  On a 0-10 Likert Scale (0=No issues & 10=worst symptoms), client checked-in with rating her depression as a \"1-2\", anxiety as a \"0\", and lastly client rated her cravings as a \"0\".  Overall client reported feeling \"a little helpless about my son in the [employer]\" today. Client reported being grateful for \"this group and for my family and friends\". Client was asked what strength do they have that they can use to strengthen their recovery in which client stated, \"perseverance\". Today's session focused on Dimension(s) 3. " "Client showed progress by being able to teach-back the skills she learned during today's session.   >Client learned about today's topic on  Learning to Handle Frustration .     >Client learned that throughout life we are often irritated by people, annoyed by delays, and frustrated when things do not go our way. We too often display our irritation and live our lives almost constantly upset. Being frustrated from time to time is a common fact of life. We all experience frustration when we face seemingly unnecessary complications or a complete roadblock on the path to our goal. Everyone gets frustrated. Low frustration tolerance is having oversized or extreme reactions to normal stressors and inconveniences of everyday life. You might feel bothered by inconsequential annoyances and become angry, irritated, and upset.  Client was asked, in general, over the past two months, how would you rate your level of frustration? Low 1-------------------------------------- 5-------------------------------------- 10 High. Client stated, \"5.\"    Client was also asked Over the past two months, how would you rate your level of tolerance for frustration? Low 1-------------------------------------- 5-------------------------------------- 10 High. Client stated, \"7.\"    We Want Our Way  >Client learned that we do not want to be stuck in traffic or forced to wait for a doctor. We want fast service, respectful treatment, and have everything easily fall into place. You may be annoyed by the niels who does not use his turn signal or the  who does not know where anything is in the store. You want the best bed, greatest food, nicest clothes, best price, and no hassle. When you don't get your way, you may find yourself moaning, complaining, yelling, screaming, or swearing. You are upset and angry. You may feel disrespected by the inconsiderate and blocked by the unintelligent. Many feel justified in their overreaction.  Things should not be " "this way, and I am going to be upset that they are.  Being anxious or depressed increases your sensitivity and reactivity. This reactivity is made worse when you are not sleeping well or your routine has been disrupted. However, developing a high frustration tolerance is vital to feeling relaxed and at peace. Remember, you won't be able to control all that frustrates you, but you can learn to manage small irritations, annoyances, and frustrations better.  >Client was then asked what common beliefs do they have that increase or contribute to their frustration? Client stated, \"this is too much to take, this only happens to me, I was right, they were wrong, and this is so unfair.\"  Low Frustration Tolerance Creates Problems  >Client learned that everyone goes through frustrating situations. People with low frustration tolerance often overreact and fall apart when inconvenienced. They also have a skewed and inaccurate perspective, filled with distorted and personalized interpretations of what just happened. Difficult situations are seen as dreadful, unbearable, and horrendous. Many people use short-term methods such as alcohol, drugs, over-eating, shopping, gambling, sex, and other distractions to reduce or avoid the pain. Low frustration tolerance is a key factor in the development of mental health problems and substance use disorders. Using any unhealthy coping strategy may allow you to escape a painful situation at first but may bring other complications and add to your overall frustration level down the road. Client was asked what are some unhealthy ways they have tried to cope with frustrating circumstances in the past? Client stated, \"drinking alcohol, shopping and eating either over or under eating but to the extremes.\"  Grumbling and Complaining  >Client learned how low frustration tolerance commonly leads to distress over minor setbacks and inconveniences. Negativity, fault-finding, and complaining are common. " "Many people become overly concerned and highly reactive because something unfair occurred. Comparison of your situation to someone else's will only increase frustration. There may be an intense focus on the unfairness of your treatment. Complaining and highlighting this unfair treatment often pushes other people away and increases a sense of alienation from others. When people feel overlooked, insulted, and unfairly treated, they may become angry, aggressive, and hostile. These aggressive actions may be justified in your mind because someone did something you did not like or failed to give you what you wanted. Client was asked how do they typically respond when they are slighted, overlooked, or treated unfairly? Client stated, \"I will shut down usually as I avoid confict at all cost.\"  Destructive Reactions  >Client learned how they may have seen someone upset and frustrated over poor service in a restaurant.  He was not served in a timely manner, the  was grumpy, and the food was served cold.  At that point, the customer in the restaurant states  I can't stand it anymore!  He comes unglued and throws an adult-version temper tantrum, complete with yelling, swearing, and name-calling. He is offended and angry. In this example, there are real problems, actual slights, and inconveniences. The customer feels entitled by these realities to react with rude, disrespectful, and immature attitudes and behavior.  Tolerating Discomfort  >Client learned that low frustration tolerance is closely linked to low discomfort tolerance. When a difficulty is experienced, a person may feel negative and uncomfortable emotions, which are perceived as extremely painful, unbearable, and intolerable. Frustration is uncomfortable and discomfort is frustrating. You may have had low frustration tolerance and low discomfort tolerance modeled by parents, caregivers, siblings, or peers. You have watched other people get what they wanted by " "exaggerating their frustrations and complaining about it. Maybe you were rewarded for forceful or aggressive behavior. People often give in to someone who is being rude, oppositional, and demanding. Over time, a style of complaining or being aggressive may develop and continue because these tactics seem to work; however, the behavior is ultimately harmful to you and others. Client was asked, when thinking back to their younger years, how did their family members and friends handle frustrating events? Client stated, \"my parents would also aboid or shut down when frustrated.\"  Increasing Your Frustration Tolerance  >Client learned how frustration tolerance can be developed through changing the way you think. You are learning to work through, or work down, difficulties and problems, rather than letting them get you worked up. Offenses can be pardoned, inconsistencies tolerated, oversights forgiven, and faults and mistakes overlooked. You often cannot change a particular situation, but you have the ability to change your beliefs and reduce your level of frustration. Don't bother trying to avoid everything that bothers you. Instead, learn to tolerate frustration and work down bothersome situations. Not only can you accept responsibility for your own attitudes and actions, but you are able to change the self-centered thoughts and reactivity that often result in frustration and disappointment.  Challenge Thoughts  >Client was asked to provide some examples of positive statements that would help them stay emotionally regulated and calm? Client stated, \"I am letting my character not my frustration, determine my actions and when I tolerate, overcome, and endure, I get stronger.\"  Putting It into Practice  >Client learned that in order to increase frustration tolerance, some practice may be helpful. You can drive in the slowest alejo or let someone go ahead of you in the checkout line. You might choose a more scenic, but " "inconvenient, route. Practice managing your own level of frustration without being inconsiderate, rude, or sarcastic. Even when someone is slow or disrespectful, be considerate and respectful. Be constantly aware of your mood, attitude, and disposition. Give up your desire to use frustration and aggression to get what you want. When difficulties arise, practice letting go of the offense, overlooking slights, and forgiving mistakes. Client was asked to provide an example of something that commonly cause them to be more frustrated than they want to be. Client stated, \"being treated less than.\" Client was asked to writer a new response for the previous answer that they can use in the future. Client stated, \"we all don't need to agree with everything to get along.\" Client was asked for an example of a way they can practice maintaining control and increase their frustration tolerance. Client stated, \"I can compromise and not take offense to everything going on day to day.\" Client learned that as they practice, they will get stronger. This is not about how to get your way, but instead how to succeed in life. Highlight what you liked about an experience rather than complaining about what you did not. You can change your character by increasing frustration tolerance, and in turn, strengthen your emotional health.  >On a 0-10 Likert Scale (0=lowest confidence & 10=most confidence), client was asked to rate her confidence with using the skills learned on  Learning to Handle Frustration  to help her strengthen her recovery both with MH and TIFFANIE. Client stated their rating was a \"6 as it's more an issue with my overall confidence period. I think once I work on being a more confident person I will be more confident with using skills as well\".   Intervention: Individual session with client. Provided client with verbal interventions including: validation, support, and psycho-education.      Assessment: Client presents engaged and " willing. Client reports taking proactive steps to reduce mental health symptoms and stressors in her life. Client continues to appear very open and willing to learn new skills that will help her maintain lifelong recovery.     Plan: Client will journal about their experience with highlighting what they liked rather than what they did not like each day. Client will continue attending all MH Skills Group Sessions.     I have reviewed the note as documented above.  This accurately captures the substance of my telehealth visit with the patient.     Deyvi Jama, SUNNY, LMFT, LADC, CGTP-[place]  Licensed Psychotherapist

## 2021-04-16 NOTE — ADDENDUM NOTE
Encounter addended by: Deyvi Jama LMFT, [facility] on: 4/16/2021 9:46 AM   Actions taken: Clinical Note Signed

## 2021-04-19 ENCOUNTER — HOSPITAL ENCOUNTER (OUTPATIENT)
Dept: BEHAVIORAL HEALTH | Facility: CLINIC | Age: 56
End: 2021-04-19
Attending: FAMILY MEDICINE
Payer: COMMERCIAL

## 2021-04-19 PROCEDURE — H2035 A/D TX PROGRAM, PER HOUR: HCPCS | Mod: HQ,GT

## 2021-04-19 NOTE — GROUP NOTE
Group Therapy Documentation    PATIENT'S NAME: Viviana Schaefer  MRN:   [id number]  :   1965  ACCT. NUMBER: [id number]  DATE OF SERVICE: 21  START TIME: 12:00 PM  END TIME:  2:00 PM  FACILITATOR(S): Peri Haynes LADC  TOPIC: BEH Group Therapy  Number of patients attending the group:  9  Group Length:  2 Hours    Group Therapy Type: Addiction    Summary of Group / Topics Discussed:    Anger/Conflict Management , Balanced Lifestyle , and resentments.  I read quotes about resentments and lack of forgiveness.  Clients gave examples of them and gave each other feedback. I discussed how judgement is a factor in resentments and that many people report resentments as one of the top 3 reasons for use.  I asked clients to name where they feel resentments in their body and also where they feel anger.  Many reported it was in the same places, generally the gut, but also appears as something that makes them feel tired.  A new client joined group today and others welcomed her and told her a bit about their story, as a place of getting to know them.  Clients gave feedback to another group member who is going thorugh a divorce and goes to court on Wednesday.  We did a short visualization on releasing those who have hurt us.      Group Attendance:  Attended group session    Patient's response to the group topic/interactions:  cooperative with task and discussed personal experience with topic    Patient appeared to be Attentive and Engaged.          Client specific details: todays session focused on dim 5  forgiveness and dim 6 sober support.    She reports she doesn't sleep the best, but has found being frustrated about it, doesn't make it any better.  She said she will continue to explore helpful coping and acceptance.  Client rated self on motivation for sobriety:  10   For sober support:7  Client checked in on dimensions and ratings 1-10, where 10 is high.  Hunger:0  Anger:0  Lonely:0  Tired:5  Stress:0   "Anxiety:1  Depression:0    We discussed points regarding forgiving self and others and she wrote down these that she finds helpful:  \"I know that it is time to cleanse the poisons that have polluted my life\"  \"undo the chains so you don't hurt self and carry this into other relationships\" \"Forgiveness is an attitude\"  Take back your power, create what I want, not what I don't want\"     She used humor to discuss some difficult things, but she used it appropriately and said it feels good to be able to laugh with her group, and health    Peri Haynes, MS, LADC, LPC       "

## 2021-04-21 ENCOUNTER — HOSPITAL ENCOUNTER (OUTPATIENT)
Dept: BEHAVIORAL HEALTH | Facility: CLINIC | Age: 56
End: 2021-04-21
Attending: FAMILY MEDICINE
Payer: COMMERCIAL

## 2021-04-21 PROCEDURE — H2035 A/D TX PROGRAM, PER HOUR: HCPCS | Mod: HQ,GT

## 2021-04-21 NOTE — GROUP NOTE
Group Therapy Documentation    PATIENT'S NAME: Viviana Schaefer  MRN:   [id number]  :   1965  ACCT. NUMBER: [id number]  DATE OF SERVICE: 21  START TIME: 12:00 PM  END TIME:  2:00 PM  FACILITATOR(S): Peri Haynes LADC  TOPIC: BEH Group Therapy  Number of patients attending the group: 8  Group Length:  2 Hours    Group Therapy Type: Addiction and Psychoeducation    Summary of Group / Topics Discussed:    Psychoeducation/Skills Self-Care and Humor in Recovery (OP)  Learn how powerful self-care is in healing from addiction and building resiliency in mental and physical health.  Learn many aspects of self-care linked to elevating moods, increasing energy reserve and staminal, thinking clearer, focusing better on tasks and improving organizational skills.     Objective(s):    Name 3 reasons self-care is crucial for optimal health and recovery from illness.      Identify 2 mental and physical illnesses directly linked to poor self-care.    Identify 2 ways social connections build resilience and may strengthen our immune systems.    Create a personal plan of action to add to daily schedule of structure and routine.     Structure (modalities, homework, worksheets, etc.):    Self-Care questions: 3    Education on Self-Care with emphasis on Recovery     Why Self-care is a necessity, not a luxury     Worksheet to explore and prioritize needs    Action plan to start this week    Expected therapeutic outcome(s):     Be more proactive in their mental and physical health     Start their personal self-care plan this week and note results in journal    Take an inventory of choices and behaviors that hinder their health and have a negative effect on the quality of their lives.      Work to remove the obstacles that keep them from these aspects of self-care.     Therapeutic outcome(s) measured by:   Teachback, creation of personal action plan, and group review and evaluation      Group Attendance:  Excused from  "group session    Patient's response to the group topic/interactions:  cooperative with task    Patient appeared to be Actively participating and Attentive.          Telemedicine Visit: The patient's condition can be safely assessed and treated via synchronous audio and visual telemedicine encounter.      Reason for Telemedicine Visit: COVID-19     Originating Location (pt. Location): Home     Type of service:  Video Visit  GROUP    Originating Location (pt. Location): Home     Distant Location (provider location):  [facility]ENT[facility]      Consent:  The patient/guardian has verbally consented to: the potential risks and benefits of telemedicine (video visit) versus in person care; billing of their insurance or make self-payment for services provided; and responsibility for payment of non-covered services.      Mode of Communication:  Video Conference via ClearApp    Client specific details: todays session focused dim 3 relaxation technique for stress and anxiety, and dimension 4 consequences of use.  Viviana said she is learning so much about herself and finds the group to help her with learning and accountability. She encouraged a new client who has legal issues to use the group for support and acccountability  I told Viviana her feedback was honest and respectful and that she is a good contributor to group and I am happy she is finding his path for recovery.    Client answered questions:    How difficult has using made my life;  \"I have had to start over with my transplant team, been sick, and now have to rely on others more because I am sicker.    What are my consequences: same as above and also lost trust with self and son.    What how far I am willing to go to prove I am right: I was determined to keep drinking and that my problems were not related to that    What stances have I taken that may not work so well anymore: That I can do this alone    Ways I have in past or am " "now setting myself up for self-sabotage (in regard to my warning signs, triggers, using): \"I can do this alone\"  I don't need anyone or a program\"    I) I encouraged client to look at beliefs around self sabotage and journal.  I encouraged client to use relaxation techniques with anxiety.   I asked client to consider what others have said and journal    P) accountability vs responsibility vs blame assignment.      Peri Haynes, MS, LADC, LPC               "

## 2021-04-22 ENCOUNTER — HOSPITAL ENCOUNTER (OUTPATIENT)
Dept: BEHAVIORAL HEALTH | Facility: CLINIC | Age: 56
End: 2021-04-22
Attending: FAMILY MEDICINE
Payer: COMMERCIAL

## 2021-04-22 PROCEDURE — H2035 A/D TX PROGRAM, PER HOUR: HCPCS | Mod: HQ,95
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
## 2021-04-22 NOTE — GROUP NOTE
Group Therapy Documentation    PATIENT'S NAME: Viviana Schaefer  MRN:   [id number]  :   1965  [id number]  DATE OF SERVICE: 21  START TIME: 12:00 PM  END TIME:  2:00 PM  FACILITATOR(S): Deyvi Jama LMFT, LADC  TOPIC: BEH Group Therapy  Number of patients attending the group:  4  Group Length:  2 Hours    Group Therapy Type: Addiction, Life skill(s), and Psychoeducation    Summary of Group / Topics Discussed:    Co-occurring Illness, Distress Tolerance, Stress Management and Psychoeducational/Skills Personal Growth and Development (MH)  This topic will address various ways to implement and sustain overall health and wellness by focusing on the various aspects of spirituality.    Objective(s):    Patient will learn strategies to reduce stress and increase overall health and wellness.    Patient will identify a coping skills they can use to help manage stress.     Structure (modalities, homework, worksheets, etc)     Facilitate group discussion on stress as it relates to both MH and Addiction.    Facilitate group discussion on stress management techniques they can utilize to help combat stress.     Provide psychoeducation on the need for a balanced lifestyle and ways to achieve this.    Use teach-back techniques to ensure patients understanding.    Patient will be provided handouts to enhance learning.    Expected therapeutic outcome(s):  Patient will:    Patient will sustain improved health and wellness.    Patient will integrate the regular use of coping skills and stress management into their daily life.     Therapeutic outcome(s) measured by:     Patient s ability to teach-back the techniques learned in group.     Telemedicine Visit: The patient's condition can be safely assessed and treated via synchronous audio and visual telemedicine encounter.      Reason for Telemedicine Visit: COVID-19, serviced only offered via tele-health.    Originating Site (Patient Location):  "Patient's home    Distant Site (Provider Location): Provider Remote Setting    Consent:  The patient/guardian has verbally consented to: the potential risks and benefits of telemedicine (video visit) versus in person care; billing of their insurance or make self-payment for services provided; and responsibility for payment of non-covered services.     Mode of Communication:  Video Conference via [employer] or Zoom.  Session Successfully completed: Yes  If session was not successful what alternative options were discussed: NA    As the provider I attest to compliance with applicable laws and regulations related to telemedicine.    Group Attendance:  Attended group session    Patient's response to the group topic/interactions:  cooperative with task, discussed personal experience with topic, expressed readiness to alter behaviors, expressed understanding of topic, gave appropriate feedback to peers and listened actively    Patient appeared to be Actively participating, Attentive and Engaged.        Client specific details:  On a 0-10 Likert Scale (0=No issues & 10=worst symptoms), client checked-in with rating her depression as a \"0\", anxiety as a \"0\", and lastly client rated her cravings as a \"0\".  Overall client reported feeling \"tired but feel well\" today. Client reported being grateful for \"my son, this group, and my family\". Client was asked what strength do they have that they can use to strengthen their recovery in which client stated, \"self-reflection\". Today's group session focused on Dimension(s) III & V. Client showed progress by being able to teach-back the skills she learned during today's group session. Please see additional details below for further specific details on group topic matter.    >Client learned about today's group topic on  Understanding and Managing Stress .    Understanding and Managing Stress  >Client learned how it seems like everyone is under stress and the list of stressors is " "almost endless. Stress is experienced at home, on the job, and on the road in between. Your family, relationships, job, and financial situation may have changed. You may be facing legal consequences and an unpredictable future. Even treatment for mental health and substance use disorders is stressful. Stress can break a person, so it is important to have some ideas, skills, and strategies in place to help reduce it and live a more balanced life. Substance abuse and stress are intertwined. People often say one of the reasons they drink or use drugs is because they are anxious, worried, or stressed. In reality, alcohol and drug use adds to long-term stress. Although substance use may be a distraction and seem to offer comfort at first, it often brings increased hardship, difficulties, and pain.  Stress Response  >Client learned that everyone responds differently to stress. Some people shut down, while others respond by going full-speed ahead. Regardless of how you deal with it, the consequences of stress affect every area of your life: mind, body, heart, thoughts, and behavior. It is important to learn strategies to bring yourself back to a place of peace and calm before becoming overwhelmed. Then, when your stress level increases, you have the tools needed and can use them effectively.  Stress Symptoms (Physical and Psychological)  >Client was asked to review a physical symptom check list (Section A) and komal each one they have experienced in the past month. Client then was asked to total up how many they have checked. Client stated, \"17.\" Client was asked to review a psychological symptom check list (Section B) and komal each one they have experienced in the past month. Client was asked to total up how many they have checked. Client stated, \"18.\" Client was then asked to add the two sections together and what did they have for a total score. Client stated, \"35 = Very High Stress.\" Client then learned what level of " stress they have been having by the following grid.  Total number of items checked                                              Stress level   0 to 7 . . . . . . . . . . . . . .  . . . . . . =. . . . . . . . . . . . . . . . . . . . . Low Stress  8 to 14 . . . . . . . . . . .  . . . . . . . =. . . . . . . . . . . . . . . . . . . . . .Moderate Stress  15 to 21 . . . . . . . . . . . . . . . . . =. . . . . . . . . . . . . . . . .  . . . . . . High Stress  22+ . . . . . . . . . . . . . . . . . . . . . . =. . . . . . . . . . . .. . . . . . . Very High Stress  Top Three Strategies for Managing Stress  >Client learned how it is impossible to eliminate all stress. Some stress can be eliminated, and some can be reduced or managed better. Consider the stress management strategies below:  1. Throw something overboard. Sometimes we get overwhelmed by the sheer number of stressful challenges on our plate. Think about the stressful things in your life and determine if any can be eliminated. Maybe you are on an organizational committee, babysit a neighbor's child for free, or host a game night in your home each week. Is it possible to eliminate that stressor, at least temporarily? Cutting the number of responsibilities and duties you have can reduce the stress you feel. Keep in mind a few rules when throwing something overboard: you cannot throw people and you cannot throw someone else's activities. See what is in your own life that can be removed from your list.  2. Manage life better. Things that cannot be eliminated can often be managed more efficiently. Consider setting up a carpool for your child's swimming lessons. Share cooking responsibilities. Get on the road a few minutes early and leave a little extra space in your schedule to better manage the stress of driving to work.  3. Better tolerate what remains. Expecting some stress can help you better tolerate the common, frustrating events of everyday life. Stoplights turn  "red, you run out of milk, babies cry, the weather can be rainy and cold, traffic is heavy, your friend may be sad, money can be tight, and you need to buy new tires. These are the ordinary stressors of life. They are distressing, but not disastrous. You can rise to the challenge, solve problems, persevere, and overcome. You are stronger and more confident, which allows you to tolerate the remaining and naturally occurring stressors in everyday life.  Client was then asked when looking at these three strategies, which one might best help them manage some of the stress in their life. Client stated, \"Yes, #2 (Manage life better) & #3 (Better tolerate what remains).\" Client was asked to name a stressor they could eliminate. Client stated, \"stress with my son that lives at home.\" Client was asked to list a stressor they cannot eliminate but can learn to manage better. Client stated, \"my children.\" Client was asked how they are going to better manage this stressor. Client stated, \"learn to be more tolerant when appropriate.\"  Stress Triggers  >Client learned how Life has two kinds of stress triggers, external and internal. External triggers come from outside yourself and could be situations, circumstances, or other people creating difficulty, tension, and stress for you. Internal triggers are reactions coming from inside you and include your tolerance of frustration, emotional and mental health difficulties, general attitude, and your level of resilience.  External Triggers    Financial difficulties   Major changes in life situation   Tension in valued relationships     Over-commitment   Work challenges   Time demands     Family responsibilities     Internal Triggers    Intolerance of uncertainty   Idealistic expectations of others   Pessimistic attitude     Intolerance of discomfort   Idealistic expectations of yourself   Negative thought patterns     Inability or reluctance to confront issues when necessary   Building " Better Coping Skills  >Client learned that stress can be managed. Client learned the following 14 skills that can help them reduce anxiety and help them better handle stress:  1. Evaluate the source of stress. Is the source of your stress something you can control, or is it out of your hands? It is important to gauge how much control you actually have over the stressor so you can choose the best response. If the problem is something you can change, then information-seeking and other problem-based coping methods can be useful. If it seems out of your hands, it may be better to learn to live with the situational stress using other methods, such as seeking social support, changing the way you think about the problem, distracting yourself, or finding ways to express your emotions.   2. Do not self-medicate. Remember, if you are using drugs or alcohol to cope with your problems, you are not dealing with the trouble at hand. Two wrongs do not make a right and you will end up adding yet another problem to your life. If you need help reducing or stopping your alcohol or drug intake, seek professional support.   3. Exercise. Not only does regular exercise promote good health and high self-esteem, it also helps reduce anxiety and depression. Exercise eases tension, burns cortisol, and releases endorphins. Activities that draw you outside and into nature are also calming and distracting. Try working out at least three times a week, for a minimum of 30 minutes each time.   4. Stop over-thinking. Continuing to dwell on your problems can make them seem even more overwhelming. If you find yourself obsessing over something, make an effort to stop those thoughts in their tracks by picking up a book that draws you in, watching a comedy on television, going for a bike ride, or some other enjoyable activity that will help distract you.  5. Evoke the relaxation response. Using progressive muscle relaxation, meditation, and breathing  exercises can produce feelings of calm and peace. This is known as the relaxation response. It is under your control, and with practice, can be used to manage stress quickly and effectively.   6. Use walking meditation. This is an excellent way to combine the benefits of meditation with a change of scenery, fresh air, and a little bit of exercise. It involves walking slowly but very deliberately. While walking, concentrate on your breathing (breathe from your lower abdomen) and imagine your feet gently pushing against the earth as you take each step. For every breath in, take 3 steps or so, and with each breath out, take another 3 steps. Adjust this to your liking, but the slower, the better. This forces you to be aware of the present moment, not mentally caught in the past or future, and the deep breathing works to calm anxiety.   7. Learn to bounce back from hard times. Most co-occurring disorder clients have entered treatment with hope for the future. They envision being stronger, feeling better, and becoming more active. Resilience tends to be weakened by poor physical health. Our immune system and emotional maintenance system are both impaired by exhaustion, poor eating and sleeping habits, harmful addictions, and a lack of regular exercise. By improving physical health, your resilience will grow. Cognitive restructuring, or positive self-talk (I'll never get over this.  vs.  This is difficult, but one day I'll laugh about it. ), can strengthen your stamina and help you bounce back faster.   8. Look at the bright side. In every situation there is a lesson or skill to be learned. Even in seemingly awful circumstances, positive things are happening. By looking for the silver lining, no matter how small, you can find meaning in setbacks, disappointments, and hardships. Focus on what went right and what you learned, rather than continually reviewing what went wrong. Identify how this situation can add positivity to  your character.   9. Take baby steps. Rather than looking at the big picture, it is much less overwhelming to take problems step-by-step. Stand back from your problems once-in-a-while to gain some perspective. Break big projects into bite-size pieces. Evaluate your progress and see how the small steps can make a big difference in resolving problems.   10. Take a step back when a task or situation overwhelms you. Getting away from the situation for just a little bit will help you relax and put things in perspective. Try breathing techniques, meditation, or simply working on a different task to get your mind off the difficult undertaking.   11. Do something you enjoy right after you experience a disappointment. You may not want to, but once you start an activity, good feelings are bound to take over. When you are in a more positive frame of mind, you can think more creatively about how to solve a problem. You will be more open to a variety of solutions once your mood has improved.  12. View setbacks as short-lived. If you find yourself plunging into negativity after a disappointment, remember that things can get better. Whatever the situation, you can take proactive steps to deal with the underlying issues. Even if you are faced with something like a life-long health problem, there is always a way to improve it. You will grow stronger, heal, and find better ways to cope.   13. Plan time for fun, relaxing, and stress-relieving activities. When people are stressed and anxious their world often shrinks and closes in. Revisiting old areas of interest and developing new hobbies can be a powerful strategy to reduce stress. Find time for hobbies and distracting activities, even when you do not have much desire to do so. Develop new pastimes that are restful and relaxing. When you are stressed, these diversions suddenly become therapeutic. Being active in hobbies and activities helps you find comfort, develop a positive  "attitude, become more active, and engage with others.   14. Fight isolation. Resilient people lean on others when needed and work to develop meaningful relationships to help pull them through. Although you should not depend entirely on other people to hold you together emotionally, a support network is beneficial. If you do not have a support system, go out and find it; volunteer, join a health club, class, or support group. The list of ways to reach out and make human connections is endless.  Staying Positive and Strong  >Client learned that because stress is inevitable, managing it becomes our only option. Both mental health and substance use disorders increase stress levels. Managing stress also means managing your mental health symptoms and substance use disorder. Look for positive ways to cope with the difficulties of everyday life. Resist the urge to eliminate all stress or avoid everything that causes you to be anxious. Instead, become more resilient, face your fears, overcome life's difficulties, and do whatever you can to strengthen your recovery.  Good Stress Versus Bad Stress  >Client learned about the difference between \"Eustress\" and \"Distress\". Client learned the importance of positive stress (eustress) and how to check if she is experience positive or negative stress. Client learned about the fight-or-flight part of the brain that is triggered when stress and/or anxiety is present. Client learned how the fight-or-flight response is triggered, symptoms of fight-or-flight, and how to manage the fight-or-flight response.    >On a 0-10 Likert Scale (0=lowest confidence & 10=most confidence), client was asked to rate her confidence with using the skills learned on  Understanding and Managing Stress  to help her strengthen her recovery both with MH and TIFFANIE. Client stated their rating was a \"6 as I need to practice this more in real life to feel more confident\".   Plan: Client will keep track of daily " "\"Eustress\" and \"Distress\" and will then journal about how they handled each.  "

## 2021-04-23 NOTE — ADDENDUM NOTE
Encounter addended by: Deyvi Jama LMFT, [facility] on: 4/23/2021 4:04 PM   Actions taken: Clinical Note Signed

## 2021-04-26 ENCOUNTER — TELEPHONE (OUTPATIENT)
Dept: TRANSPLANT | Facility: CLINIC | Age: 56
End: 2021-04-26

## 2021-04-26 ENCOUNTER — HOSPITAL ENCOUNTER (OUTPATIENT)
Dept: BEHAVIORAL HEALTH | Facility: CLINIC | Age: 56
End: 2021-04-26
Attending: FAMILY MEDICINE
Payer: COMMERCIAL

## 2021-04-26 PROCEDURE — H2035 A/D TX PROGRAM, PER HOUR: HCPCS | Mod: HQ,95

## 2021-04-26 NOTE — GROUP NOTE
"Group Therapy Documentation    PATIENT'S NAME: Viviana Schaefer  MRN:   [id number]  :   1965  [facility]. NUMBER: [id number]  DATE OF SERVICE: 21  START TIME: 12:00 PM  END TIME:  2:00 PM  FACILITATOR(S): Peri Haynes LADC  TOPIC: BEH Group Therapy  Number of patients attending the group: 6  Group Length:  2 Hours    Group Therapy Type: Addiction    Summary of Group / Topics Discussed:    Today we reviewed self sabotage and how it can be a part of resentments toward self.  I read an article from Maria Esther Mckeon, called Slogans and Self talk for Recovering people\".  In it he said that AA's Big Book states that resentments are the number one offender against recovery and that complacency is ranked high also.  We discussed how resentments toward self affect a person and their recovery.  I emphasized how the natural thing for addicts to do is drink or take drugs, addicts don't need support groups for this. However, we do need support groups for recovery and accountability.  Counselor also discussed that we forget we have choice to do things differently, that is also how having support groups, sponsors and supportive people can be helpful.  I reminded clients of 3 parts of mindfulness: compassion, gentleness and curiosity.  I explained a lovingkindness as piece that may help bring forgiveness and more peace to self and others.  I explained what a lovingkindness meditation is and clients participated in a meditation.       Group Attendance:  Attended group session    Patient's response to the group topic/interactions:  cooperative with task and discussed personal experience with topic    Patient appeared to be Actively participating and Engaged.        Telemedicine Visit: The patient's condition can be safely assessed and treated via synchronous audio and visual telemedicine encounter.      Reason for Telemedicine Visit: COVID-19     Originating Location (pt. Location): Home     Type of " service:  Video Visit  GROUP    Originating Location (pt. Location): Home     Distant Location (provider location):  [facility]      Consent:  The patient/guardian has verbally consented to: the potential risks and benefits of telemedicine (video visit) versus in person care; billing of their insurance or make self-payment for services provided; and responsibility for payment of non-covered services.      Mode of Communication:  Video Conference via "[employer]    Client specific details: todays session focused dim 3, lovingkindness meditation and self forgiveness, dim 5 warning signs and triggers and self sabotage  Client said this group is wonderful for accountability and she told group she appreciates them and counselor. Client became tearful when discussing her lack of self forgiveness, when she apologized to group they told her tears are good and this counselor said I hoped she could use the tears as a sign of vulnerability and cleansing and she said that was very helpful.    Client answered questions:  What are some specific things I have not forgiven myself for:  Saying goodbye to her Dad, when he was dying, while having been drinking.  Choosing 3 bad relationships,            What feelings  are associated with not forgiving myself:shame, sadness, disappointment  How does not forgiving myself affect my today's:    stay stuck, feel not good enough, anxious  Some things I would like to do to move forward, heal and forgive myself: empower myself with choice, move on, not drink    I) I encouraged her to use the parts of lovingkindess as she goes through her next few days, being compassionate, gentle and look at things with curiosity rather than self judgement       P) journal about what self forgiveness brought up about self.  We will meet for individual session 4/28 and discuss this as part of session.  Accountability vs responsibility vs blame.          Peri VIRGEN  [place], MS, LADC, LPC

## 2021-04-26 NOTE — TELEPHONE ENCOUNTER
Transplant Social Work Services Phone Call      Data: Viviana was being evaluated for liver transplantation, and was found to have a positive PEth test on 3/1/21 (61).  Intervention: Chart review.  I called Viviana to evaluate her progress with chemical dependency treatment.  Assessment: Viviana reports starting her sixth week of outpatient chemical dependency treatment through [facility].  She reports really benefiting from this program.  Viviana continues to work with psychiatry at [facility].  She reports her next appointment is in June.  PEth on 3/1/21 was positive.  Ongoing PEth testing is recommended.  Patient aware she will need at least 6 months of sobriety before being referred back for transplant evaluation.  Education provided by SW: Virtual liver transplant support group  Plan: I will remain involved for the psychosocial needs of this patient.      YOVANY Olivares, [facility]  Liver Transplant   Phone [phone]  Pager [phone]

## 2021-04-28 ENCOUNTER — HOSPITAL ENCOUNTER (OUTPATIENT)
Dept: BEHAVIORAL HEALTH | Facility: CLINIC | Age: 56
End: 2021-04-28
Attending: FAMILY MEDICINE
Payer: COMMERCIAL

## 2021-04-28 PROCEDURE — H2035 A/D TX PROGRAM, PER HOUR: HCPCS | Mod: HQ,95

## 2021-04-28 PROCEDURE — H2035 A/D TX PROGRAM, PER HOUR: HCPCS | Mod: GT

## 2021-04-28 ASSESSMENT — ANXIETY QUESTIONNAIRES
1. FEELING NERVOUS, ANXIOUS, OR ON EDGE: SEVERAL DAYS
5. BEING SO RESTLESS THAT IT IS HARD TO SIT STILL: NOT AT ALL
6. BECOMING EASILY ANNOYED OR IRRITABLE: NOT AT ALL
GAD7 TOTAL SCORE: 6
2. NOT BEING ABLE TO STOP OR CONTROL WORRYING: SEVERAL DAYS
IF YOU CHECKED OFF ANY PROBLEMS ON THIS QUESTIONNAIRE, HOW DIFFICULT HAVE THESE PROBLEMS MADE IT FOR YOU TO DO YOUR WORK, TAKE CARE OF THINGS AT HOME, OR GET ALONG WITH OTHER PEOPLE: SOMEWHAT DIFFICULT
7. FEELING AFRAID AS IF SOMETHING AWFUL MIGHT HAPPEN: MORE THAN HALF THE DAYS
3. WORRYING TOO MUCH ABOUT DIFFERENT THINGS: SEVERAL DAYS

## 2021-04-28 ASSESSMENT — PATIENT HEALTH QUESTIONNAIRE - PHQ9
5. POOR APPETITE OR OVEREATING: SEVERAL DAYS
SUM OF ALL RESPONSES TO PHQ QUESTIONS 1-9: 8
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
## 2021-04-29 ENCOUNTER — HOSPITAL ENCOUNTER (OUTPATIENT)
Dept: BEHAVIORAL HEALTH | Facility: CLINIC | Age: 56
End: 2021-04-29
Attending: FAMILY MEDICINE
Payer: COMMERCIAL

## 2021-04-29 PROCEDURE — H2035 A/D TX PROGRAM, PER HOUR: HCPCS | Mod: HQ,95

## 2021-04-29 ASSESSMENT — ANXIETY QUESTIONNAIRES: GAD7 TOTAL SCORE: 6

## 2021-04-30 NOTE — ADDENDUM NOTE
Encounter addended by: Deyvi Jama LMFT, [facility] on: 4/30/2021 2:04 PM   Actions taken: Clinical Note Signed
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
## 2021-05-03 ENCOUNTER — HOSPITAL ENCOUNTER (OUTPATIENT)
Dept: BEHAVIORAL HEALTH | Facility: CLINIC | Age: 56
End: 2021-05-03
Attending: FAMILY MEDICINE
Payer: COMMERCIAL

## 2021-05-03 PROCEDURE — H2035 A/D TX PROGRAM, PER HOUR: HCPCS | Mod: HQ,GT
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
## 2021-05-06 ENCOUNTER — HOSPITAL ENCOUNTER (OUTPATIENT)
Dept: BEHAVIORAL HEALTH | Facility: CLINIC | Age: 56
End: 2021-05-06
Attending: FAMILY MEDICINE
Payer: COMMERCIAL

## 2021-05-06 DIAGNOSIS — F40.240 CLAUSTROPHOBIA: ICD-10-CM

## 2021-05-06 DIAGNOSIS — K70.31 ALCOHOLIC CIRRHOSIS OF LIVER WITH ASCITES (H): Primary | ICD-10-CM

## 2021-05-06 PROCEDURE — H2035 A/D TX PROGRAM, PER HOUR: HCPCS | Mod: HQ,GT

## 2021-05-06 RX ORDER — LORAZEPAM 0.5 MG/1
0.5 TABLET ORAL EVERY 6 HOURS PRN
Qty: 2 TABLET | Refills: 0 | Status: SHIPPED | OUTPATIENT
Start: 2021-05-06

## 2021-05-06 NOTE — GROUP NOTE
Group Therapy Documentation    PATIENT'S NAME: Viviana Schaefer  MRN:   [id number]  :   1965  [id number]  DATE OF SERVICE: 21  START TIME: 12:00 PM  END TIME:  2:00 PM  FACILITATOR(S): Deyvi Jama LMFT, LADC  TOPIC: BEH Group Therapy  Number of patients attending the group:  3  Group Length:  2 Hours    Group Therapy Type: Addiction, Life skill(s), and Psychoeducation    Summary of Group / Topics Discussed:    Communication, Co-occurring Illness, Relationships, and Psychoeducation/Skills Family Dynamics (MH)  This topic will include discussing family roles, communication within the family system, and understanding the impact of mental health and substance abuse issues on the family.     Objective(s):    Identify healthy versus unhealthy roles that may appear in families    Name two affects addiction has on the brain    Identify three tools to use for self-care    Identify two communication skills    Define the difference between healthy and unhealthy boundaries.    Structure (modalities, homework, worksheets, etc)     Discuss communication strengths and weaknesses with in the family system.    Therapist will teach about the family disease of mental health and substance abuse.    Therapist will facilitate group discussion about the impact patients diagnosis(s) have had on the family system.     Use teach back techniques to ensure patients understanding.    Patient will be provided handouts to enhance learning.    Expected therapeutic outcome(s):  Patient will:    Reduced conflict and increase cohesion in the family system.    Therapeutic outcome(s) measured by:     Patient will teach-back the skills they learn on relationships and family dynamics.     Telemedicine Visit: The patient's condition can be safely assessed and treated via synchronous audio and visual telemedicine encounter.      Reason for Telemedicine Visit: COVID-19, serviced only offered via  "tele-health.    Originating Site (Patient Location): Patient's home    Distant Site (Provider Location): Provider Remote Setting    Consent:  The patient/guardian has verbally consented to: the potential risks and benefits of telemedicine (video visit) versus in person care; billing of their insurance or make self-payment for services provided; and responsibility for payment of non-covered services.     Mode of Communication:  Video Conference via [email or web address] or Zoom.  Session Successfully completed: Yes  If session was not successful what alternative options were discussed: NA    As the provider I attest to compliance with applicable laws and regulations related to telemedicine.    Group Attendance:  Attended group session    Patient's response to the group topic/interactions:  cooperative with task, discussed personal experience with topic, expressed readiness to alter behaviors, expressed understanding of topic, gave appropriate feedback to peers and listened actively    Patient appeared to be Actively participating, Attentive and Engaged.        Client specific details:  On a 0-10 Likert Scale (0=No issues & 10=worst symptoms), client checked-in with rating her depression as a \"1\", anxiety as a \"1\", and lastly client rated her cravings as a \"0\".  Overall client reported feeling \"content but a little sad\" today. Client reported being grateful for \"this group and my family\". Client was asked what strength do they have that they can use to strengthen their recovery in which client stated, \"self-awareness\". Today's group session focused on Dimension(s) III & VI. Client showed progress by being able to teach-back the skills she learned during today's group session. Please see additional details below for further specific details on group topic matter.  >Client learned how conflict is inevitable and should be expected in any relationship, but when handled poorly, disagreements and arguments can quickly get out of " "hand and cause lasting damage. By learning how to manage conflict in more positive and healthy ways, that those disagreements can be navigated without damaging the relationship. As a teach-back and for deepening insight client was asked out of the most common areas of conflict which ones are major sources of conflict for her. Client stated, \"alcohol use, hobbies and recreational activities, money and finances, sexual relationship, issues of authority, and spiritual beliefs and Oriental orthodox practices.\"  >Client learned how various actions and attitudes in a relationship can make it more stable, satisfying, and less likely to crumble. Client learned the importance of relational care. [place] how relationships can tolerate some negative interactions and still grow. Client learned how researchers agree  five positive interactions  with each other are needed to counter every painful and negative interaction, although certain negative styles and severely toxic elements overpower the five-to-one rule.  >Client learned about toxic patterns in relationships. Client was taught the MarkmanGary and Blumberg 4-critical elements model (Escalation of Argument, Invalidation, Withdrawal and Avoidance, and Negative Interpretations). [place] how if any of the four critical elements are present in a relationship, that significant damage to the relationship can take place.  As a teach-back client was asked a question on each of the four toxic elements.  1. Escalation of Argument: Client was asked how she has experienced this communication style in her life. Client stated, \"Experienced this for the past 26 years in my relationships.\"  2. Invalidation: Client was asked to list one or two occasions when she felt invalidated along with how she felt when it happened and how she responded to the invalidation. Client stated, \"From ex- #2 by telling me I am an unfit parent and when I explain why that's not true he escalates to throw " "verbal jabs to try to win the argument.\"  3. Withdrawal and Avoidance: Client was asked what it is like for her when someone close avoids or withdraws from her. Client stated, \"I am the offender on this one as I shut down and avoid most of the time.\"  4. Negative Interpretations: Client was asked what experience has she had with negative interpretation. Client stated, \"my ex- number two with always saying I am not doing things right when I am so then it has really messed with my self-esteem, confidence and overall hurt the relationship.\"  >Client learned about developing positive patterns in their relationships, to validate, affirm, and encourage their partner and/or loved ones. Instead of looking for negative patterns and problems in their relationship, look for ways to move beyond the past. Client learned they cannot change the past, but the changes they make today will form new, healthy patterns of living and help them get the most out of life.   >Client learned how connection and intimacy sometimes diminish over time, so it is important to be intentional in their efforts to stay connected and devoted to their loved ones. Client was provided with a list of activities that they can participate in with their loves ones to help build a stronger connection. Client was asked what is something she is already doing well that help her feel connected in the relationship with people close to her. Client stated, \"I try communicating with my son but he is 26 and lives with me so he keeps to himself.\" Client asked to give an example of something she can commit to doing in the near future that will improve the quality of her relationship. Client stated, \"try to cook together or maybe play a video game or talk to my son about his video games since he really likes them.\"  >Client was taught about how having close relationships with strong connections is one of the best predictors of contentment and contributes to a solid, " "long-lasting recovery. Client was taught to keep in mind that as they grow in recovery, they will become more focused on what they have to offer and give, rather than constantly focusing on what they are trying to get.   >Client rated their confidence regarding her ability to strengthen her relationships with others while remaining sober on a 0-10 Likert Scale (0=lowest confidence and 10=most confidence) and client rated themselves at a \"6.\"  Plan: Client will continue developing their own positive character qualities including kindness, patience, and understanding. Client will then document how they have strengthened their recovery and start to gain the long-lasting, rewarding relationships they desire.  "

## 2021-05-10 ENCOUNTER — ANCILLARY PROCEDURE (OUTPATIENT)
Dept: MRI IMAGING | Facility: CLINIC | Age: 56
End: 2021-05-10
Attending: STUDENT IN AN ORGANIZED HEALTH CARE EDUCATION/TRAINING PROGRAM
Payer: COMMERCIAL

## 2021-05-10 DIAGNOSIS — K70.31 ALCOHOLIC CIRRHOSIS OF LIVER WITH ASCITES (H): ICD-10-CM

## 2021-05-10 LAB
AFP SERPL-MCNC: 12.1 UG/L (ref 0–8)
ALBUMIN SERPL-MCNC: 2.4 G/DL (ref 3.4–5)
ALP SERPL-CCNC: 331 U/L (ref 40–150)
ALT SERPL W P-5'-P-CCNC: 30 U/L (ref 0–50)
ANION GAP SERPL CALCULATED.3IONS-SCNC: 7 MMOL/L (ref 3–14)
AST SERPL W P-5'-P-CCNC: 43 U/L (ref 0–45)
BILIRUB SERPL-MCNC: 4.8 MG/DL (ref 0.2–1.3)
BUN SERPL-MCNC: 15 MG/DL (ref 7–30)
CALCIUM SERPL-MCNC: 8.7 MG/DL (ref 8.5–10.1)
CHLORIDE SERPL-SCNC: 107 MMOL/L (ref 94–109)
CO2 SERPL-SCNC: 26 MMOL/L (ref 20–32)
CREAT SERPL-MCNC: 0.88 MG/DL (ref 0.52–1.04)
ERYTHROCYTE [DISTWIDTH] IN BLOOD BY AUTOMATED COUNT: 14.4 % (ref 10–15)
GFR SERPL CREATININE-BSD FRML MDRD: 74 ML/MIN/{1.73_M2}
GLUCOSE SERPL-MCNC: 116 MG/DL (ref 70–99)
HCT VFR BLD AUTO: 31.4 % (ref 35–47)
HGB BLD-MCNC: 10.2 G/DL (ref 11.7–15.7)
INR PPP: 1.77 (ref 0.86–1.14)
MCH RBC QN AUTO: 35.2 PG (ref 26.5–33)
MCHC RBC AUTO-ENTMCNC: 32.5 G/DL (ref 31.5–36.5)
MCV RBC AUTO: 108 FL (ref 78–100)
PLATELET # BLD AUTO: 92 10E9/L (ref 150–450)
POTASSIUM SERPL-SCNC: 4.3 MMOL/L (ref 3.4–5.3)
PROT SERPL-MCNC: 6.2 G/DL (ref 6.8–8.8)
RBC # BLD AUTO: 2.9 10E12/L (ref 3.8–5.2)
SODIUM SERPL-SCNC: 140 MMOL/L (ref 133–144)
WBC # BLD AUTO: 6.3 10E9/L (ref 4–11)

## 2021-05-10 PROCEDURE — 80321 ALCOHOLS BIOMARKERS 1OR 2: CPT | Mod: 90 | Performed by: PATHOLOGY

## 2021-05-10 PROCEDURE — 80053 COMPREHEN METABOLIC PANEL: CPT | Performed by: PATHOLOGY

## 2021-05-10 PROCEDURE — 82105 ALPHA-FETOPROTEIN SERUM: CPT | Mod: 90 | Performed by: PATHOLOGY

## 2021-05-10 PROCEDURE — 74183 MRI ABD W/O CNTR FLWD CNTR: CPT | Mod: GC | Performed by: RADIOLOGY

## 2021-05-10 PROCEDURE — A9585 GADOBUTROL INJECTION: HCPCS | Performed by: RADIOLOGY

## 2021-05-10 PROCEDURE — 99000 SPECIMEN HANDLING OFFICE-LAB: CPT | Performed by: PATHOLOGY

## 2021-05-10 PROCEDURE — 36415 COLL VENOUS BLD VENIPUNCTURE: CPT | Performed by: PATHOLOGY

## 2021-05-10 PROCEDURE — 85027 COMPLETE CBC AUTOMATED: CPT | Performed by: PATHOLOGY

## 2021-05-10 PROCEDURE — 85610 PROTHROMBIN TIME: CPT | Performed by: PATHOLOGY

## 2021-05-10 RX ORDER — GADOBUTROL 604.72 MG/ML
10 INJECTION INTRAVENOUS ONCE
Status: COMPLETED | OUTPATIENT
Start: 2021-05-10 | End: 2021-05-10

## 2021-05-10 RX ADMIN — GADOBUTROL 10 ML: 604.72 INJECTION INTRAVENOUS at 13:50

## 2021-05-10 NOTE — DISCHARGE INSTRUCTIONS
MRI Contrast Discharge Instructions    The IV contrast you received today will pass out of your body in your  urine. This will happen in the next 24 hours. You will not feel this process.  Your urine will not change color.    Drink at least 4 extra glasses of water or juice today (unless your doctor  has restricted your fluids). This reduces the stress on your kidneys.  You may take your regular medicines.    If you are on dialysis: It is best to have dialysis today.    If you have a reaction: Most reactions happen right away. If you have  any new symptoms after leaving the hospital (such as hives or swelling),  call your hospital at the correct number below. Or call your family doctor.  If you have breathing distress or wheezing, call 911.    Special instructions: ***    I have read and understand the above information.    Signature:______________________________________ Date:___________    Staff:__________________________________________ Date:___________     Time:__________    [facility] Departments:    ___[place]: [phone]  ___[facility]: [phone]  ___[place]: [phone] ___[facility]: [phone]  ___[facility]: [phone]  ___[facility]: [phone]  ___[phone]  ___[facility]: [phone]  ___[phone]

## 2021-05-12 ENCOUNTER — HOSPITAL ENCOUNTER (OUTPATIENT)
Dept: BEHAVIORAL HEALTH | Facility: CLINIC | Age: 56
End: 2021-05-12
Attending: FAMILY MEDICINE
Payer: COMMERCIAL

## 2021-05-12 PROCEDURE — H2035 A/D TX PROGRAM, PER HOUR: HCPCS | Mod: HQ,GT

## 2021-05-12 NOTE — GROUP NOTE
"Group Therapy Documentation    PATIENT'S NAME: Viviana Schaefer  MRN:   [id number]  :   1965  [facility]. NUMBER: [id number]  DATE OF SERVICE: 21  START TIME: 12:00 AM  END TIME:  2:00 PM  FACILITATOR(S): Peri Haynes LADC  TOPIC: BEH Group Therapy  Number of patients attending the group: 7  Group Length:  2 Hours    Group Therapy Type: Addiction    Summary of Group / Topics Discussed:    Balanced Lifestyle , Mindfulness, Symptom Management, and Addiction as Disease.  Clients participated in discussion of addiction as disease, naming characteristics of addiction and discussing them as group. Clients were asked to name 3 characteristics after the discussion.  I read \"addiction is a disease, like cancer---lets treat it as one\" from an article by Molly Rossana who is chemically dependent and a cancer survivor.  This article compared cancer and all the support and understanding she got, with how much harder it was to find support and understanding, and even good care when dealing with her chemical dependency.  She discussed that often celebrities bring awareness, but that this goes on every day and Substance abuse should now be hushed or kept out of the news, except when famous people are dealing with it.      Group also discussed balance in lifestyle, emphasizing 1-2 activities they want to add to or increase in the next week.  We ended group with a \"quick check\" of body scan.      Group Attendance:  Attended group session    Patient's response to the group topic/interactions:  cooperative with task and discussed personal experience with topic    Patient appeared to be Actively participating and Attentive.        Client specific details:  Client discussed that she was sad about her son not calling on Mother's day, \"but I had no desire to use because of it\"   I discussed how chemicals are mood altering and although it is hard at times, this \"being with\" her feelings can be healing and help her deal " "with what is, in a non altered place of being.    Client said of the reading on comparing addiction to cancer:  \"Wow,that was a wake up call about the intensity and misunderstanding of addiction\".   Viviana said it helped her understand addiction better and that people take cancer as a disease so seriously, but often shame and embarrassment make people/self not take Addiction seriously    Client participated in the discussion of what addiction is and was able to name 3 characteristics: psychological or physical addiction, habitual, comulsive, you need it.  Named reasons she used: , forget, feel better distracts from other feelings or things.    Client participated in summary of Mimvi MERON on Body Scan meditation by Marycarmen Becker     P)  Attend Thursday session with Deyvi Jama on 5/13. client will do assignment questions on \"What does abuse   Addiction mean to me\"  Put Mimvi meron on phone, if so chooses.    Peri Haynes, MS, LADC, LPC    "

## 2021-05-13 ENCOUNTER — HOSPITAL ENCOUNTER (OUTPATIENT)
Dept: BEHAVIORAL HEALTH | Facility: CLINIC | Age: 56
End: 2021-05-13
Attending: FAMILY MEDICINE
Payer: COMMERCIAL

## 2021-05-13 PROCEDURE — H2035 A/D TX PROGRAM, PER HOUR: HCPCS | Mod: HQ,GT

## 2021-05-13 NOTE — GROUP NOTE
"Group Therapy Documentation    PATIENT'S NAME: Viviana Schaefer  MRN:   [id number]  :   1965  AC[facility]. NUMBER: [id number]  DATE OF SERVICE: 21  START TIME: 12:00 PM  END TIME:  2:00 PM  FACILITATOR(S): Deyvi Jama LMFT, LADC  TOPIC: BEH Group Therapy  Number of patients attending the group:  3  Group Length:  2 Hours    Group Therapy Type: Addiction, Life skill(s), and Psychoeducation    Summary of Group / Topics Discussed:    Communication, Co-occurring Illness, Relationships, and Building Trust. ()    Telemedicine Visit: The patient's condition can be safely assessed and treated via synchronous audio and visual telemedicine encounter.      Reason for Telemedicine Visit: COVID-19, serviced only offered via tele-health.    Originating Site (Patient Location): Patient's home    Distant Site (Provider Location): Provider Remote Setting    Consent:  The patient/guardian has verbally consented to: the potential risks and benefits of telemedicine (video visit) versus in person care; billing of their insurance or make self-payment for services provided; and responsibility for payment of non-covered services.     Mode of Communication:  Video Conference via [employer] or Zoom.  Session Successfully completed: Yes  If session was not successful what alternative options were discussed: NA    As the provider I attest to compliance with applicable laws and regulations related to telemedicine.    Group Attendance:  Attended group session    Patient's response to the group topic/interactions:  cooperative with task, discussed personal experience with topic, expressed understanding of topic, gave appropriate feedback to peers and listened actively    Patient appeared to be Actively participating, Attentive and Engaged.    Client specific details:  On a 0-10 Likert Scale (0=No issues & 10=worst symptoms), client checked-in with rating her depression as a \"2 due to whole situation with my youngest son\", " "anxiety as a \"1\", and lastly client rated her cravings as a \"0\".  Overall client reported feeling \"appreciative\" today. Client reported being grateful for \"this group and my family\". Client was asked what strength do they have that they can use to strengthen their recovery in which client stated, \"application of healthy skills\". Today's group session focused on Dimension(s) 3 & 6. Client showed progress by being able to teach-back the skills she learned during today's group session. Please see additional details below for further specific details on group topic matter.  Client learned about today's group topic on \"Building Trust in Relationships\".  >Client learned that mental health is made possible when we are honest with ourselves and with others. When truth is denied, trust is broken. Being open and honest in a relationship builds intimacy, while becoming defensive, argumentative, and resistant to change will damage it. Anger and counter-attacks create distrust and block both parties from hearing each other. Rebuilding trust with someone is not just a decision to be made, it is a lifestyle change that requires honesty with yourself and those you have hurt. Lying and cheating weaken the foundation of relationships and it is important to work out the issues sincerely and without anger, defensiveness, or assigning blame. Take full responsibility for your actions as an important first step to rebuild a solid foundation of trust.  >Client was asked; with whom have you damaged trust? Describe what happened and include how it damaged the relationship? Client stated, \"with my sister around my sobriety and when I relapsed.\"   UNDERSTANDING THE REBUILDING PROCESS   >Client learned that trust is restored when you value a relationship more than protecting yourself from rejection. Many people are willing to sacrifice honesty and hide behind a lie in a misguided attempt to maintain a relationship. Someone may lie about or " "minimize their symptoms or tell someone they were sick, when in reality, they were depressed. Trust takes considerable time and work to establish. Being dishonest can have devastating consequences and ruin newly-built trust in moments. Consider a piece of fine furniture that perhaps takes 40 hours to construct. It would only take 40 minutes to burn that piece completely into ashes. It would take seconds to smash it beyond use. Trust is like that piece of furniture. It takes a long time to build confidence, security, and safety into your relationship. But it can be damaged or ruined very quickly. Rebuilding trust also takes considerable time. It would be unreasonable to think you could damage trust with someone and they would immediately put their bg in you again. Because you are the one who damaged the trust of another, the burden of rebuilding and re-establishing that trust falls on you.   CONSISTENCY BUILDS TRUST   >Client learned that trust equals behavior over time. Being dependable over a long period of time, creating and establishing a new reputation built on honesty and integrity, and living a transparent life without deception or secrets will rapidly build security in your relationships and rebuild trust. When you lie, your integrity is damaged, leaving people little reason to believe you. They are cautious and sensitive to new lies and betrayal. Being honest and consistent does the most to restore your relationships.  DISHONESTY ERODES TRUST   >Client learned that since consistent behavior demonstrated over a period of time builds trust, it stands to reason every dishonest gesture and lie told damages your integrity. Client was asked to identify past problems she has had with honesty. Client stated, \"it was mostly related to not being truthful or open around my alcohol use.\" Client learned that most people care about the truth, desire to be honest, and would say lying is wrong. They also genuinely want " "acceptance and approval. When those two values collide, a choice between honesty or approval is forced. Some people lie to retain approval. This strategy often backfires, causing further damage to the relationship.  TROUBLE WITH HONESTY  >Client learned that being honest and acknowledging dishonesty is difficult. Client was asked to consider the excuses and reasons where she has had trouble with honesty and what excuses to she use? Client stated, \"Figured what they don't know won't hurt them or myself. Also had the fear if I told someone what was really happening they would abandon me.\" There are many emotional reasons that cause people to have trouble being upfront and truthful. However, there are more, and better, reasons for telling the truth and being honest.   CHANGING YOUR BEHAVIOR   >Client learned the importance of being trustworthy cannot be overstated. It allows others to predict your future actions, confident you will have the best interest of the relationship in mind. Trust is built on the conviction and guarantee you will choose what is right, and building it is the necessary first step in laying a solid foundation for strong and healthy relationships. Client was asked for an example of something that serves as first step to her to rebuild trust in her relationships. Client stated, \"promptly admit when I am wrong, be open, and transparent about everything.\"  GETTING STARTED   >Client learned that rebuilding trust takes time and patience. The first priority is to be consistent, dependable, and responsible for your own actions. Avoid re-injuring the other person or becoming defensive about your behavior. These six guidelines will help you restore relationships where trust has been violated.  1. Be consistent with your actions. Trust is built when your behavior is predictable, and your words and actions are reliable and dependable.   2. Admit what you did was wrong and harmful. Take full responsibility for " "your own actions. Do not blame others for your mistakes. Own your behavior:  It was me. I did it. I was wrong. I am sorry. Please forgive me.  Blame shifting erodes confidence and decreases your trustworthiness.   3. Find out what the other person considers important and be responsive to what they need. Being considerate and understanding of the needs of others shows you care about their interests and not just your own.   4. Expect a bumpy ride. You may be accused, not forgiven, misunderstood, rejected, blamed, ridiculed, and gossiped about. Do not be shocked or over-react and do not be surprised by imperfect reactions to your previous imperfect actions.   5. Keep your word and admit when you do not. You are on a slippery slope when you make a mistake, cover it up with a lie, get caught, and invent a bigger lie to explain the first one. With every step along this progression, honesty is the best policy. Keep your word. If you are not able, or fail to keep your word, admit it quickly and sincerely.   6. Get help when you get stuck. Some people deceive, lie, steal, minimize, and deny problems for so long it becomes their native language. Lying is as easy as falling down, but it takes great effort to tell the truth in difficult or compromising situations. If telling the truth consistently seems impossible, get help from a sponsor or counselor.  >Client was asked, in your relationships that have been strained in the past, what can you do to rebuild trust in each one? Client stated, \"allow my behaviors, actions and words meet consistently for a long period of time.\"    WHEN YOU HAVE BEEN HURT   >Client learned how trust is a two-way street. Just as you have damaged relationships through dishonest and deceptive words and actions, others have likely betrayed and deceived you. In the same way, your willingness to trust them needs to be rebuilt over time. Draw a boundary and distance yourself from someone who repeatedly " "demonstrates they are untrustworthy. Giving your jewelry to a jewel thief is not trust, it is bad judgment. Keep your distance until they have changed their track record and can be trusted again. If they fail to change behavior, take it as an indicator your boundary needs to stay in place.   AN HONEST EFFORT   Client learned how critical it is to stay honest with yourself and with others. Emotional health is built on personal and relational integrity and transparency. Examine yourself honestly and reveal yourself accurately. Trust is a foundational piece of any long-lasting, intimate relationship, and is well worth the effort to develop. Start by being intentional and making commitments and plans that require follow-through and perseverance. Strong, lasting, and rewarding relationships are built, not discovered.    >On a 0-10 Likert Scale (0=lowest confidence & 10=most confidence), client was asked to rate her confidence with affectively utilizing the skills learned today on  Building Trust in Relationships  to strengthen the relationships in her life. Client stated her rating was a \"8\".  Plan: Client will identify one relationship in her life right now that she would really like to strengthen, and she will then set a SMART goal around strengthening the relationship.    "

## 2021-05-14 LAB — PETH BLD-MCNC: NEGATIVE NG/ML

## 2021-05-17 ENCOUNTER — VIRTUAL VISIT (OUTPATIENT)
Dept: GASTROENTEROLOGY | Facility: CLINIC | Age: 56
End: 2021-05-17
Attending: STUDENT IN AN ORGANIZED HEALTH CARE EDUCATION/TRAINING PROGRAM
Payer: COMMERCIAL

## 2021-05-17 ENCOUNTER — HOSPITAL ENCOUNTER (OUTPATIENT)
Dept: BEHAVIORAL HEALTH | Facility: CLINIC | Age: 56
End: 2021-05-17
Attending: FAMILY MEDICINE
Payer: COMMERCIAL

## 2021-05-17 DIAGNOSIS — E66.01 MORBID OBESITY (H): Primary | ICD-10-CM

## 2021-05-17 DIAGNOSIS — K70.31 ALCOHOLIC CIRRHOSIS OF LIVER WITH ASCITES (H): ICD-10-CM

## 2021-05-17 DIAGNOSIS — F10.21 ALCOHOL USE DISORDER, SEVERE, IN EARLY REMISSION (H): ICD-10-CM

## 2021-05-17 DIAGNOSIS — K76.9 LIVER LESION: ICD-10-CM

## 2021-05-17 PROCEDURE — H2035 A/D TX PROGRAM, PER HOUR: HCPCS | Mod: HQ,GT

## 2021-05-17 PROCEDURE — 99215 OFFICE O/P EST HI 40 MIN: CPT | Mod: 95 | Performed by: STUDENT IN AN ORGANIZED HEALTH CARE EDUCATION/TRAINING PROGRAM

## 2021-05-17 NOTE — PROGRESS NOTES
"[facility] Hepatology clinic    Date of Visit: 5/17/2021    Reason for referral: Liver Transplant Evaluation    Subjective: Ms. 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 CT showed cirrhosis without a mass, ascites and a pleural effusion. No prior history of liver disease - states she had an US fall 2018, was not told what it showed, but later was told she had \"fatty liver\". Denies being told LFTs elevated in the past. Underwent thoracentesis and paracentesis that were negative for SBP. She apparently had normal A1AT, AMA, AMA, Ferritin. Ceruloplasmin was low at 14, 24 hour urine copper < 1.0 micrograms/L. No KF rings seen with ophthalmology. She was started on diuretics, fluid resolved with lasix 40/nik 50. She tells me that she was diagnosed with PORTER. After this hospitalization, was sober for until Fall 2019 - around that time she had a few drinks and stopped. States she drank again 5/2020 - had a couple of drinks celebrating son's Bday.     States she abused alcohol after each divorce (1998, 2012 - drinking \"a lot\"), and then would drink regularly after that - ranging from nothing to a few drinks a week. Last drink. No DUIs. Did outpatient treatment (2003) because ex- felt she was drinking too much, states that he projected his drinking onto her, did this to appease him. And then AA for several years. Was sober between [id number], started drinking 6 months after her divorce. She has had shakes, no withdrawal. States she was drinking socially prior to 3/2019.  CD Eval done recommend she attend 6 individual psychotherapy sessions, attend 2 weekly support group meetings    Readmitted 5/2020 with jaundice and abdominal distention after drinking " "related to her son's birthday. Na was 118, down from 140 1/2020. Thought to have alcoholic hepatitis - TBR 10.4 (3 1/2020), , ALT 38, Alk Phos 102, INR 1.9. Patient reported she had been sober except for two mikes hard lemonades prior to admission. Started on steroids. Diuretics held (no ascites) and Na improved. Diuretics restarted at lower dose as an outpatient (lasix 20, nik 25).     BR increased again 8/2020 to 11.5, up from 3.8 6/2020. US with Doppler showed cirrhosis. She denied alcohol use. She was readmitted 9/2020 with SOB, found to have a large pleural effusion - tapped felt to be HH. MELD 26. CT showed an indeterminate liver lesion - 15 mm. Diuretics held for KWABENA, then resumed.     Interval Events:  - Had MRI 1/19/21 to follow up LR3 lesion - showed 1.4 cm LR 2 lesion. Also showed ascites and pleural effusion  - CXR showed new retrolithesis, not having back pain. Having some numbness in her fingers, no other neuro symptoms  - She has a long history of hematuria - history of recurrent kidney stones and medullary sponge kidney. When she last saw Nephrology they noted she did not have evidence of medullary sponge kidney on IVP in 2001. Currently having some flank pain, denies s/s of UTI.   - Met with Psychiatrist - just had her intake visit, started on buspar is optimistic about this. Has follow up visit 3/9. Met with a new therapist, therapist gave her \"Homework\" with podcasts and things to ready and this overwhelmed her and she states she almost had a panic attack.   - Taking lasix 20/nik 50 - fluid well controlled on this  - No issues with confusion  - Underwent cardiac testing - no barrier to transplant  - Doing group therapy -   - Having issues with swelling in legs, also having knee pain (history of trauma), swelling exacerbates this  - Takes lasix 20 mg daily/spironolactone 50 mg daily  - Loves her alcohol treatment - 3x week for 2 hours. Next week cutting back to twice a week  - Not having " back pain    ROS: 14 point ROS negative except for positives noted in HPI.    PMHx:  Past Medical History:   Diagnosis Date     Alcoholic cirrhosis (H)      Arthritis      Congestive heart failure (H)     Cervical CA      Coronary artery disease     Heart Murmer     Depressive disorder      Hyponatremia        PSHx:  Past Surgical History:   Procedure Laterality Date     ABDOMEN SURGERY       CHOLECYSTECTOMY  2001    patient reported     CV CORONARY ANGIOGRAM N/A 2/5/2021    Procedure: CV CORONARY ANGIOGRAM;  Surgeon: Deyvi Parham MD;  Location: [facility]     CV RIGHT HEART CATH MEASUREMENTS RECORDED N/A 2/5/2021    Procedure: CV RIGHT HEART CATH;  Surgeon: Deyvi Parham MD;  [facility]     GI SURGERY       GYN SURGERY       HYSTERECTOMY  2009    including cervix     LITHOTRIPSY      multiple times, patient reported       FamHx:  Family History   Problem Relation Age of Onset     Substance Abuse Brother      Depression Sister      Anxiety Disorder Sister      Substance Abuse Sister      Depression Sister      Anxiety Disorder Sister      Depression Sister      Anxiety Disorder Sister      Autism Spectrum Disorder Son    No history of liver disease    SocHx:  Social History     Socioeconomic History     Marital status:      Spouse name: Not on file     Number of children: Not on file     Years of education: Not on file     Highest education level: Not on file   Occupational History     Not on file   Social Needs     Financial resource strain: Not on file     Food insecurity     Worry: Not on file     Inability: Not on file     Transportation needs     Medical: Not on file     Non-medical: Not on file   Tobacco Use     Smoking status: Never Smoker     Smokeless tobacco: Never Used   Substance and Sexual Activity     Alcohol use: Not Currently     Comment: 5/2020     Drug use: Never     Sexual activity: Not on file   Lifestyle     Physical activity     Days per  week: Not on file     Minutes per session: Not on file     Stress: Not on file   Relationships     Social connections     Talks on phone: Not on file     Gets together: Not on file     Attends Restorationist service: Not on file     Active member of club or organization: Not on file     Attends meetings of clubs or organizations: Not on file     Relationship status: Not on file     Intimate partner violence     Fear of current or ex partner: Not on file     Emotionally abused: Not on file     Physically abused: Not on file     Forced sexual activity: Not on file   Other Topics Concern     Parent/sibling w/ CABG, MI or angioplasty before 65F 55M? Not Asked   Social History Narrative     Not on file   4 sons - ex- has 17 year old (high functioning autistic). 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    Medications:  Current Outpatient Medications   Medication     buPROPion (WELLBUTRIN XL) 150 MG 24 hr tablet     busPIRone (BUSPAR) 15 MG tablet     calcium carbonate-vitamin D (OSCAL W/D) 500-200 MG-UNIT tablet     ferrous sulfate (FEROSUL) 325 (65 Fe) MG tablet     folic acid (FOLVITE) 1 MG tablet     furosemide (LASIX) 40 MG tablet     LORazepam (ATIVAN) 0.5 MG tablet     omeprazole (PRILOSEC) 20 MG DR capsule     potassium chloride ER (KLOR-CON M) 20 MEQ CR tablet     pregabalin (LYRICA) 50 MG capsule     spironolactone (ALDACTONE) 50 MG tablet     UNABLE TO FIND     vitamin D2 (ERGOCALCIFEROL) 13557 units (1250 mcg) capsule     vitamin D3 (CHOLECALCIFEROL) 50 mcg (2000 units) tablet     No current facility-administered medications for this visit.      Facility-Administered Medications Ordered in Other Visits   Medication     Self Administer Medications: [facility]     [facility]    Allergies:     Allergies   Allergen Reactions     Codeine      Diazepam      Morphine      PN: LW Reaction: Nausea     Penicillins      PN: LW Reaction: Rash,  Generalized. Reportedly had a rash with this as a child and has tolerated Augmentin since per patient. Tolerated Zosyn 3/12/19 in [facility].        Objective:  There were no vitals taken for this visit.  Constitutional: pleasant woman in NAD  Eyes: non icteric  Respiratory: Normal respiratory excursion   Back: No spinal or CVA tenderness  MSK: normal range of motion of visualized extremities  Skin: no jaundice  Psychiatric: normal mood and orientation    Labs:  Last Comprehensive Metabolic Panel:  Sodium   Date Value Ref Range Status   05/10/2021 140 133 - 144 mmol/L Final     Potassium   Date Value Ref Range Status   05/10/2021 4.3 3.4 - 5.3 mmol/L Final     Chloride   Date Value Ref Range Status   05/10/2021 107 94 - 109 mmol/L Final     Carbon Dioxide   Date Value Ref Range Status   05/10/2021 26 20 - 32 mmol/L Final     Anion Gap   Date Value Ref Range Status   05/10/2021 7 3 - 14 mmol/L Final     Glucose   Date Value Ref Range Status   05/10/2021 116 (H) 70 - 99 mg/dL Final     Urea Nitrogen   Date Value Ref Range Status   05/10/2021 15 7 - 30 mg/dL Final     Creatinine   Date Value Ref Range Status   05/10/2021 0.88 0.52 - 1.04 mg/dL Final     GFR Estimate   Date Value Ref Range Status   05/10/2021 74 >60 mL/min/[1.73_m2] Final     Comment:     Non  GFR Calc  Starting 12/18/2018, serum creatinine based estimated GFR (eGFR) will be   calculated using the Chronic Kidney Disease Epidemiology Collaboration   (CKD-EPI) equation.       Calcium   Date Value Ref Range Status   05/10/2021 8.7 8.5 - 10.1 mg/dL Final     Bilirubin Total   Date Value Ref Range Status   05/10/2021 4.8 (H) 0.2 - 1.3 mg/dL Final     Alkaline Phosphatase   Date Value Ref Range Status   05/10/2021 331 (H) 40 - 150 U/L Final     ALT   Date Value Ref Range Status   05/10/2021 30 0 - 50 U/L Final     AST   Date Value Ref Range Status   05/10/2021 43 0 - 45 U/L Final     Lab Results   Component Value Date    WBC 8.9  03/01/2021     Lab Results   Component Value Date    RBC 3.20 03/01/2021     Lab Results   Component Value Date    HGB 11.2 03/01/2021     Lab Results   Component Value Date    HCT 33.6 03/01/2021     No components found for: MCT  Lab Results   Component Value Date     03/01/2021     Lab Results   Component Value Date    MCH 35.0 03/01/2021     Lab Results   Component Value Date    MCHC 33.3 03/01/2021     Lab Results   Component Value Date    RDW 16.3 03/01/2021     Lab Results   Component Value Date     03/01/2021     MELD-Na score: 19 at 5/10/2021 12:42 PM  MELD score: 19 at 5/10/2021 12:42 PM  Calculated from:  Serum Creatinine: 0.88 mg/dL (Rounded to 1 mg/dL) at 5/10/2021 12:42 PM  Serum Sodium: 140 mmol/L (Rounded to 137 mmol/L) at 5/10/2021 12:42 PM  Total Bilirubin: 4.8 mg/dL at 5/10/2021 12:42 PM  INR(ratio): 1.77 at 5/10/2021 12:42 PM  Age: 55 years 5 months    Imaging:    MRI Liver 1/19/2021    Liver: Cirrhotic configuration of the liver parenchyma with nodular  contours, irregular surface, hypertrophy of the left and caudate  lobes, lacy T2 signal with reticular delayed pattern of contrast  enhancement due to fibrosis as well as innumerable regenerative  nodules. No significant hepatic steatosis or iron deposition.      No suspicious arterial enhancing focal mass is identified.  Redemonstration of 1.4 cm nonenhancing T2 hyperintense focus in the  segment 8 since 9/3/2020 (series 18 image 17).     Gallbladder: Surgically absent.     Spleen: Enlarged, measuring up to 14.8 cm in sagittal dimension. A few  scattered cysts are demonstrated with the largest measuring up to 9  mm, series 24 image 16.     Kidneys: Bilateral tiny simple appearing cortical renal cysts. No  kidney stone, cysts or masses. No pelvocaliectasis.       Adrenal glands: Normal.      Pancreas:  Normal appearance and signal characteristics of the  pancreatic parenchyma. No focal masses. Pancreatic duct normal in  caliber. No  side branch ductal dilatation.      Bowel: Unremarkable, with no evidence of bowel dilatation or abnormal  wall enhancement.        Lymph nodes: No abdominal lymphadenopathy by size criteria. Several  prominent portocaval lymph nodes, measuring up to 1.5 cm, likely  reactive.     Blood vessels: Major blood vessels are patent with no evidence of clot  or obstruction. Prominent upper abdominal varices including  recanalized paraumbilical veins.      Lung bases: No abnormal T2 hyperintensity in the lung bases.  Right-sided pleural effusion. Basilar atelectasis.     Bones and soft tissues: No evidence of acute bony abnormality or  abnormal soft tissue enhancement.        Mesentery and abdominal wall: Mild anasarca.     Ascites: Trace perihepatic, perisplenic and mesenteric ascites.                                                                      IMPRESSION:    1. Cirrhosis and evidence of portal hypertension including  splenomegaly and ascites.  2. No suspicious arterial enhancing focal liver mass.   3. Stable 1.4 cm nonenhancing T2 hyperintense focus in the segment 8  since 9/3/2020, nonspecific but probably benign and may represent a  fibroinflammatory focus.  LIRADS 2   4. Based on this exam only, the patient is within Bebo criteria.  5. Right pleural effusion.    Endoscopy:    EGD 1/2020 showed normal esophagus, small HH, mil congestive hepatopathy    Assessment/Plan: Ms. 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 Liver disease complicated by fluid overload - ascites and HH. She has a LR 3 lesion seen on MRI 9/2020 - follow up MRI only showed LR2 lesion.     She has a long history of alcohol use, presented with decompensated 5/2020 related to recurrent drinking. BR morro this summer and she is adamant she has abstained from alcohol since 5/2020. MELD still high, but fortunately she has clinically improved in terms of her fluid overload with  diuretics.     In the interim, she has been completing her transplant evaluation.     BR/AST slightly higher on today's labs. Peth after her visit was positive. Called and talked to the patient - she reported that she was anxious after meeting with her new therapist, and because of this drank 2 glasses of wine. She reports not drinking since then and is upset she made this mistake.     Discussed that because of this, will close her liver transplant evaluation. She would require 6 months sobriety and acceptable engagement in treatment to be a candidate in the future. Discussed that medical issues required for transplant in the future include urology evaluation for hematuria, history of kidney stones, and she needs a screening colonoscopy.    - Social work will reach out to her to discuss alcohol treatment. She should continue to work with her psychiatrist and find another therapist as her anxiety is a big trigger for drinking for her and treating this will be important if she is to be considered for liver transplant in the future  - Will follow her for hepatology care - needs MRI ~ 4/2021. Can hold on colonoscopy at the time, would need to be done prior to listing in the future if she meets alcohol requirements. She will also need to see urology as well.   - Continue diuretics per local GI. Recommend she have an updated screening EGD for varices given decompensated cirrhosis with alcohol use    - Increase lasix to 40 mg daily - get labs next week at [facility] her treatment    RTC 2 months after MRI    Razia Bundy MD MSc  Transplant Hepatologist  [facility]     Stage Time 10:08  End time 10:31    Gastric sleeve    Medical weight loss    Knee pain  Did steroid injection in the past for her knee - effect was very short lived    RV in the Summer 2021

## 2021-05-17 NOTE — LETTER
"    5/17/2021         RE: Viviana Schaefer  [address]  [facility] [address]        Dear Colleague,    Thank you for referring your patient, Viviana Schaefer, to the [facility] [place]. Please see a copy of my visit note below.    [facility] Hepatology clinic    Date of Visit: 5/17/2021    Reason for referral: Liver Transplant Evaluation    Subjective: Ms. 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 CT showed cirrhosis without a mass, ascites and a pleural effusion. No prior history of liver disease - states she had an US fall 2018, was not told what it showed, but later was told she had \"fatty liver\". Denies being told LFTs elevated in the past. Underwent thoracentesis and paracentesis that were negative for SBP. She apparently had normal A1AT, AMA, AMA, Ferritin. Ceruloplasmin was low at 14, 24 hour urine copper < 1.0 micrograms/L. No KF rings seen with ophthalmology. She was started on diuretics, fluid resolved with lasix 40/nik 50. She tells me that she was diagnosed with PORTER. After this hospitalization, was sober for until Fall 2019 - around that time she had a few drinks and stopped. States she drank again 5/2020 - had a couple of drinks celebrating son's Bday.     States she abused alcohol after each divorce (1998, 2012 - drinking \"a lot\"), and then would drink regularly after that - ranging from nothing to a few drinks a week. Last drink. No DUIs. Did outpatient treatment (2003) because ex- felt she was drinking too much, states that he projected his drinking onto her, did this to appease him. And then AA for several years. Was sober between [id number], started drinking 6 months after her divorce. She " has had shakes, no withdrawal. States she was drinking socially prior to 3/2019.  BRODY Gregorio done recommend she attend 6 individual psychotherapy sessions, attend 2 weekly support group meetings    Readmitted 5/2020 with jaundice and abdominal distention after drinking related to her son's birthday. Na was 118, down from 140 1/2020. Thought to have alcoholic hepatitis - TBR 10.4 (3 1/2020), , ALT 38, Alk Phos 102, INR 1.9. Patient reported she had been sober except for two mikes hard lemonades prior to admission. Started on steroids. Diuretics held (no ascites) and Na improved. Diuretics restarted at lower dose as an outpatient (lasix 20, nik 25).     BR increased again 8/2020 to 11.5, up from 3.8 6/2020. US with Doppler showed cirrhosis. She denied alcohol use. She was readmitted 9/2020 with SOB, found to have a large pleural effusion - tapped felt to be HH. MELD 26. CT showed an indeterminate liver lesion - 15 mm. Diuretics held for KWABENA, then resumed.     Had MRI 1/19/21 to follow up LR3 lesion - showed 1.4 cm LR 2 lesion. Also showed ascites and pleural effusion    She has a long history of hematuria - history of recurrent kidney stones and medullary sponge kidney. When she last saw Nephrology they noted she did not have evidence of medullary sponge kidney on IVP in 2001. Currently having some flank pain, denies s/s of UTI.     Interval Events:   - Sober for several months. Loves her alcohol treatment classes - 3x week for 2 hours. Next week cutting back to twice a week  - Taking lasix 20/nik 50 - thinks legs are getting more swollen  - Having knee pain, has looked into knee replacement in the past  - No issues with confusion  - Not having back pain  - MRI showing stable LR2 lesion  - Having knee pain. Did steroid injection in the past for her knee - effect was very short lived    ROS: 14 point ROS negative except for positives noted in HPI.    PMHx:  Past Medical History:   Diagnosis Date     Alcoholic  cirrhosis (H)      Arthritis      Congestive heart failure (H)     Cervical CA      Coronary artery disease     Heart Murmer     Depressive disorder      Hyponatremia        PSHx:  Past Surgical History:   Procedure Laterality Date     ABDOMEN SURGERY       CHOLECYSTECTOMY  2001    patient reported     CV CORONARY ANGIOGRAM N/A 2/5/2021    Procedure: CV CORONARY ANGIOGRAM;  Surgeon: Deyvi Parham MD;  Location: [facility]     CV RIGHT HEART CATH MEASUREMENTS RECORDED N/A 2/5/2021    Procedure: CV RIGHT HEART CATH;  Surgeon: Deyvi Parham MD;  Location: [facility]     GI SURGERY       GYN SURGERY       HYSTERECTOMY  2009    including cervix     LITHOTRIPSY      multiple times, patient reported       FamHx:  Family History   Problem Relation Age of Onset     Substance Abuse Brother      Depression Sister      Anxiety Disorder Sister      Substance Abuse Sister      Depression Sister      Anxiety Disorder Sister      Depression Sister      Anxiety Disorder Sister      Autism Spectrum Disorder Son    No history of liver disease    SocHx:  Social History     Socioeconomic History     Marital status:      Spouse name: Not on file     Number of children: Not on file     Years of education: Not on file     Highest education level: Not on file   Occupational History     Not on file   Social Needs     Financial resource strain: Not on file     Food insecurity     Worry: Not on file     Inability: Not on file     Transportation needs     Medical: Not on file     Non-medical: Not on file   Tobacco Use     Smoking status: Never Smoker     Smokeless tobacco: Never Used   Substance and Sexual Activity     Alcohol use: Not Currently     Comment: 5/2020     Drug use: Never     Sexual activity: Not on file   Lifestyle     Physical activity     Days per week: Not on file     Minutes per session: Not on file     Stress: Not on file   Relationships     Social connections     Talks on phone:  Not on file     Gets together: Not on file     Attends Confucianism service: Not on file     Active member of club or organization: Not on file     Attends meetings of clubs or organizations: Not on file     Relationship status: Not on file     Intimate partner violence     Fear of current or ex partner: Not on file     Emotionally abused: Not on file     Physically abused: Not on file     Forced sexual activity: Not on file   Other Topics Concern     Parent/sibling w/ CABG, MI or angioplasty before 65F 55M? Not Asked   Social History Narrative     Not on file   4 sons - ex- has 17 year old (high functioning autistic). 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    Medications:  Current Outpatient Medications   Medication     buPROPion (WELLBUTRIN XL) 150 MG 24 hr tablet     busPIRone (BUSPAR) 15 MG tablet     calcium carbonate-vitamin D (OSCAL W/D) 500-200 MG-UNIT tablet     ferrous sulfate (FEROSUL) 325 (65 Fe) MG tablet     folic acid (FOLVITE) 1 MG tablet     furosemide (LASIX) 40 MG tablet     LORazepam (ATIVAN) 0.5 MG tablet     omeprazole (PRILOSEC) 20 MG DR capsule     potassium chloride ER (KLOR-CON M) 20 MEQ CR tablet     pregabalin (LYRICA) 50 MG capsule     spironolactone (ALDACTONE) 50 MG tablet     UNABLE TO FIND     vitamin D2 (ERGOCALCIFEROL) 20919 units (1250 mcg) capsule     vitamin D3 (CHOLECALCIFEROL) 50 mcg (2000 units) tablet     No current facility-administered medications for this visit.      Facility-Administered Medications Ordered in Other Visits   Medication     Self Administer Medications: Behavioral Services     Ativan was just for MRI     Allergies:     Allergies   Allergen Reactions     Codeine      Diazepam      Morphine      PN: LW Reaction: Nausea     Penicillins      PN: LW Reaction: Rash, Generalized. Reportedly had a rash with this as a child and has tolerated Augmentin since per patient. Tolerated Zosyn 3/12/19 in [facility]  [facility].        Objective:  There were no vitals taken for this visit.  Constitutional: pleasant woman in NAD  Eyes: non icteric  Respiratory: Normal respiratory excursion   Back: No spinal or CVA tenderness  MSK: normal range of motion of visualized extremities  Skin: no jaundice  Psychiatric: normal mood and orientation    Labs:  Last Comprehensive Metabolic Panel:  Sodium   Date Value Ref Range Status   05/10/2021 140 133 - 144 mmol/L Final     Potassium   Date Value Ref Range Status   05/10/2021 4.3 3.4 - 5.3 mmol/L Final     Chloride   Date Value Ref Range Status   05/10/2021 107 94 - 109 mmol/L Final     Carbon Dioxide   Date Value Ref Range Status   05/10/2021 26 20 - 32 mmol/L Final     Anion Gap   Date Value Ref Range Status   05/10/2021 7 3 - 14 mmol/L Final     Glucose   Date Value Ref Range Status   05/10/2021 116 (H) 70 - 99 mg/dL Final     Urea Nitrogen   Date Value Ref Range Status   05/10/2021 15 7 - 30 mg/dL Final     Creatinine   Date Value Ref Range Status   05/10/2021 0.88 0.52 - 1.04 mg/dL Final     GFR Estimate   Date Value Ref Range Status   05/10/2021 74 >60 mL/min/[1.73_m2] Final     Comment:     Non  GFR Calc  Starting 12/18/2018, serum creatinine based estimated GFR (eGFR) will be   calculated using the Chronic Kidney Disease Epidemiology Collaboration   (CKD-EPI) equation.       Calcium   Date Value Ref Range Status   05/10/2021 8.7 8.5 - 10.1 mg/dL Final     Bilirubin Total   Date Value Ref Range Status   05/10/2021 4.8 (H) 0.2 - 1.3 mg/dL Final     Alkaline Phosphatase   Date Value Ref Range Status   05/10/2021 331 (H) 40 - 150 U/L Final     ALT   Date Value Ref Range Status   05/10/2021 30 0 - 50 U/L Final     AST   Date Value Ref Range Status   05/10/2021 43 0 - 45 U/L Final     Lab Results   Component Value Date    WBC 8.9 03/01/2021     Lab Results   Component Value Date    RBC 3.20 03/01/2021     Lab Results   Component Value Date    HGB 11.2 03/01/2021     Lab Results    Component Value Date    HCT 33.6 03/01/2021     No components found for: MCT  Lab Results   Component Value Date     03/01/2021     Lab Results   Component Value Date    MCH 35.0 03/01/2021     Lab Results   Component Value Date    MCHC 33.3 03/01/2021     Lab Results   Component Value Date    RDW 16.3 03/01/2021     Lab Results   Component Value Date     03/01/2021     MELD-Na score: 19 at 5/10/2021 12:42 PM  MELD score: 19 at 5/10/2021 12:42 PM  Calculated from:  Serum Creatinine: 0.88 mg/dL (Rounded to 1 mg/dL) at 5/10/2021 12:42 PM  Serum Sodium: 140 mmol/L (Rounded to 137 mmol/L) at 5/10/2021 12:42 PM  Total Bilirubin: 4.8 mg/dL at 5/10/2021 12:42 PM  INR(ratio): 1.77 at 5/10/2021 12:42 PM  Age: 55 years 5 months    Imaging:    MRI Liver 1/19/2021    Liver: Cirrhotic configuration of the liver parenchyma with nodular  contours, irregular surface, hypertrophy of the left and caudate  lobes, lacy T2 signal with reticular delayed pattern of contrast  enhancement due to fibrosis as well as innumerable regenerative  nodules. No significant hepatic steatosis or iron deposition.      No suspicious arterial enhancing focal mass is identified.  Redemonstration of 1.4 cm nonenhancing T2 hyperintense focus in the  segment 8 since 9/3/2020 (series 18 image 17).     Gallbladder: Surgically absent.     Spleen: Enlarged, measuring up to 14.8 cm in sagittal dimension. A few  scattered cysts are demonstrated with the largest measuring up to 9  mm, series 24 image 16.     Kidneys: Bilateral tiny simple appearing cortical renal cysts. No  kidney stone, cysts or masses. No pelvocaliectasis.       Adrenal glands: Normal.      Pancreas:  Normal appearance and signal characteristics of the  pancreatic parenchyma. No focal masses. Pancreatic duct normal in  caliber. No side branch ductal dilatation.      Bowel: Unremarkable, with no evidence of bowel dilatation or abnormal  wall enhancement.        Lymph nodes: No  abdominal lymphadenopathy by size criteria. Several  prominent portocaval lymph nodes, measuring up to 1.5 cm, likely  reactive.     Blood vessels: Major blood vessels are patent with no evidence of clot  or obstruction. Prominent upper abdominal varices including  recanalized paraumbilical veins.      Lung bases: No abnormal T2 hyperintensity in the lung bases.  Right-sided pleural effusion. Basilar atelectasis.     Bones and soft tissues: No evidence of acute bony abnormality or  abnormal soft tissue enhancement.        Mesentery and abdominal wall: Mild anasarca.     Ascites: Trace perihepatic, perisplenic and mesenteric ascites.                                                                      IMPRESSION:    1. Cirrhosis and evidence of portal hypertension including  splenomegaly and ascites.  2. No suspicious arterial enhancing focal liver mass.   3. Stable 1.4 cm nonenhancing T2 hyperintense focus in the segment 8  since 9/3/2020, nonspecific but probably benign and may represent a  fibroinflammatory focus.  LIRADS 2   4. Based on this exam only, the patient is within Bebo criteria.  5. Right pleural effusion.    Endoscopy:    EGD 1/2020 showed normal esophagus, small HH, mil congestive hepatopathy    Assessment/Plan: Ms. 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 Liver disease complicated by fluid overload - ascites and HH. She has a LR 3 lesion seen on MRI 9/2020 - follow up MRI 1/2021 and 5/2021 showing LR2 lesion.     She has a long history of alcohol use, presented with decompensated 5/2020 related to recurrent drinking. BR morro this summer and she is adamant she has abstained from alcohol since 5/2020. LFTs morro 1/2021 - P[place] after her visit was positive, she reported she she was anxious after meeting with her new therapist, and because of this drank 2 glasses of wine. She reports not drinking since then and is upset she made this  mistake. Has since engaged in alcohol treatment and is enjoying it. Liver transplant currently closed related to this.    Now having some worsening issues with LE edema. Otherwise doing ok.    Interested in bariatric surgery - given her current decompensation she would not be a candidate unless potentially in the setting of liver transplant. She is agreeable to referral to medical weight loss clinic.     - Continue to engage in alcohol treatment. Would need 6 months sobriety to be considered for transplant in the future. Discussed that medical issues required for transplant in the future include urology evaluation for hematuria, history of kidney stones, and she needs a screening colonoscopy.  - MRI x 2 showing LR 2 lesion - will resume screening with RUQ US in 6 months.   - Increase lasix to 40 mg daily for worsening LE edema - get labs next week at [facility]  - Referral placed to medical weight loss clinic.     Razia Bundy MD MSc  Transplant Hepatologist  [facility]    Stage Time 10:08  End time 10:31    Approximately 22 minutes was spent for the visit with 32 minutes of non face-to-face time were spent in review of the patient's medical record on the day of the visit. This included review of previous: clinic visits, hospital records, lab results, imaging studies, and procedural documentation.  The findings from this review are summarized in the above note.      Again, thank you for allowing me to participate in the care of your patient.        Sincerely,        Razia Bundy MD

## 2021-05-17 NOTE — PROGRESS NOTES
"[facility] Hepatology clinic    Date of Visit: 5/17/2021    Reason for referral: Liver Transplant Evaluation    Subjective: Ms. 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 CT showed cirrhosis without a mass, ascites and a pleural effusion. No prior history of liver disease - states she had an US fall 2018, was not told what it showed, but later was told she had \"fatty liver\". Denies being told LFTs elevated in the past. Underwent thoracentesis and paracentesis that were negative for SBP. She apparently had normal A1AT, AMA, AMA, Ferritin. Ceruloplasmin was low at 14, 24 hour urine copper < 1.0 micrograms/L. No KF rings seen with ophthalmology. She was started on diuretics, fluid resolved with lasix 40/nik 50. She tells me that she was diagnosed with PORTER. After this hospitalization, was sober for until Fall 2019 - around that time she had a few drinks and stopped. States she drank again 5/2020 - had a couple of drinks celebrating son's Bday.     States she abused alcohol after each divorce (1998, 2012 - drinking \"a lot\"), and then would drink regularly after that - ranging from nothing to a few drinks a week. Last drink. No DUIs. Did outpatient treatment (2003) because ex- felt she was drinking too much, states that he projected his drinking onto her, did this to appease him. And then AA for several years. Was sober between [id number], started drinking 6 months after her divorce. She has had shakes, no withdrawal. States she was drinking socially prior to 3/2019.  CD Eval done recommend she attend 6 individual psychotherapy sessions, attend 2 weekly support group meetings    Readmitted 5/2020 with jaundice and abdominal distention after drinking " related to her son's birthday. Na was 118, down from 140 1/2020. Thought to have alcoholic hepatitis - TBR 10.4 (3 1/2020), , ALT 38, Alk Phos 102, INR 1.9. Patient reported she had been sober except for two mikes hard lemonades prior to admission. Started on steroids. Diuretics held (no ascites) and Na improved. Diuretics restarted at lower dose as an outpatient (lasix 20, nik 25).     BR increased again 8/2020 to 11.5, up from 3.8 6/2020. US with Doppler showed cirrhosis. She denied alcohol use. She was readmitted 9/2020 with SOB, found to have a large pleural effusion - tapped felt to be HH. MELD 26. CT showed an indeterminate liver lesion - 15 mm. Diuretics held for KWABENA, then resumed.     Had MRI 1/19/21 to follow up LR3 lesion - showed 1.4 cm LR 2 lesion. Also showed ascites and pleural effusion    She has a long history of hematuria - history of recurrent kidney stones and medullary sponge kidney. When she last saw Nephrology they noted she did not have evidence of medullary sponge kidney on IVP in 2001. Currently having some flank pain, denies s/s of UTI.     Interval Events:   - Sober for several months. Loves her alcohol treatment classes - 3x week for 2 hours. Next week cutting back to twice a week  - Taking lasix 20/nik 50 - thinks legs are getting more swollen  - Having knee pain, has looked into knee replacement in the past  - No issues with confusion  - Not having back pain  - MRI showing stable LR2 lesion  - Having knee pain. Did steroid injection in the past for her knee - effect was very short lived    ROS: 14 point ROS negative except for positives noted in HPI.    PMHx:  Past Medical History:   Diagnosis Date     Alcoholic cirrhosis (H)      Arthritis      Congestive heart failure (H)     Cervical CA      Coronary artery disease     Heart Murmer     Depressive disorder      Hyponatremia        PSHx:  Past Surgical History:   Procedure Laterality Date     ABDOMEN SURGERY        CHOLECYSTECTOMY  2001    patient reported     CV CORONARY ANGIOGRAM N/A 2/5/2021    Procedure: CV CORONARY ANGIOGRAM;  Surgeon: Deyvi Parham MD;  Location: [facility]     CV RIGHT HEART CATH MEASUREMENTS RECORDED N/A 2/5/2021    Procedure: CV RIGHT HEART CATH;  Surgeon: Deyvi Parham MD;  Location: [facility]     GI SURGERY       GYN SURGERY       HYSTERECTOMY  2009    including cervix     LITHOTRIPSY      multiple times, patient reported       FamHx:  Family History   Problem Relation Age of Onset     Substance Abuse Brother      Depression Sister      Anxiety Disorder Sister      Substance Abuse Sister      Depression Sister      Anxiety Disorder Sister      Depression Sister      Anxiety Disorder Sister      Autism Spectrum Disorder Son    No history of liver disease    SocHx:  Social History     Socioeconomic History     Marital status:      Spouse name: Not on file     Number of children: Not on file     Years of education: Not on file     Highest education level: Not on file   Occupational History     Not on file   Social Needs     Financial resource strain: Not on file     Food insecurity     Worry: Not on file     Inability: Not on file     Transportation needs     Medical: Not on file     Non-medical: Not on file   Tobacco Use     Smoking status: Never Smoker     Smokeless tobacco: Never Used   Substance and Sexual Activity     Alcohol use: Not Currently     Comment: 5/2020     Drug use: Never     Sexual activity: Not on file   Lifestyle     Physical activity     Days per week: Not on file     Minutes per session: Not on file     Stress: Not on file   Relationships     Social connections     Talks on phone: Not on file     Gets together: Not on file     Attends Yarsani service: Not on file     Active member of club or organization: Not on file     Attends meetings of clubs or organizations: Not on file     Relationship status: Not on file     Intimate partner  violence     Fear of current or ex partner: Not on file     Emotionally abused: Not on file     Physically abused: Not on file     Forced sexual activity: Not on file   Other Topics Concern     Parent/sibling w/ CABG, MI or angioplasty before 65F 55M? Not Asked   Social History Narrative     Not on file   4 sons - ex- has 17 year old (high functioning autistic). 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    Medications:  Current Outpatient Medications   Medication     buPROPion (WELLBUTRIN XL) 150 MG 24 hr tablet     busPIRone (BUSPAR) 15 MG tablet     calcium carbonate-vitamin D (OSCAL W/D) 500-200 MG-UNIT tablet     ferrous sulfate (FEROSUL) 325 (65 Fe) MG tablet     folic acid (FOLVITE) 1 MG tablet     furosemide (LASIX) 40 MG tablet     LORazepam (ATIVAN) 0.5 MG tablet     omeprazole (PRILOSEC) 20 MG DR capsule     potassium chloride ER (KLOR-CON M) 20 MEQ CR tablet     pregabalin (LYRICA) 50 MG capsule     spironolactone (ALDACTONE) 50 MG tablet     UNABLE TO FIND     vitamin D2 (ERGOCALCIFEROL) 96703 units (1250 mcg) capsule     vitamin D3 (CHOLECALCIFEROL) 50 mcg (2000 units) tablet     No current facility-administered medications for this visit.      Facility-Administered Medications Ordered in Other Visits   Medication     Self Administer Medications: Behavioral Services     Ativan was just for MRI     Allergies:     Allergies   Allergen Reactions     Codeine      Diazepam      Morphine      PN: LW Reaction: Nausea     Penicillins      PN: LW Reaction: Rash, Generalized. Reportedly had a rash with this as a child and has tolerated Augmentin since per patient. Tolerated Zosyn 3/12/19 in [facility].        Objective:  There were no vitals taken for this visit.  Constitutional: pleasant woman in NAD  Eyes: non icteric  Respiratory: Normal respiratory excursion   Back: No spinal or CVA tenderness  MSK: normal range of motion of visualized  extremities  Skin: no jaundice  Psychiatric: normal mood and orientation    Labs:  Last Comprehensive Metabolic Panel:  Sodium   Date Value Ref Range Status   05/10/2021 140 133 - 144 mmol/L Final     Potassium   Date Value Ref Range Status   05/10/2021 4.3 3.4 - 5.3 mmol/L Final     Chloride   Date Value Ref Range Status   05/10/2021 107 94 - 109 mmol/L Final     Carbon Dioxide   Date Value Ref Range Status   05/10/2021 26 20 - 32 mmol/L Final     Anion Gap   Date Value Ref Range Status   05/10/2021 7 3 - 14 mmol/L Final     Glucose   Date Value Ref Range Status   05/10/2021 116 (H) 70 - 99 mg/dL Final     Urea Nitrogen   Date Value Ref Range Status   05/10/2021 15 7 - 30 mg/dL Final     Creatinine   Date Value Ref Range Status   05/10/2021 0.88 0.52 - 1.04 mg/dL Final     GFR Estimate   Date Value Ref Range Status   05/10/2021 74 >60 mL/min/[1.73_m2] Final     Comment:     Non  GFR Calc  Starting 12/18/2018, serum creatinine based estimated GFR (eGFR) will be   calculated using the Chronic Kidney Disease Epidemiology Collaboration   (CKD-EPI) equation.       Calcium   Date Value Ref Range Status   05/10/2021 8.7 8.5 - 10.1 mg/dL Final     Bilirubin Total   Date Value Ref Range Status   05/10/2021 4.8 (H) 0.2 - 1.3 mg/dL Final     Alkaline Phosphatase   Date Value Ref Range Status   05/10/2021 331 (H) 40 - 150 U/L Final     ALT   Date Value Ref Range Status   05/10/2021 30 0 - 50 U/L Final     AST   Date Value Ref Range Status   05/10/2021 43 0 - 45 U/L Final     Lab Results   Component Value Date    WBC 8.9 03/01/2021     Lab Results   Component Value Date    RBC 3.20 03/01/2021     Lab Results   Component Value Date    HGB 11.2 03/01/2021     Lab Results   Component Value Date    HCT 33.6 03/01/2021     No components found for: MCT  Lab Results   Component Value Date     03/01/2021     Lab Results   Component Value Date    MCH 35.0 03/01/2021     Lab Results   Component Value Date    MCHC  33.3 03/01/2021     Lab Results   Component Value Date    RDW 16.3 03/01/2021     Lab Results   Component Value Date     03/01/2021     MELD-Na score: 19 at 5/10/2021 12:42 PM  MELD score: 19 at 5/10/2021 12:42 PM  Calculated from:  Serum Creatinine: 0.88 mg/dL (Rounded to 1 mg/dL) at 5/10/2021 12:42 PM  Serum Sodium: 140 mmol/L (Rounded to 137 mmol/L) at 5/10/2021 12:42 PM  Total Bilirubin: 4.8 mg/dL at 5/10/2021 12:42 PM  INR(ratio): 1.77 at 5/10/2021 12:42 PM  Age: 55 years 5 months    Imaging:    MRI Liver 1/19/2021    Liver: Cirrhotic configuration of the liver parenchyma with nodular  contours, irregular surface, hypertrophy of the left and caudate  lobes, lacy T2 signal with reticular delayed pattern of contrast  enhancement due to fibrosis as well as innumerable regenerative  nodules. No significant hepatic steatosis or iron deposition.      No suspicious arterial enhancing focal mass is identified.  Redemonstration of 1.4 cm nonenhancing T2 hyperintense focus in the  segment 8 since 9/3/2020 (series 18 image 17).     Gallbladder: Surgically absent.     Spleen: Enlarged, measuring up to 14.8 cm in sagittal dimension. A few  scattered cysts are demonstrated with the largest measuring up to 9  mm, series 24 image 16.     Kidneys: Bilateral tiny simple appearing cortical renal cysts. No  kidney stone, cysts or masses. No pelvocaliectasis.       Adrenal glands: Normal.      Pancreas:  Normal appearance and signal characteristics of the  pancreatic parenchyma. No focal masses. Pancreatic duct normal in  caliber. No side branch ductal dilatation.      Bowel: Unremarkable, with no evidence of bowel dilatation or abnormal  wall enhancement.        Lymph nodes: No abdominal lymphadenopathy by size criteria. Several  prominent portocaval lymph nodes, measuring up to 1.5 cm, likely  reactive.     Blood vessels: Major blood vessels are patent with no evidence of clot  or obstruction. Prominent upper abdominal  varices including  recanalized paraumbilical veins.      Lung bases: No abnormal T2 hyperintensity in the lung bases.  Right-sided pleural effusion. Basilar atelectasis.     Bones and soft tissues: No evidence of acute bony abnormality or  abnormal soft tissue enhancement.        Mesentery and abdominal wall: Mild anasarca.     Ascites: Trace perihepatic, perisplenic and mesenteric ascites.                                                                      IMPRESSION:    1. Cirrhosis and evidence of portal hypertension including  splenomegaly and ascites.  2. No suspicious arterial enhancing focal liver mass.   3. Stable 1.4 cm nonenhancing T2 hyperintense focus in the segment 8  since 9/3/2020, nonspecific but probably benign and may represent a  fibroinflammatory focus.  LIRADS 2   4. Based on this exam only, the patient is within Hawi criteria.  5. Right pleural effusion.    Endoscopy:    EGD 1/2020 showed normal esophagus, small HH, mil congestive hepatopathy    Assessment/Plan: Ms. 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 Liver disease complicated by fluid overload - ascites and HH. She has a LR 3 lesion seen on MRI 9/2020 - follow up MRI 1/2021 and 5/2021 showing LR2 lesion.     She has a long history of alcohol use, presented with decompensated 5/2020 related to recurrent drinking. BR morro this summer and she is adamant she has abstained from alcohol since 5/2020. LFTs morro 1/2021 - Peth after her visit was positive, she reported she she was anxious after meeting with her new therapist, and because of this drank 2 glasses of wine. She reports not drinking since then and is upset she made this mistake. Has since engaged in alcohol treatment and is enjoying it. Liver transplant currently closed related to this.    Now having some worsening issues with LE edema. Otherwise doing ok.    Interested in bariatric surgery - given her current  decompensation she would not be a candidate unless potentially in the setting of liver transplant. She is agreeable to referral to medical weight loss clinic.     - Continue to engage in alcohol treatment. Would need 6 months sobriety to be considered for transplant in the future. Discussed that medical issues required for transplant in the future include urology evaluation for hematuria, history of kidney stones, and she needs a screening colonoscopy.  - MRI x 2 showing LR 2 lesion - will resume screening with RUQ US in 6 months.   - Increase lasix to 40 mg daily for worsening LE edema - get labs next week at [place]  - Referral placed to medical weight loss clinic.     Razia Bundy MD MSc  Transplant Hepatologist  [facility]    Stage Time 10:08  End time 10:31    Approximately 22 minutes was spent for the visit with 32 minutes of non face-to-face time were spent in review of the patient's medical record on the day of the visit. This included review of previous: clinic visits, hospital records, lab results, imaging studies, and procedural documentation.  The findings from this review are summarized in the above note.

## 2021-05-17 NOTE — GROUP NOTE
"Group Therapy Documentation    PATIENT'S NAME: Viviana Schaefer  MRN:   [id number]  :   1965  ACCT. NUMBER: [id number]  DATE OF SERVICE: 21  START TIME: 12:00 PM  END TIME:  2:00 PM  FACILITATOR(S): Peri Haynes LADC  TOPIC: BEH Group Therapy  Number of patients attending the group:  8  Group Length:  2 Hours    Group Therapy Type: Addiction    Summary of Group / Topics Discussed:    Today's group began with a reading: \"one day my soul opened up\" Client discussed what they got out of the reading and shared ideas of the depth this brought out. Some group members said they felt it was very enlightening and nice to have a creative reading like this which is different than our usual daily meditation reading.  The reading emphasized open up in life, with all its realities and feeling all of life.  I emphasized this seemed appropriate as a client is completing tx today.  We did the weekly check in and clients checked in on all dimensions.  Clients also gave feedback to the client discharging, as well as words of encouragement.  We reviewed the characteristics of Addiction, including, but not limited to habitual, compulsive, obsessive. We began the discussion on what recovery and I reminded clients to do assignment on what dose addiction mean to me.      Group Attendance:  Attended group session    Patient's response to the group topic/interactions:  cooperative with task and discussed personal experience with topic    Patient appeared to be Attentive and Engaged.          Telemedicine Visit: The patient's condition can be safely assessed and treated via synchronous audio and visual telemedicine encounter.      Reason for Telemedicine Visit: COVID-19     Originating Location (pt. Location): Home     Type of service:  Video Visit  GROUP    Originating Location (pt. Location): Home     Distant Location (provider location):  [facility] HE[facility] & [facility]DI[facility]      Consent:  The " "patient/guardian has verbally consented to: the potential risks and benefits of telemedicine (video visit) versus in person care; billing of their insurance or make self-payment for services provided; and responsibility for payment of non-covered services.      Mode of Communication:  Video Conference via [employer]      Telemedicine Visit: The patient's condition can be safely assessed and treated via synchronous audio and visual telemedicine encounter.      Reason for Telemedicine Visit: COVID-19     Originating Location (pt. Location): Home     Type of service:  Video Visit  GROUP    Originating Location (pt. Location): Home     Distant Location (provider location):  [facility] MENTAL HEALTH & ADDICTION SERVICES      Consent:  The patient/guardian has verbally consented to: the potential risks and benefits of telemedicine (video visit) versus in person care; billing of their insurance or make self-payment for services provided; and responsibility for payment of non-covered services.      Mode of Communication:  Video Conference via [employer]    Client specific details:   Today's session focused on check in of all dimensions, especially dim 2 her medical issues, Dim 6 Addiction and Recovery and Dim 4 the disease concept  Client said of the reading on \"One Day My Soul Opened Up\" : that was deep and dutch  Client reports no thoughts of self harm.  On scale of 1-10, where 10 is high, client reports Anxiety: 1 Depression:1  Stress:1 She reports all of tehse numbers are related to not seeing her son and to her body pain. She reports she is \"hurting all over\" and group discussed that many of them are too, but they feel for her. I encouraged client to use breath technique and also venecia with  If she feels it would help.   A group members shared her phone number and said she'd love to talk about both of them being on transplant list.  Motivation for sobriety today: 10  motivation for treatment attendance: 10 " " motivation for ongoing sobriety:10 \" I plan to work on sobriety always    Client participated in the review of discussion from last week of what addiction is and was able to name 3 characteristics: an exclusive relationship with something, habitual, comulsive, something that makes you feel different, it pulls you in.          P)  Talk with  If pain continues.  She will do assignment questions on \"what does abuse   Addiction mean to me\"    Peri Haynes, MS, LADC, LPC                 "

## 2021-05-19 ENCOUNTER — HOSPITAL ENCOUNTER (OUTPATIENT)
Dept: BEHAVIORAL HEALTH | Facility: CLINIC | Age: 56
End: 2021-05-19
Attending: FAMILY MEDICINE
Payer: COMMERCIAL

## 2021-05-19 PROCEDURE — H2035 A/D TX PROGRAM, PER HOUR: HCPCS | Mod: HQ,GT

## 2021-05-19 NOTE — GROUP NOTE
Group Therapy Documentation    PATIENT'S NAME: Viviana Schaefer  MRN:   [id number]  :   1965  ACCT. NUMBER: [id number]  DATE OF SERVICE: 21  START TIME: 12:00 PM  END TIME:  2:00 PM  FACILITATOR(S): Peri Haynes LADC  TOPIC: BEH Group Therapy  Number of patients attending the group:  6  Group Length:  2 Hours    Group Therapy Type: Addiction and Psychotherapeutic    Summary of Group / Topics Discussed:    Psychoeducation/Skills Understanding Addiction and Recovery  This topic will give a general overview of Addiction and Recovery, Including basic relapse prevention, using consequences  of use as motivation for ongoing relapse prevention.     Objective(s):    Patients will identify 4 characteristics of addiction    Patients will identify 4 characteristics of Recovery     Patient will identify 4 consequences of their using        Structure (modalities, homework, worksheets, etc.):    Provide psychoeducation on relapse prevention and healthy coping methods for ongoing recovery    Provide education on characteristics of addiction    Facilitate group discussion around each patient s current awareness of warning signs,  triggers and cravings.     Expected therapeutic outcome(s):    Patient will:    Be able to manage triggers and cravings without returning to substance use.      Therapeutic outcomes measured by:          Patients will name 4 characteristics of addiction    Patients will name 4 characteristics of Recovery     4 consequences of using      Group Attendance:  Attended group session    Patient's response to the group topic/interactions:  cooperative with task    Patient appeared to be Actively participating.          Telemedicine Visit: The patient's condition can be safely assessed and treated via synchronous audio and visual telemedicine encounter.      Reason for Telemedicine Visit: COVID-19     Originating Location (pt. Location): Home     Type of service:  Video Visit   "GROUP    Originating Location (pt. Location): Home     Distant Location (provider location):  [facility] MENTAL HEALTH & ADDICTION SERVICES      Consent:  The patient/guardian has verbally consented to: the potential risks and benefits of telemedicine (video visit) versus in person care; billing of their insurance or make self-payment for services provided; and responsibility for payment of non-covered services.      Mode of Communication:  Video Conference via [employer]    Client specific details: Viviana reports she had a fall and the ambulance had to come and help her.  She did not need to go to hospital, but apologized that she is laying down while doing group.  Group and counselor discussed how much group means to her that she is in pain and seeking support of her group.  She also discussed with me, after group, that it has stuck with her that another gorup member keeps liquor in the house.  I talked with her about what feedback is and how important it is for others to give this.  She seemed relieved, as it concerns her, but she \"didn't want to take someone elses inventory\"  Today's session focused on check in of all dimensions, Dim 6 Addiction and Recovery and Dim 4 motivation for abstinence    I discussed withdrawal symptoms and meaning of tolerance, asking client for examples after:  Client was able to give 2 examples of tolerance in his life and 2 of withdrawal:  In 2018, I would withdraw if I didn't drink in middle of night, or have a shot before going to work.    Client  was able to name 3 characteristics of addiction :  habitual, comulsive, something that makes you feel different, it pulls you in, need it psychologically or physiologically.  Client  was able to name 3 characteristics: It is  Ongoing. a process or journey, not a single event.  When asked \"when are you recovered?\" discussion was moved to long term recoery     P)  client will do assignment on Patterns of use.  She is going to " consider giving feedback to another group member about keeping alcohol in her home    Peri Haynes, MS, LADC, LPC
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
## 2021-05-24 ENCOUNTER — HOSPITAL ENCOUNTER (OUTPATIENT)
Dept: BEHAVIORAL HEALTH | Facility: CLINIC | Age: 56
End: 2021-05-24
Attending: FAMILY MEDICINE
Payer: COMMERCIAL

## 2021-05-24 PROCEDURE — H2035 A/D TX PROGRAM, PER HOUR: HCPCS | Mod: HQ,GT

## 2021-05-24 NOTE — GROUP NOTE
Group Therapy Documentation    PATIENT'S NAME: Viviana Schaefer  MRN:   [id number]  :   1965  ACCT. NUMBER: [id number]  DATE OF SERVICE: 21  START TIME: 12:00 PM  END TIME:  2:00 PM  FACILITATOR(S): Peri Haynes LADC  TOPIC: BEH Group Therapy  Number of patients attending the group:  3  Group Length:  2 Hours    Group Therapy Type: Addiction and Psychoeducation    Summary of Group / Topics Discussed:    Psychoeducation/Skills Emotions and Addiction, thinking/feeling connection  This topic will give a general overview of maladaptive patterns of thinking and techniques to change their thinking patterns to be supportive of health and recovery.    Objective(s):     Patients will identify three cognitive distortions    Patients will identify two ways to dispute distortions    Structure (modalities, homework, worksheets, etc):     Provide psychoeducation on the thinking feeling connection    Facilitate group discussion around how addiction affects our emotions    Expected therapeutic outcome(s):   Patient will:    Recognize how addiction affected emotions and choices    Experience improved thinking and choices    Therapeutic outcome(s) measured by:     Patient will name 2-3 ways addiction affected their life.      Group Attendance:  Attended group session    Patient's response to the group topic/interactions:  discussed personal experience with topic and expressed readiness to alter behaviors    Patient appeared to be Attentive and Engaged.          Telemedicine Visit: The patient's condition can be safely assessed and treated via synchronous audio and visual telemedicine encounter.      Reason for Telemedicine Visit: COVID-19     Originating Location (pt. Location): Home     Type of service:  Video Visit  GROUP    Originating Location (pt. Location): Home     Distant Location (provider location):  [facility]      Consent:  The patient/guardian has verbally  "consented to: the potential risks and benefits of telemedicine (video visit) versus in person care; billing of their insurance or make self-payment for services provided; and responsibility for payment of non-covered services.      Mode of Communication:  Video Conference via [employer]    Client specific details: Viviana reports she is still a bit sore from her fall, but is getting better.  A group member told Viviana they have seen a huge difference in her since starting treatment:  \"you are not so focused on your exes and are taking good self care\"  Viviana said she appreciated the feedback.    Today's session focused on check in of all dimensions for check in and on dimension 3 emotions and thought connection:  Client rated the following on a likert scale where 1=little and 10= high  Hungry: 0  Angry: 0  Lonely: 0   Tired:  0    Anxiety:  0  Stress:1   Depression:0  She reports even though she has been \"laid up, after my fall\" she does not feel depressed or like using.  I asked her what she is doing to help, \"not using, playing scrabble on line, talking with my sister and a friend\"  Motivation for sobriety:  10          Client was able to name how drug and alcohol use affects her emotions:  It scrambles my emotions and  adds to anxiety and stress;     Also, discussed why it is important to be able to describe and share feelings with others:  to be vulnerable, to grow, to let others get to know me, to appreciate myself     P)  Assignment on vulnerability:  Ashwin Haynes, MS, LADC, LPC                 "

## 2021-05-25 NOTE — TELEPHONE ENCOUNTER
REFERRAL INFORMATION:    Referring Provider:  Dr. Razia Bundy     Referring Clinic:  [facility]     Reason for Visit/Diagnosis: New MWM, BMI 46       FUTURE VISIT INFORMATION:    Appointment Date: 6/17/2021    Appointment Time: 7:30 AM      NOTES RECORD STATUS  DETAILS   OFFICE NOTE from Referring Provider Internal 5/17/2021 Office visit with Dr. Bundy     OFFICE NOTE from Other Specialists N/A    HOSPITAL DISCHARGE SUMMARY/ ED VISITS  N/A    OPERATIVE REPORT N/A    ENDOSCOPY (EGD)  Care Everywhere 1/2/2020 ([facility])    PERTINENT LABS Internal/ Care Everywhere    PATHOLOGY REPORTS (RELATED) N/A    IMAGING (CT, MRI, US, XR)  N/A

## 2021-05-26 ENCOUNTER — HOSPITAL ENCOUNTER (OUTPATIENT)
Dept: BEHAVIORAL HEALTH | Facility: CLINIC | Age: 56
End: 2021-05-26
Attending: FAMILY MEDICINE
Payer: COMMERCIAL

## 2021-05-26 PROCEDURE — H2035 A/D TX PROGRAM, PER HOUR: HCPCS | Mod: HQ,95

## 2021-05-26 NOTE — GROUP NOTE
"Group Therapy Documentation    PATIENT'S NAME: Viviana Schaefer  MRN:   [id number]  :   1965  ACCT. NUMBER: [id number]  DATE OF SERVICE: 21  START TIME: 12:00 AM  END TIME:  2:00 PM  FACILITATOR(S): Peri Haynes LADC  TOPIC: BEH Group Therapy  Number of patients attending the group:  6  Group Length:  2 Hours    Group Therapy Type: Addiction    Summary of Group / Topics Discussed:    Today's group focused on the importance of support and various resources clients use.  We discussed different slogans of AA and their meaning to individuals. This counselor stressed getting sober support and for some group members to possibly attend meetings and not \"throw the baby out of the bath water\" when it comes to not liking everything about AA . I drew attention to this being the first sober memorial day weekend for each member and asked them to discuss any concerns and share their plan for sobriety over the weekend.  Client shared he had a using episode.  Group and counselor spent a great deal of time, processing, giving feedback to and discussing what was learned from a clients episode use.  I encouraged group to learn, and remember that shame can keep people from seeking the support they need after using, and that this group is here to support, encourage and help get the best possible plan/support  for a healthy and sober life.  I also read \"the MAN/GAL in the Glass poem at end of group and got a 1-10 word feedback from client.      Group Attendance:  Attended group session    Patient's response to the group topic/interactions:  cooperative with task and discussed personal experience with topic    Patient appeared to be Attentive and Engaged.        Client specific details:   Telemedicine Visit: The patient's condition can be safely assessed and treated via synchronous audio and visual telemedicine encounter.      Reason for Telemedicine Visit: COVID-19     Originating Location (pt. Location): Home   " "  Type of service:  Video Visit  GROUP    Originating Location (pt. Location): Home     Distant Location (provider location):  [facility]      Consent:  The patient/guardian has verbally consented to: the potential risks and benefits of telemedicine (video visit) versus in person care; billing of their insurance or make self-payment for services provided; and responsibility for payment of non-covered services.      Mode of Communication:  Video Conference via [employer]  Telemedicine Visit: The patient's condition can be safely assessed and treated via synchronous audio and visual telemedicine encounter.      Reason for Telemedicine Visit: COVID-19     Originating Location (pt. Location): Home     Type of service:  Video Visit  GROUP    Originating Location (pt. Location): Home     Distant Location (provider location):  [facility]      Consent:  The patient/guardian has verbally consented to: the potential risks and benefits of telemedicine (video visit) versus in person care; billing of their insurance or make self-payment for services provided; and responsibility for payment of non-covered services.      Mode of Communication:  Video Conference via [employer]    Client specific details:She reports she has no concernes for use over the holiday weekend, \"but it is good to be aware it is my first one without using\"    Todays session focused on check dimension 5:  Client participated in discussion on the reading \"Slogans\" and said the ones that stood out for her are: \"First Things First\"- stay off my case, don't feel I have to do it all at once.  She said she has never had a tx that encouraged being kind to self, and she thanked this counselor for reminding her it is good and taking responsibility and being kind to self are not on opposite ends of the scale.    Group spent a great deal of time processing a clients use episode with " counselor and group. Viviana said what he learned from this is above all Use the support.  Also Don't let shame keep you from coming back or to the group.      P: will do Reset discussion.  Follow plan for memorial day which includes having some fun.          Peri Haynes, MS, LADC, LPC
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
## 2021-06-02 ENCOUNTER — HOSPITAL ENCOUNTER (OUTPATIENT)
Dept: BEHAVIORAL HEALTH | Facility: CLINIC | Age: 56
End: 2021-06-02
Attending: FAMILY MEDICINE
Payer: COMMERCIAL

## 2021-06-02 PROCEDURE — H2035 A/D TX PROGRAM, PER HOUR: HCPCS | Mod: HQ,GT
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
## 2021-06-02 NOTE — GROUP NOTE
"Group Therapy Documentation    PATIENT'S NAME: Viviana Schaefer  MRN:   [id number]  :   1965  ACCT. NUMBER: [id number]  DATE OF SERVICE: 21  START TIME: 12:00 PM  END TIME:  2:00 PM  FACILITATOR(S): Peri Haynes LADC  TOPIC: BEH Group Therapy  Number of patients attending the group:  5  Group Length:  2 Hours    Group Therapy Type: Addiction    Summary of Group / Topics Discussed:    Today's group focused on checking in, after not having group Memorial day.  Clients discussed how their long weekend went and any concerns.  They also discussed healthy coping they used.  A client talked about her use episode and group members have her feedback on \"only 1 drink\" and good self care.  This counselor focused on accountability and responsibility for one's actions, and told them this was the topic even before hearing about the use episode.  Another client did hie last 2 assignments and was given feedback.  He will be completing treatment today and group wished him well.  This counselor went over the Stop Technique S-STOP, don't do anything.   T-Take a step back from the situation.  Take a deep breath .   O-Observe what is going on inside and outside of you and what the situation and others are saying and doing.  P-Proceed mindfully- act with awareness.  Each group member gave a situation and used this technique to describe what they could do, with each step.      Group Attendance:  Attended group session    Patient's response to the group topic/interactions:  discussed personal experience with topic    Patient appeared to be Engaged.          Telemedicine Visit: The patient's condition can be safely assessed and treated via synchronous audio and visual telemedicine encounter.      Reason for Telemedicine Visit: COVID-19     Originating Location (pt. Location): Home     Type of service:  Video Visit  GROUP    Originating Location (pt. Location): Home     Distant Location (provider location):  [facility]" [facility]      Consent:  The patient/guardian has verbally consented to: the potential risks and benefits of telemedicine (video visit) versus in person care; billing of their insurance or make self-payment for services provided; and responsibility for payment of non-covered services.      Mode of Communication:  Video Conference via Lua    Client specific details: Viviana described how  How the long weekend went with Memorial day and said she did real well, other than not being able to get hold of her son on his birthday.  She said she is getting better about not \"getting stuck\" in her emotions about his actions.  I asked how she does this and she said \"so many things we learn in group help, but mostly I check in with feelings, have them and then move on to the next thing    Today's session focused on check in of all dimensions:  Client rated the following on a likert scale where 1=little and 10= high  Hungry: 0  Angry: 0  Lonely:  0  Tired: 0     Anxiety:   1 Stress:  1 Depression:0  Motivation for sobriety:  10      motivation for community support attendance:    Client was able to name the 4 parts of STOP technique   And apply it to a real situation, with her son not contacting her above.       P) Do goal assignment. Practice STOP and give example next week     Peri Haynes, MS, LADC, LPC                 "

## 2021-06-07 ENCOUNTER — HOSPITAL ENCOUNTER (OUTPATIENT)
Dept: BEHAVIORAL HEALTH | Facility: CLINIC | Age: 56
End: 2021-06-07
Attending: FAMILY MEDICINE
Payer: COMMERCIAL

## 2021-06-07 PROCEDURE — H2035 A/D TX PROGRAM, PER HOUR: HCPCS | Mod: HQ,GT

## 2021-06-07 PROCEDURE — H2035 A/D TX PROGRAM, PER HOUR: HCPCS | Mod: GT

## 2021-06-07 ASSESSMENT — ANXIETY QUESTIONNAIRES
IF YOU CHECKED OFF ANY PROBLEMS ON THIS QUESTIONNAIRE, HOW DIFFICULT HAVE THESE PROBLEMS MADE IT FOR YOU TO DO YOUR WORK, TAKE CARE OF THINGS AT HOME, OR GET ALONG WITH OTHER PEOPLE: SOMEWHAT DIFFICULT
5. BEING SO RESTLESS THAT IT IS HARD TO SIT STILL: NOT AT ALL
GAD7 TOTAL SCORE: 4
7. FEELING AFRAID AS IF SOMETHING AWFUL MIGHT HAPPEN: SEVERAL DAYS
2. NOT BEING ABLE TO STOP OR CONTROL WORRYING: SEVERAL DAYS
3. WORRYING TOO MUCH ABOUT DIFFERENT THINGS: SEVERAL DAYS
6. BECOMING EASILY ANNOYED OR IRRITABLE: NOT AT ALL
1. FEELING NERVOUS, ANXIOUS, OR ON EDGE: SEVERAL DAYS

## 2021-06-07 ASSESSMENT — PATIENT HEALTH QUESTIONNAIRE - PHQ9
5. POOR APPETITE OR OVEREATING: NOT AT ALL
SUM OF ALL RESPONSES TO PHQ QUESTIONS 1-9: 9

## 2021-06-07 NOTE — GROUP NOTE
"Group Therapy Documentation    PATIENT'S NAME: Viviana Schaefer  MRN:   [id number]  :   1965  ACCT. NUMBER: [id number]  DATE OF SERVICE: 21  START TIME: 12:00 PM  END TIME:  2:00 PM  FACILITATOR(S): Peri Haynes LADC  TOPIC: BEH Group Therapy  Number of patients attending the group:  3  Group Length:  2 Hours    Group Therapy Type: Addiction and Psychoeducation    Summary of Group / Topics Discussed:  Today group focused on this topic as well as a reading on taking responsibility and being accountable.  We discussed taking out the \"blame\" factor and also how it can be freeing to take responsibility.  I discussed \"course correct or self correct, rather than using blame or shame.  We also checked in on all dimensions and discussed the psycho education Goal setting, below        Psychoeducation/Skills Goal Setting (TIFFANIE)  This topic will give a general overview of short, intermediate and long term goals including the value of goal setting. It will explore how the pursuit of instant gratification adversely affects the ability to reach goals.  This topic will assist the patients in completing their recovery care plans.    Objective(s):     Patients will identify the characteristics of short, intermediate and long term goals.    Patients will be able to list one personal goal under each category    Patients will identify at least one way instant gratification impacts their ability to reach goals    Structure (modalities, homework, worksheets, etc):     Provide psychoeducation on goal setting and overcoming obstacles to goal attainment.    Facilitate group discussion around each patient s current state of goal setting and attainment.    Complete a worksheet on personal goal setting    Expected therapeutic outcome(s):   Patient will:    Set goals and negotiate obstacles to reaching the goals     Experience a higher incidence of goal attainment    Therapeutic outcome(s) measured by:     Naming 3 goals, " "one short, one long and one intermediate, as well as 1 way to achieve each goal        Group Attendance:  Attended group session    Patient's response to the group topic/interactions:  cooperative with task and discussed personal experience with topic    Patient appeared to be Actively participating, Attentive and Engaged.        Client specific details:    Telemedicine Visit: The patient's condition can be safely assessed and treated via synchronous audio and visual telemedicine encounter.      Reason for Telemedicine Visit: COVID-19     Originating Location (pt. Location): Home     Type of service:  Video Visit  GROUP    Originating Location (pt. Location): Home     Distant Location (provider location):  [facility]      Consent:  The patient/guardian has verbally consented to: the potential risks and benefits of telemedicine (video visit) versus in person care; billing of their insurance or make self-payment for services provided; and responsibility for payment of non-covered services.      Mode of Communication:  Video Conference via [employer]    Client specific details: client discussed how it is freeing to take responsibility, for good and bad things in life.  \"It isn't always easy, but it helps us grow from the inside out\"  Client gave feedback to another client that she was glad she reported her use last week and seems to have gained some insight over the weekend.  She told her \"above all, I was sad and scared for you.\"    Today's session focused on check in of all dimensions as well as goal setting, dim 6  Client rated the following on a likert scale where 1=little and 10= high  Hungry: 0  Angry: 0  Lonely:  0  Tired: 0     Anxiety:   1 Stress:  1 Depression:0  Motivation for sobriety:  10      motivation for community support attendance:\" 6, but moving up\"    Discussed short term goals and how she will reach them.  One goal is:lose weight.  I will attend my " appointment with a nutritionist in a couple weeks.  I am also going to begin moving more, even if 10 minutes per day  Discussed short term goals and how she will reach them.  One goal is: sobriety.  I will do this one day at a time by using my support, getting a therapist and goint to AA.     P) do assignment on relapse prevention, begin working on goals from today.    Peri Haynes, MS, LADC, LPC               .   66 y/o male with PMHx of CVA (with residual right-sided weakness, walks with a cane) and HTN BIBEMS for AMS. As per EMS, patient went to a bar before he went to work and had a few alcoholic drinks. Patient reports that his co-workers thought his balance was off, and he admitted to them that he drank alcohol before he came and they called EMS. Patient currently denies any other acute medical complaints.
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
## 2021-06-08 ASSESSMENT — ANXIETY QUESTIONNAIRES: GAD7 TOTAL SCORE: 4

## 2021-06-09 ENCOUNTER — HOSPITAL ENCOUNTER (OUTPATIENT)
Dept: BEHAVIORAL HEALTH | Facility: CLINIC | Age: 56
End: 2021-06-09
Attending: FAMILY MEDICINE
Payer: COMMERCIAL

## 2021-06-09 PROCEDURE — H2035 A/D TX PROGRAM, PER HOUR: HCPCS | Mod: HQ,95
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
## 2021-06-11 DIAGNOSIS — E55.9 VITAMIN D DEFICIENCY: ICD-10-CM

## 2021-06-11 DIAGNOSIS — K70.30 ALCOHOLIC CIRRHOSIS (H): ICD-10-CM

## 2021-06-11 LAB
ALBUMIN SERPL-MCNC: 2.7 G/DL (ref 3.4–5)
ALP SERPL-CCNC: 208 U/L (ref 40–150)
ALT SERPL W P-5'-P-CCNC: 32 U/L (ref 0–50)
ANION GAP SERPL CALCULATED.3IONS-SCNC: 11 MMOL/L (ref 3–14)
AST SERPL W P-5'-P-CCNC: 54 U/L (ref 0–45)
BILIRUB DIRECT SERPL-MCNC: 2.4 MG/DL (ref 0–0.2)
BILIRUB SERPL-MCNC: 6.6 MG/DL (ref 0.2–1.3)
BUN SERPL-MCNC: 12 MG/DL (ref 7–30)
CALCIUM SERPL-MCNC: 8.7 MG/DL (ref 8.5–10.1)
CHLORIDE SERPL-SCNC: 108 MMOL/L (ref 94–109)
CO2 SERPL-SCNC: 23 MMOL/L (ref 20–32)
CREAT SERPL-MCNC: 0.95 MG/DL (ref 0.52–1.04)
ERYTHROCYTE [DISTWIDTH] IN BLOOD BY AUTOMATED COUNT: 14.6 % (ref 10–15)
GFR SERPL CREATININE-BSD FRML MDRD: 68 ML/MIN/{1.73_M2}
GLUCOSE SERPL-MCNC: 87 MG/DL (ref 70–99)
HCT VFR BLD AUTO: 29.6 % (ref 35–47)
HGB BLD-MCNC: 10.1 G/DL (ref 11.7–15.7)
INR PPP: 1.87 (ref 0.86–1.14)
MCH RBC QN AUTO: 35.9 PG (ref 26.5–33)
MCHC RBC AUTO-ENTMCNC: 34.1 G/DL (ref 31.5–36.5)
MCV RBC AUTO: 105 FL (ref 78–100)
PLATELET # BLD AUTO: 92 10E9/L (ref 150–450)
POTASSIUM SERPL-SCNC: 3.6 MMOL/L (ref 3.4–5.3)
PROT SERPL-MCNC: 6.6 G/DL (ref 6.8–8.8)
RBC # BLD AUTO: 2.81 10E12/L (ref 3.8–5.2)
SODIUM SERPL-SCNC: 142 MMOL/L (ref 133–144)
WBC # BLD AUTO: 6.7 10E9/L (ref 4–11)

## 2021-06-11 PROCEDURE — 99000 SPECIMEN HANDLING OFFICE-LAB: CPT | Performed by: STUDENT IN AN ORGANIZED HEALTH CARE EDUCATION/TRAINING PROGRAM

## 2021-06-11 PROCEDURE — 85610 PROTHROMBIN TIME: CPT | Performed by: STUDENT IN AN ORGANIZED HEALTH CARE EDUCATION/TRAINING PROGRAM

## 2021-06-11 PROCEDURE — 80321 ALCOHOLS BIOMARKERS 1OR 2: CPT | Mod: 90 | Performed by: STUDENT IN AN ORGANIZED HEALTH CARE EDUCATION/TRAINING PROGRAM

## 2021-06-11 PROCEDURE — 36415 COLL VENOUS BLD VENIPUNCTURE: CPT | Performed by: STUDENT IN AN ORGANIZED HEALTH CARE EDUCATION/TRAINING PROGRAM

## 2021-06-11 PROCEDURE — 80048 BASIC METABOLIC PNL TOTAL CA: CPT | Performed by: STUDENT IN AN ORGANIZED HEALTH CARE EDUCATION/TRAINING PROGRAM

## 2021-06-11 PROCEDURE — 85027 COMPLETE CBC AUTOMATED: CPT | Performed by: STUDENT IN AN ORGANIZED HEALTH CARE EDUCATION/TRAINING PROGRAM

## 2021-06-11 PROCEDURE — 80076 HEPATIC FUNCTION PANEL: CPT | Performed by: STUDENT IN AN ORGANIZED HEALTH CARE EDUCATION/TRAINING PROGRAM
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
## 2021-06-16 ENCOUNTER — HOSPITAL ENCOUNTER (OUTPATIENT)
Dept: BEHAVIORAL HEALTH | Facility: CLINIC | Age: 56
End: 2021-06-16
Attending: FAMILY MEDICINE
Payer: COMMERCIAL

## 2021-06-16 PROCEDURE — H2035 A/D TX PROGRAM, PER HOUR: HCPCS | Mod: HQ,GT

## 2021-06-16 NOTE — GROUP NOTE
Group Therapy Documentation    PATIENT'S NAME: Viviana Schaefer  MRN:   [id number]  :   1965  ACCT. NUMBER: [id number]  DATE OF SERVICE: 21  START TIME: 12:00 PM  END TIME:  2:00 PM  FACILITATOR(S): Peri Haynes LADC  TOPIC: BEH Group Therapy  Number of patients attending the group:  4  Group Length:  2 Hours    Group Therapy Type: Addiction    Summary of Group / Topics Discussed:    Group discussed daily reading about incorporating Higher power and spirituality into their plan, and/or where they are at with that.  We also discussed if it has changed since not using or beginning treatment.  We discussed conscious awareness of each members Higher Power/God/Divine.  Clients discussed that when everything is going our way, it is easy to forget HP or gratitude or sometimes when things are really bad, forget to call on prayer or HP.    We discussed distorted thinking definitions and how it can be a problem in depression, anxiety or addiction. I emphasized denial as a often used distortion for rationalizing drinking.  I also talked with clients about how distortion can change our perceptions of self, others, situations and our communication with others.    We went over Denial, minimization, All-or-nothing Thinking and overgeneralization.  Clients gave their own examples.      Group Attendance:  Attended group session    Patient's response to the group topic/interactions:  cooperative with task, discussed personal experience with topic and expressed readiness to alter behaviors    Patient appeared to be Actively participating, Attentive and Engaged.          Telemedicine Visit: The patient's condition can be safely assessed and treated via synchronous audio and visual telemedicine encounter.      Reason for Telemedicine Visit: COVID-19     Originating Location (pt. Location): Home     Type of service:  Video Visit  GROUP    Originating Location (pt. Location): Home     Distant Location (provider  location):  [facility]      Consent:  The patient/guardian has verbally consented to: the potential risks and benefits of telemedicine (video visit) versus in person care; billing of their insurance or make self-payment for services provided; and responsibility for payment of non-covered services.      Mode of Communication:  Video Conference via CellScope    Client specific details: today's session focused on dim. 4 distorted thinking and dim 6 spirituality and dim check ins.   Client said the opening reading was helpful and she has a strong bg. She feels she is more connected to her Higher Power and prayer when not using     Client rated the following on a likert scale where 1=little and 10= high  Hungry: 0  Angry: 0  Lonely:  0  Tired: 0     Anxiety:   1 Stress:  0  Depression:1.She reports her 1/s are due to not seeing son, but she feels she is handling it better and better.    Motivation for sobriety:  10      motivation for community support attendance:    Client discussed distorted thinking and reports noticing the ones she often uses are:  Denial: that my health was as bad as it was dues to drinking  All or nothing thinking:  I was wrong and bad when drinking and now that I am not, I am right and good       P) do assignment on relapse prevention.  Finish distortions assignment    Peri Haynes, MS, LADC, LPC

## 2021-06-17 ENCOUNTER — VIRTUAL VISIT (OUTPATIENT)
Dept: ENDOCRINOLOGY | Facility: CLINIC | Age: 56
End: 2021-06-17
Payer: COMMERCIAL

## 2021-06-17 ENCOUNTER — PRE VISIT (OUTPATIENT)
Dept: ENDOCRINOLOGY | Facility: CLINIC | Age: 56
End: 2021-06-17

## 2021-06-17 VITALS — WEIGHT: 256 LBS | BODY MASS INDEX: 47.11 KG/M2 | HEIGHT: 62 IN

## 2021-06-17 DIAGNOSIS — F10.21 ALCOHOL DEPENDENCE IN REMISSION (H): ICD-10-CM

## 2021-06-17 DIAGNOSIS — M25.561 CHRONIC PAIN OF BOTH KNEES: ICD-10-CM

## 2021-06-17 DIAGNOSIS — R06.83 SNORING: ICD-10-CM

## 2021-06-17 DIAGNOSIS — G89.29 CHRONIC PAIN OF BOTH KNEES: ICD-10-CM

## 2021-06-17 DIAGNOSIS — R53.81 PHYSICAL DECONDITIONING: ICD-10-CM

## 2021-06-17 DIAGNOSIS — Z71.3 NUTRITIONAL COUNSELING: ICD-10-CM

## 2021-06-17 DIAGNOSIS — E66.01 OBESITY, CLASS III, BMI 40-49.9 (MORBID OBESITY) (H): Primary | ICD-10-CM

## 2021-06-17 DIAGNOSIS — K72.10 END STAGE LIVER DISEASE (H): ICD-10-CM

## 2021-06-17 DIAGNOSIS — M25.562 CHRONIC PAIN OF BOTH KNEES: ICD-10-CM

## 2021-06-17 DIAGNOSIS — K70.31 ALCOHOLIC CIRRHOSIS OF LIVER WITH ASCITES (H): Primary | ICD-10-CM

## 2021-06-17 DIAGNOSIS — K70.31 ALCOHOLIC CIRRHOSIS OF LIVER WITH ASCITES (H): ICD-10-CM

## 2021-06-17 DIAGNOSIS — E66.01 OBESITY, CLASS III, BMI 40-49.9 (MORBID OBESITY) (H): ICD-10-CM

## 2021-06-17 PROCEDURE — 99205 OFFICE O/P NEW HI 60 MIN: CPT | Mod: 95 | Performed by: NURSE PRACTITIONER

## 2021-06-17 PROCEDURE — 97802 MEDICAL NUTRITION INDIV IN: CPT | Mod: 95 | Performed by: DIETITIAN, REGISTERED

## 2021-06-17 ASSESSMENT — MIFFLIN-ST. JEOR: SCORE: 1714.46

## 2021-06-17 ASSESSMENT — PAIN SCALES - GENERAL: PAINLEVEL: EXTREME PAIN (8)

## 2021-06-17 NOTE — PROGRESS NOTES
"Viviana Schaefer is a 55 year old female who is being evaluated via a billable video visit.      The patient has been notified of following:     \"This video visit will be conducted via a call between you and your physician/provider. We have found that certain health care needs can be provided without the need for an in-person physical exam.  This service lets us provide the care you need with a video conversation.  If a prescription is necessary we can send it directly to your pharmacy.  If lab work is needed we can place an order for that and you can then stop by our lab to have the test done at a later time.    Video visits are billed at different rates depending on your insurance coverage.  Please reach out to your insurance provider with any questions.    If during the course of the call the physician/provider feels a video visit is not appropriate, you will not be charged for this service.\"    Patient has given verbal consent for Video visit? Yes  How would you like to obtain your AVS? MyChart  If you are dropped from the video visit, the video invite should be resent to: Text to cell phone: [phone]  Will anyone else be joining your video visit? No  {If patient encounters technical issues they should call [phone]      Video-Visit Details    Type of service:  Video Visit    Video Start Time: 8:30 AM  Video End Time: 9:01 AM    Originating Location (pt. Location): Home    Distant Location (provider location):  [facility] [place]     Platform used for Video Visit: [employer]    During this virtual visit the patient is located in [place], patient verifies this as the location during the entirety of this visit.     New Weight Management Nutrition Consultation    Viviana Schaefer is a 55 year old female presents today for new weight management nutrition consultation.  Patient referred by Razia Bundy MD on June 17, 2021.    Patient with Co-morbidities of obesity " "including:  Type II DM no  Renal Failure no  Sleep apnea no  Hypertension no   Dyslipidemia no  Joint pain yes  Back pain no  GERD no   Liver Disease (end stage, etoh)    Anthropometrics:  Estimated body mass index is 46.82 kg/m  as calculated from the following:    Height as of 3/10/21: 1.575 m (5' 2\").    Weight as of 3/10/21: 116.1 kg (256 lb).     Current Weight (6/17) 256 lbs     Goals Weight 210 for liver transplant     Medications for Weight Loss:      NUTRITION HISTORY  See MD note for details.    Pt reported today biggest challenge is grazing all day - d/t  pain with walking often keeps snacks in basket near by. Encouraged to eat 3 meals, (no snacks in between) and trying healthy swaps options. Discussed starting Mindful journal and practicing alternate activities to distract from snacking. (scrabble, watch TV, call friend).    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  Volumetrics/My Plate Method     Nutrition Diagnosis  Obesity r/t long history of self-monitoring deficit and excessive energy intake aeb BMI >30.    Nutrition Intervention    Materials/education provided on Volumetric eating to help satiety level on fewer calories; portion control and healthy food choices (Plate Method and " Volumetrics handouts), 100 calorie snack choices, meal and snack planning and websites, sample meal plans     Patient demonstrates understanding.    Expected Engagement: good  Follow-Up Plans:      Nutrition Goals  1) Eat 3 meals a day  2). Avoid snacking - practicing alternate activities to distract from snacking. (scrabble, watch TV, call friend).  3) Mindful journal / food log  4) Try healthy snack/swaps when craving snacks/sweets    Healthy Snacks List/ideas  - 1/2 c cottage cheese 1 cup fruit (berries, peaches)  - sliced apple with cheedar cheese slice or 1T peanut butter  - snap peas/carrots/whole grain crackers with hummus  - serving fruit (bananas, kiwi, apple, oranges, grapes)  - hard boiled eggs    The Plate Method  [email or web address]    Protein Sources for Weight Loss  [email or web address]     Carbohydrates  [email or web address]     Mindful Eating  [email or web address]     Summary of [employer] Eating Plan  [email or web address]     Follow-Up:  Month     Time spent with patient: 31 minutes.  Senia Escobar RD, LD
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
## 2021-06-17 NOTE — PATIENT INSTRUCTIONS
Pro Michelle,    Follow-up with RD in 1 month    Thank you,    Senia Escobar, RD, LD  If you would like to schedule or reschedule an appointment with the RD, please call [phone]    Nutrition Goals  1) Eat 3 meals a day  2). Avoid snacking - practicing alternate activities to distract from snacking. (scrabble, watch TV, call friend).  3) Mindful journal / food log  4) Try healthy snack/swaps when craving snacks/sweets     Healthy Snacks List/ideas  - 1/2 c cottage cheese 1 cup fruit (berries, peaches)  - sliced apple with cheedar cheese slice or 1T peanut butter  - snap peas/carrots/whole grain crackers with hummus  - serving fruit (bananas, kiwi, apple, oranges, grapes)  - hard boiled eggs     The Plate Method  [email or web address]     Protein Sources for Weight Loss  [email or web address]      Carbohydrates  [email or web address]      Mindful Eating  [email or web address]      Summary of Volumetrics Eating Plan  [email or web address]     Interested in working with a health ?  Health coaches work with you to improve your overall health and wellbeing.  They look at the whole person, and may involve discussion of different areas of life, including, but not limited to the four pillars of health (sleep, exercise, nutrition, and stress management). Discuss with your care team if you would like to start working a health .    Health Coaching-3 Pack:    $99 for three health coaching visits    Visits may be done in person or via phone    Coaching is a partnership between the  and the client; Coaches do not prescribe or diagnose    Coaching helps inspire the client to reach his/her personal goals      Virtual Support Groups are Available             Healthy Lifestyle Support Group      All are welcome!     Facilitator: Lacey Caldwell, Certified Health     - Meets once a month on a Friday from 12:30pm to 1:30pm.  - Due to Covid-19 she is doing this Support Group  virtually using [employer] Teams.  - 60 minutes of small group guided discussion, support and resources.  - Please email Lacey directly at [email or web address] to receive monthly invitations.  - If you opt to participate in individual health coaching sessions or for general questions, contact Lacey via email.  - Lacey will send out invites for each session, so the phone number and the conference ID may change for each session.

## 2021-06-17 NOTE — PATIENT INSTRUCTIONS
"Thank you for allowing us the privilege of caring for you. We hope we provided you with the excellent service you deserve.   Please let us know if there is anything else we can do for you so that we can be sure you are completely satisfied with your care experience.    To ensure the quality of our services you may be receiving a patient satisfaction survey from an independent patient satisfaction monitoring company.    The greatest compliment you can give is a \"Likely to Recommend\"    Your visit was with Sabiha De Leon NP today.    Instructions per today's visit:     Pro Schaefer, it was great to visit with you today.  Here is a review of our visit.  If our clinic scheduler is not able to reach you please call [phone] to schedule your next appointments.          MEDICATIONS:        - Continue other medications without change       - Consider topiramate or GLP1 in the future. A1C first. Would discuss with Dr. Martin also   CONSULTATION/REFERRAL to [facility] (get moving program), and Sleep medicine   FUTURE LABS:       - Schedule a non-fasting blood draw - A1C (diabetes screening)   FUTURE APPOINTMENTS:       - Follow-up visit in 4-6 weeks- after labs        - Dietitian in 1 month   Goals:  -food journal - what you are eating, when you are eating, how hungry are you, what is driving the eating  -look at insurance coverage for gym membership   - look at insurance coverage for medical transport to appointments     Information about Video Visits with [facility]: video visit information  _________________________________________________________________________________________________________________________________________________________  Important contact and scheduling information:  Please call our contact center at [phone] to schedule your next appointments.  To find a lab location near you, please call [phone].  For any nursing questions or concerns call Alma Rosa Cardona LPN at [phone] or " Argentina Bullock RN at [phone]  Please call during clinic hours Monday through Friday 8:00a - 4:00p if you have questions or you can contact us via VSHOREt at anytime and we will reply during clinic hours.    Lab results will be communicated through My Chart or letter (if My Chart not used). Please call the clinic if you have not received communication after 1 week or if you have any questions.?  Clinic Fax: [phone]  _________________________________________________________________________________________________________________________________________________________  Meal Replacement Products:    Here is the link to our new e-store where you can purchase our meal replacement products     [email or web address] E-Store  Simplificare/store    The one week starter kit is a great way to sample a variety of products and see what works for you.    If you want more information about the product go to: [email or web address]    If you are an employee or [facility] or [facility] please contact your care team for a 10% estore discount    Free Shipping for orders over $75     Benefits of meal replacements products:    Portion and calorie control  Improved nutrition  Structured eating  Simplified food choices  Avoid contact with trigger foods  _________________________________________________________________________________________________________________________________________________________  Interested in working with a health ?  Health coaches work with you to improve your overall health and wellbeing.  They look at the whole person, and may involve discussion of different areas of life, including, but not limited to the four pillars of health (sleep, exercise, nutrition, and stress management). Discuss with your care team if you would like to start working a health .  Health Coaching-3 Pack: Schedule by calling [phone]    $99 for three health coaching  visits    Visits may be done in person or via phone    Coaching is a partnership between the  and the client; Coaches do not prescribe or diagnose    Coaching helps inspire the client to reach his/her personal goals   _________________________________________________________________________________________________________________________________________________________  24 Week Healthy Lifestyle Plan:    Our mission in the 24-week Healthy Lifestyle Plan is to provide you with individualized care by giving you the tools, education and support you need to lose weight and maintain a healthy lifestyle. In your 24-week journey, you ll be supported by a dedicated weight loss team that includes registered dietitians, medical weight management providers, health coaches, and nurses -- all with special expertise in weight loss -- to help you every step of the way.     Monthly meetings with your registered dietician or medical weight management provider help to review your progress, update your care plan, and make any adjustments needed to ensure success. Between these visits, weekly and bi-weekly health  visits will help you focus on the four pillars of weight loss -- stress, sleep, nutrition, and exercise -- and how you can best adapt each to achieve sustainable weight loss results.    In addition, you will be given exclusive access to online wellbeing classes through [employer].  Your initial visit will be with a medical weight management provider who will help to understand your weight loss goals and ensure this program is the right fit for you. Please let our team know if you are interested in the 24 week plan by sending a message to your care team or calling [phone] to schedule.  _________________________________________________________________________________________________________________________________________________________    Virtual Support Groups:    We offer support groups for patients who are  working on weight loss and considering, preparing for or have had weight loss surgery.     You are invited to attend the?Virtual Support Groups?provided by any of the following locations:    1. [facility] via [place] Teams with Faye Anguiano RN  2.   [place] via [place] Teams with Kirill Carlton, PhD, LP  3.   [place] via [place] Teams with Lacey Elena RN  4.   [facility] via [place] Teams with Lacey Caldwell [facility] Healthy Lifestyle Virtual Support Group    Healthy Lifestyle Virtual Support Group?    This group is held monthly on a Friday from 12:30 PM - to 1:30 PM. It is 60 minutes of small group guided discussion, support and resources led by National Board Certified Health , Lacey Caldwell. All are welcome who want a healthy lifestyle. To receive monthly invites to the Healthy Lifestyle Virtual Support Group or if you have any questions about having a health  or attending support group, please email Lacey [email or web address]. Lacey will send out invites for each session, with the phone number and the conference ID number specific to that session.    2021 Meetings    January 29 - How to Stay Active and Healthy during the Winter Months    February 26 - Reading Food Labels: What do I Need to Know?  Guest Speaker: Socorro Connors RD    March 19 - Finding Health, Happiness and Confidence at Every Size  Guest Speaker: Yasmin Wells, Health Psychology Fellow    April 30 - Healthy Eating on a Budget  Guest Speaker: Augusta Malone RD    May 21 - Open Forum    Jun 25 - Self Compassion    July 30 - Habits: Helpful and Hindering    August 27 - Open Forum    September 24 - Sleep    October 29 - Open Forum    November - To be determined    December - To be determined    [facility] Weight Loss Surgery Support Group    [facility] Weight Loss Surgery Support Group    The [facility]  "Weight Loss Surgery Support Group is a patient-lead forum that meets monthly. Due to Covid-19, we are meeting remotely from 5 PM - 6 PM via Microsoft Teams on the 3rd Wednesday of the month. The group is facilitated by Faye Anguiano, the program s Clinical Coordinator. The support group shares experiences, encouragement and education. It is open to those who have had weight loss surgery, are scheduled for surgery, and those who are considering surgery.   If you are interested in attending, please contact the clinic via OggiFinogi or call the nurse line directly at [phone] to inform our staff that you would like an invite sent to you. Please let us know the email you would like the invite sent to. Prior to the meeting, a link with directions on how to join the meeting will be sent to you.    2021 Meetings    January 20 - Guest Speaker: Samuel Bourgeois RD, LD     February 17 - The group will not have a speaker. This will be a time of group support.     March 17 - Guest Speaker: Audrey Perez, [facility], [facility], C[facility]     April 21 - Guest Speakers. Charisse Ye and Akanksha will be talking about Mindful Eating and Mind Hunger.     May 19- No Group this month.     June 16 - \"Let's Talk\" a group discussion and time to talk and ask questions and learn from one another.    [facility] Support Groups    Connections: Bariatric Care Support Group?  This group takes place the second Tuesday of each month from 6:30 PM - 8 PM virtually using Microsoft Teams. It is led by Kirill Carlton, Ph.D who is a Licensed Psychologist with the [facility] Comprehensive Weight Management Program. There is no cost for group participation and it is open to all [facility] (and those external to this program) pre- and post- operative bariatric surgery patients as well as their support system.   Please send an email to Kirill Carlton, Ph.D., LP at?[email or web address]?if you would like an " invitation to the group and to learn about using Microsoft Teams.    Connections: Post-Operative Bariatric Surgery Support Group  This support group meets the 4th Wednesday of the month from 11 AM - 12 PM virtually using Microsoft Teams. It is led by Lacey Elena RN. This is a support group for [facility] bariatric patients (and those external to [facility]) who have had bariatric surgery and are at least 3 months post-surgery. There is no cost to attend and registration is not required. Please send an email to Lacey Elena RN at [email or web address] if you would like an invitation to the group and to learn about using Microsoft Teams.      The above Support Group information can also be found on our websites:    [email or web address]  _________________________________________________________________________________________________________________________________________________________  [facility] Get Moving Program  Our team of physical therapists is trained to help you understand and take control of your condition. They will perform a thorough evaluation to determine your ability for activity and develop a customized plan to fit your goals and physical ability.  Scheduling: Unsure if the Get Moving program is right for you? Discuss the program with your medical provider or diabetes educator. You can also call us at [phone] to ask questions or schedule an appointment.   [facility] Get Moving Program  _________________________________________________________________________________________________________________________________________________________   [facility] Diabetes Prevention Program (DPP)  If you have prediabetes and Medicare please contact us via MyChart to learn more about the Diabetes Prevention Program (DPP)  Program Details:  [facility] offers the year-long  Diabetes Prevention Program (DPP). The program helps you to make lifestyle changes that prevent or delay type 2 diabetes by supporting healthy eating, increased physical activity, stress reduction and use of coping skills.   On average, previous [facility] DPP cohorts have lost and maintained at least 5% of their starting weight throughout the program and averaged more than 150 minutes of physical activity per week.  Participants meet weekly for one-hour group sessions over sixteen weeks, every other week for the next 8 weeks, and monthly for the last six months.   A year-long maintenance program is also available for participants who complete the first year.   Location & Cost:   During the COVID-19 Public Health Emergency, the program is offered virtually. When in-person classes can resume, they will be held at [facility].  For people with Medicare, the program is covered in full. A self-pay option will also be available for those with non-Medicare insurance plans.   _________________________________________________________________________________________________________________________________________________________  Bluetooth Scale:    We hope to provide you with high quality virtual healthcare visits while social distancing for COVID-19 is necessary, as well as in the future when virtual visits may be more convenient for you.     Our technology team made it possible for Bluetooth scales to send weight measurements to our electronic medical record. This allows weights from you weighing at home to securely flow into the medical record, which will improve telephone and virtual visits.   Additionally, studies have shown that adults actually lose more weight when their weights are automatically sent to someone else, and also that this process is not stressful for those adults.    Below is a link for purchasing the scale, with a discount code for our patients. You may call your  insurance company to see if they will reimburse you for the cost of the scale, as a piece of durable medical equipment. The scales only go up to a weight of 400 pounds. This is an issue and we are working with the developer on increasing this. We found no scales that go over 400lb that have blue-tooth for connecting to [employer].    Scale to purchase: the [employer]  Body  Scale: [email or web address].ds    Discount Code: We have a discount code for our patients to bring the cost down to $50, Discount code is: [place]_Scale_20%off      Thank you,   [facility] Comprehensive Weight Management Team

## 2021-06-17 NOTE — ASSESSMENT & PLAN NOTE
Weight gain gradual over time, starting with pregnancy. Has been up and down over the years associated with 4 pregnancies, divorce, and most recently chronic knee pain limiting mobility.    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 Patient reports needing to be 210 lb for liver transplant. Chart review indicates she needs 6 mo sober to qualify for transplant. Also considering sleeve gastrectomy which she would not currently be a candidate for unless she had liver transplant.      Has done well in the short term with calorie tracking but can't sustain for long period of time. Has never done medical weight management. With medical complexity, we are limited on AOM. Recently has had elevated blood sugar readings and no recent DMII screening. Will get A1C to potentially consider GLP1 agonist. Otherwise, we could consider low dose topiramate with close monitoring if liver team is agreeable.     Patient describes not a lot of true hunger until evenings. Grazing/ snacking throughout the day- feels like she should eat something, boredom, mindless eating, cravings. Will start with food journal to work on mindfulness- ideally working towards eating when hungry. Will see RD today as well.     Sleep is poor and disrupted. Does snore. Excessive fatigue during the day, sleeps a lot of the day. Recommend sleep consult. Discussed role sleep has in mental health and weight management.     Mental health improving with buspar and wellbutrin. Could consider increase of wellbutrin to 300mg in the future for better weight loss. However, mimicking  FDA approved contrave would not be indicated at this time given liver status.     Physical deconditioning from chronic knee pain and weight gain. Patient would [facility] to [facility] for get [facility] program. Also discussed insurance coverage for gym  memberships with pools for activity.
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
## 2021-06-17 NOTE — LETTER
"6/17/2021       RE: Viviana Schaefer  [address]  [facility] [address]     Dear Colleague,    Thank you for referring your patient, Viviana Schaefer, to the [facility] [place] at [facility]. Please see a copy of my visit note below.    Viviana Schaefer is a 55 year old female who is being evaluated via a billable video visit.      The patient has been notified of following:     \"This video visit will be conducted via a call between you and your physician/provider. We have found that certain health care needs can be provided without the need for an in-person physical exam.  This service lets us provide the care you need with a video conversation.  If a prescription is necessary we can send it directly to your pharmacy.  If lab work is needed we can place an order for that and you can then stop by our lab to have the test done at a later time.    Video visits are billed at different rates depending on your insurance coverage.  Please reach out to your insurance provider with any questions.    If during the course of the call the physician/provider feels a video visit is not appropriate, you will not be charged for this service.\"    Patient has given verbal consent for Video visit? Yes  How would you like to obtain your AVS? MyChart  If you are dropped from the video visit, the video invite should be resent to: Text to cell phone: [phone]  Will anyone else be joining your video visit? No  {If patient encounters technical issues they should call [phone]      Video-Visit Details    Type of service:  Video Visit    Video Start Time: 8:30 AM  Video End Time: 9:01 AM    Originating Location (pt. Location): Home    Distant Location (provider location):  [facility]     Platform used for Video Visit: Novaled    During this virtual visit the patient is located in [place], patient " "verifies this as the location during the entirety of this visit.     New Weight Management Nutrition Consultation    Viviana Schaefer is a 55 year old female presents today for new weight management nutrition consultation.  Patient referred by Razia Bundy MD on June 17, 2021.    Patient with Co-morbidities of obesity including:  Type II DM no  Renal Failure no  Sleep apnea no  Hypertension no   Dyslipidemia no  Joint pain yes  Back pain no  GERD no   Liver Disease (end stage, etoh)    Anthropometrics:  Estimated body mass index is 46.82 kg/m  as calculated from the following:    Height as of 3/10/21: 1.575 m (5' 2\").    Weight as of 3/10/21: 116.1 kg (256 lb).     Current Weight (6/17) 256 lbs     Goals Weight 210 for liver transplant     Medications for Weight Loss:      NUTRITION HISTORY  See MD note for details.    Pt reported today biggest challenge is grazing all day - d/t  pain with walking often keeps snacks in basket near by. Encouraged to eat 3 meals, (no snacks in between) and trying healthy swaps options. Discussed starting Mindful journal and practicing alternate activities to distract from snacking. (scrabble, watch TV, call friend).    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  Volumetrics/My Plate Method     Nutrition Diagnosis  Obesity r/t long history of self-monitoring deficit and excessive energy intake aeb BMI >30.    Nutrition Intervention    Materials/education provided on Volumetric eating to help satiety level on fewer calories; portion control and healthy food choices (Plate Method and Volumetrics handouts), 100 calorie snack choices, meal and snack planning and websites, sample meal plans     Patient demonstrates understanding.    Expected Engagement: good  Follow-Up Plans:      Nutrition Goals  1) Eat 3 meals a day  2). Avoid snacking - practicing alternate activities to distract from snacking. (scrabble, watch TV, call friend).  3) Mindful journal / food log  4) Try healthy snack/swaps when craving snacks/sweets    Healthy Snacks List/ideas  - 1/2 c cottage cheese 1 cup fruit (berries, peaches)  - sliced apple with cheedar cheese slice or 1T peanut butter  - snap peas/carrots/whole grain crackers with hummus  - serving fruit (bananas, kiwi, apple, oranges, grapes)  - hard boiled eggs    The Plate Method  [email or web address]    Protein Sources for Weight Loss  [email or web address]     Carbohydrates  [email or web address]     Mindful Eating  [email or web address]     Summary of Volumetrics Eating Plan  [email or web address]     Follow-Up:  Month     Time spent with patient: 31 minutes.  Senia Escobar RD, LD

## 2021-06-17 NOTE — NURSING NOTE
"Chief Complaint   Patient presents with     Consult     New mwm.       Vitals:    06/17/21 0703   Weight: 116.1 kg (256 lb)   Height: 1.575 m (5' 2\")       Body mass index is 46.82 kg/m .                          Fara Hurst, EMT  "

## 2021-06-21 ENCOUNTER — HOSPITAL ENCOUNTER (OUTPATIENT)
Dept: BEHAVIORAL HEALTH | Facility: CLINIC | Age: 56
End: 2021-06-21
Attending: FAMILY MEDICINE
Payer: COMMERCIAL

## 2021-06-21 PROCEDURE — H2035 A/D TX PROGRAM, PER HOUR: HCPCS | Mod: HQ,GT

## 2021-06-21 NOTE — GROUP NOTE
Group Therapy Documentation    PATIENT'S NAME: Viviana Schaefer  MRN:   [id number]  :   1965  ACCT. NUMBER: [id number]  DATE OF SERVICE: 21  START TIME: 12:00 PM  END TIME:  2:00 PM  FACILITATOR(S): Peri Haynes LADC  TOPIC: BEH Group Therapy  Number of patients attending the group:  3  Group Length:  2 Hours    Group Therapy Type: Addiction    Summary of Group / Topics Discussed:    Today we did an opening reading on looking at things as mistakes or ways to correct something that didn't work, rather than as shaming events or things that get to make us right or wrong.Group members also checked in on all dimensions.  We discussed consequences of use and I emphasized there were numerous reasons we do this. 1) to make people aware 2) to use as motivation for sobriety 3) look at if there are amends to be made or other healing to be had.  I told clients they may feel shame at some of these, but they can notice it and make choices about what they want to do, rather than seeing the exercise as a punishing things.  I said it is vital to be accountable and take responsibility, but it is about healing and moving forward also.Group welcomed back a member who was in the hospital the last few weeks due to issues with her kidney.   Group was completed with the serenity prayer.      Group Attendance:  Attended group session    Patient's response to the group topic/interactions:  cooperative with task, discussed personal experience with topic, expressed readiness to alter behaviors and expressed understanding of topic    Patient appeared to be Actively participating and Attentive.          Telemedicine Visit: The patient's condition can be safely assessed and treated via synchronous audio and visual telemedicine encounter.      Reason for Telemedicine Visit: COVID-19     Originating Location (pt. Location): Home     Type of service:  Video Visit  GROUP    Originating Location (pt. Location): Home     Distant  "Location (provider location):  [facility]      Consent:  The patient/guardian has verbally consented to: the potential risks and benefits of telemedicine (video visit) versus in person care; billing of their insurance or make self-payment for services provided; and responsibility for payment of non-covered services.      Mode of Communication:  Video Conference via WallStrip    Client specific details: Client said the opening reading was helpful and a good reminder.  \"as a matter of fact, I like this whole session, I didn't realize there are groups and ways of healing that do not involve ongoing shame and guilt as a motivator\":     Today's session focused on dim 4 consequences of use and check in on all dimensions.  We spent a great deal of time on dim 3 self talk.We also focused a bit on dime 6 boundaries.  She reports she attended a session with the nutritionist and a dietician and next week will also begin Physical therapy. \"This is making me a bit nervous because I actually have to get to the appt and I don't drive\" We did discuss resources and Viviana thanked group and counselor for the ideas.    Today's session focused on check in of all dimensions:  Client rated the following on a likert scale where 1=little and 10= high  Hungry: 0  Angry: 0  Lonely:  0  Tired: 0     Anxiety:   1 Stress:  1 Depression:0  Motivation for sobriety:  10      motivation for community support attendance: 5    Client was able to name consequences of use, including:  spent money I needed for something else, gotten sick in public, Hidden my use from others, lied about my use, been embarrassed by what I said,  passed out, had a black out from use, alienated myself     P) Follow recommendations of dietician and nutritionist. Do assignment on relapse prevention. Think if there are other consequences you want to add to your list.   Notice when using beliefs as facts, make healthy coping choices " for wha you want to do with the thought.     Peri Haynes, MS, [facility], LPC
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
## 2021-06-23 ENCOUNTER — HOSPITAL ENCOUNTER (OUTPATIENT)
Dept: BEHAVIORAL HEALTH | Facility: CLINIC | Age: 56
End: 2021-06-23
Attending: FAMILY MEDICINE
Payer: COMMERCIAL

## 2021-06-23 LAB — PETH BLD-MCNC: NEGATIVE NG/ML

## 2021-06-23 PROCEDURE — H2035 A/D TX PROGRAM, PER HOUR: HCPCS | Mod: HQ,GT

## 2021-06-23 NOTE — GROUP NOTE
"Group Therapy Documentation    PATIENT'S NAME: Viviana Schaefer  MRN:   [id number]  :   1965  [id number]  DATE OF SERVICE: 21  START TIME: 12:00 PM  END TIME:  2:00 PM  FACILITATOR(S): Peri Haynes LADC  TOPIC: BEH Group Therapy  Number of patients attending the group:  6  Group Length:  2 Hours    Group Therapy Type: Addiction and Psychotherapeutic    Summary of Group / Topics Discussed:    Psychoeducation/Skills Relapse Prevention (TIFFANIE)  This topic will use an analogy \"Autobiography in 5 chapters\" by Levi Keenan, and questions formulated by this counselor.  It will give a general overview of basic relapse prevention, including definitions of warning signs, triggers and cravings and the importance of addressing healthy coping skills for ongoing relapse prevention.      Objective(s):    Patients will identify 4 key warning signs and triggers    Patients will identify 4 healthy coping mechanisms/focus     Patients will identify which chapter they see themselves in        Structure (modalities, homework, worksheets, etc.):        *     Read Autobiography in 5 chapters, answer 7 questions.    Provide psychoeducation on relapse prevention and healthy coping methods.    Facilitate group discussion around each patient s current awareness of warning signs,  triggers and cravings.     Expected therapeutic outcome(s):    Patient will:    Be able to manage triggers and cravings without returning to substance use.      Therapeutic outcomes measured by:        *     patient will name  which chapter of the reading they see themselves in    Patients will name 4 of their warning signs and triggers    Patients will name 4 healthy coping mechanisms for dealing with their warning signs and triggers      Group Attendance:  Attended group session    Patient's response to the group topic/interactions:  discussed personal experience with topic    Patient appeared to be Attentive and Engaged.    " "      Telemedicine Visit: The patient's condition can be safely assessed and treated via synchronous audio and visual telemedicine encounter.      Reason for Telemedicine Visit: COVID-19     Originating Location (pt. Location): Home     Type of service:  Video Visit  GROUP    Originating Location (pt. Location): Home     Distant Location (provider location):  [facility]      Consent:  The patient/guardian has verbally consented to: the potential risks and benefits of telemedicine (video visit) versus in person care; billing of their insurance or make self-payment for services provided; and responsibility for payment of non-covered services.      Mode of Communication:  Video Conference via [employer]    Client specific details:  client discussed that she is a bit apprehensive about going to PT next week, but is determined to follow through.  Group gave her support and validation for her fears and apprehensions.  Client seemed to feel better and thanked group.    Today's session focused on check dimension 5   She participated in daily reading on simplicity of gratefulness and using mindfulness.  Client reports liking this simple, daily way of being mindful and grateful of where to focus.   Client reports it seems this could take some worry away and present focus.    Client presented assignment \"Autobiography in 5 short Chapters\"  answering the 7 questions of the assignment.   What causes you to fall in the hole: what  are your triggers and warning signs:her 2 ex husbands, time, smells and anxiety and depression   What does it feel like and look like when you are in the hole:anxious, depressed, cruel to self   How you treat self and others: I blame others, I am mean to self, critical   What can you do to get yourself out of the hole?breath, notice, choose differently   How could you plan ahead in order to avoid falling in?make a plan with specific people in my life, AA, talk " with my group   What would walking down another street look like?I would be more free, people would be tehre with me, I am sober, I am kinder to myself  What would be different about how you are doing your life and relationships? I forgive easier and let others be more themselves.    P: Attend PT next week.  Seek group support as needed to discuss apprehensions.    Peri Haynes, MS, LADC, LPC

## 2021-06-28 ENCOUNTER — HOSPITAL ENCOUNTER (OUTPATIENT)
Dept: BEHAVIORAL HEALTH | Facility: CLINIC | Age: 56
End: 2021-06-28
Attending: FAMILY MEDICINE
Payer: COMMERCIAL

## 2021-06-28 PROCEDURE — H2035 A/D TX PROGRAM, PER HOUR: HCPCS | Mod: HQ,95

## 2021-06-28 NOTE — GROUP NOTE
Group Therapy Documentation    PATIENT'S NAME: Viviana Schaefer  MRN:   [id number]  :   1965  [facility]. NUMBER: [id number]  DATE OF SERVICE: 21  START TIME: 12:00 PM  END TIME:  2:00 PM  FACILITATOR(S): Peri Haynes LADC  TOPIC: BEH Group Therapy  Number of patients attending the group:  4  Group Length:  2 Hours    Group Therapy Type: Addiction and Psychoeducation    Summary of Group / Topics Discussed:    Psychoeducation/Skills Cognitive Restructuring (TIFFANIE)  This topic will give a general overview of maladaptive patterns of thinking and techniques to change their thinking patterns to be supportive of health and recovery.    Objective(s):     Patients will identify three cognitive distortions    Patients will identify two ways to dispute distortions    Structure (modalities, homework, worksheets, etc):     Provide psychoeducation on cognitive distortions and disputations    Facilitate group discussion around each patient s cognitive distortions and impact of those thinking patterns    Expected therapeutic outcome(s):   Patient will:    Recognize and dispute distortions    Experience improved thinking and behavior    Therapeutic outcome(s) measured by:     Patient will name 2-3 cognitive distortions and ways to dispute them      Group Attendance:  Attended group session    Patient's response to the group topic/interactions:  cooperative with task and discussed personal experience with topic    Patient appeared to be Actively participating, Attentive and Engaged.          Telemedicine Visit: The patient's condition can be safely assessed and treated via synchronous audio and visual telemedicine encounter.      Reason for Telemedicine Visit: COVID-19     Originating Location (pt. Location): Home     Type of service:  Video Visit  GROUP    Originating Location (pt. Location): Home     Distant Location (provider location):  [facility]      Consent:  The  "patient/guardian has verbally consented to: the potential risks and benefits of telemedicine (video visit) versus in person care; billing of their insurance or make self-payment for services provided; and responsibility for payment of non-covered services.      Mode of Communication:  Video Conference via [employer]    Client specific details: client was able to name one thing she feels he is doing well in treatment: staying calmer and noticing when I am triggered     Today's session focused on dim 3 thought distortions and dim 4 consequences.   Today's session focused on check in of all dimensions:  Client discussed some consequences of her use she isn't feeling proud of: ,   Hidden my use from others, lied about my use, been embarrassed by what I said,    Client rated the following on a likert scale where 1=little and 10= high  Hungry: 0  Angry: 0  Lonely:  0  Tired: 1    Anxiety:   1 Stress:  o Depression:0  Motivation for sobriety:  10        \"I am mainly anxious over my son telling me he is going to move.  I rely on him to help me, but at the same time, I am glad he is moving, as it will be important for his growth\"    Client was able to give example of 3 cognitive distortions: overgeneralizations, Negative focus, and Should statements.  She reports when her ex tells her something negative about herself, she generalizes that everyone thinks that or that it is all true.    Counselor explained Cognitive therapy techniues  Patients will identify two ways to dispute distortions:  \"Just identify them and breath\"  \"Step back and examine the evidence\" Ask the person if they are feeling that about you\"     P) Notice distortions and the methods you used.  Bring 2 examples to group.      Peri Haynes, MS, LADC, LPC                 "
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
## 2021-06-30 ENCOUNTER — HOSPITAL ENCOUNTER (OUTPATIENT)
Dept: BEHAVIORAL HEALTH | Facility: CLINIC | Age: 56
End: 2021-06-30
Attending: FAMILY MEDICINE
Payer: COMMERCIAL

## 2021-06-30 PROCEDURE — H2035 A/D TX PROGRAM, PER HOUR: HCPCS | Mod: HQ,GT

## 2021-06-30 NOTE — GROUP NOTE
"Group Therapy Documentation    PATIENT'S NAME: Viviana Schaefer  MRN:   [id number]  :   1965  ACCT. NUMBER: [id number]  DATE OF SERVICE: 21  START TIME: 12:00 PM  END TIME:  2:00 PM  FACILITATOR(S): Peri Haynes LADC  TOPIC: BEH Group Therapy  Number of patients attending the group: 6  Group Length:  2 Hours    Group Therapy Type: Addiction    Summary of Group / Topics Discussed:    Todays group began with a daily reading on how being the \" and jury\" of ourselves and others is often defeating, rather than helpful.  Group discussed how this applied to them or didn't. We also discussed this in correlation with taking responsibility for actions, including consequences of use.  Counselor asked clients to name how using harsh self imposed criticism and judgements affect them.  I encouraged clients to realize taking responsibility and ownership can still be done with kindness and caring.  We also did brief check ins and discussed Cognitive Therapy techniques to change our thoughts , referring especially to the reading and to the session/ assignment on cognitive distortions.  This counselor showed snippets of Lane Cazares video on \"Pleasure Unwoven\" and discussed 3 parts \"Addiction as Disease\" Choice Theory\" and \"Table of elements\"  Group ended with a silent meditation.      Group Attendance:  Attended group session    Patient's response to the group topic/interactions:  cooperative with task and discussed personal experience with topic    Patient appeared to be Attentive and Engaged.          Telemedicine Visit: The patient's condition can be safely assessed and treated via synchronous audio and visual telemedicine encounter.      Reason for Telemedicine Visit: COVID-19     Originating Location (pt. Location): Home     Type of service:  Video Visit  GROUP    Originating Location (pt. Location): Home     Distant Location (provider location):  [facility] " "SERVICES      Consent:  The patient/guardian has verbally consented to: the potential risks and benefits of telemedicine (video visit) versus in person care; billing of their insurance or make self-payment for services provided; and responsibility for payment of non-covered services.      Mode of Communication:  Video Conference via [email or web address]    Client specific details: client was able to name one thing she feels he is doing well in treatment: learning to hear and express her own voice and questions  Today's session focused on dim 6, boundaries and dim 5 the disease concept    Client participated in discussion of \"This Is my responsibility and this is not\" reporting :  I find this helpful and the discussion was helpful and thank you for the handout.  What stood out is how life can be easier when we take responsibility    when we know what is ours and what is someone elses    Client asked about Cross Addiction related to snippets: I learned about the part of the brain where this occurs, and that is interesting and helpful. Counselor reminded client that it is important to let health care workers know of addiction, so they can also prescribe based on what is most helpful.    Client was able to give example of 3 cognitive distortions: including overgeneralizations and Should statements.  Counselor explained Cognitive therapy techniques  Patients will identify two ways to dispute distortions: Cost-Benefit analysis \"Just identify them and breath\"       P) Do \"who is my support\".  Follow plan for healthy coping over 4th of July    Peri Haynes, MS, LADC, LPC                 "

## 2021-07-05 ENCOUNTER — HOSPITAL ENCOUNTER (OUTPATIENT)
Dept: BEHAVIORAL HEALTH | Facility: CLINIC | Age: 56
End: 2021-07-05
Attending: FAMILY MEDICINE
Payer: COMMERCIAL

## 2021-07-05 PROCEDURE — H2035 A/D TX PROGRAM, PER HOUR: HCPCS | Mod: HQ,95
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
## 2021-07-05 NOTE — GROUP NOTE
"Group Therapy Documentation    PATIENT'S NAME: Viviana Schaefer  MRN:   [id number]  :   1965  ACCT. NUMBER: [id number]  DATE OF SERVICE: 21  START TIME: 12:00 PM  END TIME:  2:00 PM  FACILITATOR(S): Peri Haynes LADC  TOPIC: BEH Group Therapy  Number of patients attending the group:  3  Group Length:  2 Hours    Group Therapy Type: Addiction    Summary of Group / Topics Discussed:    Todays group began with a reading on Freedom in recovery and how we get it.  Each group member discussed what was helpful to them about the reading and what was fitting.  This counselor also checked in with clients on any warning signs triggers or cravings they had over the holiday weekend, also asked  each for healthy coping used.  We discussed clients definition of spirituality and what it means to them in their recovery, also emphasizing it is a very personal thing.  Counselor encouraged clients to continue exploring what spirituality means and finding people and places to be able to do this with in a meaningful way.  Clients read \"Willingness\" by Jenna Chavarria, and discussed a variety of topics withing the reading.  Clients also discussed 3 things that stuck out for them with the reading.  This counselor discussed how we often know what to do or say, but are not willing to do it.  We related this to a variety of scenarios that clients brought up, including entering treatment, stopping drinking and sober support attendance.      Group Attendance:  Attended group session    Patient's response to the group topic/interactions:  cooperative with task and discussed personal experience with topic    Patient appeared to be Actively participating and Attentive.          Telemedicine Visit: The patient's condition can be safely assessed and treated via synchronous audio and visual telemedicine encounter.      Reason for Telemedicine Visit: COVID-19     Originating Location (pt. Location): Home     Type of service:  " "Video Visit  GROUP    Originating Location (pt. Location): Home     Distant Location (provider location):  [facility]      Consent:  The patient/guardian has verbally consented to: the potential risks and benefits of telemedicine (video visit) versus in person care; billing of their insurance or make self-payment for services provided; and responsibility for payment of non-covered services.      Mode of Communication:  Video Conference via [employer]    Client specific details: Viviana reports she has had no cravings over the holiday weekend.  She said it was a low key weekend and she had no warning signs or triggers, \"however, my ex wrote to my 2 sons telling them what a bad person I am\"  She said this would have been a trigger at one time, but this time she laughed it off and told herself \"consider the source\".  Today's session focused on dim 5, healthy coping for triggers and dim 4, motivation for change and willingness to change    Client rated the following on a likert scale where 1=little and 10= high  Hungry: 0  Angry: 0  Lonely:  0  Tired: 0     Anxiety:   1 Stress:  1 Depression:0  Motivation for sobriety:  10      motivation for community support attendance: 9, but she reports her meeting is not the best one and she will discuss it on Wednesday.    Client participated in  reading on Willingness, by Jenna Chavarria, naming 3 things that were pertinent for her: \"knowing what needs to be done, but not being willing to do it for whatever reason\"  \"Being in resistance may be a sign of being in fear\"  \"trusting/being willing to trust the Divine\" I asked client how willingness fits with ongoing sobriety: To accept help from others and my Higher power.  Not needing to control  She reports willingness to use 1-2 of the willingness affirmations over the next week    Client participated in Discussion of spirituality and what it means to her and how it has changed with more " "sobriety and treatment group attendance.  \"I am getting messages more clearly and being more loving\"  \"I am relaxing more into my bg\"         P)  Sober support assignment on Wednesday.  Daily affirmations on willingness for a week    Peri Haynes, MS, LADC, LPC                 "

## 2021-07-07 ENCOUNTER — HOSPITAL ENCOUNTER (OUTPATIENT)
Dept: BEHAVIORAL HEALTH | Facility: CLINIC | Age: 56
End: 2021-07-07
Attending: FAMILY MEDICINE
Payer: COMMERCIAL

## 2021-07-07 PROCEDURE — H2035 A/D TX PROGRAM, PER HOUR: HCPCS | Mod: HQ,95

## 2021-07-07 NOTE — GROUP NOTE
Group Therapy Documentation    PATIENT'S NAME: Viviana Schaefer  MRN:   [id number]  :   1965  [id number]  DATE OF SERVICE: 21  START TIME: 12:00 PM  END TIME:  2:00 PM  FACILITATOR(S): Peri Haynes LADC  TOPIC: BEH Group Therapy  Number of patients attending the group:  5  Group Length:  2 Hours    Group Therapy Type: Addiction and Psychoeducation    Summary of Group / Topics Discussed:    Psychoeducation/Skills Relapse Prevention (TIFFANIE)  This topic will give a general overview of basic relapse prevention, including definitions of warning signs, triggers and cravings and the importance of addressing healthy coping skills for ongoing relapse prevention, including humor and fun    Objective(s):    Patients will identify 2 key warning signs and triggers    Patients will identify 2 healthy coping mechanisms     Patients will name one notice about humor or fun from the reading        Structure (modalities, homework, worksheets, etc.):    Provide psychoeducation on relapse prevention and healthy coping methods.    Facilitate group discussion around each patient s current awareness of warning signs,  triggers and cravings.         *  Provide reading and discussion on fun and humor as part of recovery     Expected therapeutic outcome(s):    Patient will:    Be able to manage triggers and cravings without returning to substance use.    Client will include humor/fun as part of recovery      Therapeutic outcomes measured by:    Patients will name 2 of their warning signs and triggers    Patients will name 2 healthy coping mechanisms for dealing with their warning signs and triggers    Patients will name one notice about humor or fun in their life now in recovery      Group Attendance:  Attended group session    Patient's response to the group topic/interactions:  discussed personal experience with topic and expressed readiness to alter behaviors    Patient appeared to be Attentive and  "Engaged.          Telemedicine Visit: The patient's condition can be safely assessed and treated via synchronous audio and visual telemedicine encounter.      Reason for Telemedicine Visit: COVID-19     Originating Location (pt. Location): Home     Type of service:  Video Visit  GROUP    Originating Location (pt. Location): Home     Distant Location (provider location):  [facility]      Consent:  The patient/guardian has verbally consented to: the potential risks and benefits of telemedicine (video visit) versus in person care; billing of their insurance or make self-payment for services provided; and responsibility for payment of non-covered services.      Mode of Communication:  Video Conference via [employer]    Client specific details:  Client gave an example of when using last week's discussion/diagram of \"responsible to and for\"  When my ex wrote a seething letter to my two boys, I reminded myself what is mine and what is his, it helped me not get defensive and to let go of his stuff faster  client named 10 feelings in 30 seconds and reported 2 feelings for today in check in: blessed and excited   Was also able to name two feelings she sees as triggers and the healthy coping: anger and defensiveness, I can use mental filtering and talking with my sister     Today's session focused on dim 3 emotions and recovery, dimension 5 warning signs and triggers and humor/fun in recovery    Client discussed reading on incorporating fun into sobriety and was able to name what she is noticing about her fun/humor: I have always used sarcasm, and I like my humor.  I am noticing I am not out a lot with people, but I still feel I have a good sense of humor.   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    I sent client a handout on mental filtering to review for next week    P) client will weighing your choices work sheet  Give an example of mental filtering    Peri Haynes, MS, LADC, LPC

## 2021-07-08 NOTE — ADDENDUM NOTE
Encounter addended by: Peri Haynes LADC on: 7/8/2021 7:40 AM   Actions taken: Flowsheet accepted, Clinical Note Signed

## 2021-07-12 ENCOUNTER — HOSPITAL ENCOUNTER (OUTPATIENT)
Dept: BEHAVIORAL HEALTH | Facility: CLINIC | Age: 56
End: 2021-07-12
Attending: FAMILY MEDICINE
Payer: COMMERCIAL

## 2021-07-12 PROCEDURE — H2035 A/D TX PROGRAM, PER HOUR: HCPCS | Mod: HQ,95

## 2021-07-12 NOTE — GROUP NOTE
Group Therapy Documentation    PATIENT'S NAME: Viviana Schaefer  MRN:   [id number]  :   1965  ACCT. NUMBER: [id number]  DATE OF SERVICE: 21  START TIME: 12:00 PM  END TIME:  2:00 PM  FACILITATOR(S): Peri Haynes LADC  TOPIC: BEH Group Therapy  Number of patients attending the group:  4  Group Length:  2 Hours    Group Therapy Type: Addiction    Summary of Group / Topics Discussed:    Psychoeducation/Skills Mindfulness and Meditation   Learn what mindfulness is and how to practice it. Learn how to increase awareness of the present moment, thereby reducing ruminating thoughts and learn how to embrace negative emotions instead of suppressing or avoiding them. Consistent practice has been shown to decrease reactivity and help facilitate effective action to make positive changes in behavior.     Objective(s):    Identify 2 skills to increase awareness of the present moment to reduce negative impact of ruminating thoughts    Describe how to embrace negative emotions instead of suppressing or avoiding them.    Give 1 example of how mindfulness works in the brain to calm the Survival part of the Midbrain (which is overactive in addiction)    Give1 example of how mindfulness practice may decrease reactivity and facilitate effective action    Structure (modalities, homework, worksheets, etc.):      Participate in Awareness of Sounds meditation    Complete Mindful Listening exercise with other group member(s)     Participate in Sitting Meditation     Expected therapeutic outcome(s):     Increased ability to remain in present moment    Increased ability to tolerate negative emotions without returning to addiction    Therapeutic outcome(s) measured by:   Teachback and group review       Group Attendance:  Attended group session    Patient's response to the group topic/interactions:  cooperative with task and expressed readiness to alter behaviors    Patient appeared to be Attentive and Engaged.     "      Telemedicine Visit: The patient's condition can be safely assessed and treated via synchronous audio and visual telemedicine encounter.      Reason for Telemedicine Visit: COVID-19     Originating Location (pt. Location): Home     Type of service:  Video Visit  GROUP    Originating Location (pt. Location): Home     Distant Location (provider location):  [facility]      Consent:  The patient/guardian has verbally consented to: the potential risks and benefits of telemedicine (video visit) versus in person care; billing of their insurance or make self-payment for services provided; and responsibility for payment of non-covered services.      Mode of Communication:  Video Conference via [employer]    Client specific details: Client expressed it is a bit stressful for her to know that her son will be moving out.  Group expressed concern and that it might be helpful if she can get some regular friends of family times set up when this happens.  She said that was a good idea.   Today's session focused on dim 6 recovery environment and dim 3 mindfulness and emotions    Client rated the following on a likert scale where 1=little and 10= high  Hungry: 0  Angry: 0  Lonely:  0  Tired: 0     Anxiety:   1 Stress:  1 Depression:0  Motivation for sobriety:  10      motivation for community support attendance: 5    Client said of the reading on Powerlessness and unmanagability: it seems to focus on surrender and humility.   Clients definition of mindfulness prior to discussion: being aware of what is around  Clients definition of meditation prior to discussion: my head doesn't shut off  Client named 3 benefits she could see of meditaiton:  improve concentration, reduce stress,  helping with stress reduction.    Client expressed desire to learn more and practice more, \"I am open to mindfulness and the benefits\"  Participated in 5 min meditation at end of group and expressed: That is " "less pressure and easy to do for 5 min\"     P)  spend 10 min on internet exploring mindfulness and meditation in whatever way you choose.    Peri Haynes, MS, LADC, LPC                   "
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
## 2021-07-14 ENCOUNTER — HOSPITAL ENCOUNTER (OUTPATIENT)
Dept: BEHAVIORAL HEALTH | Facility: CLINIC | Age: 56
End: 2021-07-14
Attending: FAMILY MEDICINE
Payer: COMMERCIAL

## 2021-07-14 PROCEDURE — H2035 A/D TX PROGRAM, PER HOUR: HCPCS | Mod: HQ,95
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
## 2021-07-19 ENCOUNTER — HOSPITAL ENCOUNTER (OUTPATIENT)
Dept: BEHAVIORAL HEALTH | Facility: CLINIC | Age: 56
End: 2021-07-19
Attending: FAMILY MEDICINE
Payer: COMMERCIAL

## 2021-07-19 PROCEDURE — H2035 A/D TX PROGRAM, PER HOUR: HCPCS | Mod: HQ,GT

## 2021-07-19 NOTE — PROGRESS NOTES
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 motivated by the transplant team, but also want to do tx for myself      *Client agrees with any changes to the treatment plan: Yes  *Client received copy of changes: Yes  *Client is aware of right to access a treatment plan review: Yes     P: Review \"Goose Assignment\" She will continue appt with nutritionist and a Dietician 6/17, continue meeting at scheduled times and report anything relevenat to your tx.  She will reschedule her  PT  for her knees.  . She is also going to talk with psychiatrist about possible different therapist, and look into getting a therapist.    Staff Members Contributing To Weekly Review:    Peri Haynes, MS, LADC, LPC  Lead Counselor Adult [facility]      "

## 2021-07-19 NOTE — GROUP NOTE
Group Therapy Documentation    PATIENT'S NAME: Viviana Schaefer  MRN:   [id number]  :   1965  ACCT. NUMBER: [id number]  DATE OF SERVICE: 21  START TIME: 12:00 PM  END TIME:  2:00 PM  FACILITATOR(S): Peri Haynes LADC  TOPIC: BEH Group Therapy  Number of patients attending the group: 3  Group Length:  2 Hours    Group Therapy Type: Addiction    Summary of Group / Topics Discussed:    Today's group began with a reading on realizing that clients can work a recovery program and still gain esteem.  It was about realizing recovery is about going deep, but also in helpful ways and exploring spirituality and not stiffling one's own growth or being negative to self.   Clients also checked in on all dimensions.  Counselor discussed Powerlessness and unmanageability and stressed to clients they need to be aware of something before they can admit it and that is why the extensive assignment on this topic.   I talked with them about being conscious of our feelings, behaviors and their effects on others and self. I stressed how the opposite of awareness is denial and that often addiction has a heavy component of denial attached to it.  Clients gave examples of different areas affected by use, including Physical, emotional and cognitive.      Group Attendance:  Attended group session    Patient's response to the group topic/interactions:  cooperative with task and discussed personal experience with topic    Patient appeared to be Attentive and Engaged.          Telemedicine Visit: The patient's condition can be safely assessed and treated via synchronous audio and visual telemedicine encounter.      Reason for Telemedicine Visit: COVID-19     Originating Location (pt. Location): Home     Type of service:  Video Visit  GROUP    Originating Location (pt. Location): Home     Distant Location (provider location):  [facility]      Consent:  The patient/guardian has  "verbally consented to: the potential risks and benefits of telemedicine (video visit) versus in person care; billing of their insurance or make self-payment for services provided; and responsibility for payment of non-covered services.      Mode of Communication:  Video Conference via [employer]    Client specific details:Today's session focused on dim 4 powerlessness and unmanageability and dim 3 gratitidude and mindfulness and dim 2 medical.  She reported she fell \"I did not hit my head, but I couldn't not get up without my son's help\"  She said she needs to get stronger and probably needs to get back on the list for P.T.    Client named 2 things she is grateful for in life and recovery:her family and her sobriety.  Jesus said of the reading for today that she liked the idea of recovery including uncovering the beauty deep inside.  I liked the line: God is with me leading and directing me to the light\" and when I asked her the reason she said, \"I push God away when I use, but I always return to knowing God is my strength\"    Client rated the following on a likert scale where 1=little and 10= high  Hungry: 0  Angry: 0  Lonely:  0  Tired: 2     Anxiety:   1 Stress:  1  Depression:0    Client participated in discussion of Powerless and Unmanageability saying: \"I am powerless over alcohol and life was definitely becoming unmanageable\".  On the  assignment client was able to name examples of ways use affected self or someone else in all 6 areas mentionded  Specifically saying 1)physically: I ended up on the transplant list  2) emotionally: I was medicating many feelings   3) Cognitively: I used irrational thinking to make excuses to people    Client named 3 benefits she could see of meditaiton:  improve concentration, reduce stress, calming body,helping with stress reduction.       P)  finish assignment on Powerless ness and unmanageability, talk to M.D. about her fall, consider getting her physicl therapy back on " schedule    Peri Haynes, MS, LADC, LPC

## 2021-07-21 ENCOUNTER — HOSPITAL ENCOUNTER (OUTPATIENT)
Dept: BEHAVIORAL HEALTH | Facility: CLINIC | Age: 56
End: 2021-07-21
Attending: FAMILY MEDICINE
Payer: COMMERCIAL

## 2021-07-21 PROCEDURE — H2035 A/D TX PROGRAM, PER HOUR: HCPCS | Mod: HQ,95
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
## 2021-07-28 ENCOUNTER — HOSPITAL ENCOUNTER (OUTPATIENT)
Dept: BEHAVIORAL HEALTH | Facility: CLINIC | Age: 56
End: 2021-07-28
Attending: FAMILY MEDICINE
Payer: COMMERCIAL

## 2021-07-28 PROCEDURE — H2035 A/D TX PROGRAM, PER HOUR: HCPCS | Mod: HQ,GT

## 2021-07-28 NOTE — GROUP NOTE
Group Therapy Documentation    PATIENT'S NAME: Viviana Schaefer  MRN:   [id number]  :   1965  ACCT. NUMBER: [id number]  DATE OF SERVICE: 21  START TIME: 12:00 PM  END TIME:  2:00 PM  FACILITATOR(S): Peri Haynes LADC  TOPIC: BEH Group Therapy  Number of patients attending the group:  6  Group Length:  2 Hours    Group Therapy Type: Addiction    Summary of Group / Topics Discussed:    Psychoeducation/Skills Self-Care and Humor in Recovery (OP)  Learn how powerful self-care is in healing from addiction and building resiliency in mental and physical health.  Learn many aspects of self-care linked to elevating moods, increasing energy reserve and staminal, thinking clearer, focusing better on tasks and improving organizational skills.     Objective(s):    Name 3 reasons self-care is crucial for optimal health and recovery from illness.      Identify 2 mental and physical illnesses directly linked to poor self-care.    Identify 3 ways social connections build resilience and may strengthen our immune systems.    Create a personal plan of action to add to daily schedule of structure and routine.     Structure (modalities, homework, worksheets, etc.):      Education on Self-Care with emphasis on Recovery     Why Self-care is a necessity, not a luxury     Worksheet to explore and prioritize needs    Action plan to start this week    Expected therapeutic outcome(s):     Be more proactive in their mental and physical health     Start their personal self-care plan this week and note results in journal    Take an inventory of choices and behaviors that hinder their health and have a negative effect on the quality of their lives.      Work to remove the obstacles that keep them from these aspects of self-care.     Therapeutic outcome(s) measured by:   Teachback, creation of personal action plan,       Group Attendance:  Attended group session    Patient's response to the group topic/interactions:   "cooperative with task and discussed personal experience with topic    Patient appeared to be Actively participating, Attentive and Engaged.          Telemedicine Visit: The patient's condition can be safely assessed and treated via synchronous audio and visual telemedicine encounter.      Reason for Telemedicine Visit: COVID-19     Originating Location (pt. Location): Home     Type of service:  Video Visit  GROUP    Originating Location (pt. Location): Home     Distant Location (provider location):  [facility]      Consent:  The patient/guardian has verbally consented to: the potential risks and benefits of telemedicine (video visit) versus in person care; billing of their insurance or make self-payment for services provided; and responsibility for payment of non-covered services.      Mode of Communication:  Video Conference via [employer]    Client specific details: Client shared 3 parts of her self care plan, including continuing to meet with her dietician and being in contact with more of her peers.  Today's session focused on dim 5  healthy coping, dim 6 humor and self care. Client also checked in on all dimensions.  Client named 4  things she is grateful for in life and recovery:This group, family, friends.  Client was able to name 1 thing doing well in treatment: treating myself better, being more gentle with self.  Client said of the reading for today:    \"a weight can be lifted\" \"It helps wipe away the idea of perfection\"     Client rated the following on a likert scale where 1=little and 10= high  Hungry: 0  Angry: 0  Lonely:  0  Tired: 0     Anxiety:  0 Stress: o  Depression:0  She expressed life is good and this is probably the first day she had all 0's on these ratings!    Client expressed she fully believes self care is linked to illness and that isolating is too.  We need social connections  Client expressed why self care is important and how it is related " to using/not using: When you don't care you do more harmful things to self      Peri Haynes, MS, LADC, LPC

## 2021-08-04 ENCOUNTER — HOSPITAL ENCOUNTER (OUTPATIENT)
Dept: BEHAVIORAL HEALTH | Facility: CLINIC | Age: 56
End: 2021-08-04
Attending: FAMILY MEDICINE
Payer: COMMERCIAL

## 2021-08-04 PROCEDURE — H2035 A/D TX PROGRAM, PER HOUR: HCPCS | Mod: HQ,GT

## 2021-08-04 NOTE — PROGRESS NOTES
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 of Care:  Client is completing assignments and reporting how helpful group and assignments are to her healing and growth.  Client reports originally she would do tx, but just mostly to get through the hoops, but within 1 day she really liked group and got invested and is learnings.  She is reporting how the group situation is teaching her a great deal about self, healthy coping, warning signs and triggers.  She said the set up provides good resources and helpd with accountability.  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  She reports 5/20/20 as last use date, prior to using 2/21/21.  She needs to complete program to get on transplant list.Client is not attending any sober support in the community at this time.  Her scores on PHQ and CECE are declining over 3 x given    Discharge Planning:  Target Discharge Date/Timeframe:  9/22   Med Mgmt Provider/Appt:  Dr Sanna Flores, psychiatrist  Next visit 4/7   Ind therapy Provider/Appt:  she has had 1 intake appt and is going to search for a different therapist   Family therapy Provider/Appt:  no   Other referrals:  no    Has vulnerable adult status change? No    Service Coordination:  Got LUDMILA's for therapist    Supervision:  no    Are Treatment Plan goals/objectives effective? yest  *If no,  "list changes to treatment plan:    Are the current goals meeting client's needs? No, she just started tx  *If no, list the changes to treatment plan.    Client Input / Response: \"I am motivated by the transplant team, but also want to do tx for myself      *Client agrees with any changes to the treatment plan: Yes  *Client received copy of changes: Yes  *Client is aware of right to access a treatment plan review: Yes     P: Will discuss readiness for phase change and goal completion for ph. 2 She will continue appt with nutritionist and a Dietician , continue meeting at scheduled times and report anything relevenat to your tx.  She will reschedule her  PT  for her knees.  . She is also going to talk with psychiatrist about possible different therapist, and look into getting a therapist.    Staff Members Contributing To Weekly Review:    Peri Haynes, MS, LADC, LPC  Lead Counselor [facility]      "

## 2021-08-04 NOTE — GROUP NOTE
"Group Therapy Documentation    PATIENT'S NAME: Viviana Schaefer  MRN:   [id number]  :   1965  ACCT. NUMBER: [id number]  DATE OF SERVICE: 21  START TIME: 12:00 PM  END TIME:  2:00 PM  FACILITATOR(S): Peri Haynes LADC  TOPIC: BEH Group Therapy  Number of patients attending the group:  6  Group Length:  2 Hours    Group Therapy Type: Addiction    Summary of Group / Topics Discussed:    Boundaries, Emotional Regulation, Relationships, and Check in.  Today's group did an opening reading and discussion on recovery teaching us to be open and honest with self and others.It emphasized gaining understanding, awareness and healthy coping for our feelings, which have been dulled by substance abuse for years.  I emphasized that feelings are just trying to tell us something and when we are aware of what we are feeling we can make a more conscious decision about what we want to do about what we are feeling .  I asked clients to write down ways their feelings affect their relationships.  I also asked clients to write down all the things that affect relationships and after time was taken, I said \"maybe it would be easier to ask what doesn't affect them!  Clients found humor in this.  Group then discussed boundaries and we used an analogy of a lawn and house to name all the boundaries we are aware of with our homes, yet often aren't aware of physical or intangible boundaries we have with people.  We discussed the difference between boundaries and walls, also.  Clients were given an assignment on \"Who is in your audience\"      Group Attendance:  Attended group session    Patient's response to the group topic/interactions:  cooperative with task    Patient appeared to be Actively participating and Engaged.              [facility] Weekly Treatment Plan Review      Telemedicine Visit: The patient's condition can be safely assessed and treated via synchronous audio and visual telemedicine encounter.    " "  Reason for Telemedicine Visit: COVID-19     Originating Location (pt. Location): Home     Type of service:  Video Visit  GROUP    Originating Location (pt. Location): Home     Distant Location (provider location):  [facility] MENTAL HEALTH & ADDICTION [facility]      Consent:  The patient/guardian has verbally consented to: the potential risks and benefits of telemedicine (video visit) versus in person care; billing of their insurance or make self-payment for services provided; and responsibility for payment of non-covered services.      Mode of Communication:  Video Conference via [employer]      Client specific details:  Viviana said she is grateful for all she is learning.  She told the new clients \"you are kellen to be in this group and we are kellen to have you\". She said she continues to gain awareness of healthy coping skills and of a more gentler approach to dealing with her past and moving forward.  Client focus was on dim 6 healthy relationships and boundaries and dim 3 feelings.  Client was able to teach back why understanding feelings is important.  She said she is more in touch with feelings and aware that they are helpful.  Client said she is learning about feelings already in short time in group.  \"I am realizing they can be my guides\".  Client said she was aware that chemicals numbed feelings.  Feelings are our teachers.  I can learn about my feelings, it isn't too late.    Client gave her definition of boundaries and defined the difference in walls and boundaries.  Client said of the analogy of the home boundaries vs our personal boundaries:  Good analogy.  This makes a lot of sense.  This was helpful.  Client was able to teach back at least 3 of the 5 things what we are responsible for:     Client said bounaries or feelings were not talked about in her home growing up, but she did with her own kids.           Peri Haynes, MS, LADC, LPC         "

## 2021-08-05 ENCOUNTER — TELEPHONE (OUTPATIENT)
Dept: TRANSPLANT | Facility: CLINIC | Age: 56
End: 2021-08-05

## 2021-08-05 NOTE — TELEPHONE ENCOUNTER
Transplant [facility] Work [facility] Phone Call      Data: Viviana was being evaluated for liver transplantation, and was found to have a positive PEth test on 3/1/21 (62).  Intervention: Chart review.  I attempted to reach Viviana but was unable.  I left her a voice message and requested a return call.  Assessment: Viviana remains engaged in outpatient chemical dependency treatment at [facility] per my chart review.  PEth tests on 5/10/21 and 6/11/21 have been negative.  Ongoing PEth testing is recommended per our team guidelines.  Education provided by SW: deferred  Plan: I am awaiting a return call from patient.      YOVANY Olivares, [facility]  Liver Transplant   Phone [phone]  Pager [phone]

## 2021-08-05 NOTE — PROGRESS NOTES
Addendum on 8/5/21: I received a call back from Viviana.  She reports CD treatment is going well, and she anticipates completing her outpatient CD treatment program at [facility] by the end of this month.          YOVANY Olivares, [facility]  Liver Transplant   Phone [phone]  Pager [phone]

## 2021-08-11 ENCOUNTER — HOSPITAL ENCOUNTER (OUTPATIENT)
Dept: BEHAVIORAL HEALTH | Facility: CLINIC | Age: 56
End: 2021-08-11
Attending: FAMILY MEDICINE
Payer: COMMERCIAL

## 2021-08-11 PROCEDURE — H2035 A/D TX PROGRAM, PER HOUR: HCPCS | Mod: HQ,GT

## 2021-08-11 NOTE — GROUP NOTE
"Group Therapy Documentation    PATIENT'S NAME: Viviana Schaefer  MRN:   [id number]  :   1965  ACCT. NUMBER: [id number]  DATE OF SERVICE: 21  START TIME: 12:00 PM  END TIME:  2:00 PM  FACILITATOR(S): Peri Haynes LADC  TOPIC: BEH Group Therapy  Number of patients attending the group:  3  Group Length:  2 Hours    Group Therapy Type: Addiction    Summary of Group / Topics Discussed:    Trust, support, Ashwin Hawkins.   I read opening reading to clients and it was emphasizing  them being at center stage of their life.  It asked questions for them to ponder about \"who is the director of your life, what parts do you play, what parts do you give to others that are either helpful or not?  Group also checked in and gave feedback. Clients listened to Ashwin Hawkins segment.      Group Attendance:  Attended group session    Patient's response to the group topic/interactions:  cooperative with task and discussed personal experience with topic    Patient appeared to be Attentive and Engaged.              [facility] Weekly Treatment Plan Review      Telemedicine Visit: The patient's condition can be safely assessed and treated via synchronous audio and visual telemedicine encounter.      Reason for Telemedicine Visit: COVID-19     Originating Location (pt. Location): Home     Type of service:  Video Visit  GROUP    Originating Location (pt. Location): Home     Distant Location (provider location):  [facility] MENTAL HEALTH & ADDICTION SERVICES      Consent:  The patient/guardian has verbally consented to: the potential risks and benefits of telemedicine (video visit) versus in person care; billing of their insurance or make self-payment for services provided; and responsibility for payment of non-covered services.      Mode of Communication:  Video Conference via [employer]      Client specific details:  Client reported:  Today's session focused on dim 6 sober support and " "dim 3 understanding and dealing with fears in healthy ways.  Client completed her assignment and shared \"who is in your audience\"   Who supports and encourages me?  Triggers me?  Who do I respect?    I encouraged client to continue to assess not judging others, but also respecting self enough to make conscious decisions of where and how much time she wants to spend with others, especially in regard to how it affects using.    Client gave input on the reading about feelings:    Client participated in discussion of fear, I like the reminder that fear is normal and just giving information.  I like that recognizing fear and admitting it can be helpful in dismantling it.  It was helpful to alk about recongizing fear and admitting it, can help with choice and empowerment           Peri Haynes, MS, LADC, LPC         "

## 2021-08-15 ENCOUNTER — TELEPHONE (OUTPATIENT)
Dept: ENDOCRINOLOGY | Facility: CLINIC | Age: 56
End: 2021-08-15

## 2021-08-16 NOTE — PROGRESS NOTES
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 grow.  Also gave reasons of   how that carries over to personal life: Learn empathy, learn new ways to do things, practice in group to be able to use outside of group, share what I feel or notice or think, knowing my needs and voice are important.  she has been  2x.  She has a 17 year old son who turns 18 in May, and 3 adult sons.  She reports she is unemployed and living off of her savings at this time.    She reports conflict with family due to use, but she has good support    Justification for Continued Treatment at this Level of Care:  Client is completing assignments and reporting how helpful group and assignments are to her healing and growth.  Client reports originally she would do tx, but just mostly to get through the hoops, but within 1 day she really liked group and got invested and is learnings.  She is reporting how the group situation is teaching her a great deal about self, healthy coping, warning signs and triggers.  She said the set up provides good resources and helpd with accountability.  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  She reports 5/20/20 as last use date, prior to using 2/21/21.  She needs to complete program to get on  "transplant list.Client is not attending any sober support in the community at this time.  Her scores on PHQ and CECE are declining over 3 x given    Discharge Planning:  Target Discharge Date/Timeframe:  9/22   Med Mgmt Provider/Appt:  Dr Sanna Flores, psychiatrist  Next visit 4/7   Ind therapy Provider/Appt:  she has had 1 intake appt and is going to search for a different therapist   Family therapy Provider/Appt:  no   Other referrals:  no    Has vulnerable adult status change? No    Service Coordination:  Got LUDMILA's for therapist    Supervision:  no    Are Treatment Plan goals/objectives effective? yest  *If no, list changes to treatment plan:    Are the current goals meeting client's needs? No, she just started tx  *If no, list the changes to treatment plan.    Client Input / Response: \"I am motivated by the transplant team, but also want to do tx for myself      *Client agrees with any changes to the treatment plan: Yes  *Client received copy of changes: Yes  *Client is aware of right to access a treatment plan review: Yes     P:Continue to take Busbar and Buprpione.    Will discuss readiness for phase change and goal completion for ph. 2 She will continue appt with nutritionist and a Dietician , continue meeting at scheduled times and report anything relevenat to your tx.  She will reschedule her  PT  for her knees.  . She is also going to talk with psychiatrist about possible different therapist, and look into getting a therapist.    Staff Members Contributing To Weekly Review:    Peri Haynes, MS, LADC, LPC  Lead Counselor Adult [facility]      "

## 2021-08-18 ENCOUNTER — HOSPITAL ENCOUNTER (OUTPATIENT)
Dept: BEHAVIORAL HEALTH | Facility: CLINIC | Age: 56
End: 2021-08-18
Attending: FAMILY MEDICINE
Payer: COMMERCIAL

## 2021-08-18 PROCEDURE — H2035 A/D TX PROGRAM, PER HOUR: HCPCS | Mod: HQ,GT

## 2021-08-18 NOTE — GROUP NOTE
Group Therapy Documentation    PATIENT'S NAME: Viviana Schaefer  MRN:   [id number]  :   1965  ACCT. NUMBER: [id number]  DATE OF SERVICE: 21  START TIME: 12:00 PM  END TIME:  2:00 PM  FACILITATOR(S): Peri Haynes LADC  TOPIC: BEH Group Therapy  Number of patients attending the group:  4  Group Length:  2 Hours    Group Therapy Type: Addiction    Summary of Group / Topics Discussed:    Today client read reading on surrendering when we are struggling and how sometimes going with the flow makes things much easier than being in control.  I asked clients to discuss how this applies to life scenarios and to their addiction. I discussed sometimes struggle is part of the journey, but how often we add to struggle, when it is not necessary and not bringing serenity or solutions.  We tied this to a reading on fears and how fear can be helpful or detrimental to us.  I stressed that fear is just trying to tell us something, but often we are so afraid to feel it we numb, run, pretend, and suffer more than is needed.  Today we also discussed helpful coping and clients discussed questions about anxiety and its connection to fear.      Group Attendance:  Attended group session    Patient's response to the group topic/interactions:  cooperative with task and discussed personal experience with topic    Patient appeared to be Actively participating and Engaged.                Telemedicine Visit: The patient's condition can be safely assessed and treated via synchronous audio and visual telemedicine encounter.      Reason for Telemedicine Visit: COVID-19     Originating Location (pt. Location): Home     Type of service:  Video Visit  GROUP    Originating Location (pt. Location): Home     Distant Location (provider location):  [facility]      Consent:  The patient/guardian has verbally consented to: the potential risks and benefits of telemedicine (video visit) versus in  "person care; billing of their insurance or make self-payment for services provided; and responsibility for payment of non-covered services.      Mode of Communication:  Video Conference via [employer]      Client specific details:  Client reported:  Today's session focused on dim 4 Powerlessness dim 3 understanding and dealing with fears in healthy ways, and healthy coping for anxiety and depression  Clients and counselor discussed medications and myth.  Viviana told other group members what she has found helpful about her medications and with coping tools she has learned..  I encouraged her to continue to discuss with me.    Client gave input on the reading about \"going with the flow\"  She discussed example of : trying to change her ex husbands. \"I was barking up the wrong tree\"  Client discussed powerlessness and unmanageability and said \"I am powerless over alcohol, I am also powerless over other people and their decisions\",  \"there isn't much I am powerfull over, just my choices\"    Client discussed  That she really liked the quotes this counselor read: \"your mind is like a bad neighborhood, you should never go in alone\"  The fear of feeling the pain is worse than the pain itself\"  .   Client participated in discussion of fear, I like that recognizing fear and admitting it can be helpful in dismantling it\"    P) assignment  \"who is in your audience\"  Continue to take Buprion and Busbar as recommended, also use healthy coping like 4-7-8 breathing        Peri Haynes, MS, [facility], LPC         "

## 2021-08-19 NOTE — ADDENDUM NOTE
Encounter addended by: Peri Haynes LADC on: 8/19/2021 10:32 AM   Actions taken: Clinical Note Signed

## 2021-08-25 ENCOUNTER — HOSPITAL ENCOUNTER (OUTPATIENT)
Dept: BEHAVIORAL HEALTH | Facility: CLINIC | Age: 56
End: 2021-08-25
Attending: FAMILY MEDICINE
Payer: COMMERCIAL

## 2021-08-25 PROCEDURE — H2035 A/D TX PROGRAM, PER HOUR: HCPCS | Mod: HQ,GT

## 2021-08-26 NOTE — GROUP NOTE
Group Therapy Documentation    PATIENT'S NAME: Viviana Schaefer  MRN:   [id number]  :   1965  ACCT. NUMBER: [id number]  DATE OF SERVICE: 21  START TIME: 12:00 PM  END TIME:  2:00 PM  FACILITATOR(S): Andrea Ritchie  TOPIC: BEH Group Therapy  Number of patients attending the group:  6  Group Length:  2 Hours    Group Therapy Type: Psychoeducation    Summary of Group / Topics Discussed:    Community Based Sober Support and Recovery Skills ([facility])    This topic will give a general overview of the importance of Community Based Sober Support and Additional Recovery Skills and specifics about using sober support and recovery skills on a regular basis, to reduce stress and cravings to use substances in early sobriety. The importance of these as prevention skills to be practiced on a regular basis as opposed to as a last-ditch efforts when in crisis or once substance is in hand is emphasized in this session.    Objective(s):    Client will identify or be introduced to at least 2 Recovery Program Skills to be put in practice during [facility] treatment program.    Client will identify the importance of Recovery Program Skills in staying sober.     Client will explain the importance of practicing Recovery Program Skills and building Recovery Resilience as a result.  Structure:    Provide psychoeducation on the benefits of practicing Recovery Program Skills on a regular basis to build Recovery Resilience.     Provide psychoeducation on how Recovery Skills can be used to minimize effects of Post-Acute Withdrawal Symptoms (PAWS).     Provide psychoeducation on how Recovery Skills can be used to increase internal motivation for sobriety and recovery.     Facilitate group discussion on concept of  Recovery Resilience  and how that is beneficial in avoiding relapse in early recovery.    Explore with client's their experience with community based sober support meetings, and if not attending yet, clients to share the  option or options they have been considering attending.     Expected therapeutic outcomes:     Minimize the severity and occurrence of PAWS symptoms such as sleep disruption, anhedonia, anxiety, mood swings, low energy, etc.    Build recovery resilience as a proactive strategy to reduce stress and concomitant cravings, leading to resilience against relapse in early sobriety.    Growth in patient  sober network  via practicing skills designed to build healthy social connections in recovery.    Therapeutic outcome(s) measured by:    Patient's ability to explain impact of implementing (regularly putting into practice) Recovery Skills and severity and occurrence of PAWS symptoms each week.    Patient's demonstration of learning and commitment via weekly check in, specifying skills practiced or community based sober support meetings attended during previous week, increase in motivation for sobriety and community based recovery using Likert (or similar) scale.    Telemedicine Visit: The patient's condition can be safely assessed and treated via synchronous audio and visual telemedicine encounter.      Reason for Telemedicine Visit: Patient convenience (e.g. access to timely appointments / distance to available provider)     Originating Site (Patient Location): Patient's home     Distant Site (Provider Location): [facility]: [place]     Consent:  The patient/guardian has verbally consented to: the potential risks and benefits of telemedicine (video visit) versus in person care; bill my insurance or make self-payment for services provided; and responsibility for payment of non-covered services.      Mode of Communication:  Video Conference via Zoom     As the provider I attest to compliance with applicable laws and regulations related to telemedicine.        Group Attendance:  Attended group session    Patient's response to the group topic/interactions:  discussed personal experience with topic and gave  "appropriate feedback to peers    Patient appeared to be Engaged.        Client specific details:    In this session client checked in reporting ongoing sobriety. She reports  what didn't work for her in the past early in her drinking was saying when drinking \"drinking all day the next day when I said I won't drink tomorrow.\"  This session addressed her Dimension 6 goal of  Increase sober support network.  Client then practiced doing a 5 senses grounding exercise and reported she found it \"helped with transition, it was calming, settled me down, let go of worrying.\" Client then watched a short educational video on the importance of sober support meetings, how they work, connecting with others and how to find a mentor or sponsor followed by discussion. She shared that it got the message across to her of the importance to her to \"stick with going to meetings.\" She shared that she is grateful for her health so far, and for the amount of discomfort. I could be dead if still drinking.\"   One of her peers presented her \"What if I used at 13 months\" assignment in a thorough manner, followed by feedback from her peers on how they related to her presentation. Client shared how she \"could relate to the isolation, the terminal uniqueness.\"  All clients were emailed resources on finding community based sober support meetings online or in person and a copy of the grounding exercise practiced in this session.      Plan: identify the meeting you will attend by type of meeting, location, day and time and share in a session next week about you plan.                  .    .      "

## 2021-08-28 NOTE — PROGRESS NOTES
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 resentment/not forgiving affects you today: takes my energy away, makes me want to drink to not feel the hurt and irritation  and we specifically discussed what she may want to do to move on, heal or forgive:  Continue to be aware when I am stuck in resentment      client gave examples of what she noticed in interactions over the weekend with styles of communication passive:     aggressive:  She discussed feedback and how she sees it as  important in group process:  staying connected, showing people we are listening, giving support, helping people to learn and grow.  Also gave reasons of   how that carries over to personal life: Learn empathy, learn new ways to do things, practice in group to be able to use outside of group, share what I feel or notice or think, knowing my needs and voice are important.  she has been  2x.  She has a 17 year old son who turns 18 in May, and 3 adult sons.  She reports she is unemployed and living off of her savings at this time.    She reports conflict with family due to use, but she has good support    8/25/2021 Client reports she is willing to attend outside community based sober support meetings. She reports she learned from the session with an emphasis on attending  "community based sober support that it will be a good thing to \"Stick with going to meetings.\"     Justification for Continued Treatment at this Level of Care:  Client is completing assignments and reporting how helpful group and assignments are to her healing and growth.  Client reports originally she would do tx, but just mostly to get through the hoops, but within 1 day she really liked group and got invested and is learnings.  She is reporting how the group situation is teaching her a great deal about self, healthy coping, warning signs and triggers.  She said the set up provides good resources and helpd with accountability.  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  She reports 5/20/20 as last use date, prior to using 2/21/21.  She needs to complete program to get on transplant list.Client is not attending any sober support in the community at this time.  Her scores on PHQ and CECE are declining over 3 x given    Discharge Planning:  Target Discharge Date/Timeframe:  9/22   Med Mgmt Provider/Appt:  Dr Sanna Flores, psychiatrist  Next visit 4/7   Ind therapy Provider/Appt:  she has had 1 intake appt and is going to search for a different therapist   Family therapy Provider/Appt:  no   Other referrals:  no    Has vulnerable adult status change? No    Service Coordination:  Got LUDMILA's for therapist    Supervision:  no    Are Treatment Plan goals/objectives effective? yest  *If no, list changes to treatment plan:    Are the current goals meeting client's needs? No, she just started tx  *If no, list the changes to treatment plan.    Client Input / Response: \"Grateful for my health so far, for the amount of discomfort. I could be dead if still drinking.\"       *Client agrees with any changes to the treatment plan: N/A  *Client received copy of changes: Yes  *Client is aware of right to access a treatment plan review: Yes     P:Continue to take Busbar and Buprpione.    Will discuss readiness for phase " change and goal completion for ph. 2 She will continue appt with nutritionist and a Dietician , continue meeting at scheduled times and report anything relevenat to your tx.  She will reschedule her  PT  for her knees.  . She is also going to talk with psychiatrist about possible different therapist, and look into getting a therapist. Client to report back a day and a time of a community based sober support meeting she could attend weekly.     Staff Members Contributing To Weekly Review:    JS Bliss

## 2021-08-31 NOTE — PROGRESS NOTES
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 named was:  .I will let go of:my ex, the voice of control he has, trying to control others  I am grateful for:my family, this group, air conditioning  I will focus on:What I can control, being happy, listening to my voice.  Client reports liking this simple, daily way of being mindful and grateful of where to focus.       She reports no thoughts of self harm. She reports great support from her group.  \"I have not looked into support from a therapist\".  I discussed that it can take awhile to find one or get in, so I suggested she at least begin the process sooner than later.   She agreed.   PREVIOUS She said she has never had a group that is so supportive, and that this counselor and her group have provided much motivation for wanting to be sober.  at this time she has not sought sober community support, but reports her group is great support for her.  Jocelyn is reporting how the group situation is teaching her a great deal about self, healthy coping, warning signs and triggers.  She said the set up provides good resources and helpd with accountability   Client was able to name 3 specific people or situations she is not able to forgive, including: her ex    Named feelings associated with not forgiving: exhausted, angry, " anxiety  How this resentment/not forgiving affects you today: takes my energy away, makes me want to drink to not feel the hurt and irritation  and we specifically discussed what she may want to do to move on, heal or forgive:  Continue to be aware when I am stuck in resentment      client gave examples of what she noticed in interactions over the weekend with styles of communication passive:     aggressive:  She discussed feedback and how she sees it as  important in group process:  staying connected, showing people we are listening, giving support, helping people to learn and grow.  Also gave reasons of   how that carries over to personal life: Learn empathy, learn new ways to do things, practice in group to be able to use outside of group, share what I feel or notice or think, knowing my needs and voice are important.  she has been  2x.  She has a 17 year old son who turns 18 in May, and 3 adult sons.  She reports she is unemployed and living off of her savings at this time.    She reports conflict with family due to use, but she has good support      Justification for Continued Treatment at this Level of Care:  Client is completing assignments and reporting how helpful group and assignments are to her healing and growth.  Client reports originally she would do tx, but just mostly to get through the hoops, but within 1 day she really liked group and got invested and is learnings.  She is reporting how the group situation is teaching her a great deal about self, healthy coping, warning signs and triggers.  She said the set up provides good resources and helpd with accountability.  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  She reports 5/20/20 as last use date, prior to using 2/21/21.  She needs to complete program to get on transplant list.Client is not attending any sober support in the community at this time.  Her scores on PHQ and CECE are declining over 3 x given    Discharge  "Planning:  Target Discharge Date/Timeframe:  9/22   Med Mgmt Provider/Appt:  Dr Sanna Flores, psychiatrist  Next visit 4/7   Ind therapy Provider/Appt:  she has had 1 intake appt and is going to search for a different therapist   Family therapy Provider/Appt:  no   Other referrals:  no    Has vulnerable adult status change? No    Service Coordination:  Got LUDMILA's for therapist    Supervision:  no    Are Treatment Plan goals/objectives effective? yest  *If no, list changes to treatment plan:    Are the current goals meeting client's needs? No, she just started tx  *If no, list the changes to treatment plan.    Client Input / Response: \"Grateful for my health so far, for the amount of discomfort. I could be dead if still drinking.\"       *Client agrees with any changes to the treatment plan: N/A  *Client received copy of changes: Yes  *Client is aware of right to access a treatment plan review: Yes     P:Continue finishing goals and plan for discharge approxmiatley 9/15  Continue to take Busbar and Buprpione.    Will discuss readiness for phase change and goal completion for ph. 2 She will continue appt with nutritionist and a Dietician , continue meeting at scheduled times and report anything relevenat to your tx.  She will reschedule her  PT  for her knees.  . She is also going to talk with psychiatrist about possible different therapist, and look into getting a therapist. Client to report back a day and a time of a community based sober support meeting she could attend weekly.     Staff Members Contributing To Weekly Review:    JS Bliss      "

## 2021-09-01 ENCOUNTER — HOSPITAL ENCOUNTER (OUTPATIENT)
Dept: BEHAVIORAL HEALTH | Facility: CLINIC | Age: 56
End: 2021-09-01
Attending: FAMILY MEDICINE
Payer: COMMERCIAL

## 2021-09-01 PROCEDURE — H2035 A/D TX PROGRAM, PER HOUR: HCPCS | Mod: HQ,95

## 2021-09-01 NOTE — GROUP NOTE
"Group Therapy Documentation    PATIENT'S NAME: Viviana Schaefer  MRN:   [id number]  :   1965  ACCT. NUMBER: [id number]  DATE OF SERVICE: 21  START TIME: 12:00 PM  END TIME:  2:00 PM  FACILITATOR(S): Peri Haynes LADC  TOPIC: BEH Group Therapy  Number of patients attending the group:  5  Group Length:  2 Hours    Group Therapy Type: Addiction    Summary of Group / Topics Discussed:    Boundaries, Emotional Regulation, and Forgiveness.  Group participated in reading on boundaries and discussed assignment \"who is in your audience\" with a group member who presented her assignment.  This group member also completed her Recovery Care Plan and Goodbye letter, due to discharge from group/completion of group.  Clients gave feedback.  I asked clients to ponder who is in their life that maybe \"needs to be moved to a different layer of their audience\" or maybe out of the auditorium (using friends that don't support), and who is supportive or new that might need to be moved in closer.  I also did a 15 min meditation with group on \"being where they are\"      Group Attendance:  Attended group session    Patient's response to the group topic/interactions:  cooperative with task and discussed personal experience with topic    Patient appeared to be Actively participating and Attentive.                 Telemedicine Visit: The patient's condition can be safely assessed and treated via synchronous audio and visual telemedicine encounter.      Reason for Telemedicine Visit: COVID-19     Originating Location (pt. Location): Home     Type of service:  Video Visit  GROUP    Originating Location (pt. Location): Home     Distant Location (provider location):  [facility]      Consent:  The patient/guardian has verbally consented to: the potential risks and benefits of telemedicine (video visit) versus in person care; billing of their insurance or make self-payment for services " "provided; and responsibility for payment of non-covered services.      Mode of Communication:  Video Conference via [employer]        Client specific details:  Client reported: \"I loved today's group, it was good to be in group with others dealing with bad livers or Antonieta who returned to group after receiving one\"  She also said how much she will miss input from client who has completed tx.      Today's session focused on dim 6 sober support/boundaries and dim 3 understanding and dealing with feelings and healthy coping for anxiety and depression    Client's response to \"life is a Theater\":  I thought this was  A good assignment to bring awareness to things I need to think about with support in my life.   Client gave other client feedback on assignment on \"Choose your audience\"  \"I admire you and your ability to see what is healthy for you, and I take note and learn from you\"    She also gave feedback on clients goodbye letter and RCA:  \"Touching\" \"good to get to know you and I wish you well\"    Client reported she liked the meditation on being where you are. She teared up and said it spoke loundly to her about loving self and being where she is rather than in the past or not being nice to self based on past.    P) no group on Labor Day, do assignment on fear    Peri Haynes, MS, LADC, LPC          "

## 2021-09-08 ENCOUNTER — HOSPITAL ENCOUNTER (OUTPATIENT)
Dept: BEHAVIORAL HEALTH | Facility: CLINIC | Age: 56
End: 2021-09-08
Attending: FAMILY MEDICINE
Payer: COMMERCIAL

## 2021-09-08 PROCEDURE — H2035 A/D TX PROGRAM, PER HOUR: HCPCS | Mod: HQ,95

## 2021-09-08 ASSESSMENT — ANXIETY QUESTIONNAIRES
1. FEELING NERVOUS, ANXIOUS, OR ON EDGE: SEVERAL DAYS
3. WORRYING TOO MUCH ABOUT DIFFERENT THINGS: SEVERAL DAYS
IF YOU CHECKED OFF ANY PROBLEMS ON THIS QUESTIONNAIRE, HOW DIFFICULT HAVE THESE PROBLEMS MADE IT FOR YOU TO DO YOUR WORK, TAKE CARE OF THINGS AT HOME, OR GET ALONG WITH OTHER PEOPLE: SOMEWHAT DIFFICULT
7. FEELING AFRAID AS IF SOMETHING AWFUL MIGHT HAPPEN: SEVERAL DAYS
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
## 2021-09-08 NOTE — GROUP NOTE
"Group Therapy Documentation    PATIENT'S NAME: Viviana Schaefer  MRN:   [id number]  :   1965  ACCT. NUMBER: [id number]  DATE OF SERVICE: 21  START TIME: 12:00 PM  END TIME:  2:00 PM  FACILITATOR(S): Peri Haynes LADC  TOPIC: BEH Group Therapy  Number of patients attending the group:  4  Group Length:  2 Hours    Group Therapy Type: Addiction    Summary of Group / Topics Discussed:    Today group did a reading from \"Time for Pop\"  and we discussed ways to find more pop in life, as a group.  Clients discussed \"negative tapes and named 1-2 they have\" we discussed how these affect their quality of life and how they affect sobriety .   I emphasized awareness as the first key thing.  Often these tapes have been playing for a long time, causing misery, anxiety, stress, self doubt and mistrust with self.  Clients checked in on dimensions and also things they are doing well and grateful for.  I encouraged clients to name one thing they are working on and how they find it helpful and what they will do to continue.  We ended group with a meditation on Pop and positivity.       Group Attendance:  Attended group session    Patient's response to the group topic/interactions:  cooperative with task and discussed personal experience with topic    Patient appeared to be Actively participating and Attentive.                 Telemedicine Visit: The patient's condition can be safely assessed and treated via synchronous audio and visual telemedicine encounter.      Reason for Telemedicine Visit: COVID-19     Originating Location (pt. Location): Home     Type of service:  Video Visit  GROUP    Originating Location (pt. Location): Home     Distant Location (provider location):  [facility]      Consent:  The patient/guardian has verbally consented to: the potential risks and benefits of telemedicine (video visit) versus in person care; billing of their insurance or make " "self-payment for services provided; and responsibility for payment of non-covered services.      Mode of Communication:  Video Conference via [id number]    Client specific details: Viviana discussed that she has an in person appt. With her Liver team and looks forward to it, other than she feels she has water weight and may need to get parencentisis.  She said she is feeling happy about her tx progress and sobriety and looks forward to reporting that to her team. Today's session focused on dim 2, medical issues/appt. and dim 5 negative self talk as  triggers and healthy ways of coping    Client checked in  Hungery:  0 Angry: 0  Lonely: 0 Tired: 1   Anxiety: 1  Stress:  1  Depression:0  \"no thoughts of self harm or harm to others\"  2 feelings:grateful, and not looking forward to leaving my house for Dr. Pelletier, but am optimistic about the meeting itself\"  Triggers or cravings since we last met: a couple commercials where there was alcohol\"  I asked what she did  \"I literally and figurtively switched the channel\"  2 positives you have done in last week: set boundaries with my son, caught myself ruminating and switched my thoughts\"  Something you are working on:  exercise, ongoing sobriety.     client discussed  that a \"negative tape\" she often plays is about \"I am not lovable or good enough\"  She said she used to hold it all in and then just drink or get really mad at self and call self names\"    I asked her if the issues gets resolved that way and she said \"no it doesn't\"  Group gave feedback and she said \"good ideas\".  They suggested she continue to know she is lovable,  talking with someone  Client reported she liked the meditation on being where you are. I encouraged clients to name one thing they are working on and how they find it helpful and what they will do to continue.  We ended group with a meditation on Shirley and positivity and she said \"thank you, I needed that\"     P) check online meetings for at least 1x " per week.  Plan for Discharge on 9/29.  Attend Dr. askew with Liver transplant team    Peri Haynes MS, LADC, LPC
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
## 2021-09-15 ENCOUNTER — TELEPHONE (OUTPATIENT)
Dept: TRANSPLANT | Facility: CLINIC | Age: 56
End: 2021-09-15

## 2021-09-15 ENCOUNTER — HOSPITAL ENCOUNTER (OUTPATIENT)
Dept: BEHAVIORAL HEALTH | Facility: CLINIC | Age: 56
End: 2021-09-15
Attending: FAMILY MEDICINE
Payer: COMMERCIAL

## 2021-09-15 PROCEDURE — H2035 A/D TX PROGRAM, PER HOUR: HCPCS | Mod: 95

## 2021-09-15 PROCEDURE — H2035 A/D TX PROGRAM, PER HOUR: HCPCS | Mod: GT

## 2021-09-15 PROCEDURE — H2035 A/D TX PROGRAM, PER HOUR: HCPCS | Mod: HQ,GT

## 2021-09-15 ASSESSMENT — PATIENT HEALTH QUESTIONNAIRE - PHQ9
5. POOR APPETITE OR OVEREATING: SEVERAL DAYS
SUM OF ALL RESPONSES TO PHQ QUESTIONS 1-9: 5

## 2021-09-15 ASSESSMENT — ANXIETY QUESTIONNAIRES
6. BECOMING EASILY ANNOYED OR IRRITABLE: SEVERAL DAYS
2. NOT BEING ABLE TO STOP OR CONTROL WORRYING: SEVERAL DAYS
3. WORRYING TOO MUCH ABOUT DIFFERENT THINGS: SEVERAL DAYS
1. FEELING NERVOUS, ANXIOUS, OR ON EDGE: MORE THAN HALF THE DAYS
GAD7 TOTAL SCORE: 7
7. FEELING AFRAID AS IF SOMETHING AWFUL MIGHT HAPPEN: SEVERAL DAYS
5. BEING SO RESTLESS THAT IT IS HARD TO SIT STILL: NOT AT ALL
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
## 2021-09-15 NOTE — TELEPHONE ENCOUNTER
Transplant Social Work Services Phone Call      Data: I received a call from Viviana reporting she has been sober for 206 days, and she will complete outpatient chemical dependency treatment the end of this month.    Intervention/education: Discussed the importance of relapse prevention.  Assessment: Viviana is very proud of her sobriety and the good work she's done in CD treatment.  She follows a couple of AA groups on Facebook, and will explore other relapse prevention offerings.  Plan: I will remain available for the psychosocial needs of this patient.      YOVANY Olivares, [facility]  Liver Transplant   Phone [phone]  Pager [phone]

## 2021-09-15 NOTE — GROUP NOTE
Group Therapy Documentation    PATIENT'S NAME: Viviana Schaefer  MRN:   [id number]  :   1965  [id number]  DATE OF SERVICE: 9/15/21  START TIME: 12:00 PM  END TIME:  2:00 PM  FACILITATOR(S): Peri Haynes LADC  TOPIC: BEH Group Therapy  Number of patients attending the group:  5  Group Length:  2 Hours    Group Therapy Type: Addiction and Psychoeducation    Summary of Group / Topics Discussed:    Psychoeducation/Skills Relapse Prevention (TIFFANIE)  This topic will give a general overview of basic relapse prevention, including definitions of warning signs, triggers and cravings and the importance of addressing healthy coping skills for ongoing relapse prevention.    Objective(s):    Patients will identify 4 key warning signs and triggers    Patients will identify 4 healthy coping mechanisms         Structure (modalities, homework, worksheets, etc.):    Provide psychoeducation on relapse prevention and healthy coping methods.    Facilitate group discussion around each patient s current awareness of warning signs,  triggers and cravings.     Expected therapeutic outcome(s):    Patient will:    Be able to manage triggers and cravings without returning to substance use.      Therapeutic outcomes measured by:    Patients will name 4 of their warning signs and triggers    Patients will name 4 healthy coping mechanisms for dealing with their warning signs and triggers      Group Attendance:  Attended group session    Patient's response to the group topic/interactions:  cooperative with task and discussed personal experience with topic    Patient appeared to be Attentive and Engaged.                 Telemedicine Visit: The patient's condition can be safely assessed and treated via synchronous audio and visual telemedicine encounter.      Reason for Telemedicine Visit: COVID-19     Originating Location (pt. Location): Home     Type of service:  Video Visit  GROUP    Originating Location (pt.  "Location): Home     Distant Location (provider location):  [facility] MENTAL HEALTH & ADDI[facility]      Consent:  The patient/guardian has verbally consented to: the potential risks and benefits of telemedicine (video visit) versus in person care; billing of their insurance or make self-payment for services provided; and responsibility for payment of non-covered services.      Mode of Communication:  Video Conference via addwish    Client specific details: Today's session focused on dim 6 sober support/boundaries, dim 5 Warning signs triggers and healthy goping:  dim 3 understanding and dealing with fears in healthy ways     client discussed from the reading that a \"negative tape or fear she often plays\" she often plays is about \"I am not good enough\" or \"I am always wrong\"  I asked her if the issues gets resolved that way and she said \"it sure doesn't\"  Group gave feedback and she said it was helpful. They suggested talking with someone or catching the negative self talk and changing it.  I also suggested she just be kind to self in general, as she is to others.      Hungery:  0 Angry: 0  Lonely: 0 Tired: 1   Anxiety: 1  Stress:  0  Depression:0  Cravings:  0 Motivation for sobriety:  10  for tx attendance:10  2 feelings:grateful, content  2 positives you have done in last week: caught my negative thoughts, changed my thoughts when stuck thinking about my ex  Something you are working on:  Sober support online, calling transplant team.    Client was able to name Triggers and healthy coping, including: don't talk with my ex, keep attending support    Client reported she liked the meditation on being where you are.     P) check online meetings to attend AA or SMART Recovery.  Call her transplant team . Assignment on anxiety    Peri Haynes, MS, LADC, LPC          "

## 2021-09-16 ASSESSMENT — ANXIETY QUESTIONNAIRES: GAD7 TOTAL SCORE: 7

## 2021-09-16 NOTE — ADDENDUM NOTE
Encounter addended by: Peri Haynes LADC on: 9/16/2021 10:43 AM   Actions taken: Clinical Note Signed

## 2021-09-20 ENCOUNTER — TELEPHONE (OUTPATIENT)
Dept: GASTROENTEROLOGY | Facility: CLINIC | Age: 56
End: 2021-09-20

## 2021-09-22 ENCOUNTER — HOSPITAL ENCOUNTER (OUTPATIENT)
Dept: BEHAVIORAL HEALTH | Facility: CLINIC | Age: 56
End: 2021-09-22
Attending: FAMILY MEDICINE
Payer: COMMERCIAL

## 2021-09-22 PROCEDURE — H2035 A/D TX PROGRAM, PER HOUR: HCPCS | Mod: HQ,95

## 2021-09-22 NOTE — GROUP NOTE
Group Therapy Documentation    PATIENT'S NAME: Viviana Schaefer  MRN:   [id number]  :   1965  [facility]. NUMBER: [id number]  DATE OF SERVICE: 21  START TIME: 12:00 PM  END TIME:  2:00 PM  FACILITATOR(S): Peri Haynes LADC  TOPIC: BEH Group Therapy  Number of patients attending the group:  6  Group Length:  2 Hours    Group Therapy Type: Addiction    Summary of Group / Topics Discussed:    Psychoeducation/Skills Cognitive Restructuring (TIFFANIE)  This topic will give a general overview of maladaptive patterns of thinking and techniques to change their thinking patterns to be supportive of health and recovery.    Objective(s):     Patients will identify three cognitive distortions    Patients will identify two ways to dispute distortions    Structure (modalities, homework, worksheets, etc):     Provide psychoeducation on cognitive distortions and disputations    Facilitate group discussion around each patient s cognitive distortions and impact of those thinking patterns    Expected therapeutic outcome(s):   Patient will:    Recognize and dispute distortions    Experience improved thinking and behavior    Therapeutic outcome(s) measured by:     Patient will name 2-3 cognitive distortions and ways to dispute them      Group Attendance:  Attended group session    Patient's response to the group topic/interactions:  discussed personal experience with topic    Patient appeared to be Actively participating and Attentive.           Telemedicine Visit: The patient's condition can be safely assessed and treated via synchronous audio and visual telemedicine encounter.      Reason for Telemedicine Visit: COVID-19     Originating Location (pt. Location): Home     Type of service:  Video Visit  GROUP    Originating Location (pt. Location): Home     Distant Location (provider location):  [facility] MENT[facility] HEALTH & ADDI[facility]      Consent:  The patient/guardian has verbally consented to: the potential  "risks and benefits of telemedicine (video visit) versus in person care; billing of their insurance or make self-payment for services provided; and responsibility for payment of non-covered services.      Mode of Communication:  Video Conference via Patient Communicator    Client specific details:  Viviana presented assignment On What my  relapse would look like.  It was in detail and included insights such as \"me blaming my ex for a relapse\" \"After using my anxiety kicked in and I htought it would be different this time\"  She said she has discussed many of these things but that it is a powerful exercise and helped her realize, she has support, she can change her choices, she can empower herself\"    Today's session focused on  Dim 5, \"what relapse look like\"  dim 3 understanding and dealing with fears/feelings in healthy ways, and dim 3 Cognitive distortions    client gave feedback on another's assignment, stating:     She discussed the answers to the group assignment:  Client was able to name back the 4 fears discussed:  undertainty and rejection, failure, success,  Some fears I have at this time in my life:relapsing, not being good enough, not seeing my son  Where in my body I feel the fear:  in my stomach, in my gut, in my throat  Feelings related to 1 or more of these fears: sad, dread, anxiety  Client expressed that .  Listening to the signals of the body can be helpful.    Client reported like the ending visualization on fear/feeling calmer:  It helped me get back to the moment    Client discussed 6 cognitive distortions with group and was able to name 3 and healthy coping for those       P)  Assignment on anxiety, discuss examples of discussion you notice over the week    Peri Haynes, MS, LADC, LPC          "
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
## 2021-09-23 NOTE — ADDENDUM NOTE
Encounter addended by: Peri Haynes LADC on: 9/23/2021 10:49 AM   Actions taken: Clinical Note Signed

## 2021-09-23 NOTE — ADDENDUM NOTE
Encounter addended by: Peri Haynes LADC on: 9/23/2021 10:16 AM   Actions taken: Clinical Note Signed

## 2021-09-26 ENCOUNTER — HEALTH MAINTENANCE LETTER (OUTPATIENT)
Age: 56
End: 2021-09-26

## 2021-09-27 DIAGNOSIS — K70.31 ALCOHOLIC CIRRHOSIS OF LIVER WITH ASCITES (H): Primary | ICD-10-CM

## 2021-09-29 ENCOUNTER — HOSPITAL ENCOUNTER (OUTPATIENT)
Dept: BEHAVIORAL HEALTH | Facility: CLINIC | Age: 56
End: 2021-09-29
Attending: FAMILY MEDICINE
Payer: COMMERCIAL

## 2021-09-29 PROCEDURE — H2035 A/D TX PROGRAM, PER HOUR: HCPCS | Mod: HQ,GT

## 2021-09-29 NOTE — GROUP NOTE
"Group Therapy Documentation    PATIENT'S NAME: Viviana Schaefer  MRN:   [id number]  :   1965  ACCT. NUMBER: [id number]  DATE OF SERVICE: 21  START TIME: 12:00 PM  END TIME:  2:00 PM  FACILITATOR(S): Peri Haynes LADC  TOPIC: BEH Group Therapy  Number of patients attending the group: 7  Group Length:  2 Hours    Group Therapy Type: Addiction    Summary of Group / Topics Discussed:    Today's group focused on clients feelings and how they are connected to warning signs, triggers and cravings.  Clients named feelings that often \"set them off\" or \"make them want to drink\" We also discussed how feelings are not facts for everyone else, but they are trying to tell self how one is doing. They are information givers.  Clients checked in on 2 feelings, any anxiety or depression symptoms they are having, issues to discuss and what they are grateful for.  I encouraged client feedback to each other, reminding them that it is helpful when given as information, not advice, as well as spoken  from the \"I\".  Clients said they could feel a difference in today's group.  We did a reading of \"passengers on my bus\"     This reading discussed  the passengers we carry with us: our memories, feelings, thoughts, and which ones are directing us and guiding us.  We discussed how some passengers will always be with us, but that does not mean they need to direct our bus.  I encouraged write down 5 minutes, anything that comes to mind, then after group to add to the list for next week.  At the end of the assignment I want them to write down \"after doing this, what do I want for myself going forward and how will I get it\"   Clients particpated in a 5 minute meditation on breath awareness and letting go of something weighing on them       Telemedicine Visit: The patient's condition can be safely assessed and treated via synchronous audio and visual telemedicine encounter.      Reason for Telemedicine Visit: COVID-19   " "  Originating Location (pt. Location): Home     Type of service:  Video Visit  GROUP    Originating Location (pt. Location): Home     Distant Location (provider location):  [facility]      Consent:  The patient/guardian has verbally consented to: the potential risks and benefits of telemedicine (video visit) versus in person care; billing of their insurance or make self-payment for services provided; and responsibility for payment of non-covered services.      Mode of Communication:  Video Conference via [employer]      Group Attendance:  Attended group session    Patient's response to the group topic/interactions:  cooperative with task    Patient appeared to be Actively participating.          Clients specific information:    Today's group focused on: dim 3 anxiety and depression symptoms and breath awareness.  It also focused on:     Clients named feelings that often \"set them off\" or \"make them want to drink\":     I reminded clients:  We also discussed how feelings are not facts for everyone else, but they are trying to tell self how one is doing. They are information givers.    \"No thoughts of harm\"  I have anxiety symptoms of:     depression symptoms:     1 helpful coping:    things I am grateful for:   Client described today's group as: \"different\" \"deep\" \"spiritual\" \"connecting\"  inspirational\"  \"serenity giving\"    Clients described what the 5 minute meditation on breath awareness and letting go of something weighing on them was like:    P;    At the end of the assignment given, for next week client will write down \"after doing this, what do I want for myself going forward and how will I get it\"       Peri Haynes, MS, LADC, LPC          "
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
## 2021-10-06 ENCOUNTER — HOSPITAL ENCOUNTER (OUTPATIENT)
Dept: BEHAVIORAL HEALTH | Facility: CLINIC | Age: 56
End: 2021-10-06
Attending: FAMILY MEDICINE
Payer: COMMERCIAL

## 2021-10-06 PROCEDURE — H2035 A/D TX PROGRAM, PER HOUR: HCPCS | Mod: GT

## 2021-10-06 PROCEDURE — H2035 A/D TX PROGRAM, PER HOUR: HCPCS | Mod: HQ,GT

## 2021-10-06 ASSESSMENT — ANXIETY QUESTIONNAIRES
2. NOT BEING ABLE TO STOP OR CONTROL WORRYING: SEVERAL DAYS
5. BEING SO RESTLESS THAT IT IS HARD TO SIT STILL: SEVERAL DAYS
6. BECOMING EASILY ANNOYED OR IRRITABLE: MORE THAN HALF THE DAYS
7. FEELING AFRAID AS IF SOMETHING AWFUL MIGHT HAPPEN: NOT AT ALL
GAD7 TOTAL SCORE: 8
IF YOU CHECKED OFF ANY PROBLEMS ON THIS QUESTIONNAIRE, HOW DIFFICULT HAVE THESE PROBLEMS MADE IT FOR YOU TO DO YOUR WORK, TAKE CARE OF THINGS AT HOME, OR GET ALONG WITH OTHER PEOPLE: SOMEWHAT DIFFICULT
3. WORRYING TOO MUCH ABOUT DIFFERENT THINGS: SEVERAL DAYS
1. FEELING NERVOUS, ANXIOUS, OR ON EDGE: MORE THAN HALF THE DAYS

## 2021-10-06 ASSESSMENT — PATIENT HEALTH QUESTIONNAIRE - PHQ9
5. POOR APPETITE OR OVEREATING: SEVERAL DAYS
SUM OF ALL RESPONSES TO PHQ QUESTIONS 1-9: 7
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
## 2021-10-06 NOTE — GROUP NOTE
Group Therapy Documentation    PATIENT'S NAME: Viviana Schaefer  MRN:   [id number]  :   1965  ACCT. NUMBER: [id number]  DATE OF SERVICE: 10/06/21  START TIME: 12:00 PM  END TIME:  2:00 PM  FACILITATOR(S): Peri Haynes LADC  TOPIC: BEH Group Therapy  Number of patients attending the group:  5  Group Length:  2 Hours    Group Therapy Type: Addiction and Psychoeducation    Summary of Group / Topics Discussed:    Psychoeducation/Skills Mindfulness in Action (IOP)  Learn what mindfulness is and how to practice it. Learn how to increase awareness of the present moment, thereby reducing ruminating thoughts and learn how to embrace negative emotions instead of suppressing or avoiding them. Consistent practice has been shown to decrease reactivity and help facilitate effective action to make positive changes in behavior.     Objective(s):    Identify 2 skills to increase awareness of the present moment to reduce negative impact of ruminating thoughts    Describe how to embrace negative emotions instead of suppressing or avoiding them.    Give 1 example of how mindfulness works in the brain to calm the Survival part of the Midbrain (which is overactive in addiction)    Give1 example of how mindfulness practice may decrease reactivity and facilitate effective action    Structure (modalities, homework, worksheets, etc.):    Complete  before and after  worksheet for mindfulness practice ([place] Exercise)    Participate in Awareness of Sounds meditation    Participate in Mindful Eating meditation    Complete Mindful Listening exercise with other group member(s) (Dyads or Triads)    Participate in Sitting Meditation     Expected therapeutic outcome(s):     Increased ability to remain in present moment    Increased ability to tolerate negative emotions without returning to addiction    Therapeutic outcome(s) measured by:   Teachback and group review and evaluation form.       Telemedicine Visit: The patient's  "condition can be safely assessed and treated via synchronous audio and visual telemedicine encounter.      Reason for Telemedicine Visit: COVID-19     Originating Location (pt. Location): Home     Type of service:  Video Visit  GROUP    Originating Location (pt. Location): Home     Distant Location (provider location):  [facility]      Consent:  The patient/guardian has verbally consented to: the potential risks and benefits of telemedicine (video visit) versus in person care; billing of their insurance or make self-payment for services provided; and responsibility for payment of non-covered services.      Mode of Communication:  Video Conference via Boracci      Group Attendance:  Attended group session    Patient's response to the group topic/interactions:  cooperative with task    Patient appeared to be Attentive.        Clients information:    Today's group focused on: dim 3 anxiety profile and dim 5 Recovery Care plan.   Client was able to name physical symptoms of her anxiety: stomach ache and increased heart rate.   Clients named feelings that often \"set them off\" or \"make them want to drink\": anxiety, my ex, fear.  She named \"the most helpful thing for me is: do one thing at a time\"    She discussed many things she has gained from treatment and will continue to use: support, challenging beliefs, using breath work and urge surfing, \"and so much more\"  She has goals of continuing AA, working with her care team, getting a sponsor, attending medical appointments, taking medications as directed.  She completed her Goodbye letter and group gave feedback \"touching\"  \"deep\" \"nice going\"  Group said their goodbyes and gave her feedback and well wishes.  She seemed grateful for the words and expressions.     Clients described what the 5 minute meditation on lovingkindness as helpful and touching, and a good way to end her time in treatment.      P;    Discharge from tx. " Counselor will send AC plan.    Peri Haynes MS, [facility], LPC

## 2021-10-07 ENCOUNTER — APPOINTMENT (OUTPATIENT)
Dept: GENERAL RADIOLOGY | Facility: CLINIC | Age: 56
End: 2021-10-07
Attending: EMERGENCY MEDICINE
Payer: COMMERCIAL

## 2021-10-07 ENCOUNTER — HOSPITAL ENCOUNTER (INPATIENT)
Facility: CLINIC | Age: 56
LOS: 5 days | Discharge: HOME HEALTH | End: 2021-10-12
Attending: EMERGENCY MEDICINE | Admitting: FAMILY MEDICINE
Payer: COMMERCIAL

## 2021-10-07 ENCOUNTER — APPOINTMENT (OUTPATIENT)
Dept: CT IMAGING | Facility: CLINIC | Age: 56
End: 2021-10-07
Attending: EMERGENCY MEDICINE
Payer: COMMERCIAL

## 2021-10-07 DIAGNOSIS — R50.9 FEVER, UNSPECIFIED FEVER CAUSE: ICD-10-CM

## 2021-10-07 DIAGNOSIS — E66.01 OBESITY, CLASS III, BMI 40-49.9 (MORBID OBESITY) (H): ICD-10-CM

## 2021-10-07 DIAGNOSIS — R51.9 NONINTRACTABLE HEADACHE, UNSPECIFIED CHRONICITY PATTERN, UNSPECIFIED HEADACHE TYPE: ICD-10-CM

## 2021-10-07 DIAGNOSIS — G25.81 RESTLESS LEG SYNDROME: ICD-10-CM

## 2021-10-07 DIAGNOSIS — J91.8 PLEURAL EFFUSION ASSOCIATED WITH HEPATIC DISORDER: ICD-10-CM

## 2021-10-07 DIAGNOSIS — N10 PYELONEPHRITIS, ACUTE: ICD-10-CM

## 2021-10-07 DIAGNOSIS — R50.9 FEVER: Primary | ICD-10-CM

## 2021-10-07 DIAGNOSIS — S80.212A ABRASION OF KNEE, LEFT, INITIAL ENCOUNTER: ICD-10-CM

## 2021-10-07 DIAGNOSIS — N17.9 AKI (ACUTE KIDNEY INJURY) (H): ICD-10-CM

## 2021-10-07 DIAGNOSIS — R60.1 ANASARCA: ICD-10-CM

## 2021-10-07 DIAGNOSIS — I50.9 ACUTE ON CHRONIC CONGESTIVE HEART FAILURE, UNSPECIFIED HEART FAILURE TYPE (H): ICD-10-CM

## 2021-10-07 DIAGNOSIS — K76.9 PLEURAL EFFUSION ASSOCIATED WITH HEPATIC DISORDER: ICD-10-CM

## 2021-10-07 DIAGNOSIS — R06.02 SOB (SHORTNESS OF BREATH): ICD-10-CM
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
## 2021-10-07 DIAGNOSIS — N83.9 LESION OF RIGHT OVARY: ICD-10-CM

## 2021-10-07 DIAGNOSIS — Z20.822 CONTACT WITH AND (SUSPECTED) EXPOSURE TO COVID-19: ICD-10-CM

## 2021-10-07 LAB
ALBUMIN SERPL-MCNC: 2 G/DL (ref 3.4–5)
ALP SERPL-CCNC: 91 U/L (ref 40–150)
ALT SERPL W P-5'-P-CCNC: 20 U/L (ref 0–50)
AMMONIA PLAS-SCNC: 47 UMOL/L (ref 10–50)
ANION GAP SERPL CALCULATED.3IONS-SCNC: 10 MMOL/L (ref 3–14)
AST SERPL W P-5'-P-CCNC: 34 U/L (ref 0–45)
BASOPHILS # BLD AUTO: 0.1 10E3/UL (ref 0–0.2)
BASOPHILS NFR BLD AUTO: 1 %
BILIRUB SERPL-MCNC: 6.5 MG/DL (ref 0.2–1.3)
BUN SERPL-MCNC: 25 MG/DL (ref 7–30)
CALCIUM SERPL-MCNC: 8.7 MG/DL (ref 8.5–10.1)
CHLORIDE BLD-SCNC: 106 MMOL/L (ref 94–109)
CO2 SERPL-SCNC: 23 MMOL/L (ref 20–32)
CREAT SERPL-MCNC: 1.49 MG/DL (ref 0.52–1.04)
EOSINOPHIL # BLD AUTO: 0.1 10E3/UL (ref 0–0.7)
EOSINOPHIL NFR BLD AUTO: 1 %
ERYTHROCYTE [DISTWIDTH] IN BLOOD BY AUTOMATED COUNT: 14.9 % (ref 10–15)
ETHANOL SERPL-MCNC: <0.01 G/DL
GFR SERPL CREATININE-BSD FRML MDRD: 40 ML/MIN/1.73M2
GLUCOSE BLD-MCNC: 123 MG/DL (ref 70–99)
HCT VFR BLD AUTO: 29.6 % (ref 35–47)
HGB BLD-MCNC: 9.7 G/DL (ref 11.7–15.7)
IMM GRANULOCYTES # BLD: 0.2 10E3/UL
IMM GRANULOCYTES NFR BLD: 1 %
INR PPP: 2.22 (ref 0.85–1.15)
LIPASE SERPL-CCNC: 83 U/L (ref 73–393)
LYMPHOCYTES # BLD AUTO: 0.5 10E3/UL (ref 0.8–5.3)
LYMPHOCYTES NFR BLD AUTO: 5 %
MCH RBC QN AUTO: 35.7 PG (ref 26.5–33)
MCHC RBC AUTO-ENTMCNC: 32.8 G/DL (ref 31.5–36.5)
MCV RBC AUTO: 109 FL (ref 78–100)
MONOCYTES # BLD AUTO: 1.3 10E3/UL (ref 0–1.3)
MONOCYTES NFR BLD AUTO: 12 %
NEUTROPHILS # BLD AUTO: 8.3 10E3/UL (ref 1.6–8.3)
NEUTROPHILS NFR BLD AUTO: 80 %
NRBC # BLD AUTO: 0 10E3/UL
NRBC BLD AUTO-RTO: 0 /100
NT-PROBNP SERPL-MCNC: 1204 PG/ML (ref 0–900)
PLATELET # BLD AUTO: 96 10E3/UL (ref 150–450)
POTASSIUM BLD-SCNC: 3.8 MMOL/L (ref 3.4–5.3)
PROT SERPL-MCNC: 5.9 G/DL (ref 6.8–8.8)
RBC # BLD AUTO: 2.72 10E6/UL (ref 3.8–5.2)
SARS-COV-2 RNA RESP QL NAA+PROBE: NEGATIVE
SODIUM SERPL-SCNC: 139 MMOL/L (ref 133–144)
TROPONIN I SERPL-MCNC: <0.015 UG/L (ref 0–0.04)
WBC # BLD AUTO: 10.4 10E3/UL (ref 4–11)

## 2021-10-07 PROCEDURE — 85025 COMPLETE CBC W/AUTO DIFF WBC: CPT | Performed by: EMERGENCY MEDICINE

## 2021-10-07 PROCEDURE — 70450 CT HEAD/BRAIN W/O DYE: CPT | Mod: 26 | Performed by: RADIOLOGY

## 2021-10-07 PROCEDURE — 120N000011 HC R&B TRANSPLANT UMMC

## 2021-10-07 PROCEDURE — 82140 ASSAY OF AMMONIA: CPT | Performed by: EMERGENCY MEDICINE

## 2021-10-07 PROCEDURE — 93005 ELECTROCARDIOGRAM TRACING: CPT | Performed by: EMERGENCY MEDICINE

## 2021-10-07 PROCEDURE — 84484 ASSAY OF TROPONIN QUANT: CPT | Performed by: EMERGENCY MEDICINE

## 2021-10-07 PROCEDURE — 80053 COMPREHEN METABOLIC PANEL: CPT | Performed by: EMERGENCY MEDICINE

## 2021-10-07 PROCEDURE — 83880 ASSAY OF NATRIURETIC PEPTIDE: CPT | Performed by: EMERGENCY MEDICINE

## 2021-10-07 PROCEDURE — 70450 CT HEAD/BRAIN W/O DYE: CPT

## 2021-10-07 PROCEDURE — 99285 EMERGENCY DEPT VISIT HI MDM: CPT | Mod: 25 | Performed by: EMERGENCY MEDICINE

## 2021-10-07 PROCEDURE — 83690 ASSAY OF LIPASE: CPT | Performed by: EMERGENCY MEDICINE

## 2021-10-07 PROCEDURE — 84145 PROCALCITONIN (PCT): CPT | Performed by: EMERGENCY MEDICINE

## 2021-10-07 PROCEDURE — 250N000011 HC RX IP 250 OP 636: Performed by: EMERGENCY MEDICINE

## 2021-10-07 PROCEDURE — 71045 X-RAY EXAM CHEST 1 VIEW: CPT | Mod: 26 | Performed by: RADIOLOGY

## 2021-10-07 PROCEDURE — 87149 DNA/RNA DIRECT PROBE: CPT | Performed by: EMERGENCY MEDICINE

## 2021-10-07 PROCEDURE — 71045 X-RAY EXAM CHEST 1 VIEW: CPT

## 2021-10-07 PROCEDURE — C9803 HOPD COVID-19 SPEC COLLECT: HCPCS | Performed by: EMERGENCY MEDICINE

## 2021-10-07 PROCEDURE — 93010 ELECTROCARDIOGRAM REPORT: CPT | Performed by: EMERGENCY MEDICINE

## 2021-10-07 PROCEDURE — 96375 TX/PRO/DX INJ NEW DRUG ADDON: CPT | Performed by: EMERGENCY MEDICINE

## 2021-10-07 PROCEDURE — 36415 COLL VENOUS BLD VENIPUNCTURE: CPT | Performed by: EMERGENCY MEDICINE

## 2021-10-07 PROCEDURE — 85610 PROTHROMBIN TIME: CPT | Performed by: EMERGENCY MEDICINE

## 2021-10-07 PROCEDURE — 82077 ASSAY SPEC XCP UR&BREATH IA: CPT | Performed by: EMERGENCY MEDICINE

## 2021-10-07 PROCEDURE — 87186 SC STD MICRODIL/AGAR DIL: CPT | Performed by: EMERGENCY MEDICINE

## 2021-10-07 PROCEDURE — U0003 INFECTIOUS AGENT DETECTION BY NUCLEIC ACID (DNA OR RNA); SEVERE ACUTE RESPIRATORY SYNDROME CORONAVIRUS 2 (SARS-COV-2) (CORONAVIRUS DISEASE [COVID-19]), AMPLIFIED PROBE TECHNIQUE, MAKING USE OF HIGH THROUGHPUT TECHNOLOGIES AS DESCRIBED BY CMS-2020-01-R: HCPCS | Performed by: EMERGENCY MEDICINE

## 2021-10-07 RX ORDER — FUROSEMIDE 10 MG/ML
20 INJECTION INTRAMUSCULAR; INTRAVENOUS ONCE
Status: COMPLETED | OUTPATIENT
Start: 2021-10-07 | End: 2021-10-07

## 2021-10-07 RX ADMIN — FUROSEMIDE 20 MG: 10 INJECTION, SOLUTION INTRAVENOUS at 23:20

## 2021-10-07 ASSESSMENT — ANXIETY QUESTIONNAIRES: GAD7 TOTAL SCORE: 8

## 2021-10-07 NOTE — LETTER
[facility] [address] [address]  [phone]    FACSIMILE TRANSMITTAL SHEET    TO: [facility] [phone], Fax [phone]    _____URGENT _____REVIEW ONLY _____PLEASE COMMENT____PLEASE REPLY    NOTES/COMMENTS: Attached please find final discharge orders.    Gisella Helm, RN BSN, PHN, ACM-RN  7A RN Care Coordinator  Phone: [phone]  Pager [phone]    10/12/2021 4:02 PM                                        IF YOU DID NOT RECEIVE THE CORRECT NUMBER OF PAGES OR THE FAX DID NOT COME THROUGH CLEARLY, PLEASE CALL THE SENDER     CONFIDENTIALITY STATEMENT: Confidential information that may accompany this transmission contains protected health information under state and federal law and is legally privileged. This information is intended only for the use of the individual or entity named above and may be used only for carrying out treatment, payment or other healthcare operations. The recipient or person responsible for delivering this information is prohibited by law from disclosing this information without proper authorization to any other party, unless required to do so by law or regulation. If you are not the intended recipient, you are hereby notified that any review, dissemination, distribution, or copying of this message is strictly prohibited. If you have received this communication in error, please destroy the materials and contact us immediately by calling the number listed above. No response indicates that the information was received by the appropriate authorized party

## 2021-10-07 NOTE — PROGRESS NOTES
Adult Mental Health Intensive Outpatient Discharge Summary/Instructions      Patient: Viviana Schaefer MRN: [id number]   : 1965 Age: 55 year old Sex: female       Focus of Treatment / Progress    Personal Safety:     * Follow your safety plan given at admission     * Call crisis lines as needed:    [facility] [place] [phone] [facility] [phone]  [facility] [phone] Crisis Connection [phone]  [facility] [phone] [facility] [phone]  [facility] [phone] National Suicide Prevention [phone]  [facility] [phone] Suicide Prevention [phone]  [facility] [phone]    Managing symptoms of:  Substance abuse, anxiety, depression    Community support/health:  As needed    Managing Symptoms and Preventing Relapse    * Go to all of your appointments    * Take all medications as directed.  Busbar and Buperione      * Carry a current list if medication with you    * Do not use illicit (street) drugs.  Avoid alcohol    * Report these symptoms to your care team. These are early signs of relapse:   Thoughts of suicide   Losing more sleep   Increased confusion   Mood getting worse   Feeling more aggressive       *Use these skills daily:  Conscious breath, daily meditation and reading, abstinence, check in with feelings     Copy of summary sent to: [facility]    Follow up with psychiatrist / main caregiver:    Next visit: January, or before if you need anything    Follow up with your therapist: None at this time, but seek if you feel you need one/want one as we have discussed       Go to group therapy and / or support groups at: AA online    See your medical doctor about:  Any medical issues, continue to work with transplant team    Other:  Continue to set good boundaries.  Use healthy coping like conscious breath, urge surfing, looking at false beliefs.    Have fun, enjoy sobriety, be kind to self.    Client Signature:_______________________  Date /  Time:___________  Staff Signature:________________________   Date / Time:___________

## 2021-10-07 NOTE — ADDENDUM NOTE
Encounter addended by: Peri Haynes LADC on: 10/7/2021 9:31 AM   Actions taken: Pend clinical note, Clinical Note Signed

## 2021-10-07 NOTE — PROGRESS NOTES
NAME: Viviana Schaefer  MR:  [id number]    EVALUATION COUNSELOR:JS Whitman  TREATMENT COUNSELOR:   Peri Haynes MS, [facility], [facility]  REFERRAL SOURCE: Self  PROGRAM: [facility],  program  ADMISSION DATE: 03/22/2021  DISCHARGE DATE:10/6/21  ADMISSION DIAGNOSIS: Alcohol Use Disorder, severe (303.9) (F10.20)  DISCHARGE DIAGNOSIS:Alchol Use Disorder, severe, In early remission (303.9) (F10.20)  DISCHARGE STATUS:01              Completed Program,  LAST USE DATE: self reported as 2/21/21  She has shown no signs of use or withdrawal  HOURS OF TREATMENT COMPLETED: 107    PRESENTING INFORMATION: See the diagnostic summary for complete information. She sought tx after testing positive on the PEth test with her transplant team.  In order to be eligible for a liver, she needed to complete a program.     SERVICES PROVIDED:  Services provided included diagnostic assessment,initial service session/orientation session, treatment planning, group therapy, addiction education, introduction to 12-steps, and individual sessions.   Attended group counseling during which time he was exposed to a variety of therapeutic techniques including : behavior modification, cognitive behavioral therapy, motivational enhancement therapy, relapse prevention, counselor feedback, and group feedback.   Client was also exposed to a variety of topics or skills: which included, but not limited to: spiritual care, communication and relationship, meditation, stress reduction, relapse prevention, urge surfing, breath work    ISSUES ADDRESSED IN TREATMENT: (under EACH dimension there will be information on ct. Problems, needs, strength while participating in tx, tx services provided, progress toward achieving each tx plan goal identified in tx plan, conclusions and risk rating)    DIMENSION 1/ACUTE WITHDRAWAL ISSUES/DETOX: Risk Rating at entry: 0 at discharge:0    Client completed all sessions and assignments.  She showed no signs  "of use or withdrawal in treatment and reports last use date as 2/21/21    DIMENSION 2/BIOMEDICAL: Risk rating at entry:0, at discharge: 0.    She reports access to good medical care and willingness to use it as needed.  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 her Primary DrElias Recommends her taking D3 because she does not get outside much,    DIMENSION 3/EMOTIONAL BEHAVIORAL:  Risk rating at entry:3, at discharge 1.  She completed CECE -7, scoring 8/21, indicating mild anxiety and PHQ-9, scoring 7 of 17  indicating mild depression.  She reports no thoughts of self harm.  She feels her scores are a bit elevated due to having an upcoming appointment with her care team to discuss her MELD scores and the fact she is completing treatment.  She feels her medications, Busbar and Buprione are very helpful.  Client said she has sleep issues, but doesn't like how Trazadone makes her feel.  She said she will continue to work through this with her psychiatrist.  Client named 3 benefits she could see of meditaiton:  improve concentration, reduce stress,  helping with stress reduction.  Client expressed desire to learn more and practice more, \"I am open to mindfulness and the benefits\"  Client completed MH skills sessions with therapist Deyvi Jama and reported she gained many good skills and information.  Client reports she uses urge surfing, conscious breathing, checking her \"distortions\" and calling others regularly for support. Client said shse is open to pursiing a therapist if she feels she needs it, and knows she will likely do that      DIMENSION 4/READINESS TO CHANGE:  Risk rating at entry:1, at discharge:0.  At , she reported \"I will do what I have to do for my health\" but by a short time in treatment, client was invested and reporting how helpful the group, counselor and assignments were.  She reports motivation for sobriety and treatment 10/10. We went over her goals and discussed readiness for " "treatment completion. She feels she will be ready on 9/29 and \"I love the group, but know it is getting to be time\"Client is completing assignments and reporting how helpful group and assignments are to her healing and growth. She working on her consequences of use assignment, and was able to name numerous consequences related to use.  Many consequences were related to her relationships physical health and emotional health.    She reports wanting to be sober to be better for her family, self and a more quality life.  Client completed an assignment on powerlessness and unmanagability and reported \"I am definitely powerless over alcohol and I know using any just gets me into the games of it all and the cycle continues\"    DIMENSION5/RELAPSE AND CONTINUES PROBLEM POTENTIAL:  Risk rating at entry:2, at discharge 0. Client was able to name warning signs and triggers, which included self doubt, getting caught up in power struggles with her ex , and not setting good boundaries with people.  She said her biggest trigger is not trusting self and believing in self.   She reports her anxeity is a trigger for use. She is able to name healthy coping many times throughout treatment and \"thanks for teaching me these things\"  Viviana reports she is not having cravings hardly ever and when she does she is able to deal with it with urge surfing, breath work or doing something different  he reports she rarely has cravings and feels good about not drinking. She completed an excellent assignment on \"what my relapse would look like\" and helpful ways to prevent it, including continuing therapy, finding sober support, being honest with herself.  She wrote a beautiful \"goodbye to alcohol letter\", choking up as she read it to his group.  She reported it was a tough assignment becauses she realized all her wasted time with \"this thing\".  She also completed a Recovery CA[facility] plan which included using urge surfing, mindfulness, checking in " "with feelings, attending AA, taking her medications as directed  and seeking therapy as needed.  She had short term and long term goals, and both included sobriety.  I discussed withdrawal symptoms and meaning of tolerance, asking client for examples after:  Client was able to give 2 examples of tolerance in her life and 2 of withdrawal:  In 2018, I would withdraw if I didn't drink in middle of night, or have a shot before going to work.      DIMENSION6/RECOVERY ENVIRONMENT: Risk rating at entry: 2 at discharge 0.   While she was in treatment, she received resources related to numerous support, including AA, Celebrate Recovery, Smart recovery and many more.  Toward the end of her treatment she bagan attending AA online meetings and reported \"I should have been doing this earlier as suggested\"  \"I like the meetings and will continue\".  She completed assignments on Stress and self-esteem and reported finding them helpful.  He said he will use scales to measure his stress and help them make good coping decisions.  She worked on assignments related to boundary setting and Client said of the analogy of the home boundaries vs our personal boundaries:  Good analogy.  This makes a lot of sense.  This was helpful.  Client was able to teach back at least 3 of the 5 things what we are responsible for: our own actions, my own feelings    STRENGTHS:  Regular Attendance, able to give and receive feedback, completed assignments, open and willing to new information    PROGNOSIS (use the prognosis criteria listed on the tx plan cover sheet))    Favorable    LIVING ARRANGEMENTS AT DISCHARGE:  alone    CONTINUING CARE RECOMMENDATIONS/REFERRALS: Continue to use helpful coping like mindfulness, urge surfing, check in with feelings. Take your medications as directed.  Seek therapy as needed for ongoing support.  Continue attending community support like AA.  Work with your transplant team and follow recommendations.      Peri VIRGEN" [place], MS, LADC, LPC

## 2021-10-07 NOTE — ADDENDUM NOTE
Encounter addended by: Peri Haynes LADC on: 10/7/2021 12:06 PM   Actions taken: Clinical Note Signed

## 2021-10-08 ENCOUNTER — APPOINTMENT (OUTPATIENT)
Dept: OCCUPATIONAL THERAPY | Facility: CLINIC | Age: 56
End: 2021-10-08
Payer: COMMERCIAL

## 2021-10-08 ENCOUNTER — APPOINTMENT (OUTPATIENT)
Dept: PHYSICAL THERAPY | Facility: CLINIC | Age: 56
End: 2021-10-08
Payer: COMMERCIAL

## 2021-10-08 PROBLEM — N17.9 AKI (ACUTE KIDNEY INJURY) (H): Status: ACTIVE | Noted: 2021-10-08

## 2021-10-08 PROBLEM — D68.9 COAGULOPATHY (H): Status: ACTIVE | Noted: 2019-03-12

## 2021-10-08 PROBLEM — Q61.5 MEDULLARY SPONGE KIDNEY: Status: ACTIVE | Noted: 2019-03-16

## 2021-10-08 LAB
ACINETOBACTER SPECIES: NOT DETECTED
ALBUMIN SERPL-MCNC: 1.9 G/DL (ref 3.4–5)
ALBUMIN UR-MCNC: 50 MG/DL
ALP SERPL-CCNC: 76 U/L (ref 40–150)
ALT SERPL W P-5'-P-CCNC: 15 U/L (ref 0–50)
AMORPH CRY #/AREA URNS HPF: ABNORMAL /HPF
ANION GAP SERPL CALCULATED.3IONS-SCNC: 6 MMOL/L (ref 3–14)
APPEARANCE UR: ABNORMAL
AST SERPL W P-5'-P-CCNC: 33 U/L (ref 0–45)
ATRIAL RATE - MUSE: 89 BPM
BACTERIA #/AREA URNS HPF: ABNORMAL /HPF
BILIRUB SERPL-MCNC: 5.5 MG/DL (ref 0.2–1.3)
BILIRUB UR QL STRIP: NEGATIVE
BUN SERPL-MCNC: 29 MG/DL (ref 7–30)
C PNEUM DNA SPEC QL NAA+PROBE: NOT DETECTED
CALCIUM SERPL-MCNC: 8.4 MG/DL (ref 8.5–10.1)
CHLORIDE BLD-SCNC: 107 MMOL/L (ref 94–109)
CITROBACTER SPECIES: NOT DETECTED
CO2 SERPL-SCNC: 26 MMOL/L (ref 20–32)
COLOR UR AUTO: YELLOW
CREAT SERPL-MCNC: 1.62 MG/DL (ref 0.52–1.04)
CREAT UR-MCNC: 84 MG/DL
CTX-M: NOT DETECTED
DIASTOLIC BLOOD PRESSURE - MUSE: NORMAL MMHG
ENTEROBACTER SPECIES: NOT DETECTED
ENTEROCOCCUS FAECALIS: NOT DETECTED
ENTEROCOCCUS FAECIUM: NOT DETECTED
ERYTHROCYTE [DISTWIDTH] IN BLOOD BY AUTOMATED COUNT: 14.8 % (ref 10–15)
ESCHERICHIA COLI: DETECTED
FLUAV H1 2009 PAND RNA SPEC QL NAA+PROBE: NOT DETECTED
FLUAV H1 RNA SPEC QL NAA+PROBE: NOT DETECTED
FLUAV H3 RNA SPEC QL NAA+PROBE: NOT DETECTED
FLUAV RNA SPEC QL NAA+PROBE: NOT DETECTED
FLUBV RNA SPEC QL NAA+PROBE: NOT DETECTED
FRACT EXCRET NA UR+SERPL-RTO: 0.4 %
GFR SERPL CREATININE-BSD FRML MDRD: 36 ML/MIN/1.73M2
GLUCOSE BLD-MCNC: 110 MG/DL (ref 70–99)
GLUCOSE UR STRIP-MCNC: NEGATIVE MG/DL
GRANULAR CAST: 1 /LPF
HADV DNA SPEC QL NAA+PROBE: NOT DETECTED
HCOV PNL SPEC NAA+PROBE: NOT DETECTED
HCT VFR BLD AUTO: 27 % (ref 35–47)
HGB BLD-MCNC: 8.9 G/DL (ref 11.7–15.7)
HGB UR QL STRIP: ABNORMAL
HMPV RNA SPEC QL NAA+PROBE: NOT DETECTED
HPIV1 RNA SPEC QL NAA+PROBE: NOT DETECTED
HPIV2 RNA SPEC QL NAA+PROBE: NOT DETECTED
HPIV3 RNA SPEC QL NAA+PROBE: NOT DETECTED
HPIV4 RNA SPEC QL NAA+PROBE: NOT DETECTED
IMP: NOT DETECTED
INR PPP: 2.21 (ref 0.85–1.15)
INTERPRETATION ECG - MUSE: NORMAL
KETONES UR STRIP-MCNC: NEGATIVE MG/DL
KLEBSIELLA OXYTOCA: NOT DETECTED
KLEBSIELLA PNEUMONIAE: NOT DETECTED
KPC: NOT DETECTED
LACTATE SERPL-SCNC: 1.3 MMOL/L (ref 0.7–2)
LEUKOCYTE ESTERASE UR QL STRIP: ABNORMAL
LISTERIA SPECIES (DETECTED/NOT DETECTED): NOT DETECTED
M PNEUMO DNA SPEC QL NAA+PROBE: NOT DETECTED
MCH RBC QN AUTO: 35.7 PG (ref 26.5–33)
MCHC RBC AUTO-ENTMCNC: 33 G/DL (ref 31.5–36.5)
MCV RBC AUTO: 108 FL (ref 78–100)
MRSA DNA SPEC QL NAA+PROBE: NEGATIVE
MUCOUS THREADS #/AREA URNS LPF: PRESENT /LPF
NDM: NOT DETECTED
NITRATE UR QL: NEGATIVE
OXA (DETECTED/NOT DETECTED): NOT DETECTED
P AXIS - MUSE: 39 DEGREES
PH UR STRIP: 5.5 [PH] (ref 5–7)
PLATELET # BLD AUTO: 91 10E3/UL (ref 150–450)
POTASSIUM BLD-SCNC: 3.8 MMOL/L (ref 3.4–5.3)
PR INTERVAL - MUSE: 174 MS
PROCALCITONIN SERPL-MCNC: 0.9 NG/ML
PROT SERPL-MCNC: 5.6 G/DL (ref 6.8–8.8)
PROTEUS SPECIES: NOT DETECTED
PSEUDOMONAS AERUGINOSA: NOT DETECTED
QRS DURATION - MUSE: 86 MS
QT - MUSE: 378 MS
QTC - MUSE: 459 MS
R AXIS - MUSE: -5 DEGREES
RBC # BLD AUTO: 2.49 10E6/UL (ref 3.8–5.2)
RBC URINE: 38 /HPF
RSV RNA SPEC QL NAA+PROBE: NOT DETECTED
RSV RNA SPEC QL NAA+PROBE: NOT DETECTED
RV+EV RNA SPEC QL NAA+PROBE: NOT DETECTED
SA TARGET DNA: POSITIVE
SODIUM SERPL-SCNC: 139 MMOL/L (ref 133–144)
SODIUM UR-SCNC: 27 MMOL/L
SP GR UR STRIP: 1.01 (ref 1–1.03)
SQUAMOUS EPITHELIAL: <1 /HPF
STAPHYLOCOCCUS AUREUS: NOT DETECTED
STAPHYLOCOCCUS EPIDERMIDIS: NOT DETECTED
STAPHYLOCOCCUS LUGDUNENSIS: NOT DETECTED
STAPHYLOCOCCUS SPECIES: DETECTED
STREPTOCOCCUS AGALACTIAE: NOT DETECTED
STREPTOCOCCUS ANGINOSUS GROUP: NOT DETECTED
STREPTOCOCCUS PNEUMONIAE: NOT DETECTED
STREPTOCOCCUS PYOGENES: NOT DETECTED
STREPTOCOCCUS SPECIES: NOT DETECTED
SYSTOLIC BLOOD PRESSURE - MUSE: NORMAL MMHG
T AXIS - MUSE: 47 DEGREES
UROBILINOGEN UR STRIP-MCNC: NORMAL MG/DL
VENTRICULAR RATE- MUSE: 89 BPM
VIM: NOT DETECTED
WBC # BLD AUTO: 10 10E3/UL (ref 4–11)
WBC CLUMPS #/AREA URNS HPF: PRESENT /HPF
WBC URINE: 142 /HPF

## 2021-10-08 PROCEDURE — 258N000003 HC RX IP 258 OP 636: Performed by: STUDENT IN AN ORGANIZED HEALTH CARE EDUCATION/TRAINING PROGRAM

## 2021-10-08 PROCEDURE — 82570 ASSAY OF URINE CREATININE: CPT | Performed by: STUDENT IN AN ORGANIZED HEALTH CARE EDUCATION/TRAINING PROGRAM

## 2021-10-08 PROCEDURE — 99223 1ST HOSP IP/OBS HIGH 75: CPT | Mod: AI | Performed by: FAMILY MEDICINE

## 2021-10-08 PROCEDURE — 97535 SELF CARE MNGMENT TRAINING: CPT | Mod: GO | Performed by: OCCUPATIONAL THERAPIST

## 2021-10-08 PROCEDURE — 87899 AGENT NOS ASSAY W/OPTIC: CPT | Performed by: STUDENT IN AN ORGANIZED HEALTH CARE EDUCATION/TRAINING PROGRAM

## 2021-10-08 PROCEDURE — 258N000003 HC RX IP 258 OP 636

## 2021-10-08 PROCEDURE — 84300 ASSAY OF URINE SODIUM: CPT | Performed by: STUDENT IN AN ORGANIZED HEALTH CARE EDUCATION/TRAINING PROGRAM

## 2021-10-08 PROCEDURE — 96367 TX/PROPH/DG ADDL SEQ IV INF: CPT | Performed by: EMERGENCY MEDICINE

## 2021-10-08 PROCEDURE — 96365 THER/PROPH/DIAG IV INF INIT: CPT | Performed by: EMERGENCY MEDICINE

## 2021-10-08 PROCEDURE — 120N000011 HC R&B TRANSPLANT UMMC

## 2021-10-08 PROCEDURE — 97110 THERAPEUTIC EXERCISES: CPT | Mod: GP

## 2021-10-08 PROCEDURE — 97140 MANUAL THERAPY 1/> REGIONS: CPT | Mod: GO | Performed by: OCCUPATIONAL THERAPIST

## 2021-10-08 PROCEDURE — 85610 PROTHROMBIN TIME: CPT | Performed by: STUDENT IN AN ORGANIZED HEALTH CARE EDUCATION/TRAINING PROGRAM

## 2021-10-08 PROCEDURE — 36415 COLL VENOUS BLD VENIPUNCTURE: CPT | Performed by: STUDENT IN AN ORGANIZED HEALTH CARE EDUCATION/TRAINING PROGRAM

## 2021-10-08 PROCEDURE — 81003 URINALYSIS AUTO W/O SCOPE: CPT | Performed by: STUDENT IN AN ORGANIZED HEALTH CARE EDUCATION/TRAINING PROGRAM

## 2021-10-08 PROCEDURE — 250N000011 HC RX IP 250 OP 636: Performed by: STUDENT IN AN ORGANIZED HEALTH CARE EDUCATION/TRAINING PROGRAM

## 2021-10-08 PROCEDURE — 97530 THERAPEUTIC ACTIVITIES: CPT | Mod: GP

## 2021-10-08 PROCEDURE — 250N000011 HC RX IP 250 OP 636: Performed by: EMERGENCY MEDICINE

## 2021-10-08 PROCEDURE — P9047 ALBUMIN (HUMAN), 25%, 50ML: HCPCS | Performed by: STUDENT IN AN ORGANIZED HEALTH CARE EDUCATION/TRAINING PROGRAM

## 2021-10-08 PROCEDURE — 250N000011 HC RX IP 250 OP 636: Performed by: FAMILY MEDICINE

## 2021-10-08 PROCEDURE — 82040 ASSAY OF SERUM ALBUMIN: CPT | Performed by: STUDENT IN AN ORGANIZED HEALTH CARE EDUCATION/TRAINING PROGRAM

## 2021-10-08 PROCEDURE — 83605 ASSAY OF LACTIC ACID: CPT | Performed by: STUDENT IN AN ORGANIZED HEALTH CARE EDUCATION/TRAINING PROGRAM

## 2021-10-08 PROCEDURE — 97116 GAIT TRAINING THERAPY: CPT | Mod: GP

## 2021-10-08 PROCEDURE — 87633 RESP VIRUS 12-25 TARGETS: CPT | Performed by: STUDENT IN AN ORGANIZED HEALTH CARE EDUCATION/TRAINING PROGRAM

## 2021-10-08 PROCEDURE — 96366 THER/PROPH/DIAG IV INF ADDON: CPT | Performed by: EMERGENCY MEDICINE

## 2021-10-08 PROCEDURE — 97161 PT EVAL LOW COMPLEX 20 MIN: CPT | Mod: GP

## 2021-10-08 PROCEDURE — 87641 MR-STAPH DNA AMP PROBE: CPT | Performed by: STUDENT IN AN ORGANIZED HEALTH CARE EDUCATION/TRAINING PROGRAM

## 2021-10-08 PROCEDURE — 87086 URINE CULTURE/COLONY COUNT: CPT | Performed by: STUDENT IN AN ORGANIZED HEALTH CARE EDUCATION/TRAINING PROGRAM

## 2021-10-08 PROCEDURE — 85027 COMPLETE CBC AUTOMATED: CPT | Performed by: STUDENT IN AN ORGANIZED HEALTH CARE EDUCATION/TRAINING PROGRAM

## 2021-10-08 PROCEDURE — 87581 M.PNEUMON DNA AMP PROBE: CPT | Performed by: STUDENT IN AN ORGANIZED HEALTH CARE EDUCATION/TRAINING PROGRAM

## 2021-10-08 PROCEDURE — 250N000013 HC RX MED GY IP 250 OP 250 PS 637: Performed by: STUDENT IN AN ORGANIZED HEALTH CARE EDUCATION/TRAINING PROGRAM

## 2021-10-08 PROCEDURE — 82610 CYSTATIN C: CPT | Performed by: STUDENT IN AN ORGANIZED HEALTH CARE EDUCATION/TRAINING PROGRAM

## 2021-10-08 PROCEDURE — 97165 OT EVAL LOW COMPLEX 30 MIN: CPT | Mod: GO | Performed by: OCCUPATIONAL THERAPIST

## 2021-10-08 PROCEDURE — 250N000013 HC RX MED GY IP 250 OP 250 PS 637: Performed by: EMERGENCY MEDICINE

## 2021-10-08 RX ORDER — PIPERACILLIN SODIUM, TAZOBACTAM SODIUM 4; .5 G/20ML; G/20ML
4.5 INJECTION, POWDER, LYOPHILIZED, FOR SOLUTION INTRAVENOUS EVERY 6 HOURS
Status: DISCONTINUED | OUTPATIENT
Start: 2021-10-08 | End: 2021-10-10

## 2021-10-08 RX ORDER — PANTOPRAZOLE SODIUM 40 MG/1
40 TABLET, DELAYED RELEASE ORAL DAILY
Status: DISCONTINUED | OUTPATIENT
Start: 2021-10-08 | End: 2021-10-12 | Stop reason: HOSPADM

## 2021-10-08 RX ORDER — FOLIC ACID 1 MG/1
1 TABLET ORAL DAILY
Status: DISCONTINUED | OUTPATIENT
Start: 2021-10-08 | End: 2021-10-12 | Stop reason: HOSPADM

## 2021-10-08 RX ORDER — VITAMIN B COMPLEX
50 TABLET ORAL DAILY
Status: DISCONTINUED | OUTPATIENT
Start: 2021-10-08 | End: 2021-10-12 | Stop reason: HOSPADM

## 2021-10-08 RX ORDER — CEFTRIAXONE 2 G/1
2 INJECTION, POWDER, FOR SOLUTION INTRAMUSCULAR; INTRAVENOUS ONCE
Status: COMPLETED | OUTPATIENT
Start: 2021-10-08 | End: 2021-10-08

## 2021-10-08 RX ORDER — SPIRONOLACTONE 50 MG/1
50 TABLET, FILM COATED ORAL DAILY
Status: DISCONTINUED | OUTPATIENT
Start: 2021-10-08 | End: 2021-10-12 | Stop reason: HOSPADM

## 2021-10-08 RX ORDER — BUSPIRONE HYDROCHLORIDE 7.5 MG/1
7.5 TABLET ORAL 2 TIMES DAILY
Status: DISCONTINUED | OUTPATIENT
Start: 2021-10-08 | End: 2021-10-12 | Stop reason: HOSPADM

## 2021-10-08 RX ORDER — ONDANSETRON 2 MG/ML
4 INJECTION INTRAMUSCULAR; INTRAVENOUS EVERY 6 HOURS PRN
Status: DISCONTINUED | OUTPATIENT
Start: 2021-10-08 | End: 2021-10-12 | Stop reason: HOSPADM

## 2021-10-08 RX ORDER — LIDOCAINE 40 MG/G
CREAM TOPICAL
Status: DISCONTINUED | OUTPATIENT
Start: 2021-10-08 | End: 2021-10-12 | Stop reason: HOSPADM

## 2021-10-08 RX ORDER — AZITHROMYCIN 250 MG/1
500 TABLET, FILM COATED ORAL ONCE
Status: COMPLETED | OUTPATIENT
Start: 2021-10-08 | End: 2021-10-08

## 2021-10-08 RX ORDER — ALBUMIN (HUMAN) 12.5 G/50ML
100 SOLUTION INTRAVENOUS
Status: COMPLETED | OUTPATIENT
Start: 2021-10-08 | End: 2021-10-10

## 2021-10-08 RX ORDER — BUPROPION HYDROCHLORIDE 150 MG/1
150 TABLET ORAL DAILY
Status: DISCONTINUED | OUTPATIENT
Start: 2021-10-08 | End: 2021-10-12 | Stop reason: HOSPADM

## 2021-10-08 RX ORDER — ACETAMINOPHEN 325 MG/1
975 TABLET ORAL EVERY 8 HOURS PRN
Status: DISCONTINUED | OUTPATIENT
Start: 2021-10-08 | End: 2021-10-12 | Stop reason: HOSPADM

## 2021-10-08 RX ORDER — ONDANSETRON 4 MG/1
4 TABLET, ORALLY DISINTEGRATING ORAL EVERY 6 HOURS PRN
Status: DISCONTINUED | OUTPATIENT
Start: 2021-10-08 | End: 2021-10-12 | Stop reason: HOSPADM

## 2021-10-08 RX ORDER — PIPERACILLIN SODIUM, TAZOBACTAM SODIUM 3; .375 G/15ML; G/15ML
3.38 INJECTION, POWDER, LYOPHILIZED, FOR SOLUTION INTRAVENOUS EVERY 6 HOURS
Status: DISCONTINUED | OUTPATIENT
Start: 2021-10-08 | End: 2021-10-08

## 2021-10-08 RX ORDER — TRAZODONE HYDROCHLORIDE 100 MG/1
100-200 TABLET ORAL
COMMUNITY

## 2021-10-08 RX ORDER — SODIUM CHLORIDE 9 MG/ML
INJECTION, SOLUTION INTRAVENOUS
Status: COMPLETED
Start: 2021-10-08 | End: 2021-10-08

## 2021-10-08 RX ORDER — POTASSIUM CHLORIDE 750 MG/1
20 TABLET, EXTENDED RELEASE ORAL DAILY
Status: DISCONTINUED | OUTPATIENT
Start: 2021-10-08 | End: 2021-10-12 | Stop reason: HOSPADM

## 2021-10-08 RX ADMIN — BUSPIRONE HYDROCHLORIDE 7.5 MG: 7.5 TABLET ORAL at 20:34

## 2021-10-08 RX ADMIN — BUSPIRONE HYDROCHLORIDE 7.5 MG: 7.5 TABLET ORAL at 09:22

## 2021-10-08 RX ADMIN — FOLIC ACID 1 MG: 1 TABLET ORAL at 09:22

## 2021-10-08 RX ADMIN — PIPERACILLIN AND TAZOBACTAM 3.38 G: 3; .375 INJECTION, POWDER, LYOPHILIZED, FOR SOLUTION INTRAVENOUS at 04:49

## 2021-10-08 RX ADMIN — CEFTRIAXONE SODIUM 2 G: 2 INJECTION, POWDER, FOR SOLUTION INTRAMUSCULAR; INTRAVENOUS at 00:55

## 2021-10-08 RX ADMIN — POTASSIUM CHLORIDE 20 MEQ: 750 TABLET, EXTENDED RELEASE ORAL at 09:22

## 2021-10-08 RX ADMIN — BUPROPION HYDROCHLORIDE 150 MG: 150 TABLET, FILM COATED, EXTENDED RELEASE ORAL at 09:22

## 2021-10-08 RX ADMIN — PIPERACILLIN SODIUM AND TAZOBACTAM SODIUM 4.5 G: 4; .5 INJECTION, POWDER, LYOPHILIZED, FOR SOLUTION INTRAVENOUS at 17:08

## 2021-10-08 RX ADMIN — SODIUM CHLORIDE 500 ML: 9 INJECTION, SOLUTION INTRAVENOUS at 11:34

## 2021-10-08 RX ADMIN — ALBUMIN HUMAN 100 G: 0.25 SOLUTION INTRAVENOUS at 20:35

## 2021-10-08 RX ADMIN — SPIRONOLACTONE 50 MG: 50 TABLET ORAL at 09:23

## 2021-10-08 RX ADMIN — AZITHROMYCIN MONOHYDRATE 500 MG: 250 TABLET ORAL at 00:56

## 2021-10-08 RX ADMIN — Medication 50 MCG: at 09:23

## 2021-10-08 RX ADMIN — PIPERACILLIN SODIUM AND TAZOBACTAM SODIUM 4.5 G: 4; .5 INJECTION, POWDER, LYOPHILIZED, FOR SOLUTION INTRAVENOUS at 23:26

## 2021-10-08 RX ADMIN — PIPERACILLIN SODIUM AND TAZOBACTAM SODIUM 4.5 G: 4; .5 INJECTION, POWDER, LYOPHILIZED, FOR SOLUTION INTRAVENOUS at 11:35

## 2021-10-08 RX ADMIN — Medication 1 TABLET: at 09:22

## 2021-10-08 RX ADMIN — ACETAMINOPHEN 975 MG: 325 TABLET, FILM COATED ORAL at 06:58

## 2021-10-08 RX ADMIN — SODIUM CHLORIDE 500 ML: 9 INJECTION, SOLUTION INTRAVENOUS at 17:08

## 2021-10-08 RX ADMIN — PANTOPRAZOLE SODIUM 40 MG: 40 TABLET, DELAYED RELEASE ORAL at 09:22

## 2021-10-08 RX ADMIN — Medication 1 TABLET: at 20:34

## 2021-10-08 ASSESSMENT — ACTIVITIES OF DAILY LIVING (ADL)
DIFFICULTY_EATING/SWALLOWING: NO
ADLS_ACUITY_SCORE: 9
CONCENTRATING,_REMEMBERING_OR_MAKING_DECISIONS_DIFFICULTY: YES
ADLS_ACUITY_SCORE: 9
DRESSING/BATHING: BATHING DIFFICULTY, ASSISTANCE 1 PERSON;DRESSING DIFFICULTY, ASSISTANCE 1 PERSON
EQUIPMENT_CURRENTLY_USED_AT_HOME: WALKER, STANDARD
ADLS_ACUITY_SCORE: 10
TOILETING_ISSUES: NO
DRESSING/BATHING_DIFFICULTY: YES
NUMBER_OF_TIMES_PATIENT_HAS_FALLEN_WITHIN_LAST_SIX_MONTHS: 1
FALL_HISTORY_WITHIN_LAST_SIX_MONTHS: YES
WALKING_OR_CLIMBING_STAIRS_DIFFICULTY: YES
DIFFICULTY_COMMUNICATING: NO
WALKING_OR_CLIMBING_STAIRS: STAIR CLIMBING DIFFICULTY, DEPENDENT;TRANSFERRING DIFFICULTY, ASSISTANCE 1 PERSON;AMBULATION DIFFICULTY, ASSISTANCE 1 PERSON
DOING_ERRANDS_INDEPENDENTLY_DIFFICULTY: NO
WEAR_GLASSES_OR_BLIND: NO

## 2021-10-08 ASSESSMENT — ENCOUNTER SYMPTOMS
DIARRHEA: 0
SHORTNESS OF BREATH: 0
APPETITE CHANGE: 1
ABDOMINAL PAIN: 0
FATIGUE: 0
HEADACHES: 1
FEVER: 0
NAUSEA: 0
DYSURIA: 0
COUGH: 0
WEAKNESS: 1
VOMITING: 0

## 2021-10-08 NOTE — ED PROVIDER NOTES
[place] EMERGENCY DEPARTMENT ([facility])  10/07/21 ED 22    History     Chief Complaint   Patient presents with     Generalized Weakness     Fall     HPI  Viviana Schaefer is a 55 year old female with history of alcoholic liver cirrhosis (sober since 2/2021), portal HTN, CAD, HLD, medullary sponge kidney, morbid obesity, and CHF (EF 55% 5/2020) who presents with generalized weakness and a fall.  Patient states that she has been generally weak secondary to her liver cirrhosis.  She does not think her weakness has recently changed from baseline.  She states this evening she was trying to get herself off of the couch and had difficulty sliding her legs underneath herself.  She went from the couch to her knees but did not hit her head or lose consciousness.  She said she did not feel faint but just lost her footing.  She did not think this was related to more weakness than usual.  However, on review of systems, the patient does report decreased appetite for the past few days.  She also has noticed increased swelling throughout her body including her arms and feet.  She denies cough or shortness of breath, abdominal pain, nausea or vomiting, diarrhea, chest pain.  She has a mild headache today.  Denies dysuria or decrease in urine output.  She has had decreased p.o. intake due to her decreased appetite.  She does take Lasix and the dose has not changed recently.  The patient states she is not vaccinated against Covid.  She does not leave the house except for her medical appointments and states the last time she had an appointment was in July.  She does not recall having a history of CHF.  She states that she came in because her sons were worried about her and wanted her to be evaluated.        Per [facility] records, patient just completed treatment.  No COVID-19 vaccine documented on [facility].    Past Medical History  Past Medical History:   Diagnosis Date     Alcoholic cirrhosis (H)      Arthritis       Congestive heart failure (H)     Cervical CA      Coronary artery disease     Heart Murmer     Depressive disorder      Hyponatremia      Past Surgical History:   Procedure Laterality Date     ABDOMEN SURGERY       CHOLECYSTECTOMY  2001    patient reported     CV CORONARY ANGIOGRAM N/A 2/5/2021    Procedure: CV CORONARY ANGIOGRAM;  Surgeon: Deyvi Parham MD;  Location:  HEART CARDIAC CATH LAB     CV RIGHT HEART CATH MEASUREMENTS RECORDED N/A 2/5/2021    Procedure: CV RIGHT HEART CATH;  Surgeon: Deyvi Parham MD;  Location:  HEART CARDIAC CATH LAB     GI SURGERY       GYN SURGERY       HYSTERECTOMY  2009    including cervix     LITHOTRIPSY      multiple times, patient reported     buPROPion (WELLBUTRIN XL) 150 MG 24 hr tablet  busPIRone (BUSPAR) 15 MG tablet  calcium carbonate-vitamin D (OSCAL W/D) 500-200 MG-UNIT tablet  ferrous sulfate (FEROSUL) 325 (65 Fe) MG tablet  folic acid (FOLVITE) 1 MG tablet  furosemide (LASIX) 40 MG tablet  LORazepam (ATIVAN) 0.5 MG tablet  omeprazole (PRILOSEC) 20 MG DR capsule  potassium chloride ER (KLOR-CON M) 20 MEQ CR tablet  pregabalin (LYRICA) 50 MG capsule  spironolactone (ALDACTONE) 50 MG tablet  UNABLE TO FIND  vitamin D2 (ERGOCALCIFEROL) 52422 units (1250 mcg) capsule  vitamin D3 (CHOLECALCIFEROL) 50 mcg (2000 units) tablet      Allergies   Allergen Reactions     Codeine      Diazepam      Morphine      PN: LW Reaction: Nausea     Penicillins      PN: LW Reaction: Rash, Generalized. Reportedly had a rash with this as a child and has tolerated Augmentin since per patient. Tolerated Zosyn 3/12/19 in [facility].      Family History  Family History   Problem Relation Age of Onset     Substance Abuse Brother      Depression Sister      Anxiety Disorder Sister      Substance Abuse Sister      Depression Sister      Anxiety Disorder Sister      Depression Sister      Anxiety Disorder Sister      Autism Spectrum Disorder Son      Social History   Social History      Tobacco Use     Smoking status: Former Smoker     Smokeless tobacco: Never Used   Substance Use Topics     Alcohol use: Not Currently     Comment: 5/2020     Drug use: Never      Past medical history, past surgical history, medications, allergies, family history, and social history were reviewed with the patient. No additional pertinent items.       Review of Systems   Constitutional: Positive for appetite change. Negative for fatigue and fever.   Respiratory: Negative for cough and shortness of breath.    Cardiovascular: Positive for leg swelling. Negative for chest pain.   Gastrointestinal: Negative for abdominal pain, diarrhea, nausea and vomiting.   Genitourinary: Negative for decreased urine volume and dysuria.   Neurological: Positive for weakness and headaches. Negative for syncope.   All other systems reviewed and are negative.    A complete review of systems was performed with pertinent positives and negatives noted in the HPI, and all other systems negative.    Physical Exam   BP: 138/58  Pulse: 76  Temp: (!) 101  F (38.3  C)  Resp: 16  Weight: 116.1 kg (256 lb)  SpO2: 99 %  Physical Exam  Vitals and nursing note reviewed.   Constitutional:       General: She is not in acute distress.     Appearance: She is well-developed. She is obese. She is not toxic-appearing or diaphoretic.   HENT:      Head: Normocephalic and atraumatic.      Nose: Nose normal.      Mouth/Throat:      Mouth: Mucous membranes are dry.   Eyes:      General: No scleral icterus.     Conjunctiva/sclera: Conjunctivae normal.   Cardiovascular:      Rate and Rhythm: Normal rate.   Pulmonary:      Effort: No respiratory distress.      Breath sounds: No stridor.      Comments: Sounds winded with minimal activity  Abdominal:      General: There is no distension.      Palpations: Abdomen is soft.      Tenderness: There is no abdominal tenderness. There is no guarding or rebound.   Musculoskeletal:         General: Swelling present. No  deformity or signs of injury. Normal range of motion.      Cervical back: Normal range of motion and neck supple. No rigidity.      Right lower leg: Edema present.      Left lower leg: Edema present.      Comments: Superficial abrasion on left knee   Skin:     General: Skin is warm and dry.      Coloration: Skin is jaundiced and pale.      Findings: No bruising, erythema or rash.      Comments: Anasarca   Neurological:      General: No focal deficit present.      Mental Status: She is alert and oriented to person, place, and time.   Psychiatric:         Mood and Affect: Mood normal.         Behavior: Behavior normal.         Thought Content: Thought content normal.         ED Course      Procedures            EKG Interpretation:      Interpreted by Ashley Flores MD  Time reviewed: 2119  Symptoms at time of EKG: weakness   Rhythm: normal sinus   Rate: normal  Axis: normal  Ectopy: none  Conduction: normal  ST Segments/ T Waves: Nonspecific ST-T wave changes  Q Waves: none  Comparison to prior: Unchanged    Clinical Impression: nonspecific EKG             Results for orders placed or performed during the hospital encounter of 10/07/21   XR Chest Port 1 View     Status: None    Narrative    EXAM: XR CHEST PORT 1 VIEW  LOCATION: [facility]  DATE/TIME: 10/7/2021 9:54 PM    INDICATION: Fever, hypoxia.  COMPARISON: 01/19/2021.      Impression    IMPRESSION: There are patchy infiltrates within the lower lobes, shallow inspiration.   CT Head w/o Contrast     Status: None    Narrative    EXAM: CT HEAD W/O CONTRAST  LOCATION: [facility]  DATE/TIME: 10/7/2021 11:02 PM    INDICATION: Cerebral hemorrhage suspected. Headache. Fall. Elevated INR.  COMPARISON: None.  TECHNIQUE: Routine CT Head without IV contrast. Multiplanar reformats. Dose reduction techniques were used.    FINDINGS:  Motion and streak artifact limits aspects of  exam.    INTRACRANIAL CONTENTS: No intracranial hemorrhage, extraaxial collection, or mass effect.  No CT evidence of acute infarct. Normal parenchymal attenuation. Normal ventricles and sulci.     VISUALIZED ORBITS/SINUSES/MASTOIDS: No intraorbital abnormality. Bilateral inferior maxillary sinuses small retention cysts/mucosal polyps. Remaining visualized paranasal sinuses essentially clear. No middle ear or mastoid effusion.    BONES/SOFT TISSUES: No acute abnormality. Dental amalgam resulting in streak artifact. Vascular calcifications.      Impression    IMPRESSION:  1.  No acute intracranial process given motion degraded examination.   Comprehensive metabolic panel     Status: Abnormal   Result Value Ref Range    Sodium 139 133 - 144 mmol/L    Potassium 3.8 3.4 - 5.3 mmol/L    Chloride 106 94 - 109 mmol/L    Carbon Dioxide (CO2) 23 20 - 32 mmol/L    Anion Gap 10 3 - 14 mmol/L    Urea Nitrogen 25 7 - 30 mg/dL    Creatinine 1.49 (H) 0.52 - 1.04 mg/dL    Calcium 8.7 8.5 - 10.1 mg/dL    Glucose 123 (H) 70 - 99 mg/dL    Alkaline Phosphatase 91 40 - 150 U/L    AST 34 0 - 45 U/L    ALT 20 0 - 50 U/L    Protein Total 5.9 (L) 6.8 - 8.8 g/dL    Albumin 2.0 (L) 3.4 - 5.0 g/dL    Bilirubin Total 6.5 (H) 0.2 - 1.3 mg/dL    GFR Estimate 40 (L) >60 mL/min/1.73m2   Lipase     Status: Normal   Result Value Ref Range    Lipase 83 73 - 393 U/L   Troponin I     Status: Normal   Result Value Ref Range    Troponin I <0.015 0.000 - 0.045 ug/L   Ammonia     Status: Normal   Result Value Ref Range    Ammonia 47 10 - 50 umol/L   INR     Status: Abnormal   Result Value Ref Range    INR 2.22 (H) 0.85 - 1.15   CBC with platelets and differential     Status: Abnormal   Result Value Ref Range    WBC Count 10.4 4.0 - 11.0 10e3/uL    RBC Count 2.72 (L) 3.80 - 5.20 10e6/uL    Hemoglobin 9.7 (L) 11.7 - 15.7 g/dL    Hematocrit 29.6 (L) 35.0 - 47.0 %     (H) 78 - 100 fL    MCH 35.7 (H) 26.5 - 33.0 pg    MCHC 32.8 31.5 - 36.5 g/dL    RDW  14.9 10.0 - 15.0 %    Platelet Count 96 (L) 150 - 450 10e3/uL    % Neutrophils 80 %    % Lymphocytes 5 %    % Monocytes 12 %    % Eosinophils 1 %    % Basophils 1 %    % Immature Granulocytes 1 %    NRBCs per 100 WBC 0 <1 /100    Absolute Neutrophils 8.3 1.6 - 8.3 10e3/uL    Absolute Lymphocytes 0.5 (L) 0.8 - 5.3 10e3/uL    Absolute Monocytes 1.3 0.0 - 1.3 10e3/uL    Absolute Eosinophils 0.1 0.0 - 0.7 10e3/uL    Absolute Basophils 0.1 0.0 - 0.2 10e3/uL    Absolute Immature Granulocytes 0.2 (H) <=0.0 10e3/uL    Absolute NRBCs 0.0 10e3/uL   Symptomatic COVID-19 Virus (Coronavirus) by PCR Nasopharyngeal     Status: Normal    Specimen: Nasopharyngeal; Swab   Result Value Ref Range    SARS CoV2 PCR Negative Negative    Narrative    Testing was performed using the [employer] Xpress SARS-CoV-2 Assay on the  Cepheid Gene-Xpert Instrument Systems. Additional information about  this Emergency Use Authorization (EUA) assay can be found via the Lab  Guide. This test should be ordered for the detection of SARS-CoV-2 in  individuals who meet SARS-CoV-2 clinical and/or epidemiological  criteria. Test performance is unknown in asymptomatic patients. This  test is for in vitro diagnostic use under the FDA EUA for  laboratories certified under CLIA to perform high complexity testing.  This test has not been FDA cleared or approved. A negative result  does not rule out the presence of PCR inhibitors in the specimen or  target RNA in concentration below the limit of detection for the  assay. The possibility of a false negative should be considered if  the patient's recent exposure or clinical presentation suggests  COVID-19. This test was validated by the [facility]. This laboratory is certified under  the Clinical Laboratory Improvement Amendments of 1988 (CLIA-88) as  qualified to perform high complexity laboratory testing.     Alcohol     Status: Normal   Result Value Ref Range    Alcohol  ethyl <0.01 <=0.01 g/dL   Nt probnp inpatient (BNP)     Status: Abnormal   Result Value Ref Range    N terminal Pro BNP Inpatient 1,204 (H) 0 - 900 pg/mL   EKG 12-lead, tracing only     Status: None (Preliminary result)   Result Value Ref Range    Systolic Blood Pressure  mmHg    Diastolic Blood Pressure  mmHg    Ventricular Rate 89 BPM    Atrial Rate 89 BPM    MI Interval 174 ms    QRS Duration 86 ms     ms    QTc 459 ms    P Axis 39 degrees    R AXIS -5 degrees    T Axis 47 degrees    Interpretation ECG       Sinus rhythm  Cannot rule out Anterior infarct , age undetermined  Abnormal ECG     CBC with platelets differential     Status: Abnormal    Narrative    The following orders were created for panel order CBC with platelets differential.  Procedure                               Abnormality         Status                     ---------                               -----------         ------                     CBC with platelets and d...[[id number]]  Abnormal            Final result                 Please view results for these tests on the individual orders.     Medications   cefTRIAXone (ROCEPHIN) 2 g vial to attach to  ml bag for ADULTS or NS 50 ml bag for PEDS (has no administration in time range)   azithromycin (ZITHROMAX) tablet 500 mg (has no administration in time range)   furosemide (LASIX) injection 20 mg (20 mg Intravenous Given 10/7/21 2320)        Assessments & Plan (with Medical Decision Making)   Viviana Schaefer is a 55 year old female with history of alcoholic liver cirrhosis (sober since 2/2021), portal HTN, CAD, HLD, medullary sponge kidney, morbid obesity, and CHF (EF 55% 5/2020) who presents with generalized weakness and a fall.     Ddx: Hypovolemia, Sirs/sepsis, COVID-19 infection, bacterial pneumonia, anasarca, pulmonary edema, KWABENA, progressive cirrhosis, ACS, CHF, alcohol intoxication, intracranial hemorrhage, coagulopathy, urinary tract infection.     Patient for febrile to  101 on arrival.  Hypertensive to 138/58, pulse 76, satting 99% on room air.  She did have 1 sat of 84% on room air but this resolved without intervention.  She is currently satting 93% on room air.  She appears dyspneic with minimal exertion just shifting in the bed.  However, given the patient's body habitus I suspect this is somewhat baseline related to deconditioning.  Patient has not been aware of a recent illness or fever at home.  Sounds like she had a mechanical fall related to baseline generalized weakness.  No traumatic injury during the fall but the patient does have a mild headache.  She is coagulopathic with an INR of 2.2 so will obtain a CT scan to rule out intracranial hemorrhage or chronic subdural.    Is also notable for KWABENA with creatinine 1.49 changed from 0.95.  This may be related to prerenal azotemia in the setting of poor p.o. intake.  The patient states she has had a decreased appetite.  She does take Lasix and the dose has not been changed.  However, she does have anasarca on exam which she states is new.  She does have a history of medullary sponge kidney which is concerning for progressive chronic kidney dysfunction or nephropathy.  Liver enzymes normal and bilirubin stably elevated at 6.5.  Lipase normal.  Troponin negative.  Patient denies having a cough or chest pain.  EKG shows normal sinus rhythm without acute ischemic changes.  Ammonia level normal.  White count normal hemoglobin 9.7 which is essentially stable.  Platelet 96 also stable.  Alcohol negative.  Patient reports being sober since February of this year.  BNP elevated to 1204.  Chest x-ray with bilateral patchy infiltrates within the lower lobes.  CT head negative for acute intracranial process.  Urinalysis and procalcitonin pending.  Covid negative.  Patient is unvaccinated so I would consider her PUI even with a normal Covid PCR x1.  However, will cover the patient for community-acquired pneumonia with azithromycin and  ceftriaxone.  We will also diurese with 20 mg IV Lasix.  Admit to medicine for ongoing management.    I have reviewed the nursing notes. I have reviewed the findings, diagnosis, plan and need for follow up with the patient.    New Prescriptions    No medications on file       Final diagnoses:   KWABENA (acute kidney injury) (H)   Acute on chronic congestive heart failure, unspecified heart failure type (H)   Fever, unspecified fever cause   Anasarca       --  Ashley Flores  [facility] EMERGENCY DEPARTMENT  10/7/2021     Ashley Flores MD  10/08/21 0036

## 2021-10-08 NOTE — PROGRESS NOTES
SPIRITUAL HEALTH SERVICES  [facility]  Unit: 7A    Referral Source: Admission Request     Attempted to visit pt twice this afternoon.  Pt was not available.    Plan: Saturday on-call  will be aware of pt's spiritual health service hs request.       Duane F Bauer  Chaplain Resident  Pager number: [phone]  * [facility] remains available 24/7 for emergent requests/referrals, either by having the switchboard page the on-call  or by entering an ASAP/STAT consult in Epic (this will also page the on-call ).*

## 2021-10-08 NOTE — PROGRESS NOTES
[facility]   BRIEF PROGRESS NOTE    Spoke with Dr. Razia Martin ([facility]) who is treating/evaluating patient pre-transplant. Appreciate her recommendations:     The following were started:  -1g/kg 25% albumin x3 days not to exceed 100g.  -Gentle bolus fluids    Please see daily rounding note for full A/P.  Belén Paz, DO  4:27 PM

## 2021-10-08 NOTE — ED TRIAGE NOTES
Pt BIBA due to fall. Pt has hx of general weakness secondary to cirrhosis and fell to her knees this evening. Denies anyother trauma.

## 2021-10-08 NOTE — H&P
[facility]    History and Physical - [place]        Date of Admission:  10/7/2021    Assessment & Plan      Viviana Schaeefr is a 55 year old female admitted on 10/7/2021. She has a history of  alcoholic cirrhosis c/b portal HTN and coagulopathy, AUD in remission, macrocytic anemia, CECE, MDD  and presents and is admitted for sepsis of undetermined source.     # Probable Sepsis, severity to be determined, source to be determinted  # Fever  # Weakness  # Fall  Patient admitted due to weakness and falls. Febrile w/ Tmax 101 and HR elevated to 91 in ED, meeting criteria for SIRs. Procalcitonin elevated to 0.9. No leukocytosis, but left shift noted on differential. Lactate pending. Source unclear at this time. Differential includes pulmonary (CXR w/ patchy lower lobe infiltrates, somewhat suspicious for aspiration, though no cough/SOB, COVID-19 PCR negative), UTI (UA pending), SBP (small ascites noted on prior imaging). Febrile, otherwise VSS. CTX + azithro for CAP coverage started in ED, will broaden to Zosyn + azithro while source being sought.   - Admit to inpt  - lactate now  - Sputum gram stain/culture  - Follow-up blood cultures, 10/7  - Consider RUQ +/- diagnostic paracentesis if no clear source in lungs/urine  - Abx: zosyn + azithromycin  - Gentle IV hydration with small boluses PRN  - APAP, max dose 3g daily  - PT/OT consult  - Recommend COVID-19 vaccination, inpatient if possible      # History of alcohol-used disorder, in remission  # End-stage Alcoholic cirrhosis  # Portal hypertension  # Thrombocytopenia  # Elevated INR  # Anasarca  MELD-Na score: 26 at 10/7/2021  9:24 PM  MELD score: 26 at 10/7/2021  9:24 PM  Calculated from:  Serum Creatinine: 1.49 mg/dL at 10/7/2021  9:24 PM  Serum Sodium: 139 mmol/L (Using max of 137 mmol/L) at 10/7/2021  9:24 PM  Total Bilirubin: 6.5 mg/dL at 10/7/2021  9:24 PM  INR(ratio): 2.22 at 10/7/2021  9:24 PM  Age: 55  years  Patient with ESLD, working towards transplant. Has been sober x8 months, just finished outpatient treatment. History of portal hypertension, no history of varices or GIB. Coagulopathy w/ elevated INR and thrombocytopenia. No abdominal pain/tenderness, but diffusely edematous. MRI liver 5/2021 shows small volume ascites. Notes that lasix has been less effective over past 3 weeks with worsening anasarca. Denies confusion, alert and oriented on exam, no history of hepatic encephalopathy and ammonia 47. 20mg IV lasix given in ED.   - PTA nik, could consider increasing dose (50mg PTA)  - Consider IV lasix given recent resistance to PTA PO lasix, but would be cautious given sepsis.   - Strict intake/output  - Daily MELD labs  - Daily CBC    # KWABENA  # History of Medullary sponge kidney  # Urinary retention  Creatinine 1.49 on admission, baseline ~0.9. BUN:Cr 16, c/f intrinsic renal damage, though would also anticipate some prerenal contribution given poor PO intake and sepsis. Also noted to have urinary retention >400cc after receiving IV lasix.   - AM creatinine, if no improvement, consider FeNa  - Follow-up UA  - Consider renal ultrasound    # Macrocytic anemia, likely 2/2 etoh-use and folate deficiency  Hgb 9.7 on admission, baseline ~10 w/ , likely 2/2 alcohol use disorder and folate deficiency. No signs of bleeding, though patient with coagulopathy as noted above.   - Continue PTA folate  - Consider restart of PTA iron    -------------------------------------------------------------------------------------------------------  Chronic conditions:    # CAD  Cardiac catheterization done 2/2021 to evaluate for liver transplant showing mild, non-obstructive CAD with normal PA and filling pressures and normal cardiac output. In review of the cart, some notes indicate history of CHF, which patient is not aware of. Echo from OSH 5/2020 shows LVEF 55%. BNP elevated, but this likely 2/2 anasarca from liver  disease.   - Monitor    # CECE  # Panic disorder  # MDD  - PTA buspar, buproprion          Diet: Combination Diet Regular Diet Adult  DVT Prophylaxis: PCDs (elevated INR, low platelets)  Smith Catheter: Not present  Fluids: PO ad jackson  Central Lines: None  Code Status: Full Code           # Coagulation Defect: INR = 2.22 (Ref range: 0.85 - 1.15) and/or PTT = N/A on admission, will monitor for bleeding  # Thrombocytopenia: Plts = 96 10e3/uL (Ref range: 150 - 450 10e3/uL) on admission, will monitor for bleeding      Disposition Plan   Expected discharge: 10/13/2021 recommended to TBD once antibiotic plan established and renal function improved.     The patient's care was discussed with the Attending Physician, Dr. Mcnair.    Vicky Scott MD  [facility]  Securely message with the Vocera Web Console (learn more here)  Text page via [employer] Paging/Directory  Please see sign in/sign out for up to date coverage information  ______________________________________________________________________    Chief Complaint   Weakness, fall    History is obtained from the patient    History of Present Illness   Viviana Schaefer is a 55 year old female who has a history of alcoholic cirrhosis c/b portal HTN and coagulopathy, AUD in remission, macrocytic anemia, CECE, MDD  and presents for evaluation of weakness and falls.     Viviana reports that she has had progressively worsening weakness over past few weeks. On evening of presentation, she was resting on the sofa, stood up but felt too weak to use arms to help her, and fell forward onto her knees. No head strike or LOC. First fall in many months. Because of this fall, she was encouraged by her sons (one of whom lives with her, the other who lives nearby) to present to the ED. She had felt tactile fevers/chills over the course of the 24 hours prior to presentation. She has also had poor appetite and relatively poor  PO intake. She endorses stable, chronic SOB, no worsening, no new cough, congestion, sore throat. She denies dysuria, hematuria, foul-smelling urine. She has no abdominal pain, no n/v, no diarrhea. She notes worsening swelling in both of her legs over the past few weeks, despite increase in lasix to 40mg daily, but no skin breakdown, no new rashes, scratches or other skin problems. She is not immunized against COVID-19.     Notably, patient has end-stage liver disease c/b portal hypertension d/t alcohol use disorder, for which she is in remission and has been sober for the past 8 months. She sees Dr. Bundy at [facility] for management of cirrhosis and is actively working towards transplant. She denies any confusion or history of hepatic encephalopathy.     ED Course:  Labs:CBC notable for normal WBC ct but left shift noted on differential. Hgb of 9.7 (), platelets 96. Creatinine 1.49, Tbilil 6.5, INR 2.22. Lipase, troponin and ammonia all unremarkable. Ethyl alcohol negative. NT-proBN 1204. Procal 0.9/moderate risk. COVID-19 negative. Blood cultures and UA pending.   Imaging: CXR with patchy, bilateral infiltrates of lower lobes, CT head w/o acute intracranial process  EKG: No acute ST-T wave changes or q waves  Medications: Rocephin 2g IV x1, azithromycin 500mg PO x1, lasix 20mg IV x1      Review of Systems    Negative except as noted above    Past Medical History    I have reviewed this patient's medical history and updated it with pertinent information if needed.   Past Medical History:   Diagnosis Date     Alcoholic cirrhosis (H)      Arthritis      Congestive heart failure (H)     Cervical CA      Coronary artery disease     Heart Murmer     Depressive disorder      Hyponatremia         Past Surgical History   I have reviewed this patient's surgical history and updated it with pertinent information if needed.  Past Surgical History:   Procedure Laterality Date     ABDOMEN SURGERY       CHOLECYSTECTOMY  2001     patient reported     CV CORONARY ANGIOGRAM N/A 2/5/2021    Procedure: CV CORONARY ANGIOGRAM;  Surgeon: Deyvi Parham MD;  [facility]     CV RIGHT HEART CATH MEASUREMENTS RECORDED N/A 2/5/2021    Procedure: CV RIGHT HEART CATH;  Surgeon: Deyvi Parham MD;  [facility]     GI SURGERY       GYN SURGERY       HYSTERECTOMY  2009    including cervix     LITHOTRIPSY      multiple times, patient reported        Social History   I have reviewed this patient's social history and updated it with pertinent information if needed. Viviana Schaefer  reports that she has quit smoking. She has never used smokeless tobacco. She reports previous alcohol use. She reports that she does not use drugs.   Lives in her own home in [place], [place]  Son, Papito lives with her currently, other son Clint, loves nearby  Sober x8 months    Family History   I have reviewed this patient's family history and updated it with pertinent information if needed.  Family History   Problem Relation Age of Onset     Substance Abuse Brother      Depression Sister      Anxiety Disorder Sister      Substance Abuse Sister      Depression Sister      Anxiety Disorder Sister      Depression Sister      Anxiety Disorder Sister      Autism Spectrum Disorder Son        Prior to Admission Medications   Prior to Admission Medications   Prescriptions Last Dose Informant Patient Reported? Taking?   LORazepam (ATIVAN) 0.5 MG tablet Unknown at Unknown time  No Yes   Sig: Take 1 tablet (0.5 mg) by mouth every 6 hours as needed for anxiety (claustrophia prior to MRI)   UNABLE TO FIND Unknown at Unknown time  Yes Yes   Sig: MEDICATION NAME: milk thisle   buPROPion (WELLBUTRIN XL) 150 MG 24 hr tablet Past Week at Unknown time  Yes Yes   Sig: Take 150 mg by mouth daily   busPIRone (BUSPAR) 15 MG tablet Past Week at Unknown time  Yes Yes   Sig: Take 0.5 tablets by mouth 2 times daily   calcium carbonate-vitamin D  (OSCAL W/D) 500-200 MG-UNIT tablet Past Week at Unknown time  No Yes   Sig: Take 1 tablet by mouth 2 times daily (with meals)   ferrous sulfate (FEROSUL) 325 (65 Fe) MG tablet Unknown at Unknown time  Yes Yes   Sig: TAKE 1 TABLET BY MOUTH DAILY WITH BREAKFAST   folic acid (FOLVITE) 1 MG tablet Past Week at Unknown time  Yes Yes   Sig: TK 1 T PO D   furosemide (LASIX) 40 MG tablet   Yes No   Sig: Take 20 mg by mouth daily    omeprazole (PRILOSEC) 20 MG DR capsule Past Week at Unknown time  Yes Yes   Sig: Take 20 mg by mouth daily    potassium chloride ER (KLOR-CON M) 20 MEQ CR tablet Past Week at Unknown time  No Yes   Sig: Take 1 tablet (20 mEq) by mouth daily   pregabalin (LYRICA) 50 MG capsule Past Week at Unknown time  Yes Yes   Sig: TK 1 C PO BID   spironolactone (ALDACTONE) 50 MG tablet Past Week at Unknown time  Yes Yes   Sig: Take 50 mg by mouth daily    vitamin D2 (ERGOCALCIFEROL) 13448 units (1250 mcg) capsule Past Month at Unknown time  No Yes   Sig: Take 1 capsule (50,000 Units) by mouth once a week   vitamin D3 (CHOLECALCIFEROL) 50 mcg (2000 units) tablet Past Week at Unknown time  No Yes   Sig: Take 1 tablet (50 mcg) by mouth daily      Facility-Administered Medications: None     Allergies   Allergies   Allergen Reactions     Codeine      Diazepam      Morphine      PN: LW Reaction: Nausea     Penicillins      PN: LW Reaction: Rash, Generalized. Reportedly had a rash with this as a child and has tolerated Augmentin since per patient. Tolerated Zosyn 3/12/19 in [facility].        Physical Exam   Vital Signs: Temp: (!) 100.9  F (38.3  C) Temp src: Oral BP: 121/44 Pulse: 78   Resp: 16 SpO2: 94 %      Weight: 256 lbs 0 oz    Physical Exam  Constitutional:       General: She is not in acute distress.     Appearance: Normal appearance. Ill appearance: chronically ill-appearing.   HENT:      Head: Normocephalic and atraumatic.      Nose: Nose normal.      Mouth/Throat:      Mouth: Mucous membranes  are dry.      Pharynx: Oropharynx is clear.   Eyes:      General: Scleral icterus (mild) present.      Extraocular Movements: Extraocular movements intact.      Pupils: Pupils are equal, round, and reactive to light.   Cardiovascular:      Rate and Rhythm: Normal rate and regular rhythm.      Pulses: Normal pulses.      Heart sounds: Normal heart sounds. No murmur heard.     Pulmonary:      Effort: Pulmonary effort is normal. No respiratory distress.      Breath sounds: Normal breath sounds. No wheezing, rhonchi or rales.   Abdominal:      General: There is no distension.      Palpations: Abdomen is soft. There is no mass.      Tenderness: There is no abdominal tenderness. There is no guarding.   Musculoskeletal:      Cervical back: Normal range of motion.      Right lower leg: Edema (3+) present.      Left lower leg: Edema (3+) present.   Skin:     General: Skin is warm and dry.      Coloration: Skin is jaundiced.      Comments: Scattered petechiae noted. No rash, skin breakdown, or other lesions noted.    Neurological:      General: No focal deficit present.      Mental Status: She is alert and oriented to person, place, and time.   Psychiatric:         Mood and Affect: Mood normal.         Behavior: Behavior normal.           Data   Data reviewed today: I reviewed all medications, new labs and imaging results over the last 24 hours.     Recent Labs   Lab 10/07/21  2124   WBC 10.4   HGB 9.7*   *   PLT 96*   INR 2.22*      POTASSIUM 3.8   CHLORIDE 106   CO2 23   BUN 25   CR 1.49*   ANIONGAP 10   ELIUD 8.7   *   ALBUMIN 2.0*   PROTTOTAL 5.9*   BILITOTAL 6.5*   ALKPHOS 91   ALT 20   AST 34   LIPASE 83   TROPONIN <0.015     Recent Results (from the past 24 hour(s))   XR Chest Port 1 View    Narrative    EXAM: XR CHEST PORT 1 VIEW  LOCATION: [facility]  DATE/TIME: 10/7/2021 9:54 PM    INDICATION: Fever, hypoxia.  COMPARISON: 01/19/2021.      Impression     IMPRESSION: There are patchy infiltrates within the lower lobes, shallow inspiration.   CT Head w/o Contrast    Narrative    EXAM: CT HEAD W/O CONTRAST  LOCATION: [facility]  DATE/TIME: 10/7/2021 11:02 PM    INDICATION: Cerebral hemorrhage suspected. Headache. Fall. Elevated INR.  COMPARISON: None.  TECHNIQUE: Routine CT Head without IV contrast. Multiplanar reformats. Dose reduction techniques were used.    FINDINGS:  Motion and streak artifact limits aspects of exam.    INTRACRANIAL CONTENTS: No intracranial hemorrhage, extraaxial collection, or mass effect.  No CT evidence of acute infarct. Normal parenchymal attenuation. Normal ventricles and sulci.     VISUALIZED ORBITS/SINUSES/MASTOIDS: No intraorbital abnormality. Bilateral inferior maxillary sinuses small retention cysts/mucosal polyps. Remaining visualized paranasal sinuses essentially clear. No middle ear or mastoid effusion.    BONES/SOFT TISSUES: No acute abnormality. Dental amalgam resulting in streak artifact. Vascular calcifications.      Impression    IMPRESSION:  1.  No acute intracranial process given motion degraded examination.

## 2021-10-08 NOTE — ED NOTES
Bed: ED22  Expected date: 10/7/21  Expected time: 8:48 PM  Means of arrival: Ambulance  Comments:  [facility]    54 y/o Female    Weak  Fall    Triaged:  YELLOW

## 2021-10-08 NOTE — PLAN OF CARE
[id number] - Viviana was admitted from the ED to  around 1000. She is alert and oriented, denies pain and nausea. Tmax 99.5, OVSS on room air. Endorses fatigue and poor appetite. On a regular diet but refused lunch for me. L PIV w MIVF & zosyn given once. Sent a MRSA & respiratory panel down to the lab, needs to move into a private room until Resp panel comes back. Hoping to send urine sample shortly, will still need sputum sample. Pt states her son at home will be moving out soon and she is not able to care for herself as she used to. Will continue to monitor and notify team w concerns.

## 2021-10-08 NOTE — PROGRESS NOTES
[facility] Progress Note    Main Plans for Today    - Bedside Ultrasound  - Follow-up Cultures  - Continue abx     Assessment & Plan     Viviana Schaefer is a 55 year old female admitted on 10/7/2021. She has a history of  alcoholic cirrhosis c/b portal HTN and coagulopathy, AUD in remission, macrocytic anemia, CECE, MDD  and presents and is admitted for sepsis of undetermined source.        # Probable Sepsis, severity to be determined, source to be determinted  # Fever  # Weakness  # Fall  Patient admitted due to weakness and falls. Febrile w/ Tmax 101 and HR elevated to 91 in ED, meeting criteria for SIRs. Procalcitonin elevated to 0.9. No leukocytosis, but left shift noted on differential. Lactate 1.3. Source: Due to significant ilfiltrates noted on CXR when compared to one in January, will continue Azithro. Due to UA showing clear infection, likely pyelo with presenting symptoms of fever, CVA tenderness, lower abdominal pain will continue zosyn. POC ultrasound performed to investigate the abdomen for ascites. No fluid found. Of note, patient unvaccinated and is resistant to receiving while in the hospital.  - Follow-up blood cultures, 10/7 (No growth after 12 hours)  - MRSA Nasal Swab pending  - Resp Aerobic Culture pending  - Legionella + Strep pneumo antigen urine pending  - Abx: Zosyn + Azithromycin (Start date 10/8)  - Gentle IV hydration with small boluses PRN  - APAP, max dose 3g daily  - PT/OT consult  - Recommend COVID-19 vaccination, inpatient if possible        # History of alcohol-used disorder, in remission  # End-stage Alcoholic cirrhosis  # Portal hypertension  # Thrombocytopenia  # Elevated INR  # Anasarca  MELD-Na Score: 27 on 10/8 0754 with elevated Cr. (Up from 26)  MELD score: 26 at 10/8/2021  0754    Patient with ESLD, working towards transplant. Has been sober x8 months, just finished outpatient treatment. History  of portal hypertension, no history of varices or GIB. Coagulopathy w/ elevated INR and thrombocytopenia. No abdominal pain/tenderness, but diffusely edematous. MRI liver 5/2021 shows small volume ascites. Notes that lasix has been less effective over past 3 weeks with worsening anasarca. Denies confusion, alert and oriented on exam, no history of hepatic encephalopathy and ammonia 47. 20mg IV lasix given in ED.   - PTA nik, could consider increasing dose (50mg PTA)  - Consider IV lasix given recent resistance to PTA PO lasix, but would be cautious given sepsis.   - Strict intake/output  - Daily MELD labs  - Daily CBC     # KWABENA  # History of Medullary sponge kidney  # Urinary retention  Creatinine 1.49 on admission, baseline ~0.9. BUN:Cr 16, c/f intrinsic renal damage, though would also anticipate some prerenal contribution given poor PO intake and sepsis. Also noted to have urinary retention >400cc after receiving IV lasix.   - Worsening creatinine 10/8 AM, 1.62   -FeNa ordered.  - Follow-up UA  - Consider renal ultrasound     # Macrocytic anemia, likely 2/2 etoh-use and folate deficiency  Hgb 9.7 on admission, baseline ~10 w/ , likely 2/2 alcohol use disorder and folate deficiency. No signs of bleeding, though patient with coagulopathy as noted above.   - Continue PTA folate  - Consider restart of PTA iron     -------------------------------------------------------------------------------------------------------  Chronic conditions:     # CAD  Cardiac catheterization done 2/2021 to evaluate for liver transplant showing mild, non-obstructive CAD with normal PA and filling pressures and normal cardiac output. In review of the cart, some notes indicate history of CHF, which patient is not aware of. Echo from OSH 5/2020 shows LVEF 55%. BNP elevated, but this likely 2/2 anasarca from liver disease.   - Monitor     # CECE  # Panic disorder  # MDD  - PTA buspar, buproprion        # Pain Assessment:  Current Pain  "Score 10/8/2021   Patient currently in pain? denies   Pain score (0-10) -   Viviana jang pain level was assessed and she currently denies pain.      Diet: Combination Diet Regular Diet Adult  Fluids: PO ad jackson  DVT Prophylaxis:PCD's (Elevated INR, low platelets)  Code Status: Full Code    Disposition Plan   Expected discharge:  2 - 3 days, recommended to prior living arrangement once renal function improved.Dispo:            Entered: Belén Paz 10/08/2021, 9:23 AM   Information in the above section will display in the discharge planner report.      The patient's care was discussed with the Attending Physician, Dr. Reynolds.    Belén Paz  [facility] Medicine: PGY1  Pager: [phone]  Please see sticky note for cross cover information    Interval History    Pt seen and evaluated bedside.     Reports: \"Feeling much better.\" Able to get her up onto the floor today, much more comfortable. Still having some abdominal pain and mid-lower back pain with CVA tenderness.     Denies SOB, chest pain, cough, unusual muscle aches or pains. Visible scleral icterus, jaundice, and 3+ pitting edema of LEs.    Bedside POC ultrasound performed to examine for ascites, No fluid found.     Review of Systems   All other systems reviewed and are negative.      Physical Exam   Vital Signs: Temp: 98.8  F (37.1  C) Temp src: Oral BP: 104/43 Pulse: 72   Resp: 16 SpO2: 95 % O2 Device: None (Room air)    Weight: 256 lbs 0 oz  Physical Exam  Vitals and nursing note reviewed.   Constitutional:       General: She is not in acute distress.     Appearance: She is obese.   HENT:      Head: Normocephalic and atraumatic.      Right Ear: External ear normal.      Left Ear: External ear normal.      Nose: Nose normal.   Eyes:      General: Scleral icterus present.      Extraocular Movements: Extraocular movements intact.      Pupils: Pupils are equal, round, and reactive to light.   Cardiovascular:      Rate and Rhythm: " Normal rate and regular rhythm.      Heart sounds: Normal heart sounds. No murmur heard.   No gallop.    Pulmonary:      Effort: Pulmonary effort is normal. No respiratory distress.   Abdominal:      Palpations: Abdomen is soft.      Tenderness: There is abdominal tenderness. There is right CVA tenderness and left CVA tenderness.   Musculoskeletal:         General: Normal range of motion.      Cervical back: Normal range of motion.      Right lower leg: Edema present.      Left lower leg: Edema present.   Skin:     Coloration: Skin is jaundiced.   Neurological:      General: No focal deficit present.      Mental Status: She is alert and oriented to person, place, and time.   Psychiatric:         Mood and Affect: Mood normal.         Behavior: Behavior normal.         Thought Content: Thought content normal.         Judgment: Judgment normal.             Data   Recent Labs   Lab 10/08/21  0754 10/07/21  2124   WBC 10.0 10.4   HGB 8.9* 9.7*   * 109*   PLT 91* 96*   INR 2.21* 2.22*    139   POTASSIUM 3.8 3.8   CHLORIDE 107 106   CO2 26 23   BUN 29 25   CR 1.62* 1.49*   ANIONGAP 6 10   ELIUD 8.4* 8.7   * 123*   ALBUMIN 1.9* 2.0*   PROTTOTAL 5.6* 5.9*   BILITOTAL 5.5* 6.5*   ALKPHOS 76 91   ALT 15 20   AST 33 34   LIPASE  --  83   TROPONIN  --  <0.015

## 2021-10-08 NOTE — PROGRESS NOTES
"7A PT Eval     10/08/21 1500   Quick Adds   Type of Visit Initial PT Evaluation   Living Environment   People in home child(fernanda), adult;alone   Current Living Arrangements house   Home Accessibility stairs to enter home;stairs within home   Number of Stairs, Main Entrance 2  (from garage)   Stair Railings, Main Entrance none   Number of Stairs, Within Home, Primary other (see comments)  (flight that patient does not use)   Transportation Anticipated family or friend will provide   Living Environment Comments Patient states that she normally lives alone but currently has adult son living with her for assist. He will only be living with patient for ~1 more month.    Self-Care   Usual Activity Tolerance moderate   Current Activity Tolerance fair   Equipment Currently Used at Home walker, standard   Activity/Exercise/Self-Care Comment Patient states that she has been using a walker for the past couple of months due to fatigue as well as knee pain   Disability/Function   Hearing Difficulty or Deaf no   Wear Glasses or Blind no   Concentrating, Remembering or Making Decisions Difficulty no   Difficulty Communicating no   Difficulty Eating/Swallowing no   Walking or Climbing Stairs Difficulty yes   Walking or Climbing Stairs ambulation difficulty, requires equipment;ambulation difficulty, assistance 1 person   Mobility Management FWW   Doing Errands Independently Difficulty (such as shopping) no   Fall history within last six months yes   Number of times patient has fallen within last six months 1   Change in Functional Status Since Onset of Current Illness/Injury yes   General Information   Onset of Illness/Injury or Date of Surgery 10/07/21   Referring Physician Vicky Scott MD   Patient/Family Therapy Goals Statement (PT) To return home   Pertinent History of Current Problem (include personal factors and/or comorbidities that impact the POC) Per EMR \"Viviana Schaefer is a 55 year old female admitted on " "10/7/2021. She has a history of  alcoholic cirrhosis c/b portal HTN and coagulopathy, AUD in remission, macrocytic anemia, CECE, MDD  and presents and is admitted for sepsis of undetermined source.\"   Existing Precautions/Restrictions fall   General Observations activity: up with assist   Cognition   Affect/Mental Status (Cognition) WFL   Follows Commands (Cognition) WFL   Range of Motion (ROM)   ROM Quick Adds ROM WFL   Strength   Manual Muscle Testing Quick Adds Deficits observed during functional mobility   Bed Mobility   Bed Mobility supine-sit;sit-supine   Supine-Sit [place] (Bed Mobility) modified independence  (HOB elevated)   Sit-Supine [place] (Bed Mobility) moderate assist (50% patient effort)   Transfers   Transfers sit-stand transfer   Sit-Stand Transfer   Sit-Stand [place] (Transfers) modified independence   Assistive Device (Sit-Stand Transfers) walker, front-wheeled   Gait/Stairs (Locomotion)   [place] Level (Gait) modified independence   Assistive Device (Gait) walker, front-wheeled   Comment (Gait/Stairs) antalgic gait 2/2 baseline knee pain   Balance   Balance Comments IND sitting balance, mod I static stance with UE support on AD   Clinical Impression   Criteria for Skilled Therapeutic Intervention yes, treatment indicated   PT Diagnosis (PT) impaired functional mobility   Influenced by the following impairments impaired activity tolerance, decreased functional strength, knee pain   Functional limitations due to impairments bed mobility, transfers, gait, stairs   Clinical Presentation Stable/Uncomplicated   Clinical Presentation Rationale clinical judgement   Clinical Decision Making (Complexity) low complexity   Therapy Frequency (PT) 5x/week   Predicted Duration of Therapy Intervention (days/wks) 2 weeks   Planned Therapy Interventions (PT) balance training;bed mobility training;gait training;home exercise program;motor coordination training;neuromuscular re-education;ROM " (range of motion);stair training;strengthening;stretching;transfer training   Risk & Benefits of therapy have been explained evaluation/treatment results reviewed;care plan/treatment goals reviewed;risks/benefits reviewed;current/potential barriers reviewed;participants voiced agreement with care plan;participants included;patient   PT Discharge Planning    PT Discharge Recommendation (DC Rec) Transitional Care Facility   PT Rationale for DC Rec At this time recommend TCU as patient with poor functional endurance and difficulty with stair navigation. Further, patient currently has some home support but states that she will be living alone in ~1 month once her children leave. If patient progresses to mod I for stair navigation she may become appropriate for discharge home with  PT.    PT Brief overview of current status  Ax1 for hallway ambulation, greatest mobility deficits with bed mobility   Total Evaluation Time   Total Evaluation Time (Minutes) 10       Alfred Hammonds, PT, DPT  Pager #[phone]

## 2021-10-08 NOTE — PROGRESS NOTES
[facility]   BRIEF PROGRESS NOTE    Positive blood culture gram negative bacilli noted, adequately covered by Zosyn.  Specimen 10/7 - 2325pm      Spoke with Dr. Razia Martin ([facility]) who is treating/evaluating patient pre-transplant. Appreciate her recommendations:      The following were started:  -1g/kg 25% albumin x3 days not to exceed 100g.  -Gentle bolus fluids         Please see daily rounding note for full A/P.  Belén Paz, DO  2:54 PM

## 2021-10-08 NOTE — PHARMACY-ADMISSION MEDICATION HISTORY
Admission Medication History Completed by Pharmacy    See [facility] Admission Navigator for allergy information, preferred outpatient pharmacy, prior to admission medications and immunization status.     Medication History Sources:     Dispense Report ([facility] [place] and [place], [place]), Chart Review    Changes made to PTA medication list (reason):    Added: trazodone    Deleted: None    Changed: pregabalin 50 mg BID to 100 mg BID    Additional Information:    Last doses per med scribe in emergency department 10/7/21    Adjust pregabalin (Lyrica) based on refill history. Patient has been on 100 mg BID dosing since 2/3/2021.     Trazodone not previously on PTA medication list. This medication has been filled on a monthly basis since 4/30/2021 per [facility].     Prior to Admission medications    Medication Sig Last Dose Taking? Auth Provider   buPROPion (WELLBUTRIN XL) 150 MG 24 hr tablet  Last filled 10/6/21 #30 Take 150 mg by mouth daily Past Week at Unknown time Yes Reported, Patient   busPIRone (BUSPAR) 15 MG tablet  Last filled 10/1/21#30 Take 0.5 tablets by mouth 2 times daily Past Week at Unknown time Yes Reported, Patient   calcium carbonate-vitamin D (OSCAL W/D) 500-200 MG-UNIT tablet  Last filled 5/22/21 #60   Take 1 tablet by mouth 2 times daily (with meals) Past Week at Unknown time Yes Razia Bundy MD   ferrous sulfate (FEROSUL) 325 (65 Fe) MG tablet  Last filled 9/19/21 #30   TAKE 1 TABLET BY MOUTH DAILY WITH BREAKFAST Unknown at Unknown time Yes Reported, Patient   folic acid (FOLVITE) 1 MG tablet  Last filled 8/31/21 #90   TK 1 T PO D Past Week at Unknown time Yes Reported, Patient   LORazepam (ATIVAN) 0.5 MG tablet  Last filled 8/31/22 #2 Take 1 tablet (0.5 mg) by mouth every 6 hours as needed for anxiety (claustrophia prior to MRI) Unknown at Unknown time Yes Razia Bundy MD   omeprazole (PRILOSEC) 20 MG DR capsule  Last filled 7/30/21 #90   Take 20 mg by mouth daily  Past Week at  Unknown time Yes Reported, Patient   potassium chloride ER (KLOR-CON M) 20 MEQ CR tablet   Take 1 tablet (20 mEq) by mouth daily Past Week at Unknown time Yes Razia Bundy MD   pregabalin (LYRICA) 100 MG capsule  Last filled 10/1/21 #60   TK 1 C PO BID Past Week at Unknown time Yes Reported, Patient   spironolactone (ALDACTONE) 50 MG tablet  Last filled 8/19/21 #90   Take 50 mg by mouth daily  Past Week at Unknown time Yes Reported, Patient   UNABLE TO FIND MEDICATION NAME: milk thisle Unknown at Unknown time Yes Reported, Patient   vitamin D2 (ERGOCALCIFEROL) 81852 units (1250 mcg) capsule  Last filled 9/13/21 #12   Take 1 capsule (50,000 Units) by mouth once a week Past Month at Unknown time Yes Razia Bundy MD   vitamin D3 (CHOLECALCIFEROL) 50 mcg (2000 units) tablet   Take 1 tablet (50 mcg) by mouth daily Past Week at Unknown time Yes Razia Bundy MD   traZODone (DESYREL) 100 MG tablet  Last filled 10/1/21 #60   Take 1-2 tablets by mouth daily at bedtime      furosemide (LASIX) 40 MG tablet  Last filled 7/30/21 #90   Take 20 mg by mouth daily    Reported, Patient       Date completed: 10/08/21    Medication history completed by: Danielle Perez, JamelD

## 2021-10-08 NOTE — PROGRESS NOTES
10/08/21 1551   Quick Adds   Type of Visit Initial Occupational Therapy Evaluation   Living Environment   People in home child(fernanda), adult  (Son will be home until November)   Current Living Arrangements house   Home Accessibility stairs to enter home  (has stairs inside, stays on main level, does not use tub)   Number of Stairs, Main Entrance 2   Stair Railings, Main Entrance none   Transportation Anticipated family or friend will provide  (pt usually does not leave house, son drives pt to appts)   Living Environment Comments Patient states that she normally lives alone but currently has adult son living with her for assist. He will only be living with patient for ~1 more month, will still be close to A prn   Self-Care   Usual Activity Tolerance moderate   Current Activity Tolerance fair   Regular Exercise   (liked to shop and go for walks, but has not been able to)   Equipment Currently Used at Home walker, standard   Disability/Function   Hearing Difficulty or Deaf no   Wear Glasses or Blind no   Concentrating, Remembering or Making Decisions Difficulty no   Difficulty Communicating no   Difficulty Eating/Swallowing no   Walking or Climbing Stairs Difficulty yes   Walking or Climbing Stairs ambulation difficulty, requires equipment;ambulation difficulty, assistance 1 person   Mobility Management FWW   Dressing/Bathing Difficulty no   Toileting issues no   Doing Errands Independently Difficulty (such as shopping) yes   Errands Management Son A, pt also orders online   Fall history within last six months yes   Number of times patient has fallen within last six months 1   General Information   Onset of Illness/Injury or Date of Surgery 10/07/21   Referring Physician Vicky Scott MD   Patient/Family Therapy Goal Statement (OT) to get home and be Ind   Additional Occupational Profile Info/Pertinent History of Current Problem Viviana Schaefer is a 55 year old female admitted on 10/7/2021. She has a  history of  alcoholic cirrhosis c/b portal HTN and coagulopathy, AUD in remission, macrocytic anemia, CECE, MDD  and presents and is admitted for sepsis of undetermined source.    Cognitive Status Examination   Orientation Status orientation to person, place and time   Affect/Mental Status (Cognitive) WNL   Visual Perception   Visual Impairment/Limitations WNL   Sensory   Sensory Quick Adds No deficits were identified   Pain Assessment   Patient Currently in Pain No   Edema General Information   Onset of Edema 09/10/21   Affected Body Part(s) Left LE;Right LE   Edema Examination/Assessment   Skin Condition Intact;Pitting   Skin Condition Comments Pt's skin dry, but intact, L dorsal foot somewhat red, but no ulcerations. Bashir feet, ankles and LEs with soft 3+ pitting   Scar No   Ulcerations No   Dorsal Pedal Pulse Symmetrical   Stemmer Sign Negative   Pitting Assessment 3+   Range of Motion Comprehensive   General Range of Motion no range of motion deficits identified   Strength Comprehensive (MMT)   General Manual Muscle Testing (MMT) Assessment no strength deficits identified   Muscle Tone Assessment   Muscle Tone Quick Adds No deficits were identified   Coordination   Upper Extremity Coordination No deficits were identified   Bed Mobility   Comment (Bed Mobility) Max A   Activities of Daily Living   BADL Assessment toileting;grooming;bathing;lower body dressing   Bathing Assessment   [place] Level (Bathing) maximum assist (25% patient effort)   Lower Body Dressing Assessment   [place] Level (Lower Body Dressing) maximum assist (25% patient effort)   Grooming Assessment   [place] Level (Grooming) supervision   Toileting   [place] Level (Toileting) maximum assist (25% patient effort)   Clinical Impression   Criteria for Skilled Therapeutic Interventions Met (OT) yes   OT Diagnosis decreased ADL I and tolerance   Edema: Patient Presentation Edema   OT Problem List-Impairments impacting ADL  strength;activity tolerance impaired   Assessment of Occupational Performance 5 or more Performance Deficits   Identified Performance Deficits home mgmt, leisure, dressing, bathing, toileting   Planned Therapy Interventions (OT) ADL retraining;progressive activity/exercise;home program guidelines   Edema: Planned Interventions Gradient compression bandaging;Fit for compression garment;Edema exercises;Precautions to prevent infection/exacerbation;Education;Manual therapy;ADL training   Clinical Decision Making Complexity (OT) low complexity   Therapy Frequency (OT) 5x/week   Predicted Duration of Therapy 10/22/21   Anticipated Equipment Needs Upon Discharge (OT)   (TBD)   Risk & Benefits of therapy have been explained care plan/treatment goals reviewed;evaluation/treatment results reviewed;risks/benefits reviewed;current/potential barriers reviewed;participants voiced agreement with care plan;patient   Comment-Clinical Impression Pt may benefit from skilled OT to help increase ADL I and tolerance   OT Discharge Planning    OT Discharge Recommendation (DC Rec) Transitional Care Facility   OT Rationale for DC Rec Pt much below ADL baseline, would require A for all mobility/stairs, bathing, toileting. would benefit from HHC/OT/PT   Total Evaluation Time (Minutes)   Total Evaluation Time (Minutes) 5

## 2021-10-09 ENCOUNTER — APPOINTMENT (OUTPATIENT)
Dept: OCCUPATIONAL THERAPY | Facility: CLINIC | Age: 56
End: 2021-10-09
Payer: COMMERCIAL

## 2021-10-09 LAB
ALBUMIN SERPL-MCNC: 2.5 G/DL (ref 3.4–5)
ALP SERPL-CCNC: 58 U/L (ref 40–150)
ALT SERPL W P-5'-P-CCNC: 18 U/L (ref 0–50)
ANION GAP SERPL CALCULATED.3IONS-SCNC: 8 MMOL/L (ref 3–14)
AST SERPL W P-5'-P-CCNC: 41 U/L (ref 0–45)
BILIRUB SERPL-MCNC: 4.3 MG/DL (ref 0.2–1.3)
BUN SERPL-MCNC: 28 MG/DL (ref 7–30)
CALCIUM SERPL-MCNC: 8.5 MG/DL (ref 8.5–10.1)
CHLORIDE BLD-SCNC: 112 MMOL/L (ref 94–109)
CO2 SERPL-SCNC: 23 MMOL/L (ref 20–32)
CREAT SERPL-MCNC: 1.5 MG/DL (ref 0.52–1.04)
CYSTATIN C SERPL-MCNC: 2.1 MG/L
ERYTHROCYTE [DISTWIDTH] IN BLOOD BY AUTOMATED COUNT: 14.7 % (ref 10–15)
GFR SERPL CREATININE-BSD FRML MDRD: 39 ML/MIN/1.73M2
GFR/BSA.PRED SERPLBLD CYS-BASED-ARV: 28 ML/MIN/BSA
GLUCOSE BLD-MCNC: 115 MG/DL (ref 70–99)
HCT VFR BLD AUTO: 26 % (ref 35–47)
HGB BLD-MCNC: 8.4 G/DL (ref 11.7–15.7)
INR PPP: 2.23 (ref 0.85–1.15)
L PNEUMO1 AG UR QL IA: NEGATIVE
MCH RBC QN AUTO: 35.3 PG (ref 26.5–33)
MCHC RBC AUTO-ENTMCNC: 32.3 G/DL (ref 31.5–36.5)
MCV RBC AUTO: 109 FL (ref 78–100)
PLATELET # BLD AUTO: 75 10E3/UL (ref 150–450)
POTASSIUM BLD-SCNC: 4.3 MMOL/L (ref 3.4–5.3)
PROT SERPL-MCNC: 5.6 G/DL (ref 6.8–8.8)
RBC # BLD AUTO: 2.38 10E6/UL (ref 3.8–5.2)
S PNEUM AG SPEC QL: NEGATIVE
SODIUM SERPL-SCNC: 143 MMOL/L (ref 133–144)
WBC # BLD AUTO: 6 10E3/UL (ref 4–11)

## 2021-10-09 PROCEDURE — 258N000003 HC RX IP 258 OP 636: Performed by: STUDENT IN AN ORGANIZED HEALTH CARE EDUCATION/TRAINING PROGRAM

## 2021-10-09 PROCEDURE — 250N000013 HC RX MED GY IP 250 OP 250 PS 637: Performed by: STUDENT IN AN ORGANIZED HEALTH CARE EDUCATION/TRAINING PROGRAM

## 2021-10-09 PROCEDURE — 85018 HEMOGLOBIN: CPT | Performed by: STUDENT IN AN ORGANIZED HEALTH CARE EDUCATION/TRAINING PROGRAM

## 2021-10-09 PROCEDURE — P9047 ALBUMIN (HUMAN), 25%, 50ML: HCPCS | Performed by: STUDENT IN AN ORGANIZED HEALTH CARE EDUCATION/TRAINING PROGRAM

## 2021-10-09 PROCEDURE — 97140 MANUAL THERAPY 1/> REGIONS: CPT | Mod: GO

## 2021-10-09 PROCEDURE — 82247 BILIRUBIN TOTAL: CPT | Performed by: STUDENT IN AN ORGANIZED HEALTH CARE EDUCATION/TRAINING PROGRAM

## 2021-10-09 PROCEDURE — 87040 BLOOD CULTURE FOR BACTERIA: CPT | Performed by: STUDENT IN AN ORGANIZED HEALTH CARE EDUCATION/TRAINING PROGRAM

## 2021-10-09 PROCEDURE — 250N000011 HC RX IP 250 OP 636: Performed by: FAMILY MEDICINE

## 2021-10-09 PROCEDURE — 250N000011 HC RX IP 250 OP 636: Performed by: STUDENT IN AN ORGANIZED HEALTH CARE EDUCATION/TRAINING PROGRAM

## 2021-10-09 PROCEDURE — 99222 1ST HOSP IP/OBS MODERATE 55: CPT | Performed by: STUDENT IN AN ORGANIZED HEALTH CARE EDUCATION/TRAINING PROGRAM

## 2021-10-09 PROCEDURE — 36415 COLL VENOUS BLD VENIPUNCTURE: CPT | Performed by: STUDENT IN AN ORGANIZED HEALTH CARE EDUCATION/TRAINING PROGRAM

## 2021-10-09 PROCEDURE — 85610 PROTHROMBIN TIME: CPT | Performed by: STUDENT IN AN ORGANIZED HEALTH CARE EDUCATION/TRAINING PROGRAM

## 2021-10-09 PROCEDURE — 120N000011 HC R&B TRANSPLANT UMMC

## 2021-10-09 PROCEDURE — 82040 ASSAY OF SERUM ALBUMIN: CPT | Performed by: STUDENT IN AN ORGANIZED HEALTH CARE EDUCATION/TRAINING PROGRAM

## 2021-10-09 RX ADMIN — PIPERACILLIN SODIUM AND TAZOBACTAM SODIUM 4.5 G: 4; .5 INJECTION, POWDER, LYOPHILIZED, FOR SOLUTION INTRAVENOUS at 11:03

## 2021-10-09 RX ADMIN — MICONAZOLE NITRATE: 20 POWDER TOPICAL at 06:32

## 2021-10-09 RX ADMIN — PIPERACILLIN SODIUM AND TAZOBACTAM SODIUM 4.5 G: 4; .5 INJECTION, POWDER, LYOPHILIZED, FOR SOLUTION INTRAVENOUS at 23:02

## 2021-10-09 RX ADMIN — PANTOPRAZOLE SODIUM 40 MG: 40 TABLET, DELAYED RELEASE ORAL at 08:22

## 2021-10-09 RX ADMIN — AZITHROMYCIN MONOHYDRATE 500 MG: 500 INJECTION, POWDER, LYOPHILIZED, FOR SOLUTION INTRAVENOUS at 01:08

## 2021-10-09 RX ADMIN — BUPROPION HYDROCHLORIDE 150 MG: 150 TABLET, FILM COATED, EXTENDED RELEASE ORAL at 08:22

## 2021-10-09 RX ADMIN — Medication 50 MCG: at 08:22

## 2021-10-09 RX ADMIN — FOLIC ACID 1 MG: 1 TABLET ORAL at 08:22

## 2021-10-09 RX ADMIN — Medication 1 TABLET: at 08:22

## 2021-10-09 RX ADMIN — Medication 1 TABLET: at 17:04

## 2021-10-09 RX ADMIN — BUSPIRONE HYDROCHLORIDE 7.5 MG: 7.5 TABLET ORAL at 19:17

## 2021-10-09 RX ADMIN — MICONAZOLE NITRATE: 20 POWDER TOPICAL at 23:02

## 2021-10-09 RX ADMIN — ALBUMIN HUMAN 100 G: 0.25 SOLUTION INTRAVENOUS at 19:17

## 2021-10-09 RX ADMIN — POTASSIUM CHLORIDE 20 MEQ: 750 TABLET, EXTENDED RELEASE ORAL at 08:22

## 2021-10-09 RX ADMIN — PIPERACILLIN SODIUM AND TAZOBACTAM SODIUM 4.5 G: 4; .5 INJECTION, POWDER, LYOPHILIZED, FOR SOLUTION INTRAVENOUS at 17:00

## 2021-10-09 RX ADMIN — SPIRONOLACTONE 50 MG: 50 TABLET ORAL at 08:22

## 2021-10-09 RX ADMIN — BUSPIRONE HYDROCHLORIDE 7.5 MG: 7.5 TABLET ORAL at 08:22

## 2021-10-09 RX ADMIN — PIPERACILLIN SODIUM AND TAZOBACTAM SODIUM 4.5 G: 4; .5 INJECTION, POWDER, LYOPHILIZED, FOR SOLUTION INTRAVENOUS at 05:16

## 2021-10-09 ASSESSMENT — ACTIVITIES OF DAILY LIVING (ADL)
ADLS_ACUITY_SCORE: 14
ADLS_ACUITY_SCORE: 13
ADLS_ACUITY_SCORE: 14
ADLS_ACUITY_SCORE: 14
ADLS_ACUITY_SCORE: 13
ADLS_ACUITY_SCORE: 13

## 2021-10-09 NOTE — PROGRESS NOTES
"SPIRITUAL HEALTH SERVICES  SPIRITUAL ASSESSMENT Progress Note  [facility] ([place]) 7A   ON-CALL VISIT    REFERRAL SOURCE: Pt request for spiritual care on admission.  Provided introduction to spiritual care, assessment of needs/resources, shared reading of Philippclaudia 4:4-8 and prayer at pt request.    PRIMARY FOCUS:   Support for coping    ILLNESS CIRCUMSTANCES:   Reviewed documentation. Reflective conversation shared with Viviana which integrated elements of illness and family narratives.   Context of Serious Illness/Symptom(s) - Viviana was tearful as she shared about the possibility of a transplant, spoke of last night being \"a rough one\" but now she is more comfortable.  Resources for Support - \"a handful and a half\" of friends, her [employer], her 3 sons    DISTRESS:   Emotional/Spiritual/Existential Distress - anxiety around a possible transplant and that this would be \"a much bigger surgery.\" Viviana spoke of having 30 procedures to date and that they have been shorter, and that she has always had her spouse with her. \"this is the first time I'd be alone and have to figure it all out myself.\"  Islam Distress - feelings sometimes that to feel fear means a lack of nelsy. Viviana stated that this mostly comes from herself, as her own expectation.  Social/Cultural/Economic Distress - Viviana is considering if she should remain in her home or move to a simpler, single level living    SPIRITUAL/Synagogue COPING:   Confucianism/Nelsy - Synagogue  Spiritual Practice(s) - prayer. Viviana stated in the past she had found Scripture passages helpful to read and reflect on when she felt afraid.  Emotional/Relational/Existential Connections -     GOALS OF CARE:  Goals of Care -   Meaning/Sense-Making - Viviana brightened with the \"new idea\" that she could choose not to think about     PLAN: I will inform the unit  of care provided.    Belem Almeida [facility]  Associate   Pager [phone]  Office [phone]  [facility] " remains available 24/7 for emergent requests/referrals, either by having the switchboard page the on-call  or by entering an ASAP/STAT consult in Epic (this will also page the on-call ). Routine Epic consults receive an initial response within 24 hours.

## 2021-10-09 NOTE — PLAN OF CARE
BP (!) 150/60 (BP Location: Right arm)   Pulse 83   Temp 98.4  F (36.9  C) (Oral)   Resp 20   Wt 133.7 kg (294 lb 12.8 oz)   SpO2 94%   BMI 53.92 kg/m        7710-3008  Neuro: Pt. alert& Ox4  Behavior: Pt. calm & cooperative with cares.   Activity: Pt. up with 1 assist.  Vital: AVSS on RA.  LDAs: Left PIV with NS at TKO.  Cardiac: WNL  Respiratory: LS diminished bilat.  GI/: Pt. voiding spontaneously, no stools this shift. Last BM 10/8.  Skin: Reddened & moist groin & abdom. folds with Interdry & Micatin powder x1.  Pain/Nausea: Pt. denies pain or nausea.   Diet: Regular  Labs/Imaging: Sputum cx. odered, however, pt. doesn't have cough & on-call will pass onto team in am if they want to order RT to induce sputum.  Plan: Continue to follow POC & notify MD with change in status.

## 2021-10-09 NOTE — PROGRESS NOTES
Admitted/transferred from: ED  Time of arrival on unit 0910  2 RN full  skin assessment completed by Ashley MARES & Molly SIMMONS  Skin assessment finding: moist & reddened abdominal & groin folds, hemorrhoids, right knee scab, BLE & flank edema.  Interventions/actions: Interdry between folds, Lymph wraps on BLE &  frequent turns.    Will continue to monitor.

## 2021-10-09 NOTE — PROVIDER NOTIFICATION
@1715  Paged meet JAMESON to notify of critical lab value: E. Coli detected in peripheral blood cx; no new orders    @1900  Paged meet JAMESON to notify that peripheral blood cx growing gram positive cocci in clusters; MD called back and aware, no new orders    @2140  Paged meet JAMESON to notify/update that peripheral blood cx growing STAPH species. MD called back and aware; microbiology lab number given to MD for further clarification. Will continue to monitor and continue POC.

## 2021-10-09 NOTE — PLAN OF CARE
/47 (BP Location: Right arm)   Pulse 65   Temp 97.8  F (36.6  C) (Oral)   Resp 18   Wt 116.1 kg (256 lb)   SpO2 97%   BMI 46.82 kg/m      5736-3984  VSS on RA, no s/s of distress. A/Ox4, pleasant and cooperative with cares; endorses fatigue, loss of appetite, and increasing edema in flank/hips/legs. Denied pain. Denied n/v--on regular diet with poor/fair appetite. PIV patent with TKO for abx therapy now; urine specimen sent. Up x1 with walker (very fatigued); voiding adequate amounts of evan urine and 2 loose, green BMs this shift. 500ml IV bolus given this shift; pt blood cx positive for bacteria growth (see last note). Sputum cx still needed. OT/PT consulted for weakness/fatigue. Continue IV abx therapy and encourage activity/PO intake. Call light and belongings within reach. Will continue to monitor and continue POC/notify team as needed.

## 2021-10-09 NOTE — PLAN OF CARE
Vitals: /56 (BP Location: Right arm)   Pulse 76   Temp 98.6  F (37  C) (Oral)   Resp 18   Wt 133.7 kg (294 lb 12.8 oz)   SpO2 95%   BMI 53.92 kg/m      Endocrine: n/a  Labs: Elevated liver enzymes.  Pain: Left shoulder pain, declined treatment.   PRN's: n/a  Diet: Regular diet, fair appetite.  LDA: L PIV saline locked.  GI:  BM 10/9, soft.  : Voids spontaneously.  Skin: Pale, reddened skin folds.  Neuro: Pleasant, alert and oriented.  Mobility: Heavy assist out of bed, steady gait using walker. Up to the bathroom with stand by assist.  Education: n/a  Plan: Continue with plan of care, antibiotics, Albumin this evening.

## 2021-10-09 NOTE — CONSULTS
GENERAL ID SERVICE: NEW CONSULTATION  Patient:  Viviana Schaefer, Date of birth 1965, Medical record number [id number]  Date of Admission: 10/7/2021  Date of Visit:  10/9/2021  Requesting Provider: Info Unavailable  Consult question: Bacteremia on abx         Assessment and Recommendations:   Problem List:  Bacteremia  Fever  KWABENA  Cirrhosis  CXR with patchy infiltrates in lower lobes  UA with c/f infection,  urine culture with NG  Not vaccinated against covid  Hematuria, proteinuria, granular casts, amorphous crystals in UA  R CVA epigastric tenderness  Medullary cystic kidney disease  Kidney stones since 4th grade s/p 5 lithotripsies  Known early systolic murmur  Loose stools    Discussion:    Ecoli and Staph homiis bacteremia, with unclear source so far. Would recommend a CTAP with contrast to evaluate for an intraabdominal source/collection, given epigastric and R CVA tenderness, loose stools, history of kidney disease and kidney stones. Would continue Zosyn for now while we evaluate, hope to narrow eventually. Would check an Enteric panel as she reports looses stools for a couple weeks now after eating outside.     Recommendations:  - CT AP with contrast  - Await blood culture susceptibilities  - Repeat blood cultures daily  - Continue Zosyn  - Enteric panel    Thank you for this consult. ID will continue to follow this patient. Please feel free to call with any questions.     Eleanor Smith  ID Staff Physician       History of Present Illness:      55 year old female who has a history of alcoholic cirrhosis c/b portal HTN and coagulopathy, alcohol use in remission fro 8 months, macrocytic anemia, CECE, MDD  and who presented for evaluation of weakness and falls on 10/7.      Sshe has had progressively worsening weakness over past few weeks. On evening of presentation, she had a fall. Also with  fevers/chills over the course of the 24 hours prior to presentation. She has also had poor appetite and  relatively poor PO intake. Stable, chronic SOB,  worsening swelling in both of her legs over the past few weeks, despite increase in lasix to 40mg daily.    She is not immunized against COVID-19.      She  is actively working towards transplant.    In ED CBC notable for normal WBC ct but left shift noted on differential.Creatinine 1.49, Tbilil 6.5, INR 2.22. NT-proBN 1204. Procal 0.9/moderate risk. COVID-19 negative. CXR with patchy, bilateral infiltrates of lower lobes, CT head w/o acute intracranial process    Was started on Ceftriaxone and Azithromycin, then switched to Zosyn +Azithro same day as  UA showing  infection, thought likely pyelo with presenting symptoms of fever, CVA tenderness, lower abdominal pain. Azithro dc'd after 2 days.     Blood cultures with E coli and Staph hominis, each in 1/2 bottles from 10/7, grown after 1 days, and ID was consulted.     Repeat cultures from 10/9x1 with NGTD.    Urine cultures with <10k mixed urogenital kasia. RVP neg. MRSA nares neg, Strep pneumoniae, Legionella urinary antigens neg    Fever to 1010 on arrival, since then temperature curve trended down. LA 1.3, PCT 0.90. BPs have been ok. POC ultrasound performed to investigate the abdomen for ascites. No fluid found.     Also noted to have urinary retention >400cc after receiving IV lasix. Baseline cr 0/9. Of note, UA with large blood, few amorphous crystals and 1 granular cast, protein albumin at 50.    No abdominal pain. She reports looses stools for a couple weeks now after eating outside.Watery to soft, >3 episodes per day, increased since being here, she attributes to cranberry juice. No nausea/v. No cough. 2 cats, sometimes sustains scratches. Lives in [place] with 2 sons, one soon to leave to [employer]. . Is an [employer] tech. Walking is only reported hobby.No other exposures.     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  Had some urinary pain a week PTA, not now. Always has increased freuqency         Review of Systems:   Complete 10 point review of systems negative except as noted in HPI.        Past Medical History:     Past Medical History:   Diagnosis Date     Alcoholic cirrhosis (H)      Arthritis      Congestive heart failure (H)     Cervical CA      Coronary artery disease     Heart Murmer     Depressive disorder      Hyponatremia          Allergies:      Allergies   Allergen Reactions     Codeine      Diazepam      Morphine      PN: LW Reaction: Nausea     Penicillins      PN: LW Reaction: Rash, Generalized. Reportedly had a rash with this as a child and has tolerated Augmentin since per patient. Tolerated Zosyn 3/12/19 in [facility].           Family History:   Reviewed and noncontributory.   Family History   Problem Relation Age of Onset     Substance Abuse Brother      Depression Sister      Anxiety Disorder Sister      Substance Abuse Sister      Depression Sister      Anxiety Disorder Sister      Depression Sister      Anxiety Disorder Sister      Autism Spectrum Disorder Son         Social History:     Social History     Socioeconomic History     Marital status:      Spouse name: Not on file     Number of children: Not on file     Years of education: Not on file     Highest education level: Not on file   Occupational History     Not on file   Tobacco Use     Smoking status: Former Smoker     Smokeless tobacco: Never Used   Substance and Sexual Activity     Alcohol use: Not Currently     Comment: 5/2020     Drug use: Never     Sexual activity: Not on file   Other Topics Concern     Parent/sibling w/ CABG, MI or angioplasty before 65F 55M? Not Asked   Social History Narrative     Not on file     Social Determinants of Health     Financial Resource Strain:      Difficulty of Paying Living Expenses:    Food Insecurity:      Worried About Running Out of Food in the Last Year:      Ran Out of Food in the  Last Year:    Transportation Needs:      Lack of Transportation (Medical):      Lack of Transportation (Non-Medical):    Physical Activity:      Days of Exercise per Week:      Minutes of Exercise per Session:    Stress:      Feeling of Stress :    Social Connections:      Frequency of Communication with Friends and Family:      Frequency of Social Gatherings with Friends and Family:      Attends Taoist Services:      Active Member of Clubs or Organizations:      Attends Club or Organization Meetings:      Marital Status:    Intimate Partner Violence:      Fear of Current or Ex-Partner:      Emotionally Abused:      Physically Abused:      Sexually Abused:               Physical Exam:   /53 (BP Location: Right arm)   Pulse 70   Temp 98.6  F (37  C) (Oral)   Resp 18   Wt 133.7 kg (294 lb 12.8 oz)   SpO2 95%   BMI 53.92 kg/m     Exam:  GENERAL:  Well-developed, well-nourished, sitting in bed in no acute distress.   ENT:  Head is normocephalic, atraumatic. Oropharynx is moist   EYES:  Eyes have anicteric sclerae.    NECK:  Supple.  LUNGS:  Clear to auscultation.  CARDIOVASCULAR:  Regular rate and rhythm with early systolic murmur 3+, gallops or rubs.  ABDOMEN:  Normal bowel sounds, soft, r cva and epigastric ttp  EXT: Extremities wrapped for edema by OT  SKIN:  No acute rashes.  Line is in place without any surrounding erythema. Moist & reddened abdominal & groin folds  NEUROLOGIC:  Grossly nonfocal.         Laboratory Data:     Creatinine   Date Value Ref Range Status   10/09/2021 1.50 (H) 0.52 - 1.04 mg/dL Final   10/08/2021 1.62 (H) 0.52 - 1.04 mg/dL Final   10/07/2021 1.49 (H) 0.52 - 1.04 mg/dL Final   06/11/2021 0.95 0.52 - 1.04 mg/dL Final   05/10/2021 0.88 0.52 - 1.04 mg/dL Final   03/01/2021 0.98 0.52 - 1.04 mg/dL Final   02/05/2021 0.97 0.52 - 1.04 mg/dL Final   01/19/2021 0.88 0.52 - 1.04 mg/dL Final     WBC   Date Value Ref Range Status   06/11/2021 6.7 4.0 - 11.0 10e9/L Final   05/10/2021 6.3  4.0 - 11.0 10e9/L Final   03/01/2021 8.9 4.0 - 11.0 10e9/L Final   02/05/2021 6.3 4.0 - 11.0 10e9/L Final   01/19/2021 6.1 4.0 - 11.0 10e9/L Final     WBC Count   Date Value Ref Range Status   10/09/2021 6.0 4.0 - 11.0 10e3/uL Final   10/08/2021 10.0 4.0 - 11.0 10e3/uL Final   10/07/2021 10.4 4.0 - 11.0 10e3/uL Final     Hemoglobin   Date Value Ref Range Status   10/09/2021 8.4 (L) 11.7 - 15.7 g/dL Final   06/11/2021 10.1 (L) 11.7 - 15.7 g/dL Final     Platelet Count   Date Value Ref Range Status   10/09/2021 75 (L) 150 - 450 10e3/uL Final   06/11/2021 92 (L) 150 - 450 10e9/L Final     Comment:     Verified by smear review     Lab Results   Component Value Date     10/09/2021    BUN 28 10/09/2021    CO2 23 10/09/2021        Pertinent Recent Microbiology Data:   As above         Imaging:     Recent Results (from the past 48 hour(s))   XR Chest Port 1 View    Narrative    EXAM: XR CHEST PORT 1 VIEW  LOCATION: [facility]  DATE/TIME: 10/7/2021 9:54 PM    INDICATION: Fever, hypoxia.  COMPARISON: 01/19/2021.      Impression    IMPRESSION: There are patchy infiltrates within the lower lobes, shallow inspiration.   CT Head w/o Contrast    Narrative    EXAM: CT HEAD W/O CONTRAST  LOCATION: [facility]  DATE/TIME: 10/7/2021 11:02 PM    INDICATION: Cerebral hemorrhage suspected. Headache. Fall. Elevated INR.  COMPARISON: None.  TECHNIQUE: Routine CT Head without IV contrast. Multiplanar reformats. Dose reduction techniques were used.    FINDINGS:  Motion and streak artifact limits aspects of exam.    INTRACRANIAL CONTENTS: No intracranial hemorrhage, extraaxial collection, or mass effect.  No CT evidence of acute infarct. Normal parenchymal attenuation. Normal ventricles and sulci.     VISUALIZED ORBITS/SINUSES/MASTOIDS: No intraorbital abnormality. Bilateral inferior maxillary sinuses small retention cysts/mucosal polyps.  Remaining visualized paranasal sinuses essentially clear. No middle ear or mastoid effusion.    BONES/SOFT TISSUES: No acute abnormality. Dental amalgam resulting in streak artifact. Vascular calcifications.      Impression    IMPRESSION:  1.  No acute intracranial process given motion degraded examination.

## 2021-10-09 NOTE — PROGRESS NOTES
[facility] Progress Note    Main Plans for Today      - Albumin x 3 days (Day 2 of 3 today)  - Cont ABX      Assessment & Plan     Viviana Schaefer is a 55 year old female admitted on 10/7/2021. She has a history of  alcoholic cirrhosis c/b portal HTN and coagulopathy, AUD in remission, macrocytic anemia, CECE, MDD  and presents and is admitted for sepsis of undetermined source.        # Probable Sepsis, severity to be determined, source to be determinted  # Fever  # Weakness  # Fall  Patient admitted due to weakness and falls. Febrile w/ Tmax 101 and HR elevated to 91 in ED, meeting criteria for SIRs. Procalcitonin elevated to 0.9. No leukocytosis, but left shift noted on differential. Lactate 1.3. Source: Due to significant ilfiltrates noted on CXR when compared to one in January, will continue Azithro. Due to UA showing clear infection, likely pyelo with presenting symptoms of fever, CVA tenderness, lower abdominal pain will continue zosyn. POC ultrasound performed to investigate the abdomen for ascites. No fluid found. Of note, patient unvaccinated and is resistant to receiving while in the hospital.  - Blood cx dated 10/7 after two days of growth  Gram negative bacilliPanic     1 of 2 bottles   Gram positive cocci in clustersPanic     1 of 2 bottles     - MRSA Nasal Swab negative  - Legionella + Strep pneumo antigen urine negative  - Verigene GN and GP panels:   -Pos for Gram- E. Coli and G+ staph coagulase negative  - Respiratory panel negative, discontinue Azithromycin (10/9)  - Abx: Zosyn (Start date 10/8)  - Gentle IV hydration with small boluses PRN  - APAP, max dose 3g daily  - PT/OT consult  - Recommend COVID-19 vaccination, inpatient if possible  -FeNa result 0.4%, suggesting pre-renal etiology        # History of alcohol-used disorder, in remission  # End-stage Alcoholic cirrhosis  # Portal hypertension  # Thrombocytopenia  #  Elevated INR  # Anasarca  MELD-Na Score: 25 on 10/8 0818 (Down from 27)  Patient with ESLD, working towards transplant. Has been sober x8 months, just finished outpatient treatment. History of portal hypertension, no history of varices or GIB. Coagulopathy w/ elevated INR and thrombocytopenia. No abdominal pain/tenderness, but diffusely edematous. MRI liver 5/2021 shows small volume ascites. Notes that lasix has been less effective over past 3 weeks with worsening anasarca. Denies confusion, alert and oriented on exam, no history of hepatic encephalopathy and ammonia 47. 20mg IV lasix given in ED.   - Spoke with Dr. Razia Martin, the pt's outpatient hepatologist and appreciate her recs.    -Albumin 1g/kg with max dose of 100g/day for three days. First dose 10/8.    -Gentle IV boluses when needed  - PTA nik, could consider increasing dose (50mg PTA)  - Consider IV lasix given recent resistance to PTA PO lasix, but would be cautious given sepsis.   - Strict intake/output  - Daily MELD labs  - Daily CBC     # KWABENA  # History of Medullary sponge kidney  # Urinary retention  Creatinine 1.49 on admission, baseline ~0.9. BUN:Cr 16, c/f intrinsic renal damage, though would also anticipate some prerenal contribution given poor PO intake and sepsis. Also noted to have urinary retention >400cc after receiving IV lasix.   - Worsening creatinine 10/8 AM, 1.62   -FeNa 0.4%  - UA unremarkable  - Consider renal ultrasound     # Macrocytic anemia, likely 2/2 etoh-use and folate deficiency  Hgb 9.7 on admission, baseline ~10 w/ , likely 2/2 alcohol use disorder and folate deficiency. No signs of bleeding, though patient with coagulopathy as noted above.   - Continue PTA folate  - Consider restart of PTA iron     -------------------------------------------------------------------------------------------------------  Chronic conditions:     # CAD  Cardiac catheterization done 2/2021 to evaluate for liver transplant showing  mild, non-obstructive CAD with normal PA and filling pressures and normal cardiac output. In review of the cart, some notes indicate history of CHF, which patient is not aware of. Echo from OSH 5/2020 shows LVEF 55%. BNP elevated, but this likely 2/2 anasarca from liver disease.   - Monitor     # CECE  # Panic disorder  # MDD  - PTA buspar, buproprion        # Pain Assessment:  Current Pain Score 10/8/2021   Patient currently in pain? denies   Pain score (0-10) -   Viviana jang pain level was assessed and she currently denies pain.      Diet: Combination Diet Regular Diet Adult  Fluids: PO ad jackson  DVT Prophylaxis:PCD's (Elevated INR, low platelets)  Code Status: Full Code    Disposition Plan   Expected discharge:  2 - 3 days, recommended to prior living arrangement once renal function improved.Dispo:            Entered: Belén Paz 10/09/2021, 6:54 AM   Information in the above section will display in the discharge planner report.      The patient's care was discussed with the Attending Physician, Dr. Reynolds.    Belén Paz  [facility] Medicine: PGY1  Pager: [phone]  Please see sticky note for cross cover information    Interval History    Pt seen and evaluated bedside.     Resting comfortably with no acute complaints except for frequent urination. Discussed that this could be due to the fluid we gave her yesterday or the albumin.     Reports improvement of symptoms, lessened abdominal pain, no burning with urination.     Review of Systems   All other systems reviewed and are negative.      Physical Exam   Vital Signs: Temp: 98.4  F (36.9  C) Temp src: Oral BP: (!) 150/60 Pulse: 83   Resp: 20 SpO2: 94 % O2 Device: None (Room air)    Weight: 294 lbs 12.8 oz  Physical Exam  Vitals and nursing note reviewed.   Constitutional:       General: She is not in acute distress.     Appearance: She is obese.   HENT:      Head: Normocephalic and atraumatic.      Right Ear: External ear normal.       Left Ear: External ear normal.      Nose: Nose normal.   Eyes:      General: Scleral icterus present.      Extraocular Movements: Extraocular movements intact.      Pupils: Pupils are equal, round, and reactive to light.   Cardiovascular:      Rate and Rhythm: Normal rate and regular rhythm.      Heart sounds: Normal heart sounds. No murmur heard.   No gallop.    Pulmonary:      Effort: Pulmonary effort is normal. No respiratory distress.   Abdominal:      Palpations: Abdomen is soft.      Tenderness: There is abdominal tenderness. There is right CVA tenderness and left CVA tenderness.   Musculoskeletal:         General: Normal range of motion.      Cervical back: Normal range of motion.      Right lower leg: Edema present.      Left lower leg: Edema present.   Skin:     Coloration: Skin is jaundiced.   Neurological:      General: No focal deficit present.      Mental Status: She is alert and oriented to person, place, and time.   Psychiatric:         Mood and Affect: Mood normal.         Behavior: Behavior normal.         Thought Content: Thought content normal.         Judgment: Judgment normal.             Data   Recent Labs   Lab 10/08/21  0754 10/07/21  2124   WBC 10.0 10.4   HGB 8.9* 9.7*   * 109*   PLT 91* 96*   INR 2.21* 2.22*    139   POTASSIUM 3.8 3.8   CHLORIDE 107 106   CO2 26 23   BUN 29 25   CR 1.62* 1.49*   ANIONGAP 6 10   ELIUD 8.4* 8.7   * 123*   ALBUMIN 1.9* 2.0*   PROTTOTAL 5.6* 5.9*   BILITOTAL 5.5* 6.5*   ALKPHOS 76 91   ALT 15 20   AST 33 34   LIPASE  --  83   TROPONIN  --  <0.015

## 2021-10-10 ENCOUNTER — APPOINTMENT (OUTPATIENT)
Dept: CT IMAGING | Facility: CLINIC | Age: 56
End: 2021-10-10
Payer: COMMERCIAL

## 2021-10-10 LAB
ACANTHOCYTES BLD QL SMEAR: SLIGHT
ALBUMIN SERPL-MCNC: 3.4 G/DL (ref 3.4–5)
ALP SERPL-CCNC: 52 U/L (ref 40–150)
ALT SERPL W P-5'-P-CCNC: 13 U/L (ref 0–50)
ANION GAP SERPL CALCULATED.3IONS-SCNC: 4 MMOL/L (ref 3–14)
AST SERPL W P-5'-P-CCNC: 29 U/L (ref 0–45)
BACTERIA BLD CULT: ABNORMAL
BASOPHILS # BLD MANUAL: 0.1 10E3/UL (ref 0–0.2)
BASOPHILS NFR BLD MANUAL: 1 %
BILIRUB SERPL-MCNC: 4.2 MG/DL (ref 0.2–1.3)
BUN SERPL-MCNC: 22 MG/DL (ref 7–30)
CALCIUM SERPL-MCNC: 9.6 MG/DL (ref 8.5–10.1)
CHLORIDE BLD-SCNC: 112 MMOL/L (ref 94–109)
CO2 SERPL-SCNC: 27 MMOL/L (ref 20–32)
CREAT SERPL-MCNC: 1.34 MG/DL (ref 0.52–1.04)
DACRYOCYTES BLD QL SMEAR: SLIGHT
EOSINOPHIL # BLD MANUAL: 0.4 10E3/UL (ref 0–0.7)
EOSINOPHIL NFR BLD MANUAL: 8 %
ERYTHROCYTE [DISTWIDTH] IN BLOOD BY AUTOMATED COUNT: 14.6 % (ref 10–15)
ERYTHROCYTE [DISTWIDTH] IN BLOOD BY AUTOMATED COUNT: 14.7 % (ref 10–15)
GFR SERPL CREATININE-BSD FRML MDRD: 45 ML/MIN/1.73M2
GLUCOSE BLD-MCNC: 108 MG/DL (ref 70–99)
HCT VFR BLD AUTO: 24.4 % (ref 35–47)
HCT VFR BLD AUTO: 24.6 % (ref 35–47)
HEMOCCULT STL QL: NEGATIVE
HGB BLD-MCNC: 7.7 G/DL (ref 11.7–15.7)
HGB BLD-MCNC: 7.9 G/DL (ref 11.7–15.7)
INR PPP: 2.38 (ref 0.85–1.15)
LDH SERPL L TO P-CCNC: 200 U/L (ref 81–234)
LYMPHOCYTES # BLD MANUAL: 0.7 10E3/UL (ref 0.8–5.3)
LYMPHOCYTES NFR BLD MANUAL: 13 %
MCH RBC QN AUTO: 35.2 PG (ref 26.5–33)
MCH RBC QN AUTO: 36.4 PG (ref 26.5–33)
MCHC RBC AUTO-ENTMCNC: 31.3 G/DL (ref 31.5–36.5)
MCHC RBC AUTO-ENTMCNC: 32.4 G/DL (ref 31.5–36.5)
MCV RBC AUTO: 112 FL (ref 78–100)
MCV RBC AUTO: 112 FL (ref 78–100)
METAMYELOCYTES # BLD MANUAL: 0.1 10E3/UL
METAMYELOCYTES NFR BLD MANUAL: 1 %
MONOCYTES # BLD MANUAL: 0.4 10E3/UL (ref 0–1.3)
MONOCYTES NFR BLD MANUAL: 8 %
MYELOCYTES # BLD MANUAL: 0.2 10E3/UL
MYELOCYTES NFR BLD MANUAL: 3 %
NEUTROPHILS # BLD MANUAL: 3.4 10E3/UL (ref 1.6–8.3)
NEUTROPHILS NFR BLD MANUAL: 66 %
NRBC # BLD AUTO: 0.1 10E3/UL
NRBC BLD MANUAL-RTO: 2 %
PLAT MORPH BLD: ABNORMAL
PLATELET # BLD AUTO: 74 10E3/UL (ref 150–450)
PLATELET # BLD AUTO: 74 10E3/UL (ref 150–450)
POTASSIUM BLD-SCNC: 3.4 MMOL/L (ref 3.4–5.3)
PROT SERPL-MCNC: 6.1 G/DL (ref 6.8–8.8)
RBC # BLD AUTO: 2.17 10E6/UL (ref 3.8–5.2)
RBC # BLD AUTO: 2.19 10E6/UL (ref 3.8–5.2)
RBC MORPH BLD: ABNORMAL
RETICS # AUTO: 0.07 10E6/UL (ref 0.03–0.1)
RETICS # AUTO: 0.07 10E6/UL (ref 0.03–0.1)
RETICS/RBC NFR AUTO: 3.1 % (ref 0.5–2)
RETICS/RBC NFR AUTO: 3.3 % (ref 0.5–2)
SODIUM SERPL-SCNC: 143 MMOL/L (ref 133–144)
WBC # BLD AUTO: 5 10E3/UL (ref 4–11)
WBC # BLD AUTO: 5.1 10E3/UL (ref 4–11)

## 2021-10-10 PROCEDURE — 250N000011 HC RX IP 250 OP 636: Performed by: FAMILY MEDICINE

## 2021-10-10 PROCEDURE — 80053 COMPREHEN METABOLIC PANEL: CPT | Performed by: STUDENT IN AN ORGANIZED HEALTH CARE EDUCATION/TRAINING PROGRAM

## 2021-10-10 PROCEDURE — 250N000011 HC RX IP 250 OP 636: Performed by: UROLOGY

## 2021-10-10 PROCEDURE — 99231 SBSQ HOSP IP/OBS SF/LOW 25: CPT | Performed by: STUDENT IN AN ORGANIZED HEALTH CARE EDUCATION/TRAINING PROGRAM

## 2021-10-10 PROCEDURE — 85610 PROTHROMBIN TIME: CPT | Performed by: STUDENT IN AN ORGANIZED HEALTH CARE EDUCATION/TRAINING PROGRAM

## 2021-10-10 PROCEDURE — 99233 SBSQ HOSP IP/OBS HIGH 50: CPT | Mod: GC | Performed by: FAMILY MEDICINE

## 2021-10-10 PROCEDURE — 86304 IMMUNOASSAY TUMOR CA 125: CPT | Performed by: OBSTETRICS & GYNECOLOGY

## 2021-10-10 PROCEDURE — 85060 BLOOD SMEAR INTERPRETATION: CPT | Performed by: STUDENT IN AN ORGANIZED HEALTH CARE EDUCATION/TRAINING PROGRAM

## 2021-10-10 PROCEDURE — P9047 ALBUMIN (HUMAN), 25%, 50ML: HCPCS | Performed by: STUDENT IN AN ORGANIZED HEALTH CARE EDUCATION/TRAINING PROGRAM

## 2021-10-10 PROCEDURE — 36415 COLL VENOUS BLD VENIPUNCTURE: CPT | Performed by: STUDENT IN AN ORGANIZED HEALTH CARE EDUCATION/TRAINING PROGRAM

## 2021-10-10 PROCEDURE — 85027 COMPLETE CBC AUTOMATED: CPT | Performed by: STUDENT IN AN ORGANIZED HEALTH CARE EDUCATION/TRAINING PROGRAM

## 2021-10-10 PROCEDURE — 87506 IADNA-DNA/RNA PROBE TQ 6-11: CPT | Performed by: STUDENT IN AN ORGANIZED HEALTH CARE EDUCATION/TRAINING PROGRAM

## 2021-10-10 PROCEDURE — 86301 IMMUNOASSAY TUMOR CA 19-9: CPT | Performed by: OBSTETRICS & GYNECOLOGY

## 2021-10-10 PROCEDURE — 83615 LACTATE (LD) (LDH) ENZYME: CPT | Performed by: STUDENT IN AN ORGANIZED HEALTH CARE EDUCATION/TRAINING PROGRAM

## 2021-10-10 PROCEDURE — 250N000011 HC RX IP 250 OP 636: Performed by: STUDENT IN AN ORGANIZED HEALTH CARE EDUCATION/TRAINING PROGRAM

## 2021-10-10 PROCEDURE — 120N000011 HC R&B TRANSPLANT UMMC

## 2021-10-10 PROCEDURE — 86900 BLOOD TYPING SEROLOGIC ABO: CPT | Performed by: STUDENT IN AN ORGANIZED HEALTH CARE EDUCATION/TRAINING PROGRAM

## 2021-10-10 PROCEDURE — 87040 BLOOD CULTURE FOR BACTERIA: CPT | Performed by: STUDENT IN AN ORGANIZED HEALTH CARE EDUCATION/TRAINING PROGRAM

## 2021-10-10 PROCEDURE — 85045 AUTOMATED RETICULOCYTE COUNT: CPT | Performed by: STUDENT IN AN ORGANIZED HEALTH CARE EDUCATION/TRAINING PROGRAM

## 2021-10-10 PROCEDURE — 83010 ASSAY OF HAPTOGLOBIN QUANT: CPT | Performed by: STUDENT IN AN ORGANIZED HEALTH CARE EDUCATION/TRAINING PROGRAM

## 2021-10-10 PROCEDURE — 71260 CT THORAX DX C+: CPT

## 2021-10-10 PROCEDURE — 71260 CT THORAX DX C+: CPT | Mod: 26 | Performed by: RADIOLOGY

## 2021-10-10 PROCEDURE — 82378 CARCINOEMBRYONIC ANTIGEN: CPT | Performed by: OBSTETRICS & GYNECOLOGY

## 2021-10-10 PROCEDURE — 82272 OCCULT BLD FECES 1-3 TESTS: CPT | Performed by: STUDENT IN AN ORGANIZED HEALTH CARE EDUCATION/TRAINING PROGRAM

## 2021-10-10 PROCEDURE — 74177 CT ABD & PELVIS W/CONTRAST: CPT | Mod: 26 | Performed by: RADIOLOGY

## 2021-10-10 PROCEDURE — 250N000013 HC RX MED GY IP 250 OP 250 PS 637: Performed by: STUDENT IN AN ORGANIZED HEALTH CARE EDUCATION/TRAINING PROGRAM

## 2021-10-10 RX ORDER — LEVOFLOXACIN 5 MG/ML
750 INJECTION, SOLUTION INTRAVENOUS EVERY 24 HOURS
Status: DISCONTINUED | OUTPATIENT
Start: 2021-10-10 | End: 2021-10-12 | Stop reason: HOSPADM

## 2021-10-10 RX ORDER — IOPAMIDOL 755 MG/ML
135 INJECTION, SOLUTION INTRAVASCULAR ONCE
Status: COMPLETED | OUTPATIENT
Start: 2021-10-10 | End: 2021-10-10

## 2021-10-10 RX ORDER — DIPHENHYDRAMINE HCL 50 MG
50 CAPSULE ORAL EVERY 6 HOURS PRN
Status: DISCONTINUED | OUTPATIENT
Start: 2021-10-10 | End: 2021-10-12 | Stop reason: HOSPADM

## 2021-10-10 RX ADMIN — Medication 50 MCG: at 08:02

## 2021-10-10 RX ADMIN — PIPERACILLIN SODIUM AND TAZOBACTAM SODIUM 4.5 G: 4; .5 INJECTION, POWDER, LYOPHILIZED, FOR SOLUTION INTRAVENOUS at 05:36

## 2021-10-10 RX ADMIN — Medication 1 TABLET: at 08:02

## 2021-10-10 RX ADMIN — PIPERACILLIN SODIUM AND TAZOBACTAM SODIUM 4.5 G: 4; .5 INJECTION, POWDER, LYOPHILIZED, FOR SOLUTION INTRAVENOUS at 11:10

## 2021-10-10 RX ADMIN — DIPHENHYDRAMINE HYDROCHLORIDE 50 MG: 50 CAPSULE ORAL at 23:03

## 2021-10-10 RX ADMIN — IOPAMIDOL 135 ML: 755 INJECTION, SOLUTION INTRAVENOUS at 13:41

## 2021-10-10 RX ADMIN — LEVOFLOXACIN 750 MG: 5 INJECTION, SOLUTION INTRAVENOUS at 18:19

## 2021-10-10 RX ADMIN — BUSPIRONE HYDROCHLORIDE 7.5 MG: 7.5 TABLET ORAL at 20:53

## 2021-10-10 RX ADMIN — BUSPIRONE HYDROCHLORIDE 7.5 MG: 7.5 TABLET ORAL at 08:02

## 2021-10-10 RX ADMIN — POTASSIUM CHLORIDE 20 MEQ: 750 TABLET, EXTENDED RELEASE ORAL at 08:02

## 2021-10-10 RX ADMIN — BUPROPION HYDROCHLORIDE 150 MG: 150 TABLET, FILM COATED, EXTENDED RELEASE ORAL at 08:02

## 2021-10-10 RX ADMIN — FOLIC ACID 1 MG: 1 TABLET ORAL at 08:02

## 2021-10-10 RX ADMIN — MICONAZOLE NITRATE: 20 POWDER TOPICAL at 23:03

## 2021-10-10 RX ADMIN — Medication 1 TABLET: at 17:06

## 2021-10-10 RX ADMIN — PANTOPRAZOLE SODIUM 40 MG: 40 TABLET, DELAYED RELEASE ORAL at 08:02

## 2021-10-10 RX ADMIN — ALBUMIN HUMAN 100 G: 0.25 SOLUTION INTRAVENOUS at 20:53

## 2021-10-10 RX ADMIN — SPIRONOLACTONE 50 MG: 50 TABLET ORAL at 08:02

## 2021-10-10 RX ADMIN — METRONIDAZOLE 500 MG: 500 INJECTION, SOLUTION INTRAVENOUS at 17:06

## 2021-10-10 ASSESSMENT — ACTIVITIES OF DAILY LIVING (ADL)
ADLS_ACUITY_SCORE: 14
ADLS_ACUITY_SCORE: 13
ADLS_ACUITY_SCORE: 14

## 2021-10-10 NOTE — PROGRESS NOTES
[facility] Progress Note    Main Plans for Today      - Albumin x 3 days (Day 3 of 3 today)  - Stop Zosyn, Start Levaquin and Flagyl  - CT-CAP w/ contrast  - Enteric panel   - Hemolysis labs  - FOBT      Assessment & Plan     Viviana Schaefer is a 55 year old female admitted on 10/7/2021. She has a history of  alcoholic cirrhosis c/b portal HTN and coagulopathy, AUD in remission, macrocytic anemia, CECE, MDD  and presents and is admitted for sepsis of undetermined source.     # Bacteremia of unknown source  Patient admitted due to weakness and falls. Febrile w/ Tmax 101 and HR elevated to 91 in ED, meeting criteria for SIRs. Procalcitonin elevated to 0.9. No leukocytosis, but left shift noted on differential. Lactate 1.3. Blood cultures from 10/7 growing E. Coli and Staph hominis in one of two bottles each. Source continues to be unclear. At first thought pyelonephritis, but urine culture unimpressive and patient has improved back pain. POC ultrasound was performed to investigate the abdomen for ascites and no fluid found. ID consulted, suggest CT-CAP with contrast to evaluate for other intra-abdominal source. Blood cultures resulted resistant to current treatment of Zosyn, will change per ID recs below with consideration of enteric involvement given abdominal exam, CT-CAP pending. Verigene GN and GP panels also pos for Gram- E. Coli and G+ staph coagulase negative. MRSA screen negative. Legionella + Strep pneumo antigen urine negative. Of note, patient unvaccinated and is resistant to receiving while in the hospital.  - ID consulted, appreciate recs:    - STOP Zosyn (10/8-10/10)   - START Levaquin w/ renal dosing (10/10- )    - START Flagyl (no renal dosing) (10/10- )   - CT-CAP with contrast   - Enteric panel ordered - monitor   - Gentle IV hydration with small boluses PRN  - APAP for fever/pain max dose 3g daily  - PT/OT consult  -  Recommend COVID-19 vaccination, inpatient if possible    # History of alcohol-used disorder, in remission  # End-stage Alcoholic cirrhosis  # Portal hypertension  # Thrombocytopenia  # Elevated INR  # Anasarca  MELD-Na score: 24 at 10/10/2021  7:23 AM  MELD score: 24 at 10/10/2021  7:23 AM  Calculated from:  Serum Creatinine: 1.34 mg/dL at 10/10/2021  7:23 AM  Serum Sodium: 143 mmol/L (Using max of 137 mmol/L) at 10/10/2021  7:23 AM  Total Bilirubin: 4.2 mg/dL at 10/10/2021  7:23 AM  INR(ratio): 2.38 at 10/10/2021  7:23 AM  Age: 55 years    Patient with ESLD, working towards transplant. Has been sober x8 months, just finished outpatient treatment. History of portal hypertension, no history of varices or GIB. Coagulopathy w/ elevated INR and thrombocytopenia. No abdominal pain/tenderness, but diffusely edematous. MRI liver 5/2021 shows small volume ascites. Notes that lasix has been less effective over past 3 weeks with worsening anasarca. No concern for hepatic encephalopathy.   - Spoke with Dr. Razia Martin, the pt's outpatient hepatologist and appreciate her recs.    -Albumin 1g/kg with max dose of 100g/day for three days. First dose 10/8, last dose today   -Gentle IV boluses when needed  - PTA nik, could consider increasing dose (50mg PTA)  - Consider IV lasix given recent resistance to PTA PO lasix, but would be cautious given sepsis.   - Strict intake/output  - Daily MELD labs  - Daily CBC    # Macrocytic anemia  Hgb 9.7 on admission, baseline ~10 w/ . Hgb on 10/10 dropped to 7.7. Possibly due to alcohol use disorder and folate deficiency, but must consider hemolysis (HUS) in setting of E. Coli bacteremia. UA on 10/8 also with moderate blood. No signs of bleeding, though patient with coagulopathy as noted above.   - Hemolysis labs including peripheral smear  - FOBT  - Repeat Hgb   - Continue PTA folate  - Consider restart of PTA iron     # KWABENA  # History of Medullary sponge kidney  # Urinary  retention  Creatinine 1.49 on admission, baseline ~0.9. BUN:Cr 16, c/f intrinsic renal damage, though would also anticipate some prerenal contribution given poor PO intake and sepsis. Also noted to have urinary retention >400cc after receiving IV lasix. Creatinine steadily improving.  - Consider renal ultrasound if needed     -------------------------------------------------------------------------------------------------------  Chronic conditions:     # CAD  Cardiac catheterization done 2/2021 to evaluate for liver transplant showing mild, non-obstructive CAD with normal PA and filling pressures and normal cardiac output. In review of the cart, some notes indicate history of CHF, which patient is not aware of. Echo from OSH 5/2020 shows LVEF 55%. BNP elevated, but this likely 2/2 anasarca from liver disease.   - Monitor     # CECE  # Panic disorder  # MDD  - PTA buspar, buproprion      # Pain Assessment:  Current Pain Score 10/10/2021   Patient currently in pain? denies   Pain score (0-10) -   Viviana jang pain level was assessed and she currently denies pain.      Diet: Combination Diet Regular Diet Adult  Fluids: PO ad jackson  DVT Prophylaxis: PCD's (Elevated INR, low platelets)  Code Status: Full Code    Disposition Plan   Expected discharge:  2 - 3 days, recommended to prior living arrangement once antibiotic plan established and bacteremia treated.Dispo:            Entered: Kasandra Wagner 10/10/2021, 7:07 PM   Information in the above section will display in the discharge planner report.      The patient's care was discussed with the Attending Physician, Dr. Reynolds.    Kasandra Wagner, PGY-1  [facility] Medicine: PGY1  Pager: [phone]  Please see sticky note for cross cover information    Interval History    Pt seen and evaluated bedside.     Poor sleep overnight, otherwise doing OK. Feels better with Zosyn.    Continues to have back pain, but unsure if it is related to kidneys  "or uncomfortable bed.    Also reports spot of abdominal pain and tenderness in the LLQ that she attributes to muscle strain or hernia.     Has had some looser stools that appear \"frothy\" - she attributes this to cranberry juice she got while in the hospital. She doesn't normally have cranberry juice.      Physical Exam   Vital Signs: Temp: 98.1  F (36.7  C) Temp src: Oral BP: 137/66 Pulse: 67   Resp: 20 SpO2: 94 % O2 Device: None (Room air)    Weight: 301 lbs 4.8 oz  Physical Exam  Vitals and nursing note reviewed.   Constitutional:       General: She is not in acute distress.     Appearance: She is obese.   HENT:      Head: Normocephalic and atraumatic.      Right Ear: External ear normal.      Left Ear: External ear normal.      Nose: Nose normal.   Eyes:      Extraocular Movements: Extraocular movements intact.      Pupils: Pupils are equal, round, and reactive to light.   Cardiovascular:      Rate and Rhythm: Normal rate and regular rhythm.      Heart sounds: Normal heart sounds. No murmur heard.   No gallop.    Pulmonary:      Effort: Pulmonary effort is normal. No respiratory distress.   Abdominal:      Palpations: Abdomen is soft.      Tenderness: There is abdominal tenderness.      Comments: Tenderness in one particular spot in LLQ, no abdominal wall defect noted, no swelling, no rebound. BS present throughout.   Musculoskeletal:         General: Normal range of motion.      Cervical back: Normal range of motion.      Right lower leg: Edema present.      Left lower leg: Edema present.      Comments: L > R upper extremity edema   Neurological:      General: No focal deficit present.      Mental Status: She is alert and oriented to person, place, and time.   Psychiatric:         Mood and Affect: Mood normal.         Behavior: Behavior normal.         Thought Content: Thought content normal.         Judgment: Judgment normal.             Data   Recent Labs   Lab 10/10/21  1426 10/10/21  0723 10/09/21  0818 " 10/08/21  0754 10/08/21  0754 10/07/21  2124 10/07/21  2124   WBC 5.1 5.0 6.0   < > 10.0   < > 10.4   HGB 7.9* 7.7* 8.4*   < > 8.9*   < > 9.7*   * 112* 109*   < > 108*   < > 109*   PLT 74* 74* 75*   < > 91*   < > 96*   INR  --  2.38* 2.23*  --  2.21*   < > 2.22*   NA  --  143 143  --  139   < > 139   POTASSIUM  --  3.4 4.3  --  3.8   < > 3.8   CHLORIDE  --  112* 112*  --  107   < > 106   CO2  --  27 23  --  26   < > 23   BUN  --  22 28  --  29   < > 25   CR  --  1.34* 1.50*  --  1.62*   < > 1.49*   ANIONGAP  --  4 8  --  6   < > 10   ELIUD  --  9.6 8.5  --  8.4*   < > 8.7   GLC  --  108* 115*  --  110*   < > 123*   ALBUMIN  --  3.4 2.5*   < > 1.9*   < > 2.0*   PROTTOTAL  --  6.1* 5.6*   < > 5.6*   < > 5.9*   BILITOTAL  --  4.2* 4.3*   < > 5.5*   < > 6.5*   ALKPHOS  --  52 58   < > 76   < > 91   ALT  --  13 18   < > 15   < > 20   AST  --  29 41   < > 33   < > 34   LIPASE  --   --   --   --   --   --  83   TROPONIN  --   --   --   --   --   --  <0.015    < > = values in this interval not displayed.

## 2021-10-10 NOTE — PLAN OF CARE
/57 (BP Location: Right arm)   Pulse 65   Temp 98.8  F (37.1  C) (Oral)   Resp 18   Wt 133.7 kg (294 lb 12.8 oz)   SpO2 95%   BMI 53.92 kg/m      [id number]  VSS on RA, no s/s of distress. A/Ox4, pleasant and cooperatvive with cares; pt still endorsing fatigue. Denied pain. Denied n/v--no appetite and did not order dinner tonight; only taking sips of juice and water. Up x1 assist with walker; pt having difficulty with standing and getting back to bed but once up on her feet, has a steady gait and able to ambulate with minimal assistance. PIV SL. Voiding spontaneously, 1 loose BM this evening. Reddened and moist groin, abdominal, breast, and gluteal folds--treated with micatin powder. Continue IV abx treatment and encourage PO intake, activity. Pt resting quietly this shift, on handheld device or visiting with her son Clint this evening. Pt sleeping now; call light and belongings within reach. Will continue to monitor and continue POC/notify team as needed.

## 2021-10-10 NOTE — PLAN OF CARE
Pt. With an un eventful night.  Offers no complaints of pain.  Difficulty getting in/out of bed. Voiding with small brown stool, missed hat.

## 2021-10-10 NOTE — PROGRESS NOTES
ID brief note    Afebrile, HDS, WBc normal, repeat cultures with NGTD. Did not see pt in person today.  Reviewed interval events, vitals, labs, images, micro, meds.      Ecoli in blood now showing R to Zosyn, S to Levo, ceftriaxone. Staph hominis S to Levaquin, Oxacillin, Vanc.     Can switch to IV Levofloxacin+flagyl( for anaerobic coverage while CTAP pending).     Urine with Ecoli <10k, possible etiology, will await further eval as above.    Khang primary    Eleanor Smith  ID Staff

## 2021-10-10 NOTE — PROVIDER NOTIFICATION
Notified meet JAMESON @0809 of critical lab value: gram negative bacilli growing in urine cx    Team stopped IV zosyn; IV flagyl and IV levofloxacin ordered to start this evening. Will continue to monitor.

## 2021-10-10 NOTE — PLAN OF CARE
Vitals: /53 (BP Location: Right arm)   Pulse 73   Temp 98.6  F (37  C)   Resp 20   Wt 136.7 kg (301 lb 4.8 oz)   SpO2 97%   BMI 55.11 kg/m      Endocrine: n/a  Labs: Potassium 3.4, HGB 7.7.  Pain: Denies pain.  PRN's: n/a  Diet: Regular diet, fair appetite.  LDA: L PIV TKO.  GI: Loose BM's daily, small amount per patient.  : Voids spontaneously.  Skin: Pale, reddened skin around abdominal folds.  Neuro: Pleasant, alert and oriented.  Mobility: Heavy assist to get out of bed, good mobility using the walker.  Education: n/a  Plan: Continue with IV antibiotics. Chest CT completed. Stool panel collected.

## 2021-10-11 ENCOUNTER — APPOINTMENT (OUTPATIENT)
Dept: OCCUPATIONAL THERAPY | Facility: CLINIC | Age: 56
End: 2021-10-11
Payer: COMMERCIAL

## 2021-10-11 ENCOUNTER — APPOINTMENT (OUTPATIENT)
Dept: PHYSICAL THERAPY | Facility: CLINIC | Age: 56
End: 2021-10-11
Payer: COMMERCIAL

## 2021-10-11 LAB
ABO/RH(D): NORMAL
ALBUMIN SERPL-MCNC: 4.1 G/DL (ref 3.4–5)
ALP SERPL-CCNC: 52 U/L (ref 40–150)
ALT SERPL W P-5'-P-CCNC: 20 U/L (ref 0–50)
ANION GAP SERPL CALCULATED.3IONS-SCNC: 11 MMOL/L (ref 3–14)
ANTIBODY SCREEN: NORMAL
AST SERPL W P-5'-P-CCNC: 30 U/L (ref 0–45)
BACTERIA UR CULT: ABNORMAL
BILIRUB SERPL-MCNC: 5.2 MG/DL (ref 0.2–1.3)
BUN SERPL-MCNC: 15 MG/DL (ref 7–30)
C COLI+JEJUNI+LARI FUSA STL QL NAA+PROBE: NOT DETECTED
CALCIUM SERPL-MCNC: 9.6 MG/DL (ref 8.5–10.1)
CHLORIDE BLD-SCNC: 109 MMOL/L (ref 94–109)
CO2 SERPL-SCNC: 22 MMOL/L (ref 20–32)
CREAT SERPL-MCNC: 1.12 MG/DL (ref 0.52–1.04)
EC STX1 GENE STL QL NAA+PROBE: NOT DETECTED
EC STX2 GENE STL QL NAA+PROBE: NOT DETECTED
ERYTHROCYTE [DISTWIDTH] IN BLOOD BY AUTOMATED COUNT: 14.7 % (ref 10–15)
FERRITIN SERPL-MCNC: 399 NG/ML (ref 8–252)
GFR SERPL CREATININE-BSD FRML MDRD: 56 ML/MIN/1.73M2
GLUCOSE BLD-MCNC: 94 MG/DL (ref 70–99)
HAPTOGLOB SERPL-MCNC: <3 MG/DL (ref 32–197)
HCT VFR BLD AUTO: 26.1 % (ref 35–47)
HGB BLD-MCNC: 8.3 G/DL (ref 11.7–15.7)
INR PPP: 2.38 (ref 0.85–1.15)
IRON SATN MFR SERPL: 76 % (ref 15–46)
IRON SERPL-MCNC: 92 UG/DL (ref 35–180)
MCH RBC QN AUTO: 35.8 PG (ref 26.5–33)
MCHC RBC AUTO-ENTMCNC: 31.8 G/DL (ref 31.5–36.5)
MCV RBC AUTO: 113 FL (ref 78–100)
NOROV GI+II ORF1-ORF2 JNC STL QL NAA+PR: NOT DETECTED
PATH REPORT.COMMENTS IMP SPEC: NORMAL
PATH REPORT.COMMENTS IMP SPEC: NORMAL
PATH REPORT.FINAL DX SPEC: NORMAL
PATH REPORT.MICROSCOPIC SPEC OTHER STN: NORMAL
PATH REPORT.MICROSCOPIC SPEC OTHER STN: NORMAL
PATH REPORT.RELEVANT HX SPEC: NORMAL
PLATELET # BLD AUTO: 73 10E3/UL (ref 150–450)
POTASSIUM BLD-SCNC: 3.3 MMOL/L (ref 3.4–5.3)
PROT SERPL-MCNC: 7 G/DL (ref 6.8–8.8)
RBC # BLD AUTO: 2.32 10E6/UL (ref 3.8–5.2)
RVA NSP5 STL QL NAA+PROBE: NOT DETECTED
SALMONELLA SP RPOD STL QL NAA+PROBE: NOT DETECTED
SHIGELLA SP+EIEC IPAH STL QL NAA+PROBE: NOT DETECTED
SODIUM SERPL-SCNC: 142 MMOL/L (ref 133–144)
TIBC SERPL-MCNC: 121 UG/DL (ref 240–430)
TRANSFERRIN SERPL-MCNC: 93 MG/DL (ref 210–360)
V CHOL+PARA RFBL+TRKH+TNAA STL QL NAA+PR: NOT DETECTED
WBC # BLD AUTO: 6 10E3/UL (ref 4–11)
Y ENTERO RECN STL QL NAA+PROBE: NOT DETECTED

## 2021-10-11 PROCEDURE — 83550 IRON BINDING TEST: CPT | Performed by: STUDENT IN AN ORGANIZED HEALTH CARE EDUCATION/TRAINING PROGRAM

## 2021-10-11 PROCEDURE — 82728 ASSAY OF FERRITIN: CPT | Performed by: STUDENT IN AN ORGANIZED HEALTH CARE EDUCATION/TRAINING PROGRAM

## 2021-10-11 PROCEDURE — 99233 SBSQ HOSP IP/OBS HIGH 50: CPT | Mod: GC | Performed by: FAMILY MEDICINE

## 2021-10-11 PROCEDURE — 36415 COLL VENOUS BLD VENIPUNCTURE: CPT | Performed by: STUDENT IN AN ORGANIZED HEALTH CARE EDUCATION/TRAINING PROGRAM

## 2021-10-11 PROCEDURE — 97530 THERAPEUTIC ACTIVITIES: CPT | Mod: GP

## 2021-10-11 PROCEDURE — 250N000013 HC RX MED GY IP 250 OP 250 PS 637: Performed by: STUDENT IN AN ORGANIZED HEALTH CARE EDUCATION/TRAINING PROGRAM

## 2021-10-11 PROCEDURE — 97116 GAIT TRAINING THERAPY: CPT | Mod: GP

## 2021-10-11 PROCEDURE — 99233 SBSQ HOSP IP/OBS HIGH 50: CPT | Performed by: STUDENT IN AN ORGANIZED HEALTH CARE EDUCATION/TRAINING PROGRAM

## 2021-10-11 PROCEDURE — 85610 PROTHROMBIN TIME: CPT | Performed by: STUDENT IN AN ORGANIZED HEALTH CARE EDUCATION/TRAINING PROGRAM

## 2021-10-11 PROCEDURE — 97535 SELF CARE MNGMENT TRAINING: CPT | Mod: GO

## 2021-10-11 PROCEDURE — 84466 ASSAY OF TRANSFERRIN: CPT | Performed by: STUDENT IN AN ORGANIZED HEALTH CARE EDUCATION/TRAINING PROGRAM

## 2021-10-11 PROCEDURE — 120N000011 HC R&B TRANSPLANT UMMC

## 2021-10-11 PROCEDURE — 87040 BLOOD CULTURE FOR BACTERIA: CPT | Performed by: STUDENT IN AN ORGANIZED HEALTH CARE EDUCATION/TRAINING PROGRAM

## 2021-10-11 PROCEDURE — 250N000011 HC RX IP 250 OP 636: Performed by: STUDENT IN AN ORGANIZED HEALTH CARE EDUCATION/TRAINING PROGRAM

## 2021-10-11 PROCEDURE — 82040 ASSAY OF SERUM ALBUMIN: CPT | Performed by: STUDENT IN AN ORGANIZED HEALTH CARE EDUCATION/TRAINING PROGRAM

## 2021-10-11 PROCEDURE — 85027 COMPLETE CBC AUTOMATED: CPT | Performed by: STUDENT IN AN ORGANIZED HEALTH CARE EDUCATION/TRAINING PROGRAM

## 2021-10-11 RX ORDER — METRONIDAZOLE 500 MG/1
500 TABLET ORAL 3 TIMES DAILY
Qty: 30 TABLET | Refills: 0 | Status: SHIPPED | OUTPATIENT
Start: 2021-10-11 | End: 2021-10-11

## 2021-10-11 RX ORDER — FUROSEMIDE 40 MG
40 TABLET ORAL ONCE
Status: COMPLETED | OUTPATIENT
Start: 2021-10-11 | End: 2021-10-11

## 2021-10-11 RX ORDER — LEVOFLOXACIN 750 MG/1
750 TABLET, FILM COATED ORAL DAILY
Qty: 10 TABLET | Refills: 0 | Status: SHIPPED | OUTPATIENT
Start: 2021-10-11 | End: 2021-10-11

## 2021-10-11 RX ORDER — METRONIDAZOLE 500 MG/1
500 TABLET ORAL 3 TIMES DAILY
Qty: 24 TABLET | Refills: 0 | Status: SHIPPED | OUTPATIENT
Start: 2021-10-11 | End: 2021-10-12

## 2021-10-11 RX ORDER — LEVOFLOXACIN 750 MG/1
750 TABLET, FILM COATED ORAL DAILY
Qty: 8 TABLET | Refills: 0 | Status: SHIPPED | OUTPATIENT
Start: 2021-10-11 | End: 2021-10-12

## 2021-10-11 RX ORDER — TRAZODONE HYDROCHLORIDE 50 MG/1
100-200 TABLET, FILM COATED ORAL AT BEDTIME
Status: DISCONTINUED | OUTPATIENT
Start: 2021-10-11 | End: 2021-10-12 | Stop reason: HOSPADM

## 2021-10-11 RX ORDER — ONDANSETRON 4 MG/1
4 TABLET, ORALLY DISINTEGRATING ORAL EVERY 6 HOURS PRN
Qty: 30 TABLET | Refills: 0 | Status: SHIPPED | OUTPATIENT
Start: 2021-10-11 | End: 2021-10-19

## 2021-10-11 RX ADMIN — METRONIDAZOLE 500 MG: 500 INJECTION, SOLUTION INTRAVENOUS at 08:56

## 2021-10-11 RX ADMIN — PANTOPRAZOLE SODIUM 40 MG: 40 TABLET, DELAYED RELEASE ORAL at 07:39

## 2021-10-11 RX ADMIN — METRONIDAZOLE 500 MG: 500 INJECTION, SOLUTION INTRAVENOUS at 01:04

## 2021-10-11 RX ADMIN — METRONIDAZOLE 500 MG: 500 INJECTION, SOLUTION INTRAVENOUS at 18:12

## 2021-10-11 RX ADMIN — SPIRONOLACTONE 50 MG: 50 TABLET ORAL at 07:39

## 2021-10-11 RX ADMIN — BUSPIRONE HYDROCHLORIDE 7.5 MG: 7.5 TABLET ORAL at 19:41

## 2021-10-11 RX ADMIN — Medication 1 TABLET: at 07:38

## 2021-10-11 RX ADMIN — FUROSEMIDE 40 MG: 40 TABLET ORAL at 07:39

## 2021-10-11 RX ADMIN — Medication 50 MCG: at 07:38

## 2021-10-11 RX ADMIN — BUSPIRONE HYDROCHLORIDE 7.5 MG: 7.5 TABLET ORAL at 07:40

## 2021-10-11 RX ADMIN — FOLIC ACID 1 MG: 1 TABLET ORAL at 07:38

## 2021-10-11 RX ADMIN — POTASSIUM CHLORIDE 20 MEQ: 750 TABLET, EXTENDED RELEASE ORAL at 07:38

## 2021-10-11 RX ADMIN — TRAZODONE HYDROCHLORIDE 100 MG: 50 TABLET ORAL at 21:58

## 2021-10-11 RX ADMIN — Medication 1 TABLET: at 18:12

## 2021-10-11 RX ADMIN — LEVOFLOXACIN 750 MG: 5 INJECTION, SOLUTION INTRAVENOUS at 16:10

## 2021-10-11 RX ADMIN — TRAZODONE HYDROCHLORIDE 200 MG: 50 TABLET ORAL at 02:17

## 2021-10-11 RX ADMIN — BUPROPION HYDROCHLORIDE 150 MG: 150 TABLET, FILM COATED, EXTENDED RELEASE ORAL at 07:39

## 2021-10-11 ASSESSMENT — ACTIVITIES OF DAILY LIVING (ADL)
ADLS_ACUITY_SCORE: 14
ADLS_ACUITY_SCORE: 12
ADLS_ACUITY_SCORE: 14
ADLS_ACUITY_SCORE: 14
ADLS_ACUITY_SCORE: 12
DEPENDENT_IADLS:: CLEANING;TRANSPORTATION
ADLS_ACUITY_SCORE: 12

## 2021-10-11 NOTE — PLAN OF CARE
BP (!) 140/65 (BP Location: Right arm)   Pulse 77   Temp 98.4  F (36.9  C) (Oral)   Resp 18   Wt 139.2 kg (306 lb 14.1 oz)   SpO2 95%   BMI 56.13 kg/m        0954-6402  Neuro: Pt. alert& Ox4  Behavior: Pt. calm & cooperative with cares.   Activity: Pt. up with 1 assist & walker.  Vital: AVSS on RA.  LDAs: Left PIV saline locked.    Cardiac: WNL  Respiratory: LS diminished bilat. QUINTANILLA & on-call aware & assessed pt. Pt. educated on IS use.  GI/: Pt. voiding spontaneously, no stools this shift. Last BM 10/10.  Skin: Reddened/moist groin, breasts & abdom. folds with Interdry.  Pain/Nausea: Pt. denies pain or nausea.   Diet: Regular  Labs/Imaging: Morning labs pending.  Plan: Continue to follow POC & notify MD with change in status.

## 2021-10-11 NOTE — PROGRESS NOTES
BRIEF PROGRESS NOTE    S: Notified by caring RN of increased QUINTANILLA without desaturations and increasing LE edema, L>R. Patient reports that over the past day or so she has been having more SOB with ambulation, thinks it may have something to do with her sleeping position in bed vs fluid overload. She denies history of DVT/VTE, no calf pain or swelling, no pain with breathing or chest pain.     She does note that she takes trazodone at home for sleep, would like to be able to use this if able.    BP (!) 152/71 (BP Location: Right arm)   Pulse 75   Temp 98.6  F (37  C) (Oral)   Resp 20   Wt 136.7 kg (301 lb 4.8 oz)   SpO2 95%   BMI 55.11 kg/m      GEN: Alert, oriented person in NAD  HENT: normocephalic, atraumatic  NECK: full ROM  RESPIRATORY: Lungs clear with good air movement, no crackles but slightly decreased sounds at bilateral bases. no respiratory distress, no extra WOB, speaking in full sentences  CVS: RRR, nl s1/s2, no MRG WWP  MSK: full ROM, bilateral LE edema noted. Slightly greater on L foot vs R, but calves without prominent pitting edema. No TTP or erythema of lower extremities.  NEURO: no FND.     A/P:  56 y/o person w/ history of cirrhosis admitted with pyelonephritis and bacteremia, evaluated for concern of worsening QUINTANILLA and L>R lower extremity swelling. VSS; Exam and history reassuring against VTE. Patient does appear hypervolemic on exam, which is likely I/s/o holding diuresis and giving fluids given acute infection and concern for sepsis. Given stability of VS, and moderate hypertension, likely patient would tolerate restart of diuresis. Will defer to day team, but will order home trazodone.  - Reassess volume status in AM, consider restart of PTA lasix vs. IV lasix  - Trazodone 100-200mg at bedtime    Vicky Scott MD

## 2021-10-11 NOTE — PROGRESS NOTES
Care Management Discharge Note    Discharge Date: 10/13/2021       Discharge Disposition: Home Care, Home    Discharge Services: None    Discharge DME: None    Discharge Transportation: family or friend will provide    Patient/family educated on Medicare website which has current facility and service quality ratings: yes    Education Provided on the Discharge Plan:  YEs  Persons Notified of Discharge Plans: Patient  Patient/Family in Agreement with the Plan: yes    Handoff Referral Completed: Yes    Additional Information:  Notified by SW that pt declines plan for TCU.  Pt requesting discharge home today.  SW discussed with pt and PT plan for home with home care services.  Pt would be open to home care services.  Spoke with [facility] regarding pt request to discharge home with home care services.  Team confirmed that they anticipate pt would discharge today pending ID recommendations and ultrasound being completed.   Plan for home PT.     Met with pt.  Pt confirmed her PP is [facility].  Pt confirmed that she has four son's one lives with pt.  Per pt her son's are involved and supportive.  Discussed home care options.  Pt open to any agency that accepts her insurance.  Reviewed Medicare.gov site with pt.    Email referral made to [facility].    1505: Notified by [facility] that they are unable to accept.   [facility] attempted to arrange home care through [facility], [facility] ,[facility], [facility] and [facility] however these agencys are unable to accept d/t staffing/pt insurance.  Spoke with [facility] [phone], Fax [phone] regarding home PT request.  [facility] will need to review clinical and confirm pt insurance before confirming acceptance.  Faxed demographic and H & P.      1620: Paged provider team that we are still waiting on confirmation of home care acceptance. Writer left a VM for [facility] requesting return  call.  Attempted to reach [facility] however agency was already closed.       1635: Received f/u call from [facility] [phone], Fax [phone]  Confirming that agency is able to accept patient.  Discharge orders updated. Paged provider team with update.     Home Care Agencies that have declined patient:  [facility] - d/t pt insurance  [facility] - [facility] referred but declined   [facility] - [facility] referred but declined  [facility] referred but declined  [facility]  [facility] referred but declined  [facility]  [facility] referred but declined  [facility] - unable to accept patients insurance  [facility] - unable to accept d/t patients insurance  [facility] - unable to accept d/t patients insurance  [facility] unable to accept d/t staffing            Gisella Helm, RN BSN, PHN, ACM-RN  7A RN Care Coordinator  Phone: [phone]  Pager [phone]    10/11/2021 2:09 PM

## 2021-10-11 NOTE — PROGRESS NOTES
[facility] Progress Note    Main Plans for Today      - Discuss dispo  - Continue Levaquin and Flagyl  - Transvaginal ultrasound      Assessment & Plan     Viviana Schaefer is a 55 year old female admitted on 10/7/2021. She has a history of  alcoholic cirrhosis c/b portal HTN and coagulopathy, AUD in remission, macrocytic anemia, CECE, MDD  and presents and is admitted for sepsis of undetermined source.     # Bacteremia of unknown source  Patient admitted due to weakness and falls. Febrile w/ Tmax 101 and HR elevated to 91 in ED, meeting criteria for SIRs. Procalcitonin elevated to 0.9. No leukocytosis, but left shift noted on differential. Lactate 1.3. Blood cultures from 10/7 growing E. Coli and Staph hominis in one of two bottles each. Source continues to be unclear. At first thought pyelonephritis, but urine culture unimpressive and patient has improved back pain. POC ultrasound was performed to investigate the abdomen for ascites and no fluid found. ID consulted, suggest CT-CAP with contrast to evaluate for other intra-abdominal source. Blood cultures resulted resistant to current treatment of Zosyn, ABX adjusted with ID recs. Verigene GN and GP panels also pos for Gram- E. Coli and G+ staph coagulase negative. MRSA screen negative. Legionella + Strep pneumo antigen urine negative. Of note, patient unvaccinated and is resistant to receiving while in the hospital.  - ID consulted, appreciate recs:    - STOP Zosyn (10/8-10/10)   - START Levaquin w/ renal dosing (10/10- )    - START Flagyl (no renal dosing) (10/10- )   - CT-CAP with contrast   - Enteric panel ordered - Negative   - On discharge, 10 day course of ABX  - Gentle IV hydration with small boluses PRN  - APAP for fever/pain max dose 3g daily  - PT/OT consult  - Recommend COVID-19 vaccination, inpatient if possible  -CT-CAP resulted: 10/11  IMPRESSION:   1. Small to moderate  right pleural effusion with overlying atelectasis. Mild interstitial pulmonary edema.  2. 1.0 cm subpleural left lower lobe pulmonary nodule. Recommend follow-up per Fleischner criteria.  3. No ascites. In the right lower quadrant there is a relatively loculated simple fluid collection that abuts and and appears to involve the right ovary. There is no surrounding enhancing rim to  suggest abscess formation. Favor loculated ascites though consider pelvic ultrasound for further evaluation.  4. Cirrhotic configuration of the liver. Prominent recanalized umbilical vein.  5. Numerous prominent retroperitoneal lymph nodes, favored to be reactive.  6. Bilateral nonobstructive nephrolithiasis.  7. Relatively extensive anasarca particularly over the abdomen.    # History of alcohol-used disorder, in remission  # End-stage Alcoholic cirrhosis  # Portal hypertension  # Thrombocytopenia  # Elevated INR  # Anasarca  MELD-Na score: 23 at 10/11/2021  7:44 AM  MELD score: 23 at 10/11/2021  7:44 AM  Calculated from:  Serum Creatinine: 1.12 mg/dL at 10/11/2021  7:44 AM  Serum Sodium: 142 mmol/L (Using max of 137 mmol/L) at 10/11/2021  7:44 AM  Total Bilirubin: 5.2 mg/dL at 10/11/2021  7:44 AM  INR(ratio): 2.38 at 10/11/2021  7:44 AM  Age: 55 years    Patient with ESLD, working towards transplant. Has been sober x8 months, just finished outpatient treatment. History of portal hypertension, no history of varices or GIB. Coagulopathy w/ elevated INR and thrombocytopenia. No abdominal pain/tenderness, but diffusely edematous. MRI liver 5/2021 shows small volume ascites. Notes that lasix has been less effective over past 3 weeks with worsening anasarca. No concern for hepatic encephalopathy.   - Spoke with Dr. Razia Martin, the pt's outpatient hepatologist and appreciate her recs.    -S/P Albumin 1g/kg with max dose of 100g/day for three days. (10/8-10/10)   -Gentle IV boluses when needed  - PTA nik, could consider increasing dose  (50mg PTA)  - Consider IV lasix given recent resistance to PTA PO lasix, but would be cautious given sepsis.   - Strict intake/output  - Daily MELD labs  - Daily CBC    # Macrocytic anemia  Hgb 9.7 on admission, baseline ~10 w/ . Hgb on 10/11 at 8.3. Possibly due to alcohol use disorder and folate deficiency, but must consider hemolysis (HUS) in setting of E. Coli bacteremia. UA on 10/8 also with moderate blood. No signs of bleeding, though patient with coagulopathy as noted above. Chronic malnutrition also may be playing a role. Iron studies reveal no iron deficiency anemia 10/11. FOBT negative 10/10. Absolute reticulocyte reveals bone marrow responding appropriately to anemia.   - Hemolysis labs including peripheral smear  - Repeat Hgb   - Continue PTA folate  - Consider restart of PTA iron     # KWABENA  # History of Medullary sponge kidney  # Urinary retention  Creatinine 1.49 on admission, down to 1.12 10/11, baseline ~0.9. BUN:Cr 16, c/f intrinsic renal damage, though would also anticipate some prerenal contribution given poor PO intake and sepsis. Also noted to have urinary retention >400cc after receiving IV lasix. Creatinine steadily improving.  - Consider renal ultrasound if needed     -------------------------------------------------------------------------------------------------------  Chronic conditions:     # CAD  Cardiac catheterization done 2/2021 to evaluate for liver transplant showing mild, non-obstructive CAD with normal PA and filling pressures and normal cardiac output. In review of the cart, some notes indicate history of CHF, which patient is not aware of. Echo from OSH 5/2020 shows LVEF 55%. BNP elevated, but this likely 2/2 anasarca from liver disease.   - Monitor     # CECE  # Panic disorder  # MDD  - PTA buspar, buproprion      # Pain Assessment:  Current Pain Score 10/10/2021   Patient currently in pain? denies   Pain score (0-10) -   Viviana s pain level was assessed and she  currently denies pain.      Diet: Combination Diet Regular Diet Adult  Fluids: PO ad jackson  DVT Prophylaxis: PCD's (Elevated INR, low platelets)  Code Status: Full Code    Disposition Plan   Expected discharge:  Tomorrow, recommended to prior living arrangement once antibiotic plan established and bacteremia treated.Dispo:            Entered: Belén Paz 10/11/2021, 7:11 AM   Information in the above section will display in the discharge planner report.      The patient's care was discussed with the Attending Physician, Dr. Reynolds.    Belén Paz, PGY-1  [facility] Medicine: PGY1  Pager: [phone]  Please see sticky note for cross cover information    Interval History    Pt seen and evaluated bedside.     Poor sleep overnight, reports increased lower extremity swelling and desire to leave the hospital. Reports that her son is home from [place] and she rarely gets to see him. Would like to leave hospital as soon as possible.     Expressed the importance of completing a workup, including the TV ultrasound. She agreed to stay for this then discharge from the hospital.        Physical Exam   Vital Signs: Temp: 98.4  F (36.9  C) Temp src: Oral BP: (!) 140/65 Pulse: 77   Resp: 18 SpO2: 95 % O2 Device: None (Room air)    Weight: 306 lbs 14.09 oz  Physical Exam  Vitals and nursing note reviewed.   Constitutional:       General: She is not in acute distress.     Appearance: She is obese.   HENT:      Head: Normocephalic and atraumatic.      Right Ear: External ear normal.      Left Ear: External ear normal.      Nose: Nose normal.   Eyes:      Extraocular Movements: Extraocular movements intact.      Pupils: Pupils are equal, round, and reactive to light.   Cardiovascular:      Rate and Rhythm: Normal rate and regular rhythm.      Heart sounds: Normal heart sounds. No murmur heard.   No gallop.    Pulmonary:      Effort: Pulmonary effort is normal. No respiratory distress.   Abdominal:       Palpations: Abdomen is soft.      Tenderness: There is no abdominal tenderness.      Comments: Mild tenderness in one particular spot in LLQ, no abdominal wall defect noted, no swelling, no rebound. BS present throughout.   Musculoskeletal:         General: Normal range of motion.      Cervical back: Normal range of motion.      Right lower leg: Edema present.      Left lower leg: Edema present.      Comments: L > R upper extremity edema   Neurological:      General: No focal deficit present.      Mental Status: She is alert and oriented to person, place, and time.   Psychiatric:         Mood and Affect: Mood normal.         Behavior: Behavior normal.         Thought Content: Thought content normal.         Judgment: Judgment normal.             Data   Recent Labs   Lab 10/10/21  1426 10/10/21  0723 10/09/21  0818 10/08/21  0754 10/08/21  0754 10/07/21  2124 10/07/21  2124   WBC 5.1 5.0 6.0   < > 10.0   < > 10.4   HGB 7.9* 7.7* 8.4*   < > 8.9*   < > 9.7*   * 112* 109*   < > 108*   < > 109*   PLT 74* 74* 75*   < > 91*   < > 96*   INR  --  2.38* 2.23*  --  2.21*   < > 2.22*   NA  --  143 143  --  139   < > 139   POTASSIUM  --  3.4 4.3  --  3.8   < > 3.8   CHLORIDE  --  112* 112*  --  107   < > 106   CO2  --  27 23  --  26   < > 23   BUN  --  22 28  --  29   < > 25   CR  --  1.34* 1.50*  --  1.62*   < > 1.49*   ANIONGAP  --  4 8  --  6   < > 10   ELIUD  --  9.6 8.5  --  8.4*   < > 8.7   GLC  --  108* 115*  --  110*   < > 123*   ALBUMIN  --  3.4 2.5*   < > 1.9*   < > 2.0*   PROTTOTAL  --  6.1* 5.6*   < > 5.6*   < > 5.9*   BILITOTAL  --  4.2* 4.3*   < > 5.5*   < > 6.5*   ALKPHOS  --  52 58   < > 76   < > 91   ALT  --  13 18   < > 15   < > 20   AST  --  29 41   < > 33   < > 34   LIPASE  --   --   --   --   --   --  83   TROPONIN  --   --   --   --   --   --  <0.015    < > = values in this interval not displayed.

## 2021-10-11 NOTE — CONSULTS
Care Management Initial Consult    General Information  Assessment completed with: Patient  Type of CM/SW Visit: Initial Assessment    Primary Care Provider verified and updated as needed:     Readmission within the last 30 days: no previous admission in last 30 days   Reason for Consult: utilization management concerns, discharge planning  Advance Care Planning: Legal NOK are her 4 adults sons       Communication Assessment  Patient's communication style: spoken language (English or Bilingual)    Hearing Difficulty or Deaf: no   Wear Glasses or Blind: no    Cognitive  Cognitive/Neuro/Behavioral: WDL                      Living Environment:   People in home: child(fernanda), adult (2 cats)     Current living Arrangements: house      Able to return to prior arrangements: yes     Family/Social Support:  Care provided by: self, child(fernanda)  Provides care for: pet(s)  Marital Status:   Support: Children          Description of Support System: Supportive    Support Assessment: Adequate family and caregiver support    Current Resources:   Patient receiving home care services: No  Community Resources: None  Equipment currently used at home: walker, standard  Supplies currently used at home: Incontinence Supplies, Chux    Employment/Financial:  Employment Status: unemployed     Financial Concerns: No concerns identified   Referral to Financial Counselor: No  Finance Comments: On MA, living off savings    Lifestyle & Psychosocial Needs:  Social Determinants of Health     Tobacco Use: Medium Risk     Smoking Tobacco Use: Former Smoker     Smokeless Tobacco Use: Never Used   Alcohol Use:      Frequency of Alcohol Consumption:      Average Number of Drinks:      Frequency of Binge Drinking:    Financial Resource Strain:      Difficulty of Paying Living Expenses:    Food Insecurity:      Worried About Running Out of Food in the Last Year:      Ran Out of Food in the Last Year:    Transportation Needs:      Lack of  Transportation (Medical):      Lack of Transportation (Non-Medical):    Physical Activity:      Days of Exercise per Week:      Minutes of Exercise per Session:    Stress:      Feeling of Stress :    Social Connections:      Frequency of Communication with Friends and Family:      Frequency of Social Gatherings with Friends and Family:      Attends Alevism Services:      Active Member of Clubs or Organizations:      Attends Club or Organization Meetings:      Marital Status:    Intimate Partner Violence:      Fear of Current or Ex-Partner:      Emotionally Abused:      Physically Abused:      Sexually Abused:    Depression: Not at risk     PHQ-2 Score: 1   Housing Stability:      Unable to Pay for Housing in the Last Year:      Number of Places Lived in the Last Year:      Unstable Housing in the Last Year:      Functional Status:  Prior to admission patient needed assistance:   Dependent ADLs: Ambulation-walker  Dependent IADLs: Cleaning, Transportation  Assesssment of Functional Status: Needs placement in a SNF/TCF for rehabilitation    Mental Health Status:  Mental Health Status: Current Concern       Chemical Dependency Status:  Chemical Dependency Status: Past Concern           Values/Beliefs:  Spiritual, Cultural Beliefs, Alevism Practices, Values that affect care: Did not discuss        Additional Information:  Patient is a 55 year old female admitted on 10/7/2021. She has a history of  alcoholic cirrhosis c/b portal HTN and coagulopathy, AUD in remission, macrocytic anemia, CECE, MDD  and presents and is admitted for sepsis of undetermined source.     SW met with patient at bedside to complete assessment. Patient lives in a home with 2 SILVERIO and she can live on the main floor (living room, kitchen, bathroom). She lives with her 26 year old son who supports her. She is largely independent in ADL/IADLs but does get help with more heavy duty cleaning and yard work from her son or hired help. She see   Mario at [facility] for hepatology and her PCP is through [facility]. She has a HCD and needs to fill it out. She is okay with her 4 sons being her legal NOK. She reports not being employed and living off her savings. Patient reports no financial concerns. She has a walker and reacher to help wipe herself after BM's. She just bough depends and chux due to incontinence. SW advised she ask if those can be covered through her insurance. Patient is diagnosed with anxiety and depression. She is on medication and completed outpatient therapy from March-October of this year. Patient reports being sober from alcohol since 2/21/21 and is very proud of this accomplishment. SW discussed discharge recommendation of TCU and patient prefers to go home with home care RN/PT/OT. NELDA updated 7A RNCC.     YOVANY Horta, LGSW  7A   Ph: [phone]  Pager: [phone]

## 2021-10-11 NOTE — PLAN OF CARE
BP (!) 144/74 (BP Location: Right arm)   Pulse 72   Temp 98  F (36.7  C) (Oral)   Resp 20   Wt 136.7 kg (301 lb 4.8 oz)   SpO2 95%   BMI 55.11 kg/m      [id number]  VSS on RA, no s/s of distress.  A/Ox4, pleasant and cooperative with cares; pt endorsing fatigue this shift and also reporting dyspnea on exertion (pt does not desat and recovers well with rest and no immediate distress noted; oncall MD paged and made aware--no new orders, will pass to day team--pt may need diuretics); edema also seemingly greater on left side vs right side (left arm, flank, leg edema greater than right side; MD aware as well and to pass on to day team). Denied pain. Denied n/v--no appetite on regular diet. PRN benadryl given x1 for c/o itching. PIV SL--on IV abx therapy. Up x1 assist with walker--would benefit from more PT/OT. Voiding spontaneously, multiple small BMs this shift. Enteric panel pending. Reddened and moist groin, abdominal/breast/gluteal folds--micatin powder applied. CT done today. Continue IV abx therapy and encourage PO intake and activity. Pt resting quietly this shift and watching television. Pt resting quietly now; call light and belongings within reach. Will continue to monitor and continue POC/notify team as needed.

## 2021-10-11 NOTE — PROGRESS NOTES
"     [facility] ID Service: Follow Up Note      Patient:  Viviana Schaefer, Date of birth 1965, Medical record number [id number]  Date of Visit:  October 11, 2021         Assessment and Recommendations:   Problem List:  1) Bacteremia, E. Coli, Staphylococcus hominis  -Staphylococcus likely contaminant  -Unclear etiology of bacteremia, though suspect urinary source given urine culture results  2) Urinary tract infection, E. Coli  3) Loculated ascites vs other fluid collection in right lower abdomen  4) Alcoholic cirrhosis c/b portal hypertension and coagulopathy    Recommendations:  1) Continue levofloxacin, metronidazole  -If patient discharges today, would plan to complete 10 day course of both antibiotics.  2) Agree with ultrasound to investigate loculated fluid abutting right ovary  3) Suspect urinary source for bacteremia given same pathogen in both urine and blood. Can likely discontinue metronidazole tomorrow if no further organisms isolated and fluid collection in abdomen benign appearing on ultrasound.    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 Currently being treated with levofloxacin and metronidazole while right lower abdominal fluid collection is investigated. Suspect urinary source for bacteremia.    Terry Velasquez MD  Division of Infectious Diseases and International Medicine  P: [phone]        Interval History:     Seen and examined. States she \"actually feels pretty good\" today, though hasn't been able to sleep much in the past 3 days. She is eager for discharge to spend time with her son (home from [employer]). She denies abdominal pain today.        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 She has had progressively worsening weakness over past few week. She has also had fevers/chills over the 24 hours prior to presentation, along with poor appetite and relatively poor PO intake. Additionally, she has experienced stable/chronic SOB amd worsening swelling in both of her legs over the past few weeks, despite increase in lasix to 40mg daily.     In the ER, labs were notable for leukocytosis,SCr 1.49 (baseline ~0.9), Tbilil 6.5, INR 2.22. NT-proBN 1204. Procal 0.9/moderate risk. COVID-19 negative. CXR with patchy, bilateral infiltrates of lower lobes, CT head w/o acute intracranial process. She was started on ceftriaxone and azithromycin, then switched to Zosyn + azithromycin that same day as  UA demonstrated possible infection, thought likely to represent pyelonephritis with presenting symptoms of fever, CVA tenderness, lower abdominal pain.      Blood cultures collected on 10/7 grew E coli and Staphylococcus hominis, each in 1/2 bottles from 10/7, grown after 1 days, and ID was consulted. Repeat cultures from 10/9x1 with NGTD. Urine cultures with <10k mixed urogenital kasia, though would later grow two strains of E coli. RVP negative. MRSA nares negative, Strep pneumoniae, Legionella urinary antigens negative.    She reports looses stools for a couple weeks now after eating outside.Watery to soft stool, >3 episodes per day, increased since being admitted. No nausea/vomiting. No cough. 2 cats, sometimes gets scratches. Lives in [place] with 2 sons, one soon to leave for [employer]. . Works as an [employer] tech. Walking is only reported hobby.No other exposures.      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 Had some urinary  pain a week PTA, not now. Always has increased freuqency    Patient is actively working towards transplant. She is not immunized against COVID-19.            Review of Systems:     Full 9 pt ROS obtained and negative unless noted above in assessment and interval history.         Current Antimicrobials     Current:  Levofloxacin (1010 - )  Metronidazole (10/10 - )    Prior:  Azithromycin (10/8 - 10/9)  Ceftriaxone (10/8)  Zosyn (10/8 - 10/10)         Physical Exam:   Ranges for vital signs:  Temp:  [97.8  F (36.6  C)-98.6  F (37  C)] 97.8  F (36.6  C)  Pulse:  [67-78] 78  Resp:  [18-20] 18  BP: (131-152)/(61-74) 131/61  SpO2:  [94 %-95 %] 94 %    Intake/Output Summary (Last 24 hours) at 10/11/2021 1341  Last data filed at 10/11/2021 0104  Gross per 24 hour   Intake 800 ml   Output --   Net 800 ml     Exam:   GENERAL:  well-developed, well-nourished, sitting in bed in no acute distress.   ENT:  Head is normocephalic, atraumatic. Oropharynx is moist without exudates or ulcers.  EYES:  Eyes have anicteric sclerae.    NECK:  Supple.  LUNGS:  Clear to auscultation.  CARDIOVASCULAR:  Regular rate and rhythm with no murmurs, gallops or rubs.  ABDOMEN:  Normal bowel sounds, soft, nontender.  EXT: Extremities warm  SKIN:  No acute rashes.  Line is in place without any surrounding erythema.  NEUROLOGIC:  Grossly nonfocal.         Laboratory Data:   Reviewed.  Pertinent for:    Microbiology:  Culture   Date Value Ref Range Status   10/10/2021 No growth after 1 day  Preliminary   10/09/2021 No growth after 1 day  Preliminary   10/08/2021 <10,000 CFU/mL Escherichia coli (A)  Preliminary   10/08/2021 <10,000 CFU/mL Escherichia coli (A)  Preliminary   10/08/2021 <10,000 CFU/mL Escherichia coli (A)  Corrected     Comment:     Susceptibilities done on previous cultures  Corrected result: Previously reported as Mixture of urogenital kasia on 10/9/2021 at 12:48 PM CDT.   10/08/2021 <10,000 CFU/mL Escherichia coli (A)  Corrected      Comment:     Susceptibilities done on previous cultures   10/07/2021 No growth after 3 days  Preliminary   10/07/2021 Positive on the 2nd day of incubation (A)  Final   10/07/2021 Escherichia coli (AA)  Final     Comment:     1 of 2 bottles   10/07/2021 Staphylococcus hominis (AA)  Final     Comment:     1 of 2 bottles     Metabolic Studies       Recent Labs   Lab Test 10/11/21  0744 10/10/21  0723 10/09/21  0818 10/08/21  0754 10/07/21  2124 06/11/21  1350    143 143 139 139 142   POTASSIUM 3.3* 3.4 4.3 3.8 3.8 3.6   CHLORIDE 109 112* 112* 107 106 108   CO2 22 27 23 26 23 23   ANIONGAP 11 4 8 6 10 11   BUN 15 22 28 29 25 12   CR 1.12* 1.34* 1.50* 1.62* 1.49* 0.95   GFRESTIMATED 56* 45* 39* 36* 40* 68   GLC 94 108* 115* 110* 123* 87   ELIUD 9.6 9.6 8.5 8.4* 8.7 8.7   LACT  --   --   --  1.3  --   --      Hepatic Studies    Recent Labs   Lab Test 10/11/21  0744 10/10/21  1318 10/10/21  0723 10/09/21  0818 10/08/21  0754 10/07/21  2124 06/11/21  1350   BILITOTAL 5.2*  --  4.2* 4.3* 5.5* 6.5* 6.6*   ALKPHOS 52  --  52 58 76 91 208*   ALBUMIN 4.1  --  3.4 2.5* 1.9* 2.0* 2.7*   AST 30  --  29 41 33 34 54*   ALT 20  --  13 18 15 20 32   LDH  --  200  --   --   --   --   --      Pancreatitis testing    Recent Labs   Lab Test 10/07/21  2124 01/19/21  1653   LIPASE 83  --    TRIG  --  148     Hematology Studies      Recent Labs   Lab Test 10/11/21  0744 10/10/21  1426 10/10/21  0723 10/09/21  0818 10/08/21  0754 10/08/21  0754 10/07/21  2124 10/07/21  2124   WBC 6.0 5.1 5.0 6.0  --  10.0  --  10.4   ANEU  --  3.4  --   --   --   --   --   --    ALYM  --  0.7*  --   --   --   --   --   --    KOBE  --  0.4  --   --   --   --   --   --    AEOS  --  0.4  --   --   --   --   --   --    HGB 8.3* 7.9* 7.7* 8.4*  --  8.9*  --  9.7*   HCT 26.1* 24.4* 24.6* 26.0*  --  27.0*  --  29.6*   PLT 73* 74* 74* 75*   < > 91*   < > 96*    < > = values in this interval not displayed.     Urine Studies     Recent Labs   Lab Test  10/08/21  0529 03/01/21  1020 01/19/21  1702   URINEPH 5.5 5.0 6.0   NITRITE Negative Negative Negative   LEUKEST Large* Negative Negative   WBCU 142* 12* 6*     Transplant Immunosuppression Labs Latest Ref Rng & Units 10/11/2021 10/10/2021 10/10/2021 10/9/2021 10/8/2021   Creat 0.52 - 1.04 mg/dL 1.12(H) - 1.34(H) 1.50(H) 1.62(H)   BUN 7 - 30 mg/dL 15 - 22 28 29   WBC 4.0 - 11.0 10e3/uL 6.0 5.1 5.0 6.0 10.0   Neutrophil % - 66 - - -   ANEU 1.6 - 8.3 10e3/uL - 3.4 - - -          Imaging:     CT Chest/Abdomen/Pelvis w Contrast  Result Date: 10/10/2021  IMPRESSION: 1. Small to moderate right pleural effusion with overlying atelectasis. Mild interstitial pulmonary edema. 2. 1.0 cm subpleural left lower lobe pulmonary nodule. Recommend follow-up per Fleischner criteria. 3. No ascites. In the right lower quadrant there is a relatively loculated simple fluid collection that abuts and and appears to involve the right ovary. There is no surrounding enhancing rim to suggest abscess formation. Favor loculated ascites though consider pelvic ultrasound for further evaluation. 4. Cirrhotic configuration of the liver. Prominent recanalized umbilical vein. 5. Numerous prominent retroperitoneal lymph nodes, favored to be reactive. 6. Bilateral nonobstructive nephrolithiasis. 7. Relatively extensive anasarca particularly over the abdomen. I have personally reviewed the examination and initial interpretation and I agree with the findings. HOWARD OGLESBY MD   SYSTEM ID:  [id number]    CT Head w/o Contrast  Result Date: 10/7/2021  IMPRESSION: 1.  No acute intracranial process given motion degraded examination.

## 2021-10-11 NOTE — DISCHARGE SUMMARY
[facility]    Family Medicine Discharge Summary- [facility]  Service    Date of Admission:  10/7/2021  Date of Service: 10/12/2021  Date of Discharge:  10/12/2021  Discharging Attending Provider: Dr. Daren Barrera  Discharge Team: [facility]    Discharge Diagnoses   Bacteremia, urinary source - Urosepsis  Alcoholic cirrhosis of liver with ascites  Alcohol dependence in remission  CECE  Portal Hypertension  KWABENA  Coagulopathy  Fever    Follow-ups Needed After Discharge   1.  Follow up with PCP within one week to review imaging findings and for follow up labs. (CBC, CMP, Inflammatory markers)  2. Follow up with Dr. Martin within two weeks.  3. Follow up with Gynecologic Oncology within two weeks for follow up on ultrasound results.       Hospital Course     Viviana Schaefer is a 55 year old female admitted on 10/7/2021. She has a history of  alcoholic cirrhosis c/b portal HTN and coagulopathy, AUD in remission, macrocytic anemia, CECE, MDD  and presents and is admitted for sepsis of undetermined source.   The following problems were addressed during her hospitalization:    # Bacteremia of a likely urinary source  Came into the ED with a Tmax of 101 and heart rate elevated to 91, with a history of falls - meeting SIRS criteria. Blood cultures from 10/7 growing E.Coli and Staph hominis in one of two bottles each. Source likely urinary, despite an unimpressive UA. Her urine culture returned.    <10,000 CFU/mL Escherichia coliAbnormal     Susceptibilities done on previous cultures   Corrected result: Previously reported as Mixture of urogenital kasia on 10/9/2021 at 12:48 PM CDT.   <10,000 CFU/mL Escherichia coliAbnormal     Susceptibilities done on previous cultures        E. Coli positive results resistant to to Zosyn, so she was switched to Levaquin + Flagyl. This was converted to PO abx with a 10 day course at discharge. PT/OT was consulted with positive results, and PT  recommended home care PT after discharge. Of note, patient is is unvaccinated for COVID-19 and resistant to having vaccine inpatient. Continued work in this area is warranted given past medical history and danger of hospitalization. Symptomatically improved on antibiotics. CT revealed bilateral nonobstructive nephrolithiasis, creating a possible nidus for infection.  If patient has a recurrent UTI, recommend following up outpatient with Urology to consider removal vs medical management.    CT for community acquired pneumonia:   1. Small to moderate right pleural effusion with overlying  atelectasis. Mild interstitial pulmonary edema.  2. 1.0 cm subpleural left lower lobe pulmonary nodule. Recommend  follow-up per Fleischner criteria.  3. No ascites. In the right lower quadrant there is a relatively  loculated simple fluid collection that abuts and and appears to  involve the right ovary. There is no surrounding enhancing rim to  suggest abscess formation. Favor loculated ascites though consider  pelvic ultrasound for further evaluation.  4. Cirrhotic configuration of the liver. Prominent recanalized  umbilical vein.  5. Numerous prominent retroperitoneal lymph nodes, favored to be  reactive.  6. Bilateral nonobstructive nephrolithiasis.    7. Relatively extensive anasarca particularly over the abdomen.    Primary team recommends further inpatient workup in regard to infectious sources, but patient prefers to leave the hospital today. Agreed to complete a pelvic ultrasound inpatient before leaving 10/12. Reiterating that no definitive source identified, though urinary most likely. With a complex CT result, including fluid, pulmonary modules, nephrolithiasis, loculated ascites - cannot rule out other sources. Patient still preferring discharge with outpatient workup at this point.     - Continue 10 day course of antibiotics (with 7 days left) outpatient.   - Ovary complex lesion noted on R ovary on TVUS, curbsided  Gyn-Onc and they recommended outpatient follow up in their clinic within two weeks. They were able to add on tumor markers to a previous specimen to start workup.  -    # End-stage Alcoholic cirrhosis  # History of alcohol-used disorder, in remission  # Portal hypertension  # Thrombocytopenia  # Elevated INR  # Anasarca  Meld score high of 26, low of 24 at discharge. Patient is working toward transplant, has been sober for 8 months, and recently completed outpatient treatment. No abdominal pain/tenderness but diffusely edematous inpatient. Patient notes that lasix has been less effective over the past few weeks with worsening anasarca. No concern for hepatic encephalopathy with lack of AMS, gait disturbance, or other symptoms.     On recommendation of patient's outpatient hepatologist, she is s/p  -Albumin 1g/kg with max dose of 100kg per day for three days. Last dose 10/10.   -Gentle fluid boluses PRN    # Macrocytic anemia  Baseline ~10 Hgb w/ . Hgb on 10/10 dropped to 7.7. Possibly due to alcohol use disorder and folate deficiency, but must consider hemolysis (HUS) in setting of E. Coli bacteremia. No signs of active bleeding. Recommend restarting iron outpatient and follow up on hemolysis labs, including peripheral smear.     # KWABENA  # History of Medullary sponge kidney  # Urinary retention  Creatinine 1.49 on admission, down to 1.12 on day of discharge. Baseline ~0.9. BUN:Cr 16, c/f intrinsic renal damage, though would also anticipate some prerenal contribution given poor PO intake and sepsis. Anticipate continued improvement post-discharge. Did have some urinary retention inpatient that may indicate needed outpatient follow up.      # Discharge Pain Plan:   - Patient currently has NO PAIN and is not being prescribed pain medications on discharge.    Consultations This Hospital Stay   PHYSICAL THERAPY ADULT IP CONSULT  OCCUPATIONAL THERAPY ADULT IP CONSULT  INFECTIOUS DISEASE GENERAL ADULT IP  CONSULT  CARE MANAGEMENT / SOCIAL WORK IP CONSULT  INTERNAL MEDICINE PROCEDURE TEAM ADULT IP CONSULT [facility] - PARACENTESIS    Code Status   Full Code       The patient was discussed with Dr. Rodolfo [facility] Inpatient [facility]  [facility]   Pager:[phone]_  ___________________________________________________________________  Review of Systems:  CONSTITUTIONAL: NEGATIVE for fever, chills, change in weight  INTEGUMENTARY/SKIN: NEGATIVE for worrisome rashes, moles or lesions  EYES: NEGATIVE for vision changes or irritation  ENT/MOUTH: NEGATIVE for ear, mouth and throat problems  RESP: Positive for mild shortness of breath  BREAST: NEGATIVE for masses, tenderness or discharge  CV: NEGATIVE for chest pain, palpitations or peripheral edema  GI: NEGATIVE for nausea, abdominal pain, heartburn, or change in bowel habits  : positive for urinary retention  MUSCULOSKELETAL: NEGATIVE for significant arthralgias or myalgia  NEURO: NEGATIVE for weakness, dizziness or paresthesias  ENDOCRINE: NEGATIVE for temperature intolerance, skin/hair changes  HEME/ALLERGY: NEGATIVE for bleeding problems  PSYCHIATRIC: NEGATIVE for changes in mood or affect    Physical Exam   Vital Signs: Temp: 98.1  F (36.7  C) Temp src: Oral BP: 131/52 Pulse: 79   Resp: 16 SpO2: 94 % O2 Device: None (Room air)    Weight: 303 lbs 4.8 oz  Physical Exam  Constitutional:       General: She is not in acute distress.     Appearance: She is obese.   HENT:      Head: Normocephalic and atraumatic.      Right Ear: External ear normal.      Left Ear: External ear normal.      Nose: Nose normal.   Eyes:      Extraocular Movements: Extraocular movements intact.      Pupils: Pupils are equal, round, and reactive to light.    -Positive for scleral icterus  Cardiovascular:      Rate and Rhythm: Normal rate and regular rhythm.      Heart sounds: Normal heart sounds. No murmur heard.   No gallop.    Pulmonary:      Effort:  Pulmonary effort is normal. No respiratory distress.   Abdominal:      Palpations: Abdomen is soft.      Comments: Tenderness in one particular spot in LLQ, no abdominal wall defect noted, no swelling, no rebound. BS present throughout.   Musculoskeletal:         General: Normal range of motion.      Cervical back: Normal range of motion.      Right lower leg: Edema present.      Left lower leg: Edema present.      Comments: L > R upper extremity edema   Neurological:      General: No focal deficit present.      Mental Status: She is alert and oriented to person, place, and time.   Psychiatric:         Mood and Affect: Mood normal.         Behavior: Behavior normal.         Thought Content: Thought content normal.         Judgment: Judgment normal.     Significant Results and Procedures   Results for orders placed or performed during the hospital encounter of 10/07/21   XR Chest Port 1 View    Narrative    EXAM: XR CHEST PORT 1 VIEW  LOCATION: [facility]  DATE/TIME: 10/7/2021 9:54 PM    INDICATION: Fever, hypoxia.  COMPARISON: 01/19/2021.      Impression    IMPRESSION: There are patchy infiltrates within the lower lobes, shallow inspiration.   CT Head w/o Contrast    Narrative    EXAM: CT HEAD W/O CONTRAST  LOCATION: [facility]  DATE/TIME: 10/7/2021 11:02 PM    INDICATION: Cerebral hemorrhage suspected. Headache. Fall. Elevated INR.  COMPARISON: None.  TECHNIQUE: Routine CT Head without IV contrast. Multiplanar reformats. Dose reduction techniques were used.    FINDINGS:  Motion and streak artifact limits aspects of exam.    INTRACRANIAL CONTENTS: No intracranial hemorrhage, extraaxial collection, or mass effect.  No CT evidence of acute infarct. Normal parenchymal attenuation. Normal ventricles and sulci.     VISUALIZED ORBITS/SINUSES/MASTOIDS: No intraorbital abnormality. Bilateral inferior maxillary sinuses small retention  cysts/mucosal polyps. Remaining visualized paranasal sinuses essentially clear. No middle ear or mastoid effusion.    BONES/SOFT TISSUES: No acute abnormality. Dental amalgam resulting in streak artifact. Vascular calcifications.      Impression    IMPRESSION:  1.  No acute intracranial process given motion degraded examination.   CT Chest/Abdomen/Pelvis w Contrast    Narrative    EXAMINATION: CT CHEST/ABDOMEN/PELVIS W CONTRAST, 10/10/2021 1:55 PM    TECHNIQUE:  Helical CT images from the thoracic inlet through the  symphysis pubis were obtained  with contrast. Contrast dose: 135 mL  [id number]    COMPARISON: Outside CT 9/30/2020, 5/17/2020. MRI 5/10/2021. Plain film  10/7/2021.    HISTORY: Abdominal pain with Escherichia coli bacteremia, unclear  source. Rule out abscess.    FINDINGS:    Chest: There is no axillary adenopathy. Numerous prominent though  nonenlarged mediastinal and hilar lymph nodes. Heart size is normal.  No pericardial effusion. Normal variant common origin of the right  brachiocephalic and left common carotid artery. The ascending aorta  and main pulmonary artery are nonaneurysmal. Thoracic esophagus is  within normal limits.    The central tracheobronchial tree is patent. Small right pleural  effusion with overlying compressive atelectasis. Left basilar  atelectasis. No pneumothorax. No focal consolidative opacities. Mild  interlobular septal thickening. Fluid courses along the superiormost  aspect of the right major fissure. Subpleural left lower lobe  pulmonary nodule measuring 10 mm (series 2, image 271).    Abdomen and pelvis: Cirrhotic configuration of the liver. No focal  hepatic mass. Recanalization of the umbilical vein. Postsurgical  changes of cholecystectomy. No intrahepatic or extrahepatic biliary  dilation. Diffuse atrophy and fatty infiltration of the pancreatic  parenchyma. No main pancreatic ductal dilation. Spleen is enlarged  measuring 15.5 cm. Multifocal splenic cysts, better  evaluated on  5/10/2021 MRI. Bilateral nonobstructive nephrolithiasis the largest  stone on the left measures 15 x 15 mm (series 8, image 385) the  largest on the right measures 8 x 7 mm (series 8, image 341). No  hydronephrosis. Adrenal glands are within normal limits.    There are no dilated loops of small or large bowel. Colonic  diverticulosis without evidence of acute diverticulitis. Appendix is  normal. Small intra-abdominal ascites. There is a collection of free  fluid in the right lower quadrant measuring 8.1 x 5.8 cm (series 8,  image 472), it is fluid attenuating. It appears to be  abutting/surrounding the right ovary.    Post surgical changes of hysterectomy and left oophorectomy. Urinary  bladder is partially distended and demonstrates a nondependent focus  of air favored to be related to catheterization.    Diffuse hazy appearance of the mesentery. No significant abdominal  ascites. No free air within the abdomen or pelvis. Numerous  retroperitoneal/periaortic lymph nodes for example there is an  aortocaval lymph node measuring 1.1 cm (series 8, image 360). Major  vasculature of the abdomen and pelvis is patent. No infrarenal  abdominal aortic aneurysm.    Bones and soft tissues: Diffuse prominent anasarca. Subcutaneous  varices. Mild multilevel degenerative changes of the visualized spine.  No suspicious osseous lesions.      Impression    IMPRESSION:   1. Small to moderate right pleural effusion with overlying  atelectasis. Mild interstitial pulmonary edema.  2. 1.0 cm subpleural left lower lobe pulmonary nodule. Recommend  follow-up per Fleischner criteria.  3. No ascites. In the right lower quadrant there is a relatively  loculated simple fluid collection that abuts and and appears to  involve the right ovary. There is no surrounding enhancing rim to  suggest abscess formation. Favor loculated ascites though consider  pelvic ultrasound for further evaluation.  4. Cirrhotic configuration of the  liver. Prominent recanalized  umbilical vein.  5. Numerous prominent retroperitoneal lymph nodes, favored to be  reactive.  6. Bilateral nonobstructive nephrolithiasis.  7. Relatively extensive anasarca particularly over the abdomen.    I have personally reviewed the examination and initial interpretation  and I agree with the findings.    HOWARD OGLESBY MD         SYSTEM ID:  [id number]       Pending Results   These results will be followed up by PCP.  Unresulted Labs Ordered in the Past 30 Days of this Admission     Date and Time Order Name Status Description    10/11/2021 11:30 PM Blood Culture Hand, Right In process     10/11/2021 10:40 AM Blood Culture Hand, Left Preliminary     10/10/2021 11:30 PM Blood Culture Hand, Right Preliminary     10/9/2021 11:30 PM Blood Culture Arm, Right Preliminary     10/9/2021  6:31 AM Blood Culture Hand, Right Preliminary     10/7/2021 10:53 PM Blood Culture Peripheral Blood Preliminary              Primary Care Physician   Paula Irwin    Discharge Disposition   Discharged to home  Condition at discharge: Satisfactory    Discharge Orders      Home Care PT Referral for Hospital Discharge      Ob/Gyn Referral      MD face to face encounter    Documentation of Face to Face and Certification for Home Health Services    I certify that patient: Viviana Schaefer is under my care and that I, or a nurse practitioner or physician's assistant working with me, had a face-to-face encounter that meets the physician face-to-face encounter requirements with this patient on: 10/11/2021.    This encounter with the patient was in whole, or in part, for the following medical condition, which is the primary reason for home health care: sepsis.    I certify that, based on my findings, the following services are medically necessary home health services: Physical Therapy.    My clinical findings support the need for the above services because: Physical Therapy Services are needed to assess and  treat the following functional impairments: mobility, strength and endurance.    Further, I certify that my clinical findings support that this patient is homebound (i.e. absences from home require considerable and taxing effort and are for medical reasons or [facility] services or infrequently or of short duration when for other reasons) because: Requires assistance of another person or specialized equipment to access medical services because patient: Requires supervision of another for safe transfer...    Based on the above findings. I certify that this patient is confined to the home and needs intermittent skilled nursing care, physical therapy and/or speech therapy.  The patient is under my care, and I have initiated the establishment of the plan of care.  This patient will be followed by a physician who will periodically review the plan of care.  Physician/Provider to provide follow up care: Paula Irwin    Attending hospital physician (the Medicare certified [place] provider): Dr. Reynolds  Physician Signature: See electronic signature associated with these discharge orders.  Date: 10/11/2021     Reason for your hospital stay    You were in the hospital for an infection.     Activity    Your activity upon discharge: activity as tolerated     Follow Up ([facility])    Follow up with primary care provider, Paula Irwin, within 7 days to evaluate medication change, to evaluate treatment change, and for hospital follow- up.  The following labs/tests are recommended: CBC, BMP, follow up on imaging.  Need transvaginal ultrasound as well.    Appointments on [place] and/or [facility] (with [facility] or [facility] provider or service). Call [phone] if you haven't heard regarding these appointments within 7 days of discharge.     Diet    Follow this diet upon discharge: Orders Placed This Encounter      Combination Diet Regular Diet Adult     Discharge Medications   Current Discharge Medication List      START taking these  medications    Details   levofloxacin (LEVAQUIN) 750 MG tablet Take 1 tablet (750 mg) by mouth daily  Qty: 7 tablet, Refills: 0    Associated Diagnoses: Pyelonephritis, acute      ondansetron (ZOFRAN-ODT) 4 MG ODT tab Take 1 tablet (4 mg) by mouth every 6 hours as needed for nausea or vomiting  Qty: 30 tablet, Refills: 0    Associated Diagnoses: Pyelonephritis, acute         CONTINUE these medications which have NOT CHANGED    Details   buPROPion (WELLBUTRIN XL) 150 MG 24 hr tablet Take 150 mg by mouth daily      busPIRone (BUSPAR) 15 MG tablet Take 0.5 tablets by mouth 2 times daily      calcium carbonate-vitamin D (OSCAL W/D) 500-200 MG-UNIT tablet Take 1 tablet by mouth 2 times daily (with meals)  Qty: 180 tablet, Refills: 3    Associated Diagnoses: Hypokalemia; Vitamin D deficiency      ferrous sulfate (FEROSUL) 325 (65 Fe) MG tablet TAKE 1 TABLET BY MOUTH DAILY WITH BREAKFAST      folic acid (FOLVITE) 1 MG tablet TK 1 T PO D      LORazepam (ATIVAN) 0.5 MG tablet Take 1 tablet (0.5 mg) by mouth every 6 hours as needed for anxiety (claustrophia prior to MRI)  Qty: 2 tablet, Refills: 0    Associated Diagnoses: Claustrophobia      omeprazole (PRILOSEC) 20 MG DR capsule Take 20 mg by mouth daily       potassium chloride ER (KLOR-CON M) 20 MEQ CR tablet Take 1 tablet (20 mEq) by mouth daily  Qty: 30 tablet, Refills: 1    Associated Diagnoses: Hypokalemia      pregabalin (LYRICA) 100 MG capsule Take 100 mg by mouth daily       spironolactone (ALDACTONE) 50 MG tablet Take 50 mg by mouth daily       traZODone (DESYREL) 100 MG tablet Take 100-200 mg by mouth At Bedtime      UNABLE TO FIND MEDICATION NAME: milk thisle      vitamin D2 (ERGOCALCIFEROL) 34798 units (1250 mcg) capsule Take 1 capsule (50,000 Units) by mouth once a week  Qty: 12 capsule, Refills: 0    Associated Diagnoses: Alcoholic cirrhosis (H); Vitamin D deficiency      vitamin D3 (CHOLECALCIFEROL) 50 mcg (2000 units) tablet Take 1 tablet (50 mcg) by mouth  daily  Qty: 90 tablet, Refills: 4    Associated Diagnoses: Hypokalemia; Vitamin D deficiency      furosemide (LASIX) 40 MG tablet Take 20 mg by mouth daily            Allergies   Allergies   Allergen Reactions     Codeine      Diazepam      Morphine      PN: LW Reaction: Nausea     Penicillins      PN: LW Reaction: Rash, Generalized. Reportedly had a rash with this as a child and has tolerated Augmentin since per patient. Tolerated Zosyn 3/12/19 in [facility].

## 2021-10-11 NOTE — DISCHARGE INSTRUCTIONS
Nursing please fax final discharge orders to [facility] [phone], Fax [phone]

## 2021-10-11 NOTE — PLAN OF CARE
/65 (BP Location: Right arm)   Pulse 75   Temp 98.2  F (36.8  C) (Oral)   Resp 18   Wt 139.2 kg (306 lb 14.1 oz)   SpO2 94%   BMI 56.13 kg/m        [id number]  VSS on RA. SOB on exertion. Pt up in chair for most of shift. No blood sugar checks. Denies pain or nausea. Regular diet, poor appetite. L PIV TKO w/abx. Voids WOD. LBM today. Edematous,  lymph saw pt today. Lymph wraps currently off. Offered micatin powder for pts skin but pt declined. Up SBA using walker. Flagyl q8. Will continue to monitor and notify team with changes.

## 2021-10-12 ENCOUNTER — APPOINTMENT (OUTPATIENT)
Dept: ULTRASOUND IMAGING | Facility: CLINIC | Age: 56
End: 2021-10-12
Payer: COMMERCIAL

## 2021-10-12 ENCOUNTER — APPOINTMENT (OUTPATIENT)
Dept: PHYSICAL THERAPY | Facility: CLINIC | Age: 56
End: 2021-10-12
Payer: COMMERCIAL

## 2021-10-12 ENCOUNTER — APPOINTMENT (OUTPATIENT)
Dept: OCCUPATIONAL THERAPY | Facility: CLINIC | Age: 56
End: 2021-10-12
Payer: COMMERCIAL

## 2021-10-12 VITALS
RESPIRATION RATE: 16 BRPM | OXYGEN SATURATION: 97 % | DIASTOLIC BLOOD PRESSURE: 79 MMHG | SYSTOLIC BLOOD PRESSURE: 155 MMHG | TEMPERATURE: 98.3 F | BODY MASS INDEX: 55.47 KG/M2 | WEIGHT: 293 LBS | HEART RATE: 84 BPM

## 2021-10-12 LAB
ALBUMIN SERPL-MCNC: 3.5 G/DL (ref 3.4–5)
ALP SERPL-CCNC: 49 U/L (ref 40–150)
ALT SERPL W P-5'-P-CCNC: 17 U/L (ref 0–50)
ANION GAP SERPL CALCULATED.3IONS-SCNC: 8 MMOL/L (ref 3–14)
AST SERPL W P-5'-P-CCNC: 34 U/L (ref 0–45)
BILIRUB SERPL-MCNC: 4.9 MG/DL (ref 0.2–1.3)
BUN SERPL-MCNC: 16 MG/DL (ref 7–30)
CALCIUM SERPL-MCNC: 9.4 MG/DL (ref 8.5–10.1)
CANCER AG125 SERPL-ACNC: 76 U/ML (ref 0–30)
CEA SERPL-MCNC: 5.5 UG/L (ref 0–2.5)
CHLORIDE BLD-SCNC: 112 MMOL/L (ref 94–109)
CO2 SERPL-SCNC: 22 MMOL/L (ref 20–32)
CREAT SERPL-MCNC: 1.14 MG/DL (ref 0.52–1.04)
ERYTHROCYTE [DISTWIDTH] IN BLOOD BY AUTOMATED COUNT: 15 % (ref 10–15)
GFR SERPL CREATININE-BSD FRML MDRD: 55 ML/MIN/1.73M2
GLUCOSE BLD-MCNC: 78 MG/DL (ref 70–99)
HCT VFR BLD AUTO: 24.6 % (ref 35–47)
HGB BLD-MCNC: 7.7 G/DL (ref 11.7–15.7)
INR PPP: 2.37 (ref 0.85–1.15)
MCH RBC QN AUTO: 35.5 PG (ref 26.5–33)
MCHC RBC AUTO-ENTMCNC: 31.3 G/DL (ref 31.5–36.5)
MCV RBC AUTO: 113 FL (ref 78–100)
PLATELET # BLD AUTO: 78 10E3/UL (ref 150–450)
POTASSIUM BLD-SCNC: 3.3 MMOL/L (ref 3.4–5.3)
PROT SERPL-MCNC: 6.2 G/DL (ref 6.8–8.8)
RBC # BLD AUTO: 2.17 10E6/UL (ref 3.8–5.2)
SODIUM SERPL-SCNC: 142 MMOL/L (ref 133–144)
WBC # BLD AUTO: 6.6 10E3/UL (ref 4–11)

## 2021-10-12 PROCEDURE — 97140 MANUAL THERAPY 1/> REGIONS: CPT | Mod: GO

## 2021-10-12 PROCEDURE — 250N000013 HC RX MED GY IP 250 OP 250 PS 637: Performed by: STUDENT IN AN ORGANIZED HEALTH CARE EDUCATION/TRAINING PROGRAM

## 2021-10-12 PROCEDURE — 97116 GAIT TRAINING THERAPY: CPT | Mod: GP

## 2021-10-12 PROCEDURE — 36415 COLL VENOUS BLD VENIPUNCTURE: CPT | Performed by: STUDENT IN AN ORGANIZED HEALTH CARE EDUCATION/TRAINING PROGRAM

## 2021-10-12 PROCEDURE — 99233 SBSQ HOSP IP/OBS HIGH 50: CPT | Performed by: STUDENT IN AN ORGANIZED HEALTH CARE EDUCATION/TRAINING PROGRAM

## 2021-10-12 PROCEDURE — 97530 THERAPEUTIC ACTIVITIES: CPT | Mod: GP

## 2021-10-12 PROCEDURE — 99239 HOSP IP/OBS DSCHRG MGMT >30: CPT | Mod: GC | Performed by: FAMILY MEDICINE

## 2021-10-12 PROCEDURE — 250N000011 HC RX IP 250 OP 636: Performed by: STUDENT IN AN ORGANIZED HEALTH CARE EDUCATION/TRAINING PROGRAM

## 2021-10-12 PROCEDURE — 80053 COMPREHEN METABOLIC PANEL: CPT | Performed by: STUDENT IN AN ORGANIZED HEALTH CARE EDUCATION/TRAINING PROGRAM

## 2021-10-12 PROCEDURE — 76830 TRANSVAGINAL US NON-OB: CPT

## 2021-10-12 PROCEDURE — 76830 TRANSVAGINAL US NON-OB: CPT | Mod: 26 | Performed by: STUDENT IN AN ORGANIZED HEALTH CARE EDUCATION/TRAINING PROGRAM

## 2021-10-12 PROCEDURE — 85027 COMPLETE CBC AUTOMATED: CPT | Performed by: STUDENT IN AN ORGANIZED HEALTH CARE EDUCATION/TRAINING PROGRAM

## 2021-10-12 PROCEDURE — 87040 BLOOD CULTURE FOR BACTERIA: CPT | Performed by: STUDENT IN AN ORGANIZED HEALTH CARE EDUCATION/TRAINING PROGRAM

## 2021-10-12 PROCEDURE — 76856 US EXAM PELVIC COMPLETE: CPT | Mod: 26 | Performed by: STUDENT IN AN ORGANIZED HEALTH CARE EDUCATION/TRAINING PROGRAM

## 2021-10-12 PROCEDURE — 85610 PROTHROMBIN TIME: CPT | Performed by: STUDENT IN AN ORGANIZED HEALTH CARE EDUCATION/TRAINING PROGRAM

## 2021-10-12 RX ORDER — METRONIDAZOLE 500 MG/1
500 TABLET ORAL 3 TIMES DAILY
Qty: 21 TABLET | Refills: 0 | Status: SHIPPED | OUTPATIENT
Start: 2021-10-12 | End: 2021-10-12

## 2021-10-12 RX ORDER — LEVOFLOXACIN 750 MG/1
750 TABLET, FILM COATED ORAL DAILY
Qty: 7 TABLET | Refills: 0 | Status: SHIPPED | OUTPATIENT
Start: 2021-10-12

## 2021-10-12 RX ADMIN — Medication 1 TABLET: at 07:30

## 2021-10-12 RX ADMIN — METRONIDAZOLE 500 MG: 500 INJECTION, SOLUTION INTRAVENOUS at 02:19

## 2021-10-12 RX ADMIN — SPIRONOLACTONE 50 MG: 50 TABLET ORAL at 07:29

## 2021-10-12 RX ADMIN — BUSPIRONE HYDROCHLORIDE 7.5 MG: 7.5 TABLET ORAL at 07:30

## 2021-10-12 RX ADMIN — METRONIDAZOLE 500 MG: 500 INJECTION, SOLUTION INTRAVENOUS at 10:23

## 2021-10-12 RX ADMIN — Medication 50 MCG: at 07:29

## 2021-10-12 RX ADMIN — FOLIC ACID 1 MG: 1 TABLET ORAL at 07:30

## 2021-10-12 RX ADMIN — POTASSIUM CHLORIDE 20 MEQ: 750 TABLET, EXTENDED RELEASE ORAL at 07:29

## 2021-10-12 RX ADMIN — PANTOPRAZOLE SODIUM 40 MG: 40 TABLET, DELAYED RELEASE ORAL at 07:29

## 2021-10-12 RX ADMIN — BUPROPION HYDROCHLORIDE 150 MG: 150 TABLET, FILM COATED, EXTENDED RELEASE ORAL at 07:30

## 2021-10-12 ASSESSMENT — ACTIVITIES OF DAILY LIVING (ADL)
ADLS_ACUITY_SCORE: 14
ADLS_ACUITY_SCORE: 14
ADLS_ACUITY_SCORE: 12

## 2021-10-12 NOTE — PLAN OF CARE
/52 (BP Location: Right arm)   Pulse 79   Temp 98.1  F (36.7  C) (Oral)   Resp 16   Wt 137.6 kg (303 lb 4.8 oz)   SpO2 94%   BMI 55.47 kg/m      4940-2255  Patient VSS on RA, afebrile. No complaints of pain or nausea. Tolerating regular diet with fair appetite. PIV saline locked. Voiding spontaneously WOD. Lymph wraps applied. Patient is up with assist of 1 and walker. Worked with PT, tolerated well. Ultrasound completed today.  Plan to discharge after ultrasound results. Will continue with POC and notify MD with changes or concerns.

## 2021-10-12 NOTE — PROGRESS NOTES
Care Management Discharge Note    Discharge Date: 10/13/2021       Discharge Disposition: Home Care, Home    Discharge Services: None    Discharge DME: None    Discharge Transportation: family or friend will provide    Patient/family educated on Medicare website which has current facility and service quality ratings: yes    Education Provided on the Discharge Plan:  Yes  Persons Notified of Discharge Plans: Patient  Patient/Family in Agreement with the Plan: yes    Handoff Referral Completed: Yes    Additional Information:  Pt status reviewed with Dr. Paz.  Team anticipates patient will discharge today after pelvic ultrasound has been completed.   Plan for home health PT.       left for [facility] [phone], Fax [phone].  Discharge home care orders reviewed.      1600: Final discharge orders faxed to [facility] along with discharge summary.     Gisella Helm RN BSN, PHN, ACM-RN  7A RN Care Coordinator  Phone: [phone]  Pager [phone]    10/12/2021 9:38 AM

## 2021-10-12 NOTE — PROGRESS NOTES
"     [facility] ID Service: Follow Up Note      Patient:  Viviana Schaefer, Date of birth 1965, Medical record number [id number]  Date of Visit:  October 11, 2021         Assessment and Recommendations:   Problem List:  1) Bacteremia, E. Coli, Staphylococcus hominis  -Staphylococcus likely contaminant  -Unclear etiology of bacteremia, though suspect urinary source given urine culture results  2) Urinary tract infection, E. Coli  3) Loculated ascites vs other fluid collection in right lower abdomen  4) Alcoholic cirrhosis c/b portal hypertension and coagulopathy    Recommendations:  1) Continue levofloxacin, metronidazole for planned duration of 10 days  2) Nonobstructive nephrolithiasis less likely to be nidus of infection, though not impossible. Would recommend urology follow-up ,particularly if further episodes of UTI/abdominal pain occur.  3) Suspect urinary source for bacteremia given same pathogen in both urine and blood. .    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 Currently being treated with levofloxacin and metronidazole while right lower abdominal fluid collection is investigated. Suspect urinary source for bacteremia.    Terry Velasquez MD  Division of Infectious Diseases and International Medicine  P: [phone]        Interval History:     Seen and examined. Denies fevers, chills, abd pain, dysuria overnight and is eager for discharge. Reviewed \"red flags\" for re-evaluation for infection with her.         HPI:     55 year old female who has a history of alcoholic cirrhosis c/b portal HTN and coagulopathy, alcohol use in remission fro 8 months, macrocytic anemia, CECE, MDD  and who presented " for evaluation of weakness and falls on 10/7. She has had progressively worsening weakness over past few week. She has also had fevers/chills over the 24 hours prior to presentation, along with poor appetite and relatively poor PO intake. Additionally, she has experienced stable/chronic SOB amd worsening swelling in both of her legs over the past few weeks, despite increase in lasix to 40mg daily.     In the ER, labs were notable for leukocytosis,SCr 1.49 (baseline ~0.9), Tbilil 6.5, INR 2.22. NT-proBN 1204. Procal 0.9/moderate risk. COVID-19 negative. CXR with patchy, bilateral infiltrates of lower lobes, CT head w/o acute intracranial process. She was started on ceftriaxone and azithromycin, then switched to Zosyn + azithromycin that same day as  UA demonstrated possible infection, thought likely to represent pyelonephritis with presenting symptoms of fever, CVA tenderness, lower abdominal pain.      Blood cultures collected on 10/7 grew E coli and Staphylococcus hominis, each in 1/2 bottles from 10/7, grown after 1 days, and ID was consulted. Repeat cultures from 10/9x1 with NGTD. Urine cultures with <10k mixed urogenital kasia, though would later grow two strains of E coli. RVP negative. MRSA nares negative, Strep pneumoniae, Legionella urinary antigens negative.    She reports looses stools for a couple weeks now after eating outside.Watery to soft stool, >3 episodes per day, increased since being admitted. No nausea/vomiting. No cough. 2 cats, sometimes gets scratches. Lives in [place] with 2 sons, one soon to leave for [employer]. . Works as an US tech. Walking is only reported hobby.No other exposures.      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 Had some urinary pain a week PTA, not now. Always has increased freuqency    Patient is actively working towards transplant. She is not immunized against COVID-19.             Review of Systems:     Full 9 pt ROS obtained and negative unless noted above in assessment and interval history.         Current Antimicrobials     Current:  Levofloxacin (1010 - )  Metronidazole (10/10 - )    Prior:  Azithromycin (10/8 - 10/9)  Ceftriaxone (10/8)  Zosyn (10/8 - 10/10)         Physical Exam:   Ranges for vital signs:  Temp:  [98.1  F (36.7  C)-98.9  F (37.2  C)] 98.1  F (36.7  C)  Pulse:  [72-83] 79  Resp:  [16-18] 16  BP: (124-147)/(52-69) 131/52  SpO2:  [93 %-95 %] 94 %    Intake/Output Summary (Last 24 hours) at 10/11/2021 1341  Last data filed at 10/11/2021 0104  Gross per 24 hour   Intake 800 ml   Output --   Net 800 ml     Exam:   GENERAL:  well-developed, well-nourished, sitting in bed in no acute distress.   ENT:  Head is normocephalic, atraumatic. Oropharynx is moist without exudates or ulcers.  EYES:  Eyes have anicteric sclerae.    NECK:  Supple.  LUNGS:  Clear to auscultation.  CARDIOVASCULAR:  Regular rate and rhythm with no murmurs, gallops or rubs.  ABDOMEN:  Normal bowel sounds, soft, nontender.  EXT: Extremities warm  SKIN:  No acute rashes.  Line is in place without any surrounding erythema.  NEUROLOGIC:  Grossly nonfocal.         Laboratory Data:   Reviewed.  Pertinent for:    Microbiology:  Culture   Date Value Ref Range Status   10/11/2021 No growth after 1 day  Preliminary   10/11/2021 No growth after 1 day  Preliminary   10/10/2021 No growth after 2 days  Preliminary   10/09/2021 No growth after 2 days  Preliminary   10/08/2021 <10,000 CFU/mL Escherichia coli (A)  Final   10/08/2021 <10,000 CFU/mL Escherichia coli (A)  Final   10/08/2021 <10,000 CFU/mL Escherichia coli (A)  Corrected     Comment:     Susceptibilities done on previous cultures  Corrected result: Previously reported as Mixture of urogenital kasia on 10/9/2021 at 12:48 PM CDT.   10/08/2021 <10,000 CFU/mL Escherichia coli (A)  Corrected     Comment:     Susceptibilities done on previous cultures    10/07/2021 No growth after 4 days  Preliminary   10/07/2021 Positive on the 2nd day of incubation (A)  Final   10/07/2021 Escherichia coli (AA)  Final     Comment:     1 of 2 bottles   10/07/2021 Staphylococcus hominis (AA)  Final     Comment:     1 of 2 bottles     Metabolic Studies       Recent Labs   Lab Test 10/12/21  0612 10/11/21  0744 10/10/21  0723 10/09/21  0818 10/08/21  0754 10/07/21  2124    142 143 143 139 139   POTASSIUM 3.3* 3.3* 3.4 4.3 3.8 3.8   CHLORIDE 112* 109 112* 112* 107 106   CO2 22 22 27 23 26 23   ANIONGAP 8 11 4 8 6 10   BUN 16 15 22 28 29 25   CR 1.14* 1.12* 1.34* 1.50* 1.62* 1.49*   GFRESTIMATED 55* 56* 45* 39* 36* 40*   GLC 78 94 108* 115* 110* 123*   ELIUD 9.4 9.6 9.6 8.5 8.4* 8.7   LACT  --   --   --   --  1.3  --      Hepatic Studies    Recent Labs   Lab Test 10/12/21  0612 10/11/21  0744 10/10/21  1318 10/10/21  0723 10/09/21  0818 10/08/21  0754 10/07/21  2124   BILITOTAL 4.9* 5.2*  --  4.2* 4.3* 5.5* 6.5*   ALKPHOS 49 52  --  52 58 76 91   ALBUMIN 3.5 4.1  --  3.4 2.5* 1.9* 2.0*   AST 34 30  --  29 41 33 34   ALT 17 20  --  13 18 15 20   LDH  --   --  200  --   --   --   --      Pancreatitis testing    Recent Labs   Lab Test 10/07/21  2124 01/19/21  1653   LIPASE 83  --    TRIG  --  148     Hematology Studies      Recent Labs   Lab Test 10/12/21  0612 10/11/21  0744 10/10/21  1426 10/10/21  0723 10/09/21  0818 10/09/21  0818 10/08/21  0754 10/08/21  0754   WBC 6.6 6.0 5.1 5.0  --  6.0  --  10.0   ANEU  --   --  3.4  --   --   --   --   --    ALYM  --   --  0.7*  --   --   --   --   --    KOBE  --   --  0.4  --   --   --   --   --    AEOS  --   --  0.4  --   --   --   --   --    HGB 7.7* 8.3* 7.9* 7.7*  --  8.4*  --  8.9*   HCT 24.6* 26.1* 24.4* 24.6*  --  26.0*  --  27.0*   PLT 78* 73* 74* 74*   < > 75*   < > 91*    < > = values in this interval not displayed.     Urine Studies     Recent Labs   Lab Test 10/08/21  0529 03/01/21  1020 01/19/21  1702   URINEPH 5.5 5.0 6.0    NITRITE Negative Negative Negative   LEUKEST Large* Negative Negative   WBCU 142* 12* 6*     Transplant Immunosuppression Labs Latest Ref Rng & Units 10/12/2021 10/11/2021 10/10/2021 10/10/2021 10/9/2021   Creat 0.52 - 1.04 mg/dL 1.14(H) 1.12(H) - 1.34(H) 1.50(H)   BUN 7 - 30 mg/dL 16 15 - 22 28   WBC 4.0 - 11.0 10e3/uL 6.6 6.0 5.1 5.0 6.0   Neutrophil % - - 66 - -   ANEU 1.6 - 8.3 10e3/uL - - 3.4 - -          Imaging:     CT Chest/Abdomen/Pelvis w Contrast  Result Date: 10/10/2021  IMPRESSION: 1. Small to moderate right pleural effusion with overlying atelectasis. Mild interstitial pulmonary edema. 2. 1.0 cm subpleural left lower lobe pulmonary nodule. Recommend follow-up per Fleischner criteria. 3. No ascites. In the right lower quadrant there is a relatively loculated simple fluid collection that abuts and and appears to involve the right ovary. There is no surrounding enhancing rim to suggest abscess formation. Favor loculated ascites though consider pelvic ultrasound for further evaluation. 4. Cirrhotic configuration of the liver. Prominent recanalized umbilical vein. 5. Numerous prominent retroperitoneal lymph nodes, favored to be reactive. 6. Bilateral nonobstructive nephrolithiasis. 7. Relatively extensive anasarca particularly over the abdomen. I have personally reviewed the examination and initial interpretation and I agree with the findings. HOWARD OGLESBY MD   SYSTEM ID:  [id number]    CT Head w/o Contrast  Result Date: 10/7/2021  IMPRESSION: 1.  No acute intracranial process given motion degraded examination.

## 2021-10-12 NOTE — PLAN OF CARE
BP (!) 155/79 (BP Location: Right arm)   Pulse 84   Temp 98.3  F (36.8  C) (Oral)   Resp 16   Wt 137.6 kg (303 lb 4.8 oz)   SpO2 97%   BMI 55.47 kg/m      5429-6920    DISCHARGE:  D: Patient with orders to discharge to home with PT/OT referral via home care.     I: Discharge instructions, medications, & follow ups reviewed with Viviana and her son Poncho. Copy of discharge summary given to pt.  PIV removed. All belongings packed & sent with patient. Medications filled & picked up at discharge pharmacy. Paperwork faxed to home care agency.     A: Patient in stable condition. AVSS; slightly hypertensive but pt up and ambulating at the time. Viviana had no further questions regarding discharge instructions and medications. Patient transferred out by wheelchair & left with her son Poncho.     P: Plan for f/u with PCP and home care referral.

## 2021-10-12 NOTE — PLAN OF CARE
/59 (BP Location: Right arm)   Pulse 78   Temp 98.9  F (37.2  C) (Oral)   Resp 18   Wt 137.6 kg (303 lb 4.8 oz)   SpO2 93%   BMI 55.47 kg/m          9051-5330  Neuro: Pt. alert& Ox4  Behavior: Pt. calm & cooperative with cares.   Activity: Pt. up with 1 assist & walker.  Vital: AVSS on RA.  LDAs: Left PIV saline locked.    Cardiac: WNL  Respiratory: LS diminished bilat. QUINTANILLA, pt. using IS.   GI/: Pt. voiding spontaneously, no stools this shift. Last BM 10/11.  Skin: Reddened/moist groin, breasts & abdom. folds with Interdry.  Pain/Nausea: Pt. denies pain or nausea.   Diet: Regular  Labs/Imaging: Morning labs pending.  Plan: Continue to follow POC & notify MD with change in status.

## 2021-10-13 LAB — BACTERIA BLD CULT: NO GROWTH

## 2021-10-13 NOTE — PLAN OF CARE
Physical Therapy Discharge Summary    Reason for therapy discharge:    Discharged to home with home therapy.    Progress towards therapy goal(s). See goals on Care Plan in [facility] electronic health record for goal details.  Goals partially met.  Barriers to achieving goals:   discharge from facility.    Therapy recommendation(s):    Continued therapy is recommended.  Rationale/Recommendations:  HHPT to progress strength and IND.

## 2021-10-14 LAB
BACTERIA BLD CULT: NO GROWTH
CANCER AG19-9 SERPL IA-ACNC: <1 U/ML

## 2021-10-14 NOTE — PLAN OF CARE
Occupational Therapy and Lymphedema Therapy Discharge Summary    Reason for therapy discharge:    Discharged to home with home therapy.    Progress towards therapy goal(s). See goals on Care Plan in [facility] electronic health record for goal details.  Goals partially met.  Barriers to achieving goals:   discharge from facility.    Therapy recommendation(s):    Continued therapy is recommended.  Rationale/Recommendations:  To promote ADL independence and safety in the home environment..

## 2021-10-15 LAB — BACTERIA BLD CULT: NO GROWTH

## 2021-10-16 LAB
BACTERIA BLD CULT: NO GROWTH
BACTERIA BLD CULT: NO GROWTH

## 2021-10-17 LAB — BACTERIA BLD CULT: NO GROWTH

## 2021-10-17 NOTE — PROGRESS NOTES
[facility]   BRIEF PROGRESS NOTE        Response to coding query, please consider this an addendum to discharge and progress notes dated before. See Discharge summary and previous progress notes for full details.    Discharge Diagnoses     Bacteremia, urinary source - Urosepsis  Alcoholic cirrhosis of liver with ascites  Alcohol dependence in remission  CECE  Portal Hypertension  KWABENA  Coagulopathy  Fever        #Sepsis d/t E.coli  Labs/Imaging:   -Urine culture positive for <10,000 CFU/mL Escherichia coli  -Blood culture 10/7 positive for Escherichia coli and staphylococcus hominis  -Verigene GN Panel: Positive for Escherichia coli  -Procalcitonin 0.90     Signs & Symptoms:   -Fevers on 10/7; 101 and 100.9 F  -HR elevated to 91 in ED (H&P)     Medications/Treatment: Zosyn, levofloxacin, flagyl            Please see daily rounding note for full A/P.  Belén Paz, DO  2:41 PM

## 2021-11-03 ENCOUNTER — TELEPHONE (OUTPATIENT)
Dept: UROLOGY | Facility: CLINIC | Age: 56
End: 2021-11-03

## 2021-11-03 ENCOUNTER — TELEPHONE (OUTPATIENT)
Dept: TRANSPLANT | Facility: CLINIC | Age: 56
End: 2021-11-03

## 2021-11-03 ENCOUNTER — VIRTUAL VISIT (OUTPATIENT)
Dept: GASTROENTEROLOGY | Facility: CLINIC | Age: 56
End: 2021-11-03
Attending: STUDENT IN AN ORGANIZED HEALTH CARE EDUCATION/TRAINING PROGRAM
Payer: COMMERCIAL

## 2021-11-03 DIAGNOSIS — R19.00 PELVIC MASS IN FEMALE: ICD-10-CM

## 2021-11-03 DIAGNOSIS — K70.31 ALCOHOLIC CIRRHOSIS OF LIVER WITH ASCITES (H): Primary | ICD-10-CM

## 2021-11-03 DIAGNOSIS — R91.8 PULMONARY NODULES: ICD-10-CM

## 2021-11-03 DIAGNOSIS — Z12.11 COLON CANCER SCREENING: ICD-10-CM

## 2021-11-03 DIAGNOSIS — R31.9 HEMATURIA, UNSPECIFIED TYPE: ICD-10-CM

## 2021-11-03 PROCEDURE — 99215 OFFICE O/P EST HI 40 MIN: CPT | Mod: 95 | Performed by: STUDENT IN AN ORGANIZED HEALTH CARE EDUCATION/TRAINING PROGRAM

## 2021-11-03 PROCEDURE — G0463 HOSPITAL OUTPT CLINIC VISIT: HCPCS | Mod: PN,RTG | Performed by: STUDENT IN AN ORGANIZED HEALTH CARE EDUCATION/TRAINING PROGRAM

## 2021-11-03 ASSESSMENT — PAIN SCALES - GENERAL: PAINLEVEL: MILD PAIN (2)

## 2021-11-03 NOTE — LETTER
"    11/3/2021         RE: Viviana Schaefer  [address]  [facility] [address]        Dear Colleague,    Thank you for referring your patient, Viviana Schaefer, to the [facility] [place]. Please see a copy of my visit note below.    Viviana is a 55 year old who is being evaluated via a billable video visit.      How would you like to obtain your AVS? MyChart  If the video visit is dropped, the invitation should be resent by: Text to cell phone: [phone]  Will anyone else be joining your video visit? No      Video Start Time: 3:40 PM  Video-Visit Details    Type of service:  Video Visit    Video End Time:4:05    Originating Location (pt. Location): Home    Distant Location (provider location):  [facility] [place]     Platform used for Video Visit: WiTricity     [facility] clinic    Date of Visit: 11/5/2021    Reason for referral: Liver Transplant Evaluation    Subjective: Ms. 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 CT showed cirrhosis without a mass, ascites and a pleural effusion. No prior history of liver disease - states she had an US fall 2018, was not told what it showed, but later was told she had \"fatty liver\". Denies being told LFTs elevated in the past. Underwent thoracentesis and paracentesis that were negative for SBP. She apparently had normal A1AT, AMA, AMA, Ferritin. Ceruloplasmin was low at 14, 24 hour urine copper < 1.0 micrograms/L. No KF rings seen with ophthalmology. She was started on diuretics, fluid resolved with lasix 40/nik 50. She tells me that she was diagnosed with PORTER. After this hospitalization, was sober for until Fall 2019 - around that time she " "had a few drinks and stopped. States she drank again 5/2020 - had a couple of drinks celebrating son's Bday.     States she abused alcohol after each divorce (1998, 2012 - drinking \"a lot\"), and then would drink regularly after that - ranging from nothing to a few drinks a week. Last drink. No DUIs. Did outpatient treatment (2003) because ex- felt she was drinking too much, states that he projected his drinking onto her, did this to appease him. And then AA for several years. Was sober between [id number], started drinking 6 months after her divorce. She has had shakes, no withdrawal. States she was drinking socially prior to 3/2019.  CD Eval done recommend she attend 6 individual psychotherapy sessions, attend 2 weekly support group meetings    Readmitted 5/2020 with jaundice and abdominal distention after drinking related to her son's birthday. Na was 118, down from 140 1/2020. Thought to have alcoholic hepatitis - TBR 10.4 (3 1/2020), , ALT 38, Alk Phos 102, INR 1.9. Patient reported she had been sober except for two mikes hard lemonades prior to admission. Started on steroids. Diuretics held (no ascites) and Na improved. Diuretics restarted at lower dose as an outpatient (lasix 20, nik 25).     BR increased again 8/2020 to 11.5, up from 3.8 6/2020. US with Doppler showed cirrhosis. She denied alcohol use. She was readmitted 9/2020 with SOB, found to have a large pleural effusion - tapped felt to be HH. MELD 26. CT showed an indeterminate liver lesion - 15 mm. Diuretics held for KWABENA, then resumed.     Had MRI 1/19/21 to follow up LR3 lesion - showed 1.4 cm LR 2 lesion. Also showed ascites and pleural effusion    She has a long history of hematuria - history of recurrent kidney stones and medullary sponge kidney. When she last saw Nephrology they noted she did not have evidence of medullary sponge kidney on IVP in 2001. Currently having some flank pain, denies s/s of UTI.     Interval Events:   - " Recent admission for sepsis - 101, Blood cx growing E. Coli and staph hominis. Source thought to be urinary - urine culture returned as urinary source. She had a CT scan to evaluate for a source - did not show ascites (showed abdominal anasarca) but showed a loculated simple fluid collection in her pelvis around her right ovary, this was not previously seen on imaging. Pelvic US showed complex lesion around her presumed right ovary, recommended she follow up with gyn/onc. CT showed bilateral non obstructive kidney stones. Had KWABENA this admission that improved with IVF.   - Weight is around 265 lbs - gained weight with fluid during her admission, but losing some of the fluid. Currently taking lasix 40/spironolactone 50 mg daily. Feels puffy in the feet. Was around 256 lbs at last RV in clinic  - she feels better than when she was last seen in clinic - she has more endurance. Walks 181 feet three times twice a day with PT. She really enjoyed alcohol treatment, feels like she can handle stress better now. She     ROS: 14 point ROS negative except for positives noted in HPI.    PMHx:  Past Medical History:   Diagnosis Date     Alcoholic cirrhosis (H)      Arthritis      Congestive heart failure (H)     Cervical CA      Coronary artery disease     Heart Murmer     Depressive disorder      Hyponatremia        PSHx:  Past Surgical History:   Procedure Laterality Date     ABDOMEN SURGERY       CHOLECYSTECTOMY  2001    patient reported     CV CORONARY ANGIOGRAM N/A 2/5/2021    Procedure: CV CORONARY ANGIOGRAM;  Surgeon: Deyvi Parham MD;  Location: [facility]     CV RIGHT HEART CATH MEASUREMENTS RECORDED N/A 2/5/2021    Procedure: CV RIGHT HEART CATH;  Surgeon: Deyvi Parham MD;  Location: [facility]     GI SURGERY       GYN SURGERY       HYSTERECTOMY  2009    including cervix     LITHOTRIPSY      multiple times, patient reported       FamHx:  Family History   Problem Relation Age of Onset      Substance Abuse Brother      Depression Sister      Anxiety Disorder Sister      Substance Abuse Sister      Depression Sister      Anxiety Disorder Sister      Depression Sister      Anxiety Disorder Sister      Autism Spectrum Disorder Son    No history of liver disease    SocHx:  Social History     Socioeconomic History     Marital status:      Spouse name: Not on file     Number of children: Not on file     Years of education: Not on file     Highest education level: Not on file   Occupational History     Not on file   Tobacco Use     Smoking status: Former Smoker     Smokeless tobacco: Never Used   Substance and Sexual Activity     Alcohol use: Not Currently     Comment: 5/2020     Drug use: Never     Sexual activity: Not on file   Other Topics Concern     Parent/sibling w/ CABG, MI or angioplasty before 65F 55M? Not Asked   Social History Narrative     Not on file     Social Determinants of Health     Financial Resource Strain:      Difficulty of Paying Living Expenses:    Food Insecurity:      Worried About Running Out of Food in the Last Year:      Ran Out of Food in the Last Year:    Transportation Needs:      Lack of Transportation (Medical):      Lack of Transportation (Non-Medical):    Physical Activity:      Days of Exercise per Week:      Minutes of Exercise per Session:    Stress:      Feeling of Stress :    Social Connections:      Frequency of Communication with Friends and Family:      Frequency of Social Gatherings with Friends and Family:      Attends Mu-ism Services:      Active Member of Clubs or Organizations:      Attends Club or Organization Meetings:      Marital Status:    Intimate Partner Violence:      Fear of Current or Ex-Partner:      Emotionally Abused:      Physically Abused:      Sexually Abused:    4 sons - ex- has 17 year old (high functioning autistic). 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    Medications:  Current Outpatient Medications   Medication     buPROPion (WELLBUTRIN XL) 150 MG 24 hr tablet     busPIRone (BUSPAR) 15 MG tablet     calcium carbonate-vitamin D (OSCAL W/D) 500-200 MG-UNIT tablet     ferrous sulfate (FEROSUL) 325 (65 Fe) MG tablet     folic acid (FOLVITE) 1 MG tablet     LORazepam (ATIVAN) 0.5 MG tablet     omeprazole (PRILOSEC) 20 MG DR capsule     potassium chloride ER (KLOR-CON M) 20 MEQ CR tablet     pregabalin (LYRICA) 100 MG capsule     spironolactone (ALDACTONE) 50 MG tablet     traZODone (DESYREL) 100 MG tablet     UNABLE TO FIND     vitamin D2 (ERGOCALCIFEROL) 19746 units (1250 mcg) capsule     vitamin D3 (CHOLECALCIFEROL) 50 mcg (2000 units) tablet     furosemide (LASIX) 40 MG tablet     levofloxacin (LEVAQUIN) 750 MG tablet     No current facility-administered medications for this visit.     Ativan was just for MRI     Allergies:     Allergies   Allergen Reactions     Codeine      Diazepam      Morphine      PN: LW Reaction: Nausea     Penicillins      PN: LW Reaction: Rash, Generalized. Reportedly had a rash with this as a child and has tolerated Augmentin since per patient. Tolerated Zosyn 3/12/19 in [facility].        Objective:  There were no vitals taken for this visit.  Constitutional: pleasant woman in NAD  Eyes: non icteric  Respiratory: Normal respiratory excursion   Back: No spinal or CVA tenderness  MSK: normal range of motion of visualized extremities  Skin: no jaundice  Psychiatric: normal mood and orientation    Labs:  Last Comprehensive Metabolic Panel:  Sodium   Date Value Ref Range Status   10/12/2021 142 133 - 144 mmol/L Final   06/11/2021 142 133 - 144 mmol/L Final     Potassium   Date Value Ref Range Status   10/12/2021 3.3 (L) 3.4 - 5.3 mmol/L Final   06/11/2021 3.6 3.4 - 5.3 mmol/L Final     Chloride   Date Value Ref Range Status   10/12/2021 112 (H) 94 - 109 mmol/L Final   06/11/2021 108 94 - 109 mmol/L Final     Carbon Dioxide    Date Value Ref Range Status   06/11/2021 23 20 - 32 mmol/L Final     Carbon Dioxide (CO2)   Date Value Ref Range Status   10/12/2021 22 20 - 32 mmol/L Final     Anion Gap   Date Value Ref Range Status   10/12/2021 8 3 - 14 mmol/L Final   06/11/2021 11 3 - 14 mmol/L Final     Glucose   Date Value Ref Range Status   10/12/2021 78 70 - 99 mg/dL Final   06/11/2021 87 70 - 99 mg/dL Final     Urea Nitrogen   Date Value Ref Range Status   10/12/2021 16 7 - 30 mg/dL Final   06/11/2021 12 7 - 30 mg/dL Final     Creatinine   Date Value Ref Range Status   10/12/2021 1.14 (H) 0.52 - 1.04 mg/dL Final   06/11/2021 0.95 0.52 - 1.04 mg/dL Final     GFR Estimate   Date Value Ref Range Status   10/12/2021 55 (L) >60 mL/min/1.73m2 Final     Comment:     As of July 11, 2021, eGFR is calculated by the CKD-EPI creatinine equation, without race adjustment. eGFR can be influenced by muscle mass, exercise, and diet. The reported eGFR is an estimation only and is only applicable if the renal function is stable.   06/11/2021 68 >60 mL/min/[1.73_m2] Final     Comment:     Non  GFR Calc  Starting 12/18/2018, serum creatinine based estimated GFR (eGFR) will be   calculated using the Chronic Kidney Disease Epidemiology Collaboration   (CKD-EPI) equation.       Calcium   Date Value Ref Range Status   10/12/2021 9.4 8.5 - 10.1 mg/dL Final   06/11/2021 8.7 8.5 - 10.1 mg/dL Final     Bilirubin Total   Date Value Ref Range Status   10/12/2021 4.9 (H) 0.2 - 1.3 mg/dL Final   06/11/2021 6.6 (H) 0.2 - 1.3 mg/dL Final     Alkaline Phosphatase   Date Value Ref Range Status   10/12/2021 49 40 - 150 U/L Final   06/11/2021 208 (H) 40 - 150 U/L Final     ALT   Date Value Ref Range Status   10/12/2021 17 0 - 50 U/L Final   06/11/2021 32 0 - 50 U/L Final     AST   Date Value Ref Range Status   10/12/2021 34 0 - 45 U/L Final   06/11/2021 54 (H) 0 - 45 U/L Final     Lab Results   Component Value Date    WBC 8.9 03/01/2021     Lab Results    Component Value Date    RBC 3.20 03/01/2021     Lab Results   Component Value Date    HGB 11.2 03/01/2021     Lab Results   Component Value Date    HCT 33.6 03/01/2021     No components found for: MCT  Lab Results   Component Value Date     03/01/2021     Lab Results   Component Value Date    MCH 35.0 03/01/2021     Lab Results   Component Value Date    MCHC 33.3 03/01/2021     Lab Results   Component Value Date    RDW 16.3 03/01/2021     Lab Results   Component Value Date     03/01/2021     MELD-Na score: 23 at 10/12/2021  6:12 AM  MELD score: 23 at 10/12/2021  6:12 AM  Calculated from:  Serum Creatinine: 1.14 mg/dL at 10/12/2021  6:12 AM  Serum Sodium: 142 mmol/L (Using max of 137 mmol/L) at 10/12/2021  6:12 AM  Total Bilirubin: 4.9 mg/dL at 10/12/2021  6:12 AM  INR(ratio): 2.37 at 10/12/2021  6:12 AM  Age: 55 years    Imaging:    MRI Liver 5/2021    IMPRESSION:    1. Cirrhosis and evidence of portal hypertension including  splenomegaly, ascites, and portosystemic collateral vessels.  2. No suspicious arterial enhancing focal liver lesion.   3. Unchanged 1.4 cm nonenhancing T2 hyperintense observation in  segment 8 since 9/3/2020. LI-RADS 2.  4. Based on this exam only, the patient is within Bebo criteria.    Endoscopy:    EGD 1/2020 showed normal esophagus, small HH, mil congestive hepatopathy    Assessment/Plan: Ms. 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 Liver disease complicated by fluid overload - ascites and HH. She has a LR 3 lesion seen on MRI 9/2020 - follow up MRI 1/2021 and 5/2021 showing LR2 lesion.     Liver evaluation was closed in the past due to positive Peth. She has since been sober and has engaged in alcohol counseling.     Re opening transplant evaluation - ongoing issues include - pelvic cyst/mass, need for screening colonoscopy, hematuria with kidney stones/recurrent UTIs. CT also found 1 cm pulmonary  nodule.     She is overweight - higher than her last clinic visit. Weight is distributed in her lower abdomen, so not felt to be a barrier to transplant, but will need a reassessment by surgery.     - Continue complete sobriety from alcohol  - Continue diuretics at current dose   - HCC screening: CT 10/2021 without masses, ordered AFP  - She will schedule follow up with Gyn/Onc for pelvic fluid/cyst  - Ordering screening colonoscopy  - Referral to Urology for hematuria, kidney stones, recurrent UTIs  - Repeat CT Chest 3 months for 1 cm subpleural pulmonary nodule  - Will re-evaluate her in person with surgery once above issues addressed     Razia Bundy MD MSc  Transplant Hepatologist  [facility]        Again, thank you for allowing me to participate in the care of your patient.        Sincerely,        Razia Bundy MD

## 2021-11-03 NOTE — LETTER
"11/3/2021      RE: Viviana Schaefer  [address]  [facility] [address]       Viviana is a 55 year old who is being evaluated via a billable video visit.      How would you like to obtain your AVS? MyChart  If the video visit is dropped, the invitation should be resent by: Text to cell phone: [phone]  Will anyone else be joining your video visit? No      Video Start Time: 3:40 PM  Video-Visit Details    Type of service:  Video Visit    Video End Time:4:05    Originating Location (pt. Location): Home    Distant Location (provider location):  [facility] [place]     Platform used for Video Visit: ComCam     [facility] clinic    Date of Visit: 11/5/2021    Reason for referral: Liver Transplant Evaluation    Subjective: Ms. 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 CT showed cirrhosis without a mass, ascites and a pleural effusion. No prior history of liver disease - states she had an US fall 2018, was not told what it showed, but later was told she had \"fatty liver\". Denies being told LFTs elevated in the past. Underwent thoracentesis and paracentesis that were negative for SBP. She apparently had normal A1AT, AMA, AMA, Ferritin. Ceruloplasmin was low at 14, 24 hour urine copper < 1.0 micrograms/L. No KF rings seen with ophthalmology. She was started on diuretics, fluid resolved with lasix 40/nik 50. She tells me that she was diagnosed with PORTER. After this hospitalization, was sober for until Fall 2019 - around that time she had a few drinks and stopped. States she drank again 5/2020 - had a couple of drinks celebrating son's Bday.     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 Last drink. No DUIs. Did outpatient treatment (2003) because ex- felt she was drinking too much, states that he projected his drinking onto her, did this to appease him. And then AA for several years. Was sober between 1715-1542, started drinking 6 months after her divorce. She has had shakes, no withdrawal. States she was drinking socially prior to 3/2019.  CD Eval done recommend she attend 6 individual psychotherapy sessions, attend 2 weekly support group meetings    Readmitted 5/2020 with jaundice and abdominal distention after drinking related to her son's birthday. Na was 118, down from 140 1/2020. Thought to have alcoholic hepatitis - TBR 10.4 (3 1/2020), , ALT 38, Alk Phos 102, INR 1.9. Patient reported she had been sober except for two mikes hard lemonades prior to admission. Started on steroids. Diuretics held (no ascites) and Na improved. Diuretics restarted at lower dose as an outpatient (lasix 20, nik 25).     BR increased again 8/2020 to 11.5, up from 3.8 6/2020. US with Doppler showed cirrhosis. She denied alcohol use. She was readmitted 9/2020 with SOB, found to have a large pleural effusion - tapped felt to be HH. MELD 26. CT showed an indeterminate liver lesion - 15 mm. Diuretics held for KWABENA, then resumed.     Had MRI 1/19/21 to follow up LR3 lesion - showed 1.4 cm LR 2 lesion. Also showed ascites and pleural effusion    She has a long history of hematuria - history of recurrent kidney stones and medullary sponge kidney. When she last saw Nephrology they noted she did not have evidence of medullary sponge kidney on IVP in 2001. Currently having some flank pain, denies s/s of UTI.     Interval Events:   - Recent admission for sepsis - 101, Blood cx growing E. Coli and staph hominis. Source thought to be urinary - urine culture returned as urinary source. She had a CT scan to evaluate for a " source - did not show ascites (showed abdominal anasarca) but showed a loculated simple fluid collection in her pelvis around her right ovary, this was not previously seen on imaging. Pelvic US showed complex lesion around her presumed right ovary, recommended she follow up with gyn/onc. CT showed bilateral non obstructive kidney stones. Had KWABENA this admission that improved with IVF.   - Weight is around 265 lbs - gained weight with fluid during her admission, but losing some of the fluid. Currently taking lasix 40/spironolactone 50 mg daily. Feels puffy in the feet. Was around 256 lbs at last RV in clinic  - she feels better than when she was last seen in clinic - she has more endurance. Walks 181 feet three times twice a day with PT. She really enjoyed alcohol treatment, feels like she can handle stress better now. She     ROS: 14 point ROS negative except for positives noted in HPI.    PMHx:  Past Medical History:   Diagnosis Date     Alcoholic cirrhosis (H)      Arthritis      Congestive heart failure (H)     Cervical CA      Coronary artery disease     Heart Murmer     Depressive disorder      Hyponatremia        PSHx:  Past Surgical History:   Procedure Laterality Date     ABDOMEN SURGERY       CHOLECYSTECTOMY  2001    patient reported     CV CORONARY ANGIOGRAM N/A 2/5/2021    Procedure: CV CORONARY ANGIOGRAM;  Surgeon: Deyvi Parham MD;  Location: [facility]     CV RIGHT HEART CATH MEASUREMENTS RECORDED N/A 2/5/2021    Procedure: CV RIGHT HEART CATH;  Surgeon: Deyvi Parham MD;  Location: [facility]     GI SURGERY       GYN SURGERY       HYSTERECTOMY  2009    including cervix     LITHOTRIPSY      multiple times, patient reported       FamHx:  Family History   Problem Relation Age of Onset     Substance Abuse Brother      Depression Sister      Anxiety Disorder Sister      Substance Abuse Sister      Depression Sister      Anxiety Disorder Sister      Depression Sister       Anxiety Disorder Sister      Autism Spectrum Disorder Son    No history of liver disease    SocHx:  Social History     Socioeconomic History     Marital status:      Spouse name: Not on file     Number of children: Not on file     Years of education: Not on file     Highest education level: Not on file   Occupational History     Not on file   Tobacco Use     Smoking status: Former Smoker     Smokeless tobacco: Never Used   Substance and Sexual Activity     Alcohol use: Not Currently     Comment: 5/2020     Drug use: Never     Sexual activity: Not on file   Other Topics Concern     Parent/sibling w/ CABG, MI or angioplasty before 65F 55M? Not Asked   Social History Narrative     Not on file     Social Determinants of Health     Financial Resource Strain:      Difficulty of Paying Living Expenses:    Food Insecurity:      Worried About Running Out of Food in the Last Year:      Ran Out of Food in the Last Year:    Transportation Needs:      Lack of Transportation (Medical):      Lack of Transportation (Non-Medical):    Physical Activity:      Days of Exercise per Week:      Minutes of Exercise per Session:    Stress:      Feeling of Stress :    Social Connections:      Frequency of Communication with Friends and Family:      Frequency of Social Gatherings with Friends and Family:      Attends Druze Services:      Active Member of Clubs or Organizations:      Attends Club or Organization Meetings:      Marital Status:    Intimate Partner Violence:      Fear of Current or Ex-Partner:      Emotionally Abused:      Physically Abused:      Sexually Abused:    4 sons - ex- has 17 year old (high functioning autistic). 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    Medications:  Current Outpatient Medications   Medication     buPROPion (WELLBUTRIN XL) 150 MG 24 hr tablet     busPIRone (BUSPAR) 15 MG tablet     calcium carbonate-vitamin D (OSCAL W/D) 500-200 MG-UNIT  tablet     ferrous sulfate (FEROSUL) 325 (65 Fe) MG tablet     folic acid (FOLVITE) 1 MG tablet     LORazepam (ATIVAN) 0.5 MG tablet     omeprazole (PRILOSEC) 20 MG DR capsule     potassium chloride ER (KLOR-CON M) 20 MEQ CR tablet     pregabalin (LYRICA) 100 MG capsule     spironolactone (ALDACTONE) 50 MG tablet     traZODone (DESYREL) 100 MG tablet     UNABLE TO FIND     vitamin D2 (ERGOCALCIFEROL) 51484 units (1250 mcg) capsule     vitamin D3 (CHOLECALCIFEROL) 50 mcg (2000 units) tablet     furosemide (LASIX) 40 MG tablet     levofloxacin (LEVAQUIN) 750 MG tablet     No current facility-administered medications for this visit.     Ativan was just for MRI     Allergies:     Allergies   Allergen Reactions     Codeine      Diazepam      Morphine      PN: LW Reaction: Nausea     Penicillins      PN: LW Reaction: Rash, Generalized. Reportedly had a rash with this as a child and has tolerated Augmentin since per patient. Tolerated Zosyn 3/12/19 in [facility].        Objective:  There were no vitals taken for this visit.  Constitutional: pleasant woman in NAD  Eyes: non icteric  Respiratory: Normal respiratory excursion   Back: No spinal or CVA tenderness  MSK: normal range of motion of visualized extremities  Skin: no jaundice  Psychiatric: normal mood and orientation    Labs:  Last Comprehensive Metabolic Panel:  Sodium   Date Value Ref Range Status   10/12/2021 142 133 - 144 mmol/L Final   06/11/2021 142 133 - 144 mmol/L Final     Potassium   Date Value Ref Range Status   10/12/2021 3.3 (L) 3.4 - 5.3 mmol/L Final   06/11/2021 3.6 3.4 - 5.3 mmol/L Final     Chloride   Date Value Ref Range Status   10/12/2021 112 (H) 94 - 109 mmol/L Final   06/11/2021 108 94 - 109 mmol/L Final     Carbon Dioxide   Date Value Ref Range Status   06/11/2021 23 20 - 32 mmol/L Final     Carbon Dioxide (CO2)   Date Value Ref Range Status   10/12/2021 22 20 - 32 mmol/L Final     Anion Gap   Date Value Ref Range Status   10/12/2021  8 3 - 14 mmol/L Final   06/11/2021 11 3 - 14 mmol/L Final     Glucose   Date Value Ref Range Status   10/12/2021 78 70 - 99 mg/dL Final   06/11/2021 87 70 - 99 mg/dL Final     Urea Nitrogen   Date Value Ref Range Status   10/12/2021 16 7 - 30 mg/dL Final   06/11/2021 12 7 - 30 mg/dL Final     Creatinine   Date Value Ref Range Status   10/12/2021 1.14 (H) 0.52 - 1.04 mg/dL Final   06/11/2021 0.95 0.52 - 1.04 mg/dL Final     GFR Estimate   Date Value Ref Range Status   10/12/2021 55 (L) >60 mL/min/1.73m2 Final     Comment:     As of July 11, 2021, eGFR is calculated by the CKD-EPI creatinine equation, without race adjustment. eGFR can be influenced by muscle mass, exercise, and diet. The reported eGFR is an estimation only and is only applicable if the renal function is stable.   06/11/2021 68 >60 mL/min/[1.73_m2] Final     Comment:     Non  GFR Calc  Starting 12/18/2018, serum creatinine based estimated GFR (eGFR) will be   calculated using the Chronic Kidney Disease Epidemiology Collaboration   (CKD-EPI) equation.       Calcium   Date Value Ref Range Status   10/12/2021 9.4 8.5 - 10.1 mg/dL Final   06/11/2021 8.7 8.5 - 10.1 mg/dL Final     Bilirubin Total   Date Value Ref Range Status   10/12/2021 4.9 (H) 0.2 - 1.3 mg/dL Final   06/11/2021 6.6 (H) 0.2 - 1.3 mg/dL Final     Alkaline Phosphatase   Date Value Ref Range Status   10/12/2021 49 40 - 150 U/L Final   06/11/2021 208 (H) 40 - 150 U/L Final     ALT   Date Value Ref Range Status   10/12/2021 17 0 - 50 U/L Final   06/11/2021 32 0 - 50 U/L Final     AST   Date Value Ref Range Status   10/12/2021 34 0 - 45 U/L Final   06/11/2021 54 (H) 0 - 45 U/L Final     Lab Results   Component Value Date    WBC 8.9 03/01/2021     Lab Results   Component Value Date    RBC 3.20 03/01/2021     Lab Results   Component Value Date    HGB 11.2 03/01/2021     Lab Results   Component Value Date    HCT 33.6 03/01/2021     No components found for: MCT  Lab Results    Component Value Date     03/01/2021     Lab Results   Component Value Date    MCH 35.0 03/01/2021     Lab Results   Component Value Date    MCHC 33.3 03/01/2021     Lab Results   Component Value Date    RDW 16.3 03/01/2021     Lab Results   Component Value Date     03/01/2021     MELD-Na score: 23 at 10/12/2021  6:12 AM  MELD score: 23 at 10/12/2021  6:12 AM  Calculated from:  Serum Creatinine: 1.14 mg/dL at 10/12/2021  6:12 AM  Serum Sodium: 142 mmol/L (Using max of 137 mmol/L) at 10/12/2021  6:12 AM  Total Bilirubin: 4.9 mg/dL at 10/12/2021  6:12 AM  INR(ratio): 2.37 at 10/12/2021  6:12 AM  Age: 55 years    Imaging:    MRI Liver 5/2021    IMPRESSION:    1. Cirrhosis and evidence of portal hypertension including  splenomegaly, ascites, and portosystemic collateral vessels.  2. No suspicious arterial enhancing focal liver lesion.   3. Unchanged 1.4 cm nonenhancing T2 hyperintense observation in  segment 8 since 9/3/2020. LI-RADS 2.  4. Based on this exam only, the patient is within Bebo criteria.    Endoscopy:    EGD 1/2020 showed normal esophagus, small HH, mil congestive hepatopathy    Assessment/Plan: Ms. 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 Liver disease complicated by fluid overload - ascites and HH. She has a LR 3 lesion seen on MRI 9/2020 - follow up MRI 1/2021 and 5/2021 showing LR2 lesion.     Liver evaluation was closed in the past due to positive Peth. She has since been sober and has engaged in alcohol counseling.     Re opening transplant evaluation - ongoing issues include - pelvic cyst/mass, need for screening colonoscopy, hematuria with kidney stones/recurrent UTIs. CT also found 1 cm pulmonary nodule.     She is overweight - higher than her last clinic visit. Weight is distributed in her lower abdomen, so not felt to be a barrier to transplant, but will need a reassessment by surgery.     - Continue complete  sobriety from alcohol  - Continue diuretics at current dose   - HCC screening: CT 10/2021 without masses, ordered AFP  - She will schedule follow up with Gyn/Onc for pelvic fluid/cyst  - Ordering screening colonoscopy  - Referral to Urology for hematuria, kidney stones, recurrent UTIs  - Repeat CT Chest 3 months for 1 cm subpleural pulmonary nodule  - Will re-evaluate her in person with surgery once above issues addressed     Raiza Bundy MD MSc  Transplant Hepatologist  [facility]        Razia Bundy MD

## 2021-11-03 NOTE — TELEPHONE ENCOUNTER
[facility] Call Center    Phone Message    May a detailed message be left on voicemail: yes     Reason for Call: Appointment Intake    Referring Provider Name: Razia Bundy MD   Diagnosis and/or Symptoms: Hematuria, unspecified type Other comments: chronic hematuria, kidney stones, undergoing transplatn evaluation    Action Taken: Message routed to:  Adult Clinics: Urology p [id number]    Travel Screening: Not Applicable

## 2021-11-03 NOTE — TELEPHONE ENCOUNTER
Transplant Social Work Services Phone Call      Data: I have been following patient since 12/15/20.  Intervention: I called Viviana for psychosocial follow up.  Assessment: Viviana completed outpatient chemical dependency treatment on 10/6/21 at [facility].  She completed 107 hours of treatment.  Viviana reports she really benefited from treatment.  She had a positive PEth test on 3/1/21 and subsequent PEth tests in May and June have been negative.  Viviana is engaged in virtual AA meetings, and she is leaning on her sober supports (sister in [place], sister in [place], friend).  I would support Viviana resuming a liver transplant evaluation.  Education provided by SW: relapse prevention, health care directive, virtual liver transplant support group  Plan: I will remain available for the psychosocial needs of this patient.      YOVANY Olivares, [facility]  Liver Transplant   Phone [phone]  Pager [phone]

## 2021-11-03 NOTE — PROGRESS NOTES
"Viviana is a 55 year old who is being evaluated via a billable video visit.      How would you like to obtain your AVS? MyChart  If the video visit is dropped, the invitation should be resent by: Text to cell phone: [phone]  Will anyone else be joining your video visit? No      Video Start Time: 3:40 PM  Video-Visit Details    Type of service:  Video Visit    Video End Time:4:05    Originating Location (pt. Location): Home    Distant Location (provider location):  [facility] [place]     Platform used for Video Visit: Makeblock     [facility] Hepatology clinic    Date of Visit: 11/5/2021    Reason for referral: Liver Transplant Evaluation    Subjective: Ms. 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 CT showed cirrhosis without a mass, ascites and a pleural effusion. No prior history of liver disease - states she had an US fall 2018, was not told what it showed, but later was told she had \"fatty liver\". Denies being told LFTs elevated in the past. Underwent thoracentesis and paracentesis that were negative for SBP. She apparently had normal A1AT, AMA, AMA, Ferritin. Ceruloplasmin was low at 14, 24 hour urine copper < 1.0 micrograms/L. No KF rings seen with ophthalmology. She was started on diuretics, fluid resolved with lasix 40/nik 50. She tells me that she was diagnosed with PORTER. After this hospitalization, was sober for until Fall 2019 - around that time she had a few drinks and stopped. States she drank again 5/2020 - had a couple of drinks celebrating son's Bday.     States she abused alcohol after each divorce (1998, 2012 - drinking \"a lot\"), and then would drink regularly after that - ranging from nothing to a few " drinks a week. Last drink. No DUIs. Did outpatient treatment (2003) because ex- felt she was drinking too much, states that he projected his drinking onto her, did this to appease him. And then AA for several years. Was sober between 9706-0103, started drinking 6 months after her divorce. She has had shakes, no withdrawal. States she was drinking socially prior to 3/2019.  CD Eval done recommend she attend 6 individual psychotherapy sessions, attend 2 weekly support group meetings    Readmitted 5/2020 with jaundice and abdominal distention after drinking related to her son's birthday. Na was 118, down from 140 1/2020. Thought to have alcoholic hepatitis - TBR 10.4 (3 1/2020), , ALT 38, Alk Phos 102, INR 1.9. Patient reported she had been sober except for two mikes hard lemonades prior to admission. Started on steroids. Diuretics held (no ascites) and Na improved. Diuretics restarted at lower dose as an outpatient (lasix 20, nik 25).     BR increased again 8/2020 to 11.5, up from 3.8 6/2020. US with Doppler showed cirrhosis. She denied alcohol use. She was readmitted 9/2020 with SOB, found to have a large pleural effusion - tapped felt to be HH. MELD 26. CT showed an indeterminate liver lesion - 15 mm. Diuretics held for KWABENA, then resumed.     Had MRI 1/19/21 to follow up LR3 lesion - showed 1.4 cm LR 2 lesion. Also showed ascites and pleural effusion    She has a long history of hematuria - history of recurrent kidney stones and medullary sponge kidney. When she last saw Nephrology they noted she did not have evidence of medullary sponge kidney on IVP in 2001. Currently having some flank pain, denies s/s of UTI.     Interval Events:   - Recent admission for sepsis - 101, Blood cx growing E. Coli and staph hominis. Source thought to be urinary - urine culture returned as urinary source. She had a CT scan to evaluate for a source - did not show ascites (showed abdominal anasarca) but showed a loculated  simple fluid collection in her pelvis around her right ovary, this was not previously seen on imaging. Pelvic US showed complex lesion around her presumed right ovary, recommended she follow up with gyn/onc. CT showed bilateral non obstructive kidney stones. Had KWABENA this admission that improved with IVF.   - Weight is around 265 lbs - gained weight with fluid during her admission, but losing some of the fluid. Currently taking lasix 40/spironolactone 50 mg daily. Feels puffy in the feet. Was around 256 lbs at last RV in clinic  - she feels better than when she was last seen in clinic - she has more endurance. Walks 181 feet three times twice a day with PT. She really enjoyed alcohol treatment, feels like she can handle stress better now. She     ROS: 14 point ROS negative except for positives noted in HPI.    PMHx:  Past Medical History:   Diagnosis Date     Alcoholic cirrhosis (H)      Arthritis      Congestive heart failure (H)     Cervical CA      Coronary artery disease     Heart Murmer     Depressive disorder      Hyponatremia        PSHx:  Past Surgical History:   Procedure Laterality Date     ABDOMEN SURGERY       CHOLECYSTECTOMY  2001    patient reported     CV CORONARY ANGIOGRAM N/A 2/5/2021    Procedure: CV CORONARY ANGIOGRAM;  Surgeon: Deyvi Parham MD;  [facility]     CV RIGHT HEART CATH MEASUREMENTS RECORDED N/A 2/5/2021    Procedure: CV RIGHT HEART CATH;  Surgeon: Deyvi Parham MD;  [facility]     GI SURGERY       GYN SURGERY       HYSTERECTOMY  2009    including cervix     LITHOTRIPSY      multiple times, patient reported       FamHx:  Family History   Problem Relation Age of Onset     Substance Abuse Brother      Depression Sister      Anxiety Disorder Sister      Substance Abuse Sister      Depression Sister      Anxiety Disorder Sister      Depression Sister      Anxiety Disorder Sister      Autism Spectrum Disorder Son    No history of liver  disease    SocHx:  Social History     Socioeconomic History     Marital status:      Spouse name: Not on file     Number of children: Not on file     Years of education: Not on file     Highest education level: Not on file   Occupational History     Not on file   Tobacco Use     Smoking status: Former Smoker     Smokeless tobacco: Never Used   Substance and Sexual Activity     Alcohol use: Not Currently     Comment: 5/2020     Drug use: Never     Sexual activity: Not on file   Other Topics Concern     Parent/sibling w/ CABG, MI or angioplasty before 65F 55M? Not Asked   Social History Narrative     Not on file     Social Determinants of Health     Financial Resource Strain:      Difficulty of Paying Living Expenses:    Food Insecurity:      Worried About Running Out of Food in the Last Year:      Ran Out of Food in the Last Year:    Transportation Needs:      Lack of Transportation (Medical):      Lack of Transportation (Non-Medical):    Physical Activity:      Days of Exercise per Week:      Minutes of Exercise per Session:    Stress:      Feeling of Stress :    Social Connections:      Frequency of Communication with Friends and Family:      Frequency of Social Gatherings with Friends and Family:      Attends [employer] Services:      Active Member of Clubs or Organizations:      Attends Club or Organization Meetings:      Marital Status:    Intimate Partner Violence:      Fear of Current or Ex-Partner:      Emotionally Abused:      Physically Abused:      Sexually Abused:    4 sons - ex- has 17 year old (high functioning autistic). 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    Medications:  Current Outpatient Medications   Medication     buPROPion (WELLBUTRIN XL) 150 MG 24 hr tablet     busPIRone (BUSPAR) 15 MG tablet     calcium carbonate-vitamin D (OSCAL W/D) 500-200 MG-UNIT tablet     ferrous sulfate (FEROSUL) 325 (65 Fe) MG tablet     folic acid  (FOLVITE) 1 MG tablet     LORazepam (ATIVAN) 0.5 MG tablet     omeprazole (PRILOSEC) 20 MG DR capsule     potassium chloride ER (KLOR-CON M) 20 MEQ CR tablet     pregabalin (LYRICA) 100 MG capsule     spironolactone (ALDACTONE) 50 MG tablet     traZODone (DESYREL) 100 MG tablet     UNABLE TO FIND     vitamin D2 (ERGOCALCIFEROL) 68308 units (1250 mcg) capsule     vitamin D3 (CHOLECALCIFEROL) 50 mcg (2000 units) tablet     furosemide (LASIX) 40 MG tablet     levofloxacin (LEVAQUIN) 750 MG tablet     No current facility-administered medications for this visit.     Ativan was just for MRI     Allergies:     Allergies   Allergen Reactions     Codeine      Diazepam      Morphine      PN: LW Reaction: Nausea     Penicillins      PN: LW Reaction: Rash, Generalized. Reportedly had a rash with this as a child and has tolerated Augmentin since per patient. Tolerated Zosyn 3/12/19 in [facility].        Objective:  There were no vitals taken for this visit.  Constitutional: pleasant woman in NAD  Eyes: non icteric  Respiratory: Normal respiratory excursion   Back: No spinal or CVA tenderness  MSK: normal range of motion of visualized extremities  Skin: no jaundice  Psychiatric: normal mood and orientation    Labs:  Last Comprehensive Metabolic Panel:  Sodium   Date Value Ref Range Status   10/12/2021 142 133 - 144 mmol/L Final   06/11/2021 142 133 - 144 mmol/L Final     Potassium   Date Value Ref Range Status   10/12/2021 3.3 (L) 3.4 - 5.3 mmol/L Final   06/11/2021 3.6 3.4 - 5.3 mmol/L Final     Chloride   Date Value Ref Range Status   10/12/2021 112 (H) 94 - 109 mmol/L Final   06/11/2021 108 94 - 109 mmol/L Final     Carbon Dioxide   Date Value Ref Range Status   06/11/2021 23 20 - 32 mmol/L Final     Carbon Dioxide (CO2)   Date Value Ref Range Status   10/12/2021 22 20 - 32 mmol/L Final     Anion Gap   Date Value Ref Range Status   10/12/2021 8 3 - 14 mmol/L Final   06/11/2021 11 3 - 14 mmol/L Final     Glucose    Date Value Ref Range Status   10/12/2021 78 70 - 99 mg/dL Final   06/11/2021 87 70 - 99 mg/dL Final     Urea Nitrogen   Date Value Ref Range Status   10/12/2021 16 7 - 30 mg/dL Final   06/11/2021 12 7 - 30 mg/dL Final     Creatinine   Date Value Ref Range Status   10/12/2021 1.14 (H) 0.52 - 1.04 mg/dL Final   06/11/2021 0.95 0.52 - 1.04 mg/dL Final     GFR Estimate   Date Value Ref Range Status   10/12/2021 55 (L) >60 mL/min/1.73m2 Final     Comment:     As of July 11, 2021, eGFR is calculated by the CKD-EPI creatinine equation, without race adjustment. eGFR can be influenced by muscle mass, exercise, and diet. The reported eGFR is an estimation only and is only applicable if the renal function is stable.   06/11/2021 68 >60 mL/min/[1.73_m2] Final     Comment:     Non  GFR Calc  Starting 12/18/2018, serum creatinine based estimated GFR (eGFR) will be   calculated using the Chronic Kidney Disease Epidemiology Collaboration   (CKD-EPI) equation.       Calcium   Date Value Ref Range Status   10/12/2021 9.4 8.5 - 10.1 mg/dL Final   06/11/2021 8.7 8.5 - 10.1 mg/dL Final     Bilirubin Total   Date Value Ref Range Status   10/12/2021 4.9 (H) 0.2 - 1.3 mg/dL Final   06/11/2021 6.6 (H) 0.2 - 1.3 mg/dL Final     Alkaline Phosphatase   Date Value Ref Range Status   10/12/2021 49 40 - 150 U/L Final   06/11/2021 208 (H) 40 - 150 U/L Final     ALT   Date Value Ref Range Status   10/12/2021 17 0 - 50 U/L Final   06/11/2021 32 0 - 50 U/L Final     AST   Date Value Ref Range Status   10/12/2021 34 0 - 45 U/L Final   06/11/2021 54 (H) 0 - 45 U/L Final     Lab Results   Component Value Date    WBC 8.9 03/01/2021     Lab Results   Component Value Date    RBC 3.20 03/01/2021     Lab Results   Component Value Date    HGB 11.2 03/01/2021     Lab Results   Component Value Date    HCT 33.6 03/01/2021     No components found for: MCT  Lab Results   Component Value Date     03/01/2021     Lab Results   Component Value  Date    MCH 35.0 03/01/2021     Lab Results   Component Value Date    MCHC 33.3 03/01/2021     Lab Results   Component Value Date    RDW 16.3 03/01/2021     Lab Results   Component Value Date     03/01/2021     MELD-Na score: 23 at 10/12/2021  6:12 AM  MELD score: 23 at 10/12/2021  6:12 AM  Calculated from:  Serum Creatinine: 1.14 mg/dL at 10/12/2021  6:12 AM  Serum Sodium: 142 mmol/L (Using max of 137 mmol/L) at 10/12/2021  6:12 AM  Total Bilirubin: 4.9 mg/dL at 10/12/2021  6:12 AM  INR(ratio): 2.37 at 10/12/2021  6:12 AM  Age: 55 years    Imaging:    MRI Liver 5/2021    IMPRESSION:    1. Cirrhosis and evidence of portal hypertension including  splenomegaly, ascites, and portosystemic collateral vessels.  2. No suspicious arterial enhancing focal liver lesion.   3. Unchanged 1.4 cm nonenhancing T2 hyperintense observation in  segment 8 since 9/3/2020. LI-RADS 2.  4. Based on this exam only, the patient is within Beyer criteria.    Endoscopy:    EGD 1/2020 showed normal esophagus, small HH, mil congestive hepatopathy    Assessment/Plan: Ms. 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 Liver disease complicated by fluid overload - ascites and HH. She has a LR 3 lesion seen on MRI 9/2020 - follow up MRI 1/2021 and 5/2021 showing LR2 lesion.     Liver evaluation was closed in the past due to positive Peth. She has since been sober and has engaged in alcohol counseling.     Re opening transplant evaluation - ongoing issues include - pelvic cyst/mass, need for screening colonoscopy, hematuria with kidney stones/recurrent UTIs. CT also found 1 cm pulmonary nodule.     She is overweight - higher than her last clinic visit. Weight is distributed in her lower abdomen, so not felt to be a barrier to transplant, but will need a reassessment by surgery.     - Continue complete sobriety from alcohol  - Continue diuretics at current dose   - HCC screening: CT  10/2021 without masses, ordered AFP  - She will schedule follow up with Gyn/Onc for pelvic fluid/cyst  - Ordering screening colonoscopy  - Referral to Urology for hematuria, kidney stones, recurrent UTIs  - Repeat CT Chest 3 months for 1 cm subpleural pulmonary nodule  - Will re-evaluate her in person with surgery once above issues addressed     Razia Bundy MD MSc  Transplant Hepatologist  [facility]

## 2021-11-04 ENCOUNTER — TELEPHONE (OUTPATIENT)
Dept: GASTROENTEROLOGY | Facility: CLINIC | Age: 56
End: 2021-11-04

## 2021-11-04 NOTE — TELEPHONE ENCOUNTER
Screening Questions  1. Are you active on mychart? YES    2. What insurance is in the chart? [employer]    2.  Ordering/Referring Provider: Razia Bundy MD    3. BMI 55.4    4. Do you have any Lung issues?  N If yes continue:   Do you use daily home oxygen? N   Do you have Pulmonary Hypertension? N   Do you have SEVERE asthma? N    5. Have you had a heart, lung, or liver transplant? N    6. Are you currently on dialysis or have chronic kidney disease? N    7. Have you had a stroke or Transient ischemic attack (TIA) within 6 months? N    8. In the past 6 months, have you had any heart related issues including cardiomyopathy or heart attack? N      If yes, did it require cardiac stenting or other implantable device?N      9. Do you have any implantable devices in your body (pacemaker, defib, LVAD)? N    10. Do you take nitroglycerin? If yes, how often? N    11. Are you currently taking any blood thinners?N    12. Are you a diabetic? N    13. (Females) Are you currently pregnant? N  If yes, how many weeks?      15. Are you taking any prescription pain medications on a routine schedule? N If yes, MAC sedation.    16. Do you have any chemical dependencies such as alcohol, street drugs, or methadone? ALCHOLOIC RECOVERY If yes, MAC sedation.    17. Do you have any history of post-traumatic stress syndrome, severe anxiety or history of psychosis? Y    18. Do you transfer independently? Y - WALKER    19.  Do you have any issues with constipation? N    20. Preferred Pharmacy for Pre Prescription [facility]    Additional comments: PATIENT BMI IS OVER 50 FOR CSC AND NEEDS MAC

## 2021-11-04 NOTE — TELEPHONE ENCOUNTER
Chart reviewed. Message sent to scheduling team with request to contact patient to assist in scheduling new video or in person consult with Dr. Aguilar on 11/11/21 at 8:15am.    Cordelia Riddle RN, BSN

## 2021-11-04 NOTE — TELEPHONE ENCOUNTER
Patient has been scheduled.      Linnette Montano  Surgical Specialties Procedure   [facility] [place]  11/17/2020 8:33 AM

## 2021-11-05 ENCOUNTER — TRANSCRIBE ORDERS (OUTPATIENT)
Dept: OTHER | Age: 56
End: 2021-11-05

## 2021-11-05 DIAGNOSIS — N94.9 ADNEXAL CYST: Primary | ICD-10-CM

## 2021-11-11 ENCOUNTER — VIRTUAL VISIT (OUTPATIENT)
Dept: UROLOGY | Facility: CLINIC | Age: 56
End: 2021-11-11
Attending: STUDENT IN AN ORGANIZED HEALTH CARE EDUCATION/TRAINING PROGRAM
Payer: COMMERCIAL

## 2021-11-11 DIAGNOSIS — N39.0 URINARY TRACT INFECTION WITHOUT HEMATURIA, SITE UNSPECIFIED: ICD-10-CM

## 2021-11-11 DIAGNOSIS — N20.0 BILATERAL NEPHROLITHIASIS: Primary | ICD-10-CM

## 2021-11-11 PROCEDURE — 99204 OFFICE O/P NEW MOD 45 MIN: CPT | Mod: 95 | Performed by: UROLOGY

## 2021-11-11 NOTE — PROGRESS NOTES
Urology Consult History and Physical  CLIFTON EMMANUEL  Name: Viviana Schaefer    MRN: [id number]   YOB: 1965       We were asked to see Viviana Schaefer at the request of Dr. Martin for evaluation and treatment of recent UTI.        Chief Complaint:   Recent UTI    History is obtained from the patient            History of Present Illness:   Viviana Schaefer is a 55 year old female who is being seen for evaluation of recent UTI.    Hospitalized 10/7-10/12  This occurred after a fall which was found to be secondary to sepsis from a urinary source  She had a UTI about 3-4 weeks prior to this  She has had issues with recurrent UTIs in her younger years - no significant issues in the past 5-10 years  She does have history of kidney stones and intermittent flank pain    She has had history of ESWL and Ureteroscopy at [facility]             Past Medical History:     Past Medical History:   Diagnosis Date     Alcoholic cirrhosis (H)      Arthritis      Congestive heart failure (H)     Cervical CA      Coronary artery disease     Heart Murmer     Depressive disorder      Hyponatremia             Past Surgical History:     Past Surgical History:   Procedure Laterality Date     ABDOMEN SURGERY       CHOLECYSTECTOMY  2001    patient reported     CV CORONARY ANGIOGRAM N/A 2/5/2021    Procedure: CV CORONARY ANGIOGRAM;  Surgeon: Deyvi Parham MD;  [facility]     CV RIGHT HEART CATH MEASUREMENTS RECORDED N/A 2/5/2021    Procedure: CV RIGHT HEART CATH;  Surgeon: Deyvi Parham MD;  [facility]     GI SURGERY       GYN SURGERY       HYSTERECTOMY  2009    including cervix     LITHOTRIPSY      multiple times, patient reported            Social History:     Social History     Tobacco Use     Smoking status: Former Smoker     Smokeless tobacco: Never Used   Substance Use Topics     Alcohol use: Not Currently     Comment: 5/2020       History   Smoking Status      Former Smoker   Smokeless Tobacco     Never Used            Family History:     Family History   Problem Relation Age of Onset     Substance Abuse Brother      Depression Sister      Anxiety Disorder Sister      Substance Abuse Sister      Depression Sister      Anxiety Disorder Sister      Depression Sister      Anxiety Disorder Sister      Autism Spectrum Disorder Son               Allergies:     Allergies   Allergen Reactions     Codeine      Diazepam      Morphine      PN: LW Reaction: Nausea     Penicillins      PN: LW Reaction: Rash, Generalized. Reportedly had a rash with this as a child and has tolerated Augmentin since per patient. Tolerated Zosyn 3/12/19 in [facility].             Medications:     Current Outpatient Medications   Medication Sig     buPROPion (WELLBUTRIN XL) 150 MG 24 hr tablet Take 150 mg by mouth daily     busPIRone (BUSPAR) 15 MG tablet Take 0.5 tablets by mouth 2 times daily     calcium carbonate-vitamin D (OSCAL W/D) 500-200 MG-UNIT tablet Take 1 tablet by mouth 2 times daily (with meals)     ferrous sulfate (FEROSUL) 325 (65 Fe) MG tablet TAKE 1 TABLET BY MOUTH DAILY WITH BREAKFAST     folic acid (FOLVITE) 1 MG tablet TK 1 T PO D     LORazepam (ATIVAN) 0.5 MG tablet Take 1 tablet (0.5 mg) by mouth every 6 hours as needed for anxiety (claustrophia prior to MRI)     omeprazole (PRILOSEC) 20 MG DR capsule Take 20 mg by mouth daily      potassium chloride ER (KLOR-CON M) 20 MEQ CR tablet Take 1 tablet (20 mEq) by mouth daily     pregabalin (LYRICA) 100 MG capsule Take 100 mg by mouth daily      spironolactone (ALDACTONE) 50 MG tablet Take 50 mg by mouth daily      traZODone (DESYREL) 100 MG tablet Take 100-200 mg by mouth At Bedtime     UNABLE TO FIND MEDICATION NAME: milk thisle     vitamin D2 (ERGOCALCIFEROL) 25438 units (1250 mcg) capsule Take 1 capsule (50,000 Units) by mouth once a week     vitamin D3 (CHOLECALCIFEROL) 50 mcg (2000 units) tablet Take 1 tablet (50 mcg) by  mouth daily     furosemide (LASIX) 40 MG tablet Take 20 mg by mouth daily      levofloxacin (LEVAQUIN) 750 MG tablet Take 1 tablet (750 mg) by mouth daily (Patient not taking: Reported on 11/3/2021)     No current facility-administered medications for this visit.             Review of Systems:     Skin: negative  Eyes: negative  Ears/Nose/Throat: negative  Respiratory: No shortness of breath, dyspnea on exertion, cough, or hemoptysis  Cardiovascular: negative  Gastrointestinal: as above  Genitourinary: as above  Musculoskeletal: negative  Neurologic: negative  Psychiatric: negative  Hematologic/Lymphatic/Immunologic: negative  Endocrine: as above          Physical Exam:     PHYSICAL EXAM  Patient is a 55 year old  female   Vitals: There were no vitals taken for this visit.  There is no height or weight on file to calculate BMI.  General Appearance Adult:   Alert, no acute distress, oriented  HENT: throat/mouth:normal, good dentition  Lungs: no respiratory distress, or pursed lip breathing  Heart: No obvious jugular venous distension present  Abdomen: obesely - distended  Musculoskeltal: extremities normal, no peripheral edema  Skin: no suspicious lesions or rashes  Neuro: Alert, oriented, speech and mentation normal  Psych: affect and mood normal  Gait: Normal           Data:   All laboratory data reviewed:    UA RESULTS:  Recent Labs   Lab Test 10/08/21  0529   COLOR Yellow   APPEARANCE Slightly Cloudy*   URINEGLC Negative   URINEBILI Negative   URINEKETONE Negative   SG 1.012   UBLD Large*   URINEPH 5.5   PROTEIN 50 *   NITRITE Negative   LEUKEST Large*   RBCU 38*   WBCU 142*     Lab Results   Component Value Date    CR 1.14 10/12/2021    CR 0.95 06/11/2021      IMAGING:  All pertinent imaging reviewed:    All imaging studies reviewed by me.  I personally reviewed these imaging films.  A formal report from radiology will follow.    CT CHEST/ABD/PEL 10/10/21:  FINDINGS:     Chest: There is no axillary adenopathy.  Numerous prominent though  nonenlarged mediastinal and hilar lymph nodes. Heart size is normal.  No pericardial effusion. Normal variant common origin of the right  brachiocephalic and left common carotid artery. The ascending aorta  and main pulmonary artery are nonaneurysmal. Thoracic esophagus is  within normal limits.     The central tracheobronchial tree is patent. Small right pleural  effusion with overlying compressive atelectasis. Left basilar  atelectasis. No pneumothorax. No focal consolidative opacities. Mild  interlobular septal thickening. Fluid courses along the superiormost  aspect of the right major fissure. Subpleural left lower lobe  pulmonary nodule measuring 10 mm (series 2, image 271).     Abdomen and pelvis: Cirrhotic configuration of the liver. No focal  hepatic mass. Recanalization of the umbilical vein. Postsurgical  changes of cholecystectomy. No intrahepatic or extrahepatic biliary  dilation. Diffuse atrophy and fatty infiltration of the pancreatic  parenchyma. No main pancreatic ductal dilation. Spleen is enlarged  measuring 15.5 cm. Multifocal splenic cysts, better evaluated on  5/10/2021 MRI. Bilateral nonobstructive nephrolithiasis the largest  stone on the left measures 15 x 15 mm (series 8, image 385) the  largest on the right measures 8 x 7 mm (series 8, image 341). No  hydronephrosis. Adrenal glands are within normal limits.     There are no dilated loops of small or large bowel. Colonic  diverticulosis without evidence of acute diverticulitis. Appendix is  normal. Small intra-abdominal ascites. There is a collection of free  fluid in the right lower quadrant measuring 8.1 x 5.8 cm (series 8,  image 472), it is fluid attenuating. It appears to be  abutting/surrounding the right ovary.     Post surgical changes of hysterectomy and left oophorectomy. Urinary  bladder is partially distended and demonstrates a nondependent focus  of air favored to be related to  catheterization.     Diffuse hazy appearance of the mesentery. No significant abdominal  ascites. No free air within the abdomen or pelvis. Numerous  retroperitoneal/periaortic lymph nodes for example there is an  aortocaval lymph node measuring 1.1 cm (series 8, image 360). Major  vasculature of the abdomen and pelvis is patent. No infrarenal  abdominal aortic aneurysm.     Bones and soft tissues: Diffuse prominent anasarca. Subcutaneous  varices. Mild multilevel degenerative changes of the visualized spine.  No suspicious osseous lesions.                                                        IMPRESSION:   1. Small to moderate right pleural effusion with overlying  atelectasis. Mild interstitial pulmonary edema.  2. 1.0 cm subpleural left lower lobe pulmonary nodule. Recommend  follow-up per Fleischner criteria.  3. No ascites. In the right lower quadrant there is a relatively  loculated simple fluid collection that abuts and and appears to  involve the right ovary. There is no surrounding enhancing rim to  suggest abscess formation. Favor loculated ascites though consider  pelvic ultrasound for further evaluation.  4. Cirrhotic configuration of the liver. Prominent recanalized  umbilical vein.  5. Numerous prominent retroperitoneal lymph nodes, favored to be  reactive.  6. Bilateral nonobstructive nephrolithiasis.  7. Relatively extensive anasarca particularly over the abdomen.           Impression and Plan:   Impression:   55-year-old female with complex medical history including alcoholic cirrhosis of the liver with ascites, portal hypertension, recent hospitalization for sepsis from urinary source, and history of bilateral nephrolithiasis      Plan:   History of bilateral nephrolithiasis  -I reviewed her CT images from 10/10/2021.  I reviewed these images personally and agree with radiologist interpretation.  She does have a single right-sided nonobstructive roughly 7 to 8 mm kidney stone as well as several  nonobstructive left kidney stones.  -Given that she is not having significant flank pain or evidence of recurrent pyelonephritis, I recommend continued observation at this time  -Follow-up with me in 1 year with a KUB    Recent sepsis from urinary source  -I reviewed her urine cultures from 10/8/2021 which demonstrated low colony counts of E. coli  -She has recovered from this  -She has not been having issues with recurrent urinary tract infections in the last 10 years and I think that observation is warranted     Thank you for the kind consultation.    Time spent: 20 minutes spent on the date of the encounter doing chart review, history and exam, documentation and further activities as noted above.    Fredis Aguilar MD   Urology  [facility]  [facility] Phone: [phone]  [facility] Phone: [phone]       Viviana Schaefer  who is being evaluated via a billable video visit.      How would you like to obtain your AVS? MyChart  If the video visit is dropped, the invitation should be resent by: Send to e-mail at: [email or web address]  Will anyone else be joining your video visit? No    Video-Visit Details    Type of service:  Video Visit    Video Start Time: 8:21 AM    Video End Time:8:35 AM    Originating Location (pt. Location): Home    Distant Location (provider location):  [facility]     Platform used for Video Visit: [employer]

## 2021-11-16 NOTE — PROGRESS NOTES
RECORDS STATUS - ALL OTHER DIAGNOSIS      RECORDS RECEIVED FROM: [facility]   DATE RECEIVED: 11/29/2021   NOTES STATUS DETAILS   OFFICE NOTE from referring provider     OFFICE NOTE from medical oncologist N/A    DISCHARGE SUMMARY from hospital N/A    DISCHARGE REPORT from the ER N/A    OPERATIVE REPORT     MEDICATION LIST Complete [facility]   CLINICAL TRIAL TREATMENTS TO DATE     LABS     PATHOLOGY REPORTS     ANYTHING RELATED TO DIAGNOSIS Complete Labs last updated on 10/12/2021   GENONOMIC TESTING     TYPE:     IMAGING (NEED IMAGES & REPORT)     CT SCANS Complete CT Chest Abdomen 10/10/2021   MRI Complete MRI Abdomen 1/19/2021    MRI Liver 5/10/2021   MAMMO     ULTRASOUND Complete US Pelvic Complete 10/12/2021   PET

## 2021-11-29 ENCOUNTER — ONCOLOGY VISIT (OUTPATIENT)
Dept: ONCOLOGY | Facility: CLINIC | Age: 56
End: 2021-11-29
Payer: COMMERCIAL

## 2021-11-29 ENCOUNTER — PRE VISIT (OUTPATIENT)
Dept: ONCOLOGY | Facility: CLINIC | Age: 56
End: 2021-11-29

## 2021-11-29 VITALS
OXYGEN SATURATION: 98 % | HEART RATE: 93 BPM | WEIGHT: 278.3 LBS | RESPIRATION RATE: 20 BRPM | HEIGHT: 62 IN | TEMPERATURE: 98.8 F | DIASTOLIC BLOOD PRESSURE: 81 MMHG | SYSTOLIC BLOOD PRESSURE: 138 MMHG | BODY MASS INDEX: 51.21 KG/M2

## 2021-11-29 DIAGNOSIS — K70.31 ALCOHOLIC CIRRHOSIS OF LIVER WITH ASCITES (H): ICD-10-CM

## 2021-11-29 DIAGNOSIS — E66.01 OBESITY, CLASS III, BMI 40-49.9 (MORBID OBESITY) (H): ICD-10-CM

## 2021-11-29 DIAGNOSIS — N94.9 ADNEXAL CYST: Primary | ICD-10-CM

## 2021-11-29 PROCEDURE — 99204 OFFICE O/P NEW MOD 45 MIN: CPT | Performed by: OBSTETRICS & GYNECOLOGY

## 2021-11-29 ASSESSMENT — MIFFLIN-ST. JEOR: SCORE: 1810.61

## 2021-11-29 NOTE — NURSING NOTE
"Oncology Rooming Note    November 29, 2021 1:31 PM   Viviana Schaefer is a 56 year old female who presents for:    Chief Complaint   Patient presents with     Oncology Clinic Visit     New Patient     Initial Vitals: /81 (BP Location: Right arm)   Pulse 93   Temp 98.8  F (37.1  C) (Oral)   Resp 20   Ht 1.575 m (5' 2\")   Wt 126.2 kg (278 lb 4.8 oz)   SpO2 98%   BMI 50.90 kg/m   Estimated body mass index is 50.9 kg/m  as calculated from the following:    Height as of this encounter: 1.575 m (5' 2\").    Weight as of this encounter: 126.2 kg (278 lb 4.8 oz). Body surface area is 2.35 meters squared.  Data Unavailable Comment: chronic joint pain, neuropathy, knees, L shoulder   No LMP recorded. Patient has had a hysterectomy.  Allergies reviewed: Yes  Medications reviewed: Yes    Medications: Medication refills not needed today.  Pharmacy name entered into [employer]: [id number] [address] [facility] [address]    Clinical concerns: New Patient       Emiliana Munoz LPN              "

## 2021-11-29 NOTE — PROGRESS NOTES
Gynecologic Oncology Clinic - New Patient    Patient: Viviana Schaefer  : 1965    Date of Visit: 2021     Reason for visit: complex adnexal cyst    History of Present Illness:  Viviana Schaefer is a 56 year old post-menopausal female with history of hysterectomy and PMH notable for PORTER and alcoholic-related liver disease with resultant cirrhosis, ascites, pulmonary hypertension, and coagulopathy, who had complex ovarian cyst noted incidentally during recent admission for infection.    Overall feeling well. Edema from prior hospitalization is improving. She has no pelvic pain. Has history of hysterectomy with left ovarian cyst. Has multiple prior imaging related to liver dysfunction including a CT in  showing this same area. She has background as ultrasound tech, so she has seen the report and is not overly concerned about the lesion herself.       Review of Systems:  As per HPI, otherwise non-contributory.     OB/Gynecologic History:   , 4 sons  History of hysterectomy, history of left ovarian cyst.  Pap smears no longer indicated.     Past Medical History:   Diagnosis Date     Alcoholic cirrhosis (H)      Arthritis      Congestive heart failure (H)     Cervical CA      Coronary artery disease     Heart Murmer     Depressive disorder      Hyponatremia        Past Surgical History:   Procedure Laterality Date     CHOLECYSTECTOMY      patient reported     CV CORONARY ANGIOGRAM N/A 2021    Procedure: CV CORONARY ANGIOGRAM;  Surgeon: Deyvi Parham MD;  [facility]     CV RIGHT HEART CATH MEASUREMENTS RECORDED N/A 2021    Procedure: CV RIGHT HEART CATH;  Surgeon: Deyvi Parham MD;  [facility]     HYSTERECTOMY  2009    Total hysterectomy     LITHOTRIPSY      multiple times, patient reported        Social History     Tobacco Use     Smoking status: Former Smoker     Smokeless tobacco: Never Used   Substance Use Topics      Alcohol use: Not Currently     Comment: 5/2020     Drug use: Never        Family History   Problem Relation Age of Onset     Substance Abuse Brother      Depression Sister      Anxiety Disorder Sister      Substance Abuse Sister      Depression Sister      Anxiety Disorder Sister      Depression Sister      Anxiety Disorder Sister      Autism Spectrum Disorder Son        Current Outpatient Medications   Medication Sig Dispense Refill     buPROPion (WELLBUTRIN XL) 150 MG 24 hr tablet Take 150 mg by mouth daily       busPIRone (BUSPAR) 15 MG tablet Take 0.5 tablets by mouth 2 times daily       calcium carbonate-vitamin D (OSCAL W/D) 500-200 MG-UNIT tablet Take 1 tablet by mouth 2 times daily (with meals) 180 tablet 3     ferrous sulfate (FEROSUL) 325 (65 Fe) MG tablet TAKE 1 TABLET BY MOUTH DAILY WITH BREAKFAST       folic acid (FOLVITE) 1 MG tablet TK 1 T PO D       LORazepam (ATIVAN) 0.5 MG tablet Take 1 tablet (0.5 mg) by mouth every 6 hours as needed for anxiety (claustrophia prior to MRI) 2 tablet 0     omeprazole (PRILOSEC) 20 MG DR capsule Take 20 mg by mouth daily        potassium chloride ER (KLOR-CON M) 20 MEQ CR tablet Take 1 tablet (20 mEq) by mouth daily 30 tablet 1     pregabalin (LYRICA) 100 MG capsule Take 100 mg by mouth daily        spironolactone (ALDACTONE) 50 MG tablet Take 50 mg by mouth daily        traZODone (DESYREL) 100 MG tablet Take 100-200 mg by mouth At Bedtime       UNABLE TO FIND MEDICATION NAME: milk thisle       vitamin D2 (ERGOCALCIFEROL) 29735 units (1250 mcg) capsule Take 1 capsule (50,000 Units) by mouth once a week 12 capsule 0     vitamin D3 (CHOLECALCIFEROL) 50 mcg (2000 units) tablet Take 1 tablet (50 mcg) by mouth daily 90 tablet 4     furosemide (LASIX) 40 MG tablet Take 20 mg by mouth daily        levofloxacin (LEVAQUIN) 750 MG tablet Take 1 tablet (750 mg) by mouth daily (Patient not taking: Reported on 11/3/2021) 7 tablet 0        Allergies   Allergen Reactions      "Codeine      Diazepam      Morphine      PN: LW Reaction: Nausea     Penicillins      PN: LW Reaction: Rash, Generalized. Reportedly had a rash with this as a child and has tolerated Augmentin since per patient. Tolerated Zosyn 3/12/19 in [facility].          Physical Exam:   /81 (BP Location: Right arm)   Pulse 93   Temp 98.8  F (37.1  C) (Oral)   Resp 20   Ht 1.575 m (5' 2\")   Wt 126.2 kg (278 lb 4.8 oz)   SpO2 98%   BMI 50.90 kg/m    General appearance: Alert and oriented, no acute distress, well groomed    Labs/Pathology:   Mildly elevated  and CEA.    Imaging:   I personally reviewed the images from her recent ultrasound and CT scan as well as the images from 9/2020 CT of the abdomen/pelvis.  Recent imaging shows fluid collection in the area of the right adnexa. On ultrasound likely consistent with right ovarian complex cyst by way of multiple septations. No areas of solid nodularity, no internal flow. No normal appearing ovarian parenchyma. On CT scan both recent and 2020, there is an area of fluid collection in this region which carries up into the small bowel mesentery. On 2020 scan especially but also more recent, it is difficult to identify whether this is a discrete adnexal cyst or merely loculated fluid versus mesenteric or peritoneal cysts. Based upon location and ultrasound appearance ovarian cyst is slightly more probable.      Assessment:  Viviana Schaefer is a 56 year old post-menopausal female with a history of hysterectomy for benign indications who has probable right adnexal cyst with minimal features concerning for malignancy.    Plan:   1. Adnexal cyst: review of images reveals minimal concern for malignancy. Tumor markers, particularly  are not always reliable in patients with liver dysfunction and particularly during times of acute infection as any peritoneal irritation can elevate this number. Furthermore, the stability of this lesion over the course of " the last year as evidenced on imaging all speak against a cancerous etiology.   2. Likewise, I do not feel there is any reason to do further imaging work-up with an MRI at this time due to low concern for malignant etiology. Would not affect management at this time. If confirmation of adnexal etiology or further information were necessary, or if there were changes in the future, then MRI could be considered, but I would no recommend it at this time.   3. Follow-up pelvic ultrasound (trans-abdominal approach as best visualized transabdominally due to high location) in 3-6 months. If stable, no further imaging or work-up is necessary. I can call her with those results, no need for return appointment.     I spent a total of 40 minutes on the care of Viviana Schaefer on the day of service including 30 minutes face to face time and the remainder in chart review, care coordination, and documentation on the day of service. I reviewed images and labs listed above as well as primary care follow-up note.     Shaun Rapp MD    Division of Gynecologic Oncology  Department of Ob/Gyn and Women's Health  [facility]

## 2021-11-29 NOTE — LETTER
2021         RE: Viviana Schaefer  [address]  [facility] [address]        Dear Colleague,    Thank you for referring your patient, Viviana Schaefer, to the [facility]. Please see a copy of my visit note below.    Gynecologic Oncology Clinic - New Patient    Patient: Viviana Schaefer  : 1965    Date of Visit: 2021     Reason for visit: complex adnexal cyst    History of Present Illness:  Viviana Schaefer is a 56 year old post-menopausal female with history of hysterectomy and PMH notable for PORTER and alcoholic-related liver disease with resultant cirrhosis, ascites, pulmonary hypertension, and coagulopathy, who had complex ovarian cyst noted incidentally during recent admission for infection.    Overall feeling well. Edema from prior hospitalization is improving. She has no pelvic pain. Has history of hysterectomy with left ovarian cyst. Has multiple prior imaging related to liver dysfunction including a CT in  showing this same area. She has background as ultrasound tech, so she has seen the report and is not overly concerned about the lesion herself.       Review of Systems:  As per HPI, otherwise non-contributory.     OB/Gynecologic History:   , 4 sons  History of hysterectomy, history of left ovarian cyst.  Pap smears no longer indicated.     Past Medical History:   Diagnosis Date     Alcoholic cirrhosis (H)      Arthritis      Congestive heart failure (H)     Cervical CA      Coronary artery disease     Heart Murmer     Depressive disorder      Hyponatremia        Past Surgical History:   Procedure Laterality Date     CHOLECYSTECTOMY      patient reported     CV CORONARY ANGIOGRAM N/A 2021    Procedure: CV CORONARY ANGIOGRAM;  Surgeon: Deyvi Parham MD;  Location:  HEART CARDIAC CATH LAB     CV RIGHT HEART CATH MEASUREMENTS RECORDED N/A 2021    Procedure: CV RIGHT HEART CATH;  Surgeon: Deyvi Parham MD;   Location:  HEART CARDIAC CATH LAB     HYSTERECTOMY  2009    Total hysterectomy     LITHOTRIPSY      multiple times, patient reported        Social History     Tobacco Use     Smoking status: Former Smoker     Smokeless tobacco: Never Used   Substance Use Topics     Alcohol use: Not Currently     Comment: 5/2020     Drug use: Never        Family History   Problem Relation Age of Onset     Substance Abuse Brother      Depression Sister      Anxiety Disorder Sister      Substance Abuse Sister      Depression Sister      Anxiety Disorder Sister      Depression Sister      Anxiety Disorder Sister      Autism Spectrum Disorder Son        Current Outpatient Medications   Medication Sig Dispense Refill     buPROPion (WELLBUTRIN XL) 150 MG 24 hr tablet Take 150 mg by mouth daily       busPIRone (BUSPAR) 15 MG tablet Take 0.5 tablets by mouth 2 times daily       calcium carbonate-vitamin D (OSCAL W/D) 500-200 MG-UNIT tablet Take 1 tablet by mouth 2 times daily (with meals) 180 tablet 3     ferrous sulfate (FEROSUL) 325 (65 Fe) MG tablet TAKE 1 TABLET BY MOUTH DAILY WITH BREAKFAST       folic acid (FOLVITE) 1 MG tablet TK 1 T PO D       LORazepam (ATIVAN) 0.5 MG tablet Take 1 tablet (0.5 mg) by mouth every 6 hours as needed for anxiety (claustrophia prior to MRI) 2 tablet 0     omeprazole (PRILOSEC) 20 MG DR capsule Take 20 mg by mouth daily        potassium chloride ER (KLOR-CON M) 20 MEQ CR tablet Take 1 tablet (20 mEq) by mouth daily 30 tablet 1     pregabalin (LYRICA) 100 MG capsule Take 100 mg by mouth daily        spironolactone (ALDACTONE) 50 MG tablet Take 50 mg by mouth daily        traZODone (DESYREL) 100 MG tablet Take 100-200 mg by mouth At Bedtime       UNABLE TO FIND MEDICATION NAME: milk thisle       vitamin D2 (ERGOCALCIFEROL) 32825 units (1250 mcg) capsule Take 1 capsule (50,000 Units) by mouth once a week 12 capsule 0     vitamin D3 (CHOLECALCIFEROL) 50 mcg (2000 units) tablet Take 1 tablet (50 mcg) by  "mouth daily 90 tablet 4     furosemide (LASIX) 40 MG tablet Take 20 mg by mouth daily        levofloxacin (LEVAQUIN) 750 MG tablet Take 1 tablet (750 mg) by mouth daily (Patient not taking: Reported on 11/3/2021) 7 tablet 0        Allergies   Allergen Reactions     Codeine      Diazepam      Morphine      PN: LW Reaction: Nausea     Penicillins      PN: LW Reaction: Rash, Generalized. Reportedly had a rash with this as a child and has tolerated Augmentin since per patient. Tolerated Zosyn 3/12/19 in [facility].          Physical Exam:   /81 (BP Location: Right arm)   Pulse 93   Temp 98.8  F (37.1  C) (Oral)   Resp 20   Ht 1.575 m (5' 2\")   Wt 126.2 kg (278 lb 4.8 oz)   SpO2 98%   BMI 50.90 kg/m    General appearance: Alert and oriented, no acute distress, well groomed    Labs/Pathology:   Mildly elevated  and CEA.    Imaging:   I personally reviewed the images from her recent ultrasound and CT scan as well as the images from 9/2020 CT of the abdomen/pelvis.  Recent imaging shows fluid collection in the area of the right adnexa. On ultrasound likely consistent with right ovarian complex cyst by way of multiple septations. No areas of solid nodularity, no internal flow. No normal appearing ovarian parenchyma. On CT scan both recent and 2020, there is an area of fluid collection in this region which carries up into the small bowel mesentery. On 2020 scan especially but also more recent, it is difficult to identify whether this is a discrete adnexal cyst or merely loculated fluid versus mesenteric or peritoneal cysts. Based upon location and ultrasound appearance ovarian cyst is slightly more probable.      Assessment:  Viviana Schaefer is a 56 year old post-menopausal female with a history of hysterectomy for benign indications who has probable right adnexal cyst with minimal features concerning for malignancy.    Plan:   1. Adnexal cyst: review of images reveals minimal concern for " malignancy. Tumor markers, particularly  are not always reliable in patients with liver dysfunction and particularly during times of acute infection as any peritoneal irritation can elevate this number. Furthermore, the stability of this lesion over the course of the last year as evidenced on imaging all speak against a cancerous etiology.   2. Likewise, I do not feel there is any reason to do further imaging work-up with an MRI at this time due to low concern for malignant etiology. Would not affect management at this time. If confirmation of adnexal etiology or further information were necessary, or if there were changes in the future, then MRI could be considered, but I would no recommend it at this time.   3. Follow-up pelvic ultrasound (trans-abdominal approach as best visualized transabdominally due to high location) in 3-6 months. If stable, no further imaging or work-up is necessary. I can call her with those results, no need for return appointment.     I spent a total of 40 minutes on the care of Vivianaaidan Schaefer on the day of service including 30 minutes face to face time and the remainder in chart review, care coordination, and documentation on the day of service. I reviewed images and labs listed above as well as primary care follow-up note.     Shaun Rapp MD    Division of Gynecologic Oncology  Department of Ob/Gyn and Women's Health  [facility]       Again, thank you for allowing me to participate in the care of your patient.        Sincerely,        Shaun Rapp MD

## 2021-12-07 ENCOUNTER — VIRTUAL VISIT (OUTPATIENT)
Dept: GASTROENTEROLOGY | Facility: CLINIC | Age: 56
End: 2021-12-07
Attending: STUDENT IN AN ORGANIZED HEALTH CARE EDUCATION/TRAINING PROGRAM
Payer: COMMERCIAL

## 2021-12-07 DIAGNOSIS — K70.31 ALCOHOLIC CIRRHOSIS OF LIVER WITH ASCITES (H): Primary | ICD-10-CM

## 2021-12-07 DIAGNOSIS — M79.89 LEG SWELLING: ICD-10-CM

## 2021-12-07 NOTE — LETTER
12/7/2021     RE: Viviana Schaefer  [address]  [facility] [address]      Dear Colleague,    Thank you for referring your patient, Viviana Schaefer, to the [facility] [place]. Please see a copy of my visit note below.    Cancelled visit, will reschedule in eval clinic    Again, thank you for allowing me to participate in the care of your patient.        Sincerely,        Razia Bundy MD

## 2021-12-12 DIAGNOSIS — R91.8 PULMONARY NODULES: Primary | ICD-10-CM

## 2021-12-14 ENCOUNTER — REFERRAL (OUTPATIENT)
Dept: TRANSPLANT | Facility: CLINIC | Age: 56
End: 2021-12-14
Payer: COMMERCIAL

## 2021-12-14 DIAGNOSIS — K70.30 ALCOHOLIC CIRRHOSIS (H): Primary | ICD-10-CM

## 2021-12-15 ENCOUNTER — LAB (OUTPATIENT)
Dept: LAB | Facility: CLINIC | Age: 56
End: 2021-12-15
Payer: COMMERCIAL

## 2021-12-15 ENCOUNTER — ANCILLARY PROCEDURE (OUTPATIENT)
Dept: ULTRASOUND IMAGING | Facility: CLINIC | Age: 56
End: 2021-12-15
Attending: STUDENT IN AN ORGANIZED HEALTH CARE EDUCATION/TRAINING PROGRAM
Payer: COMMERCIAL

## 2021-12-15 VITALS — WEIGHT: 276 LBS | BODY MASS INDEX: 50.79 KG/M2 | HEIGHT: 62 IN

## 2021-12-15 DIAGNOSIS — K70.31 ALCOHOLIC CIRRHOSIS OF LIVER WITH ASCITES (H): ICD-10-CM

## 2021-12-15 DIAGNOSIS — M79.89 LEG SWELLING: ICD-10-CM

## 2021-12-15 LAB
AFP SERPL-MCNC: 9 UG/L (ref 0–8)
ALBUMIN SERPL-MCNC: 2.7 G/DL (ref 3.4–5)
ALP SERPL-CCNC: 363 U/L (ref 40–150)
ALT SERPL W P-5'-P-CCNC: 27 U/L (ref 0–50)
ANION GAP SERPL CALCULATED.3IONS-SCNC: 4 MMOL/L (ref 3–14)
AST SERPL W P-5'-P-CCNC: 41 U/L (ref 0–45)
BILIRUB SERPL-MCNC: 2.8 MG/DL (ref 0.2–1.3)
BUN SERPL-MCNC: 21 MG/DL (ref 7–30)
CALCIUM SERPL-MCNC: 9.4 MG/DL (ref 8.5–10.1)
CHLORIDE BLD-SCNC: 112 MMOL/L (ref 94–109)
CO2 SERPL-SCNC: 25 MMOL/L (ref 20–32)
CREAT SERPL-MCNC: 0.8 MG/DL (ref 0.52–1.04)
ERYTHROCYTE [DISTWIDTH] IN BLOOD BY AUTOMATED COUNT: 14.3 % (ref 10–15)
GFR SERPL CREATININE-BSD FRML MDRD: 83 ML/MIN/1.73M2
GLUCOSE BLD-MCNC: 100 MG/DL (ref 70–99)
HCT VFR BLD AUTO: 31.2 % (ref 35–47)
HGB BLD-MCNC: 10.4 G/DL (ref 11.7–15.7)
INR PPP: 1.6 (ref 0.85–1.15)
MCH RBC QN AUTO: 35.7 PG (ref 26.5–33)
MCHC RBC AUTO-ENTMCNC: 33.3 G/DL (ref 31.5–36.5)
MCV RBC AUTO: 107 FL (ref 78–100)
PLATELET # BLD AUTO: 89 10E3/UL (ref 150–450)
POTASSIUM BLD-SCNC: 4.4 MMOL/L (ref 3.4–5.3)
PROT SERPL-MCNC: 6.2 G/DL (ref 6.8–8.8)
RBC # BLD AUTO: 2.91 10E6/UL (ref 3.8–5.2)
SODIUM SERPL-SCNC: 141 MMOL/L (ref 133–144)
WBC # BLD AUTO: 6.2 10E3/UL (ref 4–11)

## 2021-12-15 PROCEDURE — 93971 EXTREMITY STUDY: CPT | Mod: LT | Performed by: RADIOLOGY

## 2021-12-15 PROCEDURE — 85610 PROTHROMBIN TIME: CPT

## 2021-12-15 PROCEDURE — 80053 COMPREHEN METABOLIC PANEL: CPT

## 2021-12-15 PROCEDURE — 80321 ALCOHOLS BIOMARKERS 1OR 2: CPT | Mod: 90

## 2021-12-15 PROCEDURE — 85027 COMPLETE CBC AUTOMATED: CPT

## 2021-12-15 PROCEDURE — 36415 COLL VENOUS BLD VENIPUNCTURE: CPT

## 2021-12-15 PROCEDURE — 82105 ALPHA-FETOPROTEIN SERUM: CPT

## 2021-12-15 ASSESSMENT — MIFFLIN-ST. JEOR: SCORE: 1800.18

## 2021-12-15 NOTE — PROGRESS NOTES
Let me call her this week. If she is refusing to get vaccinated, you can do her intake but I would not schedule her appointments.

## 2021-12-15 NOTE — TELEPHONE ENCOUNTER
Patient was asked the following questions during liver intake call.     Referring Provider: ADRIANA  Referring Diagnosis:  Alcoholic Cirrhosis of the Liver   PCP: Paula Irwin NP     1)Do you know why you have liver disease: Yes        If Alcoholic Cirrhosis is present when was your last drink: 2/21/21       Have you ever been through treatment for alcohol: Yes, outpatient.   2) Presence of Ascites: Yes Paracentesis: Yes, done one time in 2019  3) Presence of Hepatic Encephalopathy: No Medications: No  4) History of GI Bleeding: No  5) Oxygen Use: None  6) EGD: Yes Where: [facility] When: 2020  7) Colonoscopy: No  8) MELD Score: 23  9) Labs available for review from PCP/GI: Yes   10) HCC Diagnosis:No  11)Insurance information: [facility]       Policy lock: Self       Subscriber/policy/ID number: [id number]      Group Number: [id number]    Referral intake process completed.  Patient is aware that after financial approval is received, medical records will be requested.   Patient confirmed for a callback from transplant coordinator on 12/22/2021.  Tentative evaluation date TBD.    Confirmed coordinator will discuss evaluation process in more detail at the time of their call.   Patient is aware of the need to arrange age appropriate cancer screening, vaccinations, and dental care.  Reminded patient to complete questionnaire, complete medical records release, and review packet prior to evaluation visit .  Assessed patient for special needs (ie--wheelchair, assistance, guardian, and ):  None  Patient instructed to call [phone] with questions.     Patient gave verbal consent during intake call to obtain medical records and documents outside of [facility]:  Yes     MILA Wilburn, LPN       Solid Organ Transplant

## 2021-12-16 ENCOUNTER — DOCUMENTATION ONLY (OUTPATIENT)
Dept: TRANSPLANT | Facility: CLINIC | Age: 56
End: 2021-12-16

## 2021-12-19 DIAGNOSIS — R60.0 LEG EDEMA: Primary | ICD-10-CM

## 2021-12-19 NOTE — PROGRESS NOTES
I called her back, she is not interested in getting the COVID vaccine right now. Told her why it is required prior to transplant, she will think more about it. I will follow her in hepatology clinic, told her we will not open a transplant evaluation until she is agreeable to getting the vaccine.

## 2021-12-20 LAB — PETH BLD-MCNC: NEGATIVE NG/ML

## 2021-12-22 NOTE — TELEPHONE ENCOUNTER
Referral initial call  MELD: 16   Blood Type: O Pos  PCP: Paula Veliz (formally Dc) NP  Referring: [facility]    Social Hx: grew up in [place].   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 Scanned for 20 years.   Family/social support:  &  X2, 4 sons. Mother still alive, had breast cancer. Father  at 73 from leukemia. Many close friends. 3 sisters, 1 brother.    Liver DX/History: told of liver disease 2019, was told was PORTER.  Dx: etoh cirrhosis   HE/Meds: A little forgetful but no HE. No meds.  GIB/dark stools: No  Colonoscopy/EGD: No [place], Yes EGD [facility]   Ascites (Paracentesis/diuretics?): Yes ascites, Para & Thora X1 in 2019. Takes spironolactone & furosemide  Alcohol use (chem dep done?): Last drink 22, Yes outpatient treatment through [place], finished in October (did outpatient previously)    Other Medical hx:  Neuro (strokes/seizures?): none  Cardiac (MI s, ECHOs/Angios?): none, has heart murmur  Lungs/smoking (oxygen use?): spot on lung, repeat Chest CT on , smoked in past briefly, quit   Kidneys: medullary sponge kidney disease, frequent kidney stones  Cancer: cervical cancer  Vaccines (Hepatitis/COVID?): Probably Hepatitis. Not COVID vaccinated.   Surgeries: hysterectomy at 41, R ovary still in. Gallbladder out , 2 C-sections, still has spleen. Lithotripsy X5.     Plan: plan to email [facility]. Patient has no plans for COVID vaccine at this time. Educated patient on Evaluation day. Patient to get labs drawn in two days. Still having significant problems with BLE edema.

## 2021-12-24 ENCOUNTER — LAB (OUTPATIENT)
Dept: LAB | Facility: CLINIC | Age: 56
End: 2021-12-24
Payer: COMMERCIAL

## 2021-12-24 DIAGNOSIS — E55.9 VITAMIN D DEFICIENCY: ICD-10-CM

## 2021-12-24 DIAGNOSIS — K70.30 ALCOHOLIC CIRRHOSIS (H): ICD-10-CM

## 2021-12-24 DIAGNOSIS — R60.0 LEG EDEMA: ICD-10-CM

## 2021-12-24 LAB
ANION GAP SERPL CALCULATED.3IONS-SCNC: 5 MMOL/L (ref 3–14)
BUN SERPL-MCNC: 22 MG/DL (ref 7–30)
CALCIUM SERPL-MCNC: 9.4 MG/DL (ref 8.5–10.1)
CHLORIDE BLD-SCNC: 109 MMOL/L (ref 94–109)
CO2 SERPL-SCNC: 29 MMOL/L (ref 20–32)
CREAT SERPL-MCNC: 0.86 MG/DL (ref 0.52–1.04)
GFR SERPL CREATININE-BSD FRML MDRD: 79 ML/MIN/1.73M2
GLUCOSE BLD-MCNC: 105 MG/DL (ref 70–99)
POTASSIUM BLD-SCNC: 4.1 MMOL/L (ref 3.4–5.3)
SODIUM SERPL-SCNC: 143 MMOL/L (ref 133–144)

## 2021-12-24 PROCEDURE — 80048 BASIC METABOLIC PNL TOTAL CA: CPT

## 2021-12-24 PROCEDURE — 36415 COLL VENOUS BLD VENIPUNCTURE: CPT

## 2022-01-05 ENCOUNTER — TELEPHONE (OUTPATIENT)
Dept: GASTROENTEROLOGY | Facility: CLINIC | Age: 57
End: 2022-01-05
Payer: COMMERCIAL

## 2022-01-05 ENCOUNTER — HOSPITAL ENCOUNTER (OUTPATIENT)
Facility: CLINIC | Age: 57
End: 2022-01-05
Attending: INTERNAL MEDICINE | Admitting: INTERNAL MEDICINE
Payer: COMMERCIAL

## 2022-01-05 NOTE — TELEPHONE ENCOUNTER
Screening Questions  1. Are you active on mychart? Y    2. What insurance is in the chart? [employer]    2.  Ordering/Referring Provider: Razia Bundy MD    3. BMI 49.9, If greater than 40 review exclusion criteria also will need EXTENDED PREP    4.  Respiratory Screening (If yes to any of the following Hospital setting only):     Do you use daily home oxygen? N  Do you have mod to severe Obstructive Sleep Apnea? N   Do you have Pulmonary Hypertension? N   Do you have UNCONTROLLED asthma? N    5. Have you had a heart or lung transplant (If yes, please review exclusion criteria) ? N    6. Are you currently on dialysis or have chronic kidney disease? CKD    7. Have you had a stroke or Transient ischemic attack (TIA) within 6 months? N    8. In the past 6 months, have you had any heart related issues including cardiomyopathy or heart attack? N                 If yes, did it require cardiac stenting or other implantable device?      9. Do you have any implantable devices in your body (pacemaker, defib, LVAD)? N    10. Do you take nitroglycerin? If yes, how often? N    11. Are you currently taking any blood thinners?N    12. Are you a diabetic? N    13. (Females) Are you currently pregnant? N  If yes, how many weeks?      15. Are you taking any prescription pain medications on a routine schedule? N If yes, MAC sedation and patient will need EXTENDED PREP.    16. Do you have any chemical dependencies such as alcohol, street drugs, or methadone? NIf yes, MAC sedation.    17. Do you have any history of post-traumatic stress syndrome, severe anxiety or history of psychosis? Y PTSD ANXIETY    18. Do you transfer independently? Y    19.  Do you have any issues with constipation? N   If yes, pt will need EXTENDED PREP     20. Preferred Pharmacy for Pre Prescription [facility]    Scheduling Details    Which Colonoscopy Prep was Sent?: EXTENDED  Type of Procedure Scheduled: DOUBLE  Surgeon: ANÍBAL  Date of Procedure:  2/24  Location: [facility]  Caller (Please ask for phone number if not scheduled by patient):       Sedation Type: MAC  Conscious Sedation- Needs  for 6 hours after the procedure  MAC/General-Needs  for 24 hours after procedure    Pre-op Required at [facility] [place] and OR for MAC sedation: Y  (if yes advise patient they will need a pre-op prior to procedure)      Is patient on blood thinners? -N (If yes- inform patient to follow up with PCP or provider for follow up instructions)     Informed patient they will need an adult  Y  Cannot take any type of public or medical transportation alone    Pre-Procedure Covid test to be completed at [facility] or Externally: 2/21    Confirmed Nurse will call to complete assessment Y    Additional comments:

## 2022-01-11 ENCOUNTER — DOCUMENTATION ONLY (OUTPATIENT)
Dept: TRANSPLANT | Facility: CLINIC | Age: 57
End: 2022-01-11

## 2022-01-13 NOTE — TELEPHONE ENCOUNTER
Patient moved to consult only given reluctance to get covid vaccine.    Will set up with Dr. Bundy for next available in clinic

## 2022-01-16 ENCOUNTER — HEALTH MAINTENANCE LETTER (OUTPATIENT)
Age: 57
End: 2022-01-16

## 2022-02-04 ENCOUNTER — TELEPHONE (OUTPATIENT)
Dept: GASTROENTEROLOGY | Facility: CLINIC | Age: 57
End: 2022-02-04
Payer: COMMERCIAL

## 2022-02-04 NOTE — TELEPHONE ENCOUNTER
[facility] Call Center    Phone Message    May a detailed message be left on voicemail: yes     Reason for Call: Other:     Pt is requesting a call back to discuss having her monthly lab orders added to her COVID test appt. scheduled on 02/21.     Action Taken: Message routed to:  [facility] HEP    Travel Screening: Not Applicable

## 2022-02-07 DIAGNOSIS — Z11.59 ENCOUNTER FOR SCREENING FOR OTHER VIRAL DISEASES: Primary | ICD-10-CM

## 2022-02-07 NOTE — TELEPHONE ENCOUNTER
Advised patient to call location she is having the Covid test done to see if labs can be done at the same time.     Evangelina HERNANDEZ LPN  Hepatology Clinic

## 2022-02-16 ENCOUNTER — TELEPHONE (OUTPATIENT)
Dept: TRANSPLANT | Facility: CLINIC | Age: 57
End: 2022-02-16
Payer: COMMERCIAL

## 2022-02-16 DIAGNOSIS — Z12.11 SPECIAL SCREENING FOR MALIGNANT NEOPLASMS, COLON: Primary | ICD-10-CM

## 2022-02-16 NOTE — TELEPHONE ENCOUNTER
Patient Called regarding colonoscopy / Endo ordered by Dr. MYLES Michelle did not receive the prescription for the Prep?   Please call Viviana #[phone]    Call back needed? Yes    Return Call Needed  Same as documented in contacts section  When to return call?: Same day: [facility]

## 2022-02-17 RX ORDER — BISACODYL 5 MG
TABLET, DELAYED RELEASE (ENTERIC COATED) ORAL
Qty: 2 TABLET | Refills: 0 | Status: SHIPPED | OUTPATIENT
Start: 2022-02-17

## 2022-02-17 NOTE — TELEPHONE ENCOUNTER
Prep prescription sent to [id number] - [place], [place] - [address] AT [facility] [address]    Yoly Avalos RN

## 2022-02-18 ENCOUNTER — VIRTUAL VISIT (OUTPATIENT)
Dept: SURGERY | Facility: CLINIC | Age: 57
End: 2022-02-18
Payer: COMMERCIAL

## 2022-02-18 ENCOUNTER — TELEPHONE (OUTPATIENT)
Dept: GASTROENTEROLOGY | Facility: CLINIC | Age: 57
End: 2022-02-18

## 2022-02-18 DIAGNOSIS — Z12.11 SPECIAL SCREENING FOR MALIGNANT NEOPLASMS, COLON: ICD-10-CM

## 2022-02-18 DIAGNOSIS — Z01.818 PREOP EXAMINATION: Primary | ICD-10-CM

## 2022-02-18 DIAGNOSIS — K70.9 LIVER DISEASE DUE TO ALCOHOL (H): ICD-10-CM

## 2022-02-18 PROCEDURE — 99205 OFFICE O/P NEW HI 60 MIN: CPT | Mod: 95 | Performed by: CLINICAL NURSE SPECIALIST

## 2022-02-18 RX ORDER — IBUPROFEN 200 MG
200 TABLET ORAL EVERY 4 HOURS PRN
COMMUNITY

## 2022-02-18 ASSESSMENT — PAIN SCALES - GENERAL: PAINLEVEL: MODERATE PAIN (5)

## 2022-02-18 ASSESSMENT — LIFESTYLE VARIABLES: TOBACCO_USE: 1

## 2022-02-18 NOTE — TELEPHONE ENCOUNTER
Pre assessment questions completed for upcoming colonoscopy/EGD procedure scheduled on 2.24.2022    COVID test scheduled 2.21.2022    Pre-op:  2.18.2022    Reviewed procedural arrival time 0630 and facility location [facility].    Designated  policy reviewed. Instructed to have someone stay 24 hours post procedure.     Anticoagulation/blood thinners? no    Electronic implanted devices? no    Reviewed extended prep instructions with patient. No fiber/iron supplements or foods that contain nuts/seeds prior to procedure.     Pt stated she received the mag citrate and dulcolax tablets from the pharmacy however pt did not receive the Golytely.  RN reordered Golytely prep.     RN did talk with pt extensively re: BMI.  Pt's BMI on endo scheduling questionnaire is 49.9 however per chart 50.48.  Pt was scheduled with MAC d/t dx PTSD.  Pt has had an EGD in the past with conscious sedation and tolerated procedure well.  Pt would be willing to have colonoscopy/EGD with conscious sedation if necessary.    Patient verbalized understanding and had no questions or concerns at this time.    Yoly Avalos RN

## 2022-02-18 NOTE — H&P
Pre-Operative H & P     CC:  Preoperative exam to assess for increased cardiopulmonary risk while undergoing surgery and anesthesia.    Date of Encounter: 2/18/2022  Primary Care Physician:  Paula Veliz     Reason for visit:   Encounter Diagnoses   Name Primary?     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)        HPI  Viviana Schaefer is a 56 year old female who presents for pre-operative H & P in preparation for  Procedure Information     Case: [id number] Date/Time: 02/24/22 0800    Procedures:       COLONOSCOPY (N/A Anus) - Colon cancer screening [Z12.11]  Alcoholic cirrhosis of liver with ascites (H) [K70.31]      ESOPHAGOGASTRODUODENOSCOPY (EGD) (N/A Esophagus) - Colon cancer screening [Z12.11]  Alcoholic cirrhosis of liver with ascites (H) [K70.31]    Anesthesia type: Monitor Anesthesia Care    Diagnosis: Colon cancer screening [Z12.11]    Pre-op diagnosis: Colon cancer screening [Z12.11]    Location:  GI 02 /  GI    Providers: Annika Martin MD            History is obtained from the patient and chart review    Patient with PORTER and alcoholic-related liver disease with resultant cirrhosis, ascites, edema, and coagulopathy. She has stopped drinking and is followed by Dr. Bundy. She is under evaluation for liver transplant and above screening colonoscopy is required. Her esophagus will also be evaluated for varices or other changes.     As part of her transplant evaluation she had cardiac evaluation with testing. Findings included mild aortic stenosis, normal PA pressures, and nonobstructive CAD.     Her history is otherwise significant for obesity, BMI 50, HLD, anemia, thrombocytopenia, GERD, and anxiety.       Hx of abnormal bleeding or anti-platelet use: Denies    Menstrual history: No LMP recorded. Patient has had a hysterectomy.     Past Medical History  Past Medical History:   Diagnosis Date     Alcoholic cirrhosis (H)      Arthritis      Congestive heart  failure (H)     Cervical CA      Depressive disorder      Heart murmur      Hyponatremia      Nephrolithiasis        Past Surgical History  Past Surgical History:   Procedure Laterality Date     CHOLECYSTECTOMY  2001    patient reported     CV CORONARY ANGIOGRAM N/A 02/05/2021    Procedure: CV CORONARY ANGIOGRAM;  Surgeon: Deyvi Parham MD;  Location:  [facility]     CV RIGHT HEART CATH MEASUREMENTS RECORDED N/A 02/05/2021    Procedure: CV RIGHT HEART CATH;  Surgeon: Deyvi Parham MD;  Location:  [facility]     HYSTERECTOMY  2009    Total hysterectomy     LITHOTRIPSY      multiple times, patient reported       Prior to Admission Medications  Current Outpatient Medications   Medication Sig Dispense Refill     buPROPion (WELLBUTRIN XL) 150 MG 24 hr tablet Take 150 mg by mouth every morning        busPIRone (BUSPAR) 15 MG tablet Take 15 mg by mouth 2 times daily        diphenhydrAMINE HCl (BENADRYL ALLERGY PO) Take by mouth every morning       folic acid (FOLVITE) 1 MG tablet Take 1 mg by mouth every morning        furosemide (LASIX) 40 MG tablet Take 60 mg by mouth every morning        ibuprofen (ADVIL/MOTRIN) 200 MG tablet Take 200 mg by mouth every 4 hours as needed for mild pain       omeprazole (PRILOSEC) 20 MG DR capsule Take 20 mg by mouth daily as needed        pregabalin (LYRICA) 100 MG capsule Take 100 mg by mouth 2 times daily as needed        spironolactone (ALDACTONE) 50 MG tablet Take 50 mg by mouth every morning        traZODone (DESYREL) 100 MG tablet Take 100-200 mg by mouth nightly as needed        vitamin D3 (CHOLECALCIFEROL) 50 mcg (2000 units) tablet Take 1 tablet (50 mcg) by mouth daily (Patient taking differently: Take 1 tablet by mouth every morning ) 90 tablet 4     bisacodyl (DULCOLAX) 5 MG EC tablet Take 2 tablets by mouth at 10am the day before procedure. 2 tablet 0     calcium carbonate-vitamin D (OSCAL W/D) 500-200 MG-UNIT tablet Take 1 tablet by mouth 2  times daily (with meals) (Patient not taking: Reported on 2/18/2022) 180 tablet 3     ferrous sulfate (FEROSUL) 325 (65 Fe) MG tablet TAKE 1 TABLET BY MOUTH DAILY WITH BREAKFAST (Patient not taking: Reported on 2/18/2022)       levofloxacin (LEVAQUIN) 750 MG tablet Take 1 tablet (750 mg) by mouth daily (Patient not taking: Reported on 11/3/2021) 7 tablet 0     LORazepam (ATIVAN) 0.5 MG tablet Take 1 tablet (0.5 mg) by mouth every 6 hours as needed for anxiety (claustrophia prior to MRI) (Patient not taking: Reported on 2/18/2022) 2 tablet 0     magnesium citrate solution Drink entire bottle at 4pm two days prior to procedure. 296 mL 0     polyethylene glycol (GOLYTELY) 236 g suspension Take as directed. At 3:00pm drink one 8oz glass every 10-15 minutes until half of the first container is empty. Store the remainder in the refrigerator. At 8:00pm drink the second half of the first container until it is gone. Before you go to bed mix the second container with water and put in refrigerator. Six hours before your check in time drink one 8oz glass every 10-15 minutes until half of container is empty. Discard the remainder of solution. 8000 mL 0     potassium chloride ER (KLOR-CON M) 20 MEQ CR tablet Take 1 tablet (20 mEq) by mouth daily 30 tablet 1     UNABLE TO FIND MEDICATION NAME: milk thisle       vitamin D2 (ERGOCALCIFEROL) 31867 units (1250 mcg) capsule Take 1 capsule (50,000 Units) by mouth once a week 12 capsule 0       Allergies  Allergies   Allergen Reactions     Codeine      Diazepam      Morphine      PN: LW Reaction: Nausea     Penicillins      PN: LW Reaction: Rash, Generalized. Reportedly had a rash with this as a child and has tolerated Augmentin since per patient. Tolerated Zosyn 3/12/19 in [facility].        Social History  Social History     Socioeconomic History     Marital status:      Spouse name: Not on file     Number of children: Not on file     Years of education: Not on file      Highest education level: Not on file   Occupational History     Not on file   Tobacco Use     Smoking status: Former Smoker     Types: Cigarettes     Smokeless tobacco: Never Used   Substance and Sexual Activity     Alcohol use: Not Currently     Comment: Last ETOH 2/21/21.     Drug use: Never     Sexual activity: Not on file   Other Topics Concern     Parent/sibling w/ CABG, MI or angioplasty before 65F 55M? Not Asked   Social History Narrative     Not on file     Social Determinants of Health     Financial Resource Strain: Not on file   Food Insecurity: Not on file   Transportation Needs: Not on file   Physical Activity: Not on file   Stress: Not on file   Social Connections: Not on file   Intimate Partner Violence: Not on file   Housing Stability: Not on file       Family History  Family History   Problem Relation Age of Onset     Substance Abuse Brother      Depression Sister      Anxiety Disorder Sister      Substance Abuse Sister      Depression Sister      Anxiety Disorder Sister      Depression Sister      Anxiety Disorder Sister      Autism Spectrum Disorder Son        Review of Systems  The complete review of systems is negative other than noted in the HPI or here.   Anesthesia Evaluation   Pt has had prior anesthetic. Type: General and MAC.    No history of anesthetic complications       ROS/MED HX  ENT/Pulmonary:     (+) tobacco use, Past use,  (-) recent URI   Neurologic:  - neg neurologic ROS     Cardiovascular: Comment: Nonobstructive CAD    (+) -----valvular problems/murmurs type: AS mild. Previous cardiac testing   Echo: Date: 5/18/20 Results:    Stress Test: Date: Results:    ECG Reviewed: Date: 10/7/21 Results:  SR  Cath: Date: 2/25/21 Results:      METS/Exercise Tolerance: 1 - Eating, dressing    Hematologic: Comments: Thrombocytopenia    (+) anemia,     Musculoskeletal:   (+) arthritis,     GI/Hepatic: Comment: Portal HYPERTENSION      (+) GERD, Asymptomatic on medication, bowel prep, liver  disease,     Renal/Genitourinary: Comment: Multiple procedures for kidney stones    (+) renal disease, type: ARF, Pt does not require dialysis, Nephrolithiasis ,     Endo: Comment: Hyponatremia    (+) Obesity,     Psychiatric/Substance Use:     (+) psychiatric history depression and anxiety alcohol abuse     Infectious Disease:  - neg infectious disease ROS     Malignancy:  - neg malignancy ROS     Other:  - neg other ROS          Virtual visit -  No vitals were obtained    Physical Exam  Constitutional: Awake, alert, no apparent distress, and appears stated age.  HENT: Normocephalic  Respiratory: non labored breathing   Neurologic: Oriented to name, place and time.   Neuropsychiatric: Calm, cooperative. Normal affect.      Prior Labs/Diagnostic Studies   All labs and imaging personally reviewed   Lab Results   Component Value Date    WBC 6.2 12/15/2021    WBC 6.7 06/11/2021     Lab Results   Component Value Date    RBC 2.91 12/15/2021    RBC 2.81 06/11/2021     Lab Results   Component Value Date    HGB 10.4 12/15/2021    HGB 10.1 06/11/2021     Lab Results   Component Value Date    HCT 31.2 12/15/2021    HCT 29.6 06/11/2021     Lab Results   Component Value Date     12/15/2021     06/11/2021     Lab Results   Component Value Date    MCH 35.7 12/15/2021    MCH 35.9 06/11/2021     Lab Results   Component Value Date    MCHC 33.3 12/15/2021    MCHC 34.1 06/11/2021     Lab Results   Component Value Date    RDW 14.3 12/15/2021    RDW 14.6 06/11/2021     Lab Results   Component Value Date    PLT 89 12/15/2021    PLT 92 06/11/2021     Last Comprehensive Metabolic Panel:  Sodium   Date Value Ref Range Status   12/24/2021 143 133 - 144 mmol/L Final   06/11/2021 142 133 - 144 mmol/L Final     Potassium   Date Value Ref Range Status   12/24/2021 4.1 3.4 - 5.3 mmol/L Final   06/11/2021 3.6 3.4 - 5.3 mmol/L Final     Chloride   Date Value Ref Range Status   12/24/2021 109 94 - 109 mmol/L Final   06/11/2021 108 94 -  109 mmol/L Final     Carbon Dioxide   Date Value Ref Range Status   06/11/2021 23 20 - 32 mmol/L Final     Carbon Dioxide (CO2)   Date Value Ref Range Status   12/24/2021 29 20 - 32 mmol/L Final     Anion Gap   Date Value Ref Range Status   12/24/2021 5 3 - 14 mmol/L Final   06/11/2021 11 3 - 14 mmol/L Final     Glucose   Date Value Ref Range Status   12/24/2021 105 (H) 70 - 99 mg/dL Final   06/11/2021 87 70 - 99 mg/dL Final     Urea Nitrogen   Date Value Ref Range Status   12/24/2021 22 7 - 30 mg/dL Final   06/11/2021 12 7 - 30 mg/dL Final     Creatinine   Date Value Ref Range Status   12/24/2021 0.86 0.52 - 1.04 mg/dL Final   06/11/2021 0.95 0.52 - 1.04 mg/dL Final     GFR Estimate   Date Value Ref Range Status   12/24/2021 79 >60 mL/min/1.73m2 Final     Comment:     Effective December 21, 2021 eGFRcr in adults is calculated using the 2021 CKD-EPI creatinine equation which includes age and gender (Donnell et al., NEJM, DOI: 10.1056/FGJRjp1718836)   06/11/2021 68 >60 mL/min/[1.73_m2] Final     Comment:     Non  GFR Calc  Starting 12/18/2018, serum creatinine based estimated GFR (eGFR) will be   calculated using the Chronic Kidney Disease Epidemiology Collaboration   (CKD-EPI) equation.       Calcium   Date Value Ref Range Status   12/24/2021 9.4 8.5 - 10.1 mg/dL Final   06/11/2021 8.7 8.5 - 10.1 mg/dL Final     Lab Results   Component Value Date    AST 41 12/15/2021    AST 54 06/11/2021     Lab Results   Component Value Date    ALT 27 12/15/2021    ALT 32 06/11/2021     No results found for: BILICONJ   Lab Results   Component Value Date    BILITOTAL 2.8 12/15/2021    BILITOTAL 6.6 06/11/2021     Lab Results   Component Value Date    ALBUMIN 2.7 12/15/2021    ALBUMIN 2.7 06/11/2021     Lab Results   Component Value Date    PROTTOTAL 6.2 12/15/2021    PROTTOTAL 6.6 06/11/2021      Lab Results   Component Value Date    ALKPHOS 363 12/15/2021    ALKPHOS 208 06/11/2021     INR 1.60    EKG: 10/7/21 Sinus  rhythm    Cardiac catheterization 1/25/21  Conclusion      Mild non-obstructive coronary artery disease    Normal PA pressures.    Left sided filling pressures are normal.    Right sided filling pressures are normal.    Normal cardiac output level.     Echocardiogram 5/18/20  CONCLUSIONS   1. Left ventricular ejection fraction is visually estimated at 55%.   2. The right ventricle is not well visualized, but appears to have   normal global function.   3. Mild aortic stenosis. Mean gradient across the aortic valve: 12   mmHg.   4. Pulmonary artery pressures cannot be estimated due to the absence   of adequate TR jet.   5. No pericardial effusion is present.   No prior study is available for comparison.     CT Chest/abd 10/7/21  IMPRESSION:   1. Small to moderate right pleural effusion with overlying  atelectasis. Mild interstitial pulmonary edema.  2. 1.0 cm subpleural left lower lobe pulmonary nodule. Recommend  follow-up per Fleischner criteria.  3. No ascites. In the right lower quadrant there is a relatively  loculated simple fluid collection that abuts and and appears to  involve the right ovary. There is no surrounding enhancing rim to  suggest abscess formation. Favor loculated ascites though consider  pelvic ultrasound for further evaluation.  4. Cirrhotic configuration of the liver. Prominent recanalized  umbilical vein.  5. Numerous prominent retroperitoneal lymph nodes, favored to be  reactive.  6. Bilateral nonobstructive nephrolithiasis.  7. Relatively extensive anasarca particularly over the abdomen.     Update planned on 2/21/22     MRI liver 5/10/21                                                                     IMPRESSION:    1. Cirrhosis and evidence of portal hypertension including  splenomegaly, ascites, and portosystemic collateral vessels.  2. No suspicious arterial enhancing focal liver lesion.   3. Unchanged 1.4 cm nonenhancing T2 hyperintense observation in  segment 8 since 9/3/2020.  LI-RADS 2.  4. Based on this exam only, the patient is within [place] criteria.      Outside records reviewed from: Care Everywhere      Assessment      Viviana Schaefer is a 56 year old female was seen as a PAC referral for risk assessment and optimization for anesthesia.    Plan/Recommendations  Pt will be optimized for the proposed procedure.  See below for details on the assessment, risk, and preoperative recommendations    NEUROLOGY  - No history of TIA, CVA or seizure  -Post Op delirium risk factors:  High co-morbid index    ENT  - No current airway concerns.  Will need to be reassessed day of surgery.  Mallampati: Unable to assess  TM: Unable to assess    CARDIAC  Cardiac evaluation in 2021 in consideration for transplant. Nonobstructive CAD, mild aortic stenosis. Normal PA pressures.  Denies chest pain or irregular HR.    - METS (Metabolic Equivalents <4, due to edema and knee pain  )RCRI-Very low risk: Class 1 0.4% complication rate  Total Score: 0        PULMONARY  Patient is not comfortable lying flat but can be on back with HOB slightly elevated.  Denies pulmonary symptoms.  Is concerned about possible GAUTAM but has never had sleep study. Intermediate risk  GAUTAM Medium Risk  Total Score: 3           GAUTAM: Snores loudly    GAUTAM: BMI over 35 kg/m2    GAUTAM: Over 50 ys old      - Tobacco History      History   Smoking Status     Former Smoker     Types: Cigarettes   Smokeless Tobacco     Never Used       GI  GERD. Omeprazole prn  Alcoholic liver disease followed by Dr. Bundy.   Being evaluated for liver transplant. MELD score 23  Lab update planned prior to above procedure 2/21/22.  Lasix and spironolactone daily.  PONV Medium Risk  Total Score: 2           1 AN PONV: Pt is Female    1 AN PONV: Patient is not a current smoker        /RENAL  - Baseline Creatinine  Last 0.86.   - Multiple procedures for kidney stones.     ENDOCRINE    - BMI: Estimated body mass index is 50.48 kg/m  as calculated from the  "following:    Height as of 12/15/21: 1.575 m (5' 2\").    Weight as of 12/15/21: 125.2 kg (276 lb).  Class 3 Obesity (BMI > 40)  - No history of Diabetes Mellitus   -Intermittent hyponatremia. Last Na 143    HEME  VTE Low Risk 0.26%  Total Score: 1           VTE: BMI greater than 39    Anemia Iron supplement   Thrombocytopenia Last platelet count 89      MSK OA with chronic knee pain.     PSYCH  - Anxiety/depression Wellbutrin. Buspar      The patient is optimized for their procedure.     Today I confirmed with patient that she has received instructions from the GI team regarding date of procedure, arrival time, eating and drinking instructions, extended bowel prep, and need for . We discussed her medications and the early arrival time. She will take her Buspar earlier with a sip of water. She will take other medications when she returns home.     Please refer to the physical examination documented by the anesthesiologist in the anesthesia record on the day of surgery.    Video-Visit Details    Type of service:  Video Visit    Patient verbally consented to video service today: YES    Video Start Time: 12:47pm   Video End Time (time video stopped): 1:05pm     Originating Location (pt. Location): Home    Distant Location (provider location):  [facility] PREOPERATIVE ASSESSMENT [facility]     Mode of Communication:  Video Conference via [employer]  On the day of service:     Prep time: 20 minutes  Visit time: 18 minutes  Documentation time: 25 minutes  ------------------------------------------  Total time: 63 minutes      BRAVO Martines CNS  Preoperative Assessment Center  [facility]  Clinic and Surgery Center  Phone: [phone]  Fax: [phone]  "

## 2022-02-18 NOTE — PROGRESS NOTES
Viviana is a 56 year old who is being evaluated via a billable video visit.      How would you like to obtain your AVS? MyChart    HPI         Review of Systems         Objective    Vitals - Patient Reported  Pain Score: Moderate Pain (5)  Pain Loc: Foot        Physical Exam   ALESSANDRO Moore LPN

## 2022-02-21 ENCOUNTER — APPOINTMENT (OUTPATIENT)
Dept: LAB | Facility: CLINIC | Age: 57
End: 2022-02-21
Payer: COMMERCIAL

## 2022-02-21 ENCOUNTER — TELEPHONE (OUTPATIENT)
Dept: UROLOGY | Facility: CLINIC | Age: 57
End: 2022-02-21

## 2022-02-21 ENCOUNTER — ANCILLARY PROCEDURE (OUTPATIENT)
Dept: CT IMAGING | Facility: CLINIC | Age: 57
End: 2022-02-21
Attending: STUDENT IN AN ORGANIZED HEALTH CARE EDUCATION/TRAINING PROGRAM
Payer: COMMERCIAL

## 2022-02-21 ENCOUNTER — LAB (OUTPATIENT)
Dept: LAB | Facility: CLINIC | Age: 57
End: 2022-02-21
Payer: COMMERCIAL

## 2022-02-21 DIAGNOSIS — R82.90 CLOUDY URINE: Primary | ICD-10-CM

## 2022-02-21 DIAGNOSIS — Z11.59 ENCOUNTER FOR SCREENING FOR OTHER VIRAL DISEASES: Primary | ICD-10-CM

## 2022-02-21 DIAGNOSIS — R82.90 CLOUDY URINE: ICD-10-CM

## 2022-02-21 DIAGNOSIS — R91.8 PULMONARY NODULES: ICD-10-CM

## 2022-02-21 DIAGNOSIS — K70.30 ALCOHOLIC CIRRHOSIS (H): ICD-10-CM

## 2022-02-21 LAB
ALBUMIN SERPL-MCNC: 3 G/DL (ref 3.4–5)
ALBUMIN UR-MCNC: 10 MG/DL
ALP SERPL-CCNC: 208 U/L (ref 40–150)
ALT SERPL W P-5'-P-CCNC: 31 U/L (ref 0–50)
ANION GAP SERPL CALCULATED.3IONS-SCNC: 7 MMOL/L (ref 3–14)
APPEARANCE UR: ABNORMAL
AST SERPL W P-5'-P-CCNC: 47 U/L (ref 0–45)
BACTERIA #/AREA URNS HPF: ABNORMAL /HPF
BILIRUB DIRECT SERPL-MCNC: 1.6 MG/DL (ref 0–0.2)
BILIRUB SERPL-MCNC: 4.2 MG/DL (ref 0.2–1.3)
BILIRUB UR QL STRIP: NEGATIVE
BUN SERPL-MCNC: 18 MG/DL (ref 7–30)
CALCIUM SERPL-MCNC: 9.7 MG/DL (ref 8.5–10.1)
CHLORIDE BLD-SCNC: 109 MMOL/L (ref 94–109)
CO2 SERPL-SCNC: 26 MMOL/L (ref 20–32)
COLOR UR AUTO: ABNORMAL
CREAT SERPL-MCNC: 0.97 MG/DL (ref 0.52–1.04)
ERYTHROCYTE [DISTWIDTH] IN BLOOD BY AUTOMATED COUNT: 14.3 % (ref 10–15)
GFR SERPL CREATININE-BSD FRML MDRD: 69 ML/MIN/1.73M2
GLUCOSE BLD-MCNC: 99 MG/DL (ref 70–99)
GLUCOSE UR STRIP-MCNC: NEGATIVE MG/DL
HCT VFR BLD AUTO: 34.1 % (ref 35–47)
HGB BLD-MCNC: 11.3 G/DL (ref 11.7–15.7)
HGB UR QL STRIP: ABNORMAL
INR PPP: 1.47 (ref 0.85–1.15)
KETONES UR STRIP-MCNC: NEGATIVE MG/DL
LEUKOCYTE ESTERASE UR QL STRIP: ABNORMAL
MCH RBC QN AUTO: 35 PG (ref 26.5–33)
MCHC RBC AUTO-ENTMCNC: 33.1 G/DL (ref 31.5–36.5)
MCV RBC AUTO: 106 FL (ref 78–100)
NITRATE UR QL: NEGATIVE
PH UR STRIP: 6 [PH] (ref 5–7)
PLATELET # BLD AUTO: 80 10E3/UL (ref 150–450)
POTASSIUM BLD-SCNC: 4.1 MMOL/L (ref 3.4–5.3)
PROT SERPL-MCNC: 6.6 G/DL (ref 6.8–8.8)
RBC # BLD AUTO: 3.23 10E6/UL (ref 3.8–5.2)
RBC #/AREA URNS AUTO: ABNORMAL /HPF
SKIP: ABNORMAL
SODIUM SERPL-SCNC: 142 MMOL/L (ref 133–144)
SP GR UR STRIP: 1 (ref 1–1.03)
SQUAMOUS #/AREA URNS AUTO: ABNORMAL /LPF
UROBILINOGEN UR STRIP-MCNC: NORMAL MG/DL
WBC # BLD AUTO: 7.2 10E3/UL (ref 4–11)
WBC #/AREA URNS AUTO: ABNORMAL /HPF
WBC CLUMPS #/AREA URNS HPF: PRESENT /HPF

## 2022-02-21 PROCEDURE — 81001 URINALYSIS AUTO W/SCOPE: CPT

## 2022-02-21 PROCEDURE — 87086 URINE CULTURE/COLONY COUNT: CPT

## 2022-02-21 PROCEDURE — U0005 INFEC AGEN DETEC AMPLI PROBE: HCPCS

## 2022-02-21 PROCEDURE — 82248 BILIRUBIN DIRECT: CPT

## 2022-02-21 PROCEDURE — 87186 SC STD MICRODIL/AGAR DIL: CPT

## 2022-02-21 PROCEDURE — 71250 CT THORAX DX C-: CPT | Mod: GC | Performed by: RADIOLOGY

## 2022-02-21 PROCEDURE — U0003 INFECTIOUS AGENT DETECTION BY NUCLEIC ACID (DNA OR RNA); SEVERE ACUTE RESPIRATORY SYNDROME CORONAVIRUS 2 (SARS-COV-2) (CORONAVIRUS DISEASE [COVID-19]), AMPLIFIED PROBE TECHNIQUE, MAKING USE OF HIGH THROUGHPUT TECHNOLOGIES AS DESCRIBED BY CMS-2020-01-R: HCPCS

## 2022-02-21 PROCEDURE — 85610 PROTHROMBIN TIME: CPT

## 2022-02-21 PROCEDURE — 87088 URINE BACTERIA CULTURE: CPT

## 2022-02-21 PROCEDURE — 85027 COMPLETE CBC AUTOMATED: CPT

## 2022-02-21 PROCEDURE — 80321 ALCOHOLS BIOMARKERS 1OR 2: CPT | Mod: 90

## 2022-02-21 PROCEDURE — 36415 COLL VENOUS BLD VENIPUNCTURE: CPT

## 2022-02-21 PROCEDURE — 80053 COMPREHEN METABOLIC PANEL: CPT

## 2022-02-21 NOTE — TELEPHONE ENCOUNTER
Spoke to pt. Pt reports starting yesterday bilateral flank pain, severe. Pt historically had a UTI that turned into sepsis and was hospitalized. Pt reports that she is feeling like she's experiencing labor pains. Currently rated 6-8/10, but reports that it can get worse where it is difficult to talk. Pt is able to urinate well. Pt always has urgency, but she feels that the feeling has worsened including frequency. Urine appears yellow and cloudy. No hematuria or fever. No abnormal vaginal discharge. Pt has not taken anything to help relieve the pain. Pt reports she is unable to take tylenol nor ibuprofen. Chart and problems list reviewed.    Christiane Olivera RN MSN    Orders Placed This Encounter   Procedures     UA reflex to Microscopic and Culture [[id number]]     Standing Status:   Future     Number of Occurrences:   1     Standing Expiration Date:   2/21/2023

## 2022-02-21 NOTE — TELEPHONE ENCOUNTER
[facility] Call Center    Phone Message    May a detailed message be left on voicemail: yes     Reason for Call: Order(s): Other:   Reason for requested: Orders for UA labs  Date needed: asap  Provider name: Dr. Aguilar    Patient calling because she is experiencing some flank pain along with cloudy urine. Will be in getting labs done today 2/21 at 4pm. Patient wondering if a lab can be ordered for that. Thank you      Action Taken: Message routed to:  [facility] ([facility]): Uro    Travel Screening: Not Applicable

## 2022-02-21 NOTE — TELEPHONE ENCOUNTER
Note below reviewed by writer. Future UA/UC ordered per protocol as patient's lab appointment is in about 5 minutes.    Attempted to reach patient by phone, but no answer. Left generic message stating that the order was placed and requested for patient to return call to clinic to discuss the message. When patient returns call, will discuss symptoms further.    Cordelia Riddle RN, BSN

## 2022-02-21 NOTE — TELEPHONE ENCOUNTER
The patient returned call regarding message below. She dropped off a urine sample. Please advise. Thank you.

## 2022-02-22 ENCOUNTER — TELEPHONE (OUTPATIENT)
Dept: UROLOGY | Facility: CLINIC | Age: 57
End: 2022-02-22
Payer: COMMERCIAL

## 2022-02-22 LAB — SARS-COV-2 RNA RESP QL NAA+PROBE: NEGATIVE

## 2022-02-22 NOTE — TELEPHONE ENCOUNTER
[facility] Call Center    Phone Message    May a detailed message be left on voicemail: yes     Reason for Call: PT would like to get lab resutls from Dr. Aguilar's nurse. Thank you    Action Taken: Message routed to:  Adult Clinics: Urology p [id number]    Travel Screening: Not Applicable

## 2022-02-22 NOTE — TELEPHONE ENCOUNTER
Spoke to pt. Pt reports her flank pain is down to 4/10 at this time. Pt calling for lab results. Pt reports she had a temp of 101 last night before going to bed. No fever this morning. Informed her of results and waiting for culture. Pt verbalized understanding.     Christiane Olivera, RN MSN

## 2022-02-22 NOTE — TELEPHONE ENCOUNTER
Spoke to pt. Informed pt that Dr. Aguilar is not able to prescribe anything for her discomfort other than what was already discussed. Pt verbalized understanding and will await culture results.     Christiane Olivera RN MSN

## 2022-02-23 ENCOUNTER — TELEPHONE (OUTPATIENT)
Dept: GASTROENTEROLOGY | Facility: CLINIC | Age: 57
End: 2022-02-23
Payer: COMMERCIAL

## 2022-02-23 ENCOUNTER — TELEPHONE (OUTPATIENT)
Dept: UROLOGY | Facility: CLINIC | Age: 57
End: 2022-02-23
Payer: COMMERCIAL

## 2022-02-23 DIAGNOSIS — Z11.59 ENCOUNTER FOR SCREENING FOR OTHER VIRAL DISEASES: Primary | ICD-10-CM

## 2022-02-23 DIAGNOSIS — N39.0 URINARY TRACT INFECTION: Primary | ICD-10-CM

## 2022-02-23 LAB — BACTERIA UR CULT: ABNORMAL

## 2022-02-23 RX ORDER — SULFAMETHOXAZOLE/TRIMETHOPRIM 800-160 MG
1 TABLET ORAL 2 TIMES DAILY
Qty: 14 TABLET | Refills: 0 | Status: SHIPPED | OUTPATIENT
Start: 2022-02-23 | End: 2022-03-02

## 2022-02-23 NOTE — TELEPHONE ENCOUNTER
[facility] Call Center    Phone Message    May a detailed message be left on voicemail: yes     Reason for Call: PT would like a call back from Dr. Aguilar 's nurse about lab resluts.     Action Taken: Message routed to:  Adult Clinics: Urology p [id number]    Travel Screening: Not Applicable

## 2022-02-23 NOTE — TELEPHONE ENCOUNTER
Received result note below per Dr. Aguilar.    Fredis Aguilar MD  P [facility] Urology Adult [place]  Please contact the patient to inform them of their Urine Culture results.     Plan   - Please send a 7 day course of Bactrim DS BID     ThanksFredis MD       Relayed note to patient. Patient verbalized understanding. Medication pended for RN review.    Anahi Call LPN on 2/23/2022 at 2:14 PM

## 2022-02-23 NOTE — TELEPHONE ENCOUNTER
Caller: Jenifer Xavier    Procedure: Colon-EGD     Date, Location, and Surgeon of Procedure Cancelled: 2-24 [facility] Annika Martin    Ordering Provider:Razia Bundy    Reason for cancel (please be detailed, any staff messages or encounters to note?): Pt running fever        Rescheduled: N     PT can be seen 3-3 [facility] CS was ok'd by Дмитрий if need be. Pt had pre-op already and I believe it expires on or around 3-15.     When pt calls back she needs to be rescheduled for EGD/Colon with Hep

## 2022-02-23 NOTE — TELEPHONE ENCOUNTER
Bactrim DS po BID for 7 days ordered per Dr. Aguilar. Prescription sent to [facility] per patient request.     Cordelia Riddle RN, BSN

## 2022-02-23 NOTE — TELEPHONE ENCOUNTER
Pt calling in with questions re: colonoscopy/EGD.    Pt stated that she had a UA done 2.21.2022 that was abnormal and she is waiting to hear if she will be started on abx or not.  Pt reports having an on and off fever yesterday and today.  RN did inform pt that she needs to be fever free x 24 hours prior to procedures.  RN will have endo scheduling reach out to pt to r/s.    Yoly Avalos RN

## 2022-02-23 NOTE — TELEPHONE ENCOUNTER
I did move pt to 3-3 CS with Annika Martin, just in case this will work for her when she calls back. I have not sent out any updated mychart or done anything else regarding this appt until we speak to pt.    uDc Nice

## 2022-02-24 LAB
PLPETH BLD-MCNC: <10 NG/ML
POPETH BLD-MCNC: <10 NG/ML

## 2022-03-01 ENCOUNTER — TELEPHONE (OUTPATIENT)
Dept: GASTROENTEROLOGY | Facility: CLINIC | Age: 57
End: 2022-03-01
Payer: COMMERCIAL

## 2022-03-01 NOTE — TELEPHONE ENCOUNTER
Caller: Viviana    Procedure: Colon/EGD    Date, Location, and Surgeon of Procedure Cancelled: 3/3/22 [facility]    Ordering Provider:Gregor    Reason for cancel (please be detailed, any staff messages or encounters to note?): Patient    Yoly Avalos, RN  P Endoscopy Scheduling Pool  Cc: P Endoscopy Nurse Pool  Endo lindaling,     Pt's Teachablehart message was forwarded to endo scheduling as pt was unaware she was placed on the scheduled for 3.3.2022.  Will someone please look into this?     Pt is suppose to have EGD/colonoscopy with MAC at [facility].  Order placed by Razia Bundy MD.     If she doesn't have any MAC time perhaps she would be okay with another hepatology doc seeing pt?     Дмитрий Goldstein         Rescheduled: Yes     If rescheduled:    Date: 4/28/22   Location: [facility]   Note any change or update to original order/sedation: MAC sedation, only if BMI is under 50.  Patient aware that the BMI needs to be under 50 for MAC sedation to be used

## 2022-03-09 ENCOUNTER — VIRTUAL VISIT (OUTPATIENT)
Dept: GASTROENTEROLOGY | Facility: CLINIC | Age: 57
End: 2022-03-09
Attending: STUDENT IN AN ORGANIZED HEALTH CARE EDUCATION/TRAINING PROGRAM
Payer: COMMERCIAL

## 2022-03-09 DIAGNOSIS — N83.201 CYST OF RIGHT OVARY: ICD-10-CM

## 2022-03-09 DIAGNOSIS — F10.21 ALCOHOL DEPENDENCE IN REMISSION (H): ICD-10-CM

## 2022-03-09 DIAGNOSIS — K70.31 ALCOHOLIC CIRRHOSIS OF LIVER WITH ASCITES (H): Primary | ICD-10-CM

## 2022-03-09 PROCEDURE — 99215 OFFICE O/P EST HI 40 MIN: CPT | Mod: 95 | Performed by: STUDENT IN AN ORGANIZED HEALTH CARE EDUCATION/TRAINING PROGRAM

## 2022-03-09 PROCEDURE — G0463 HOSPITAL OUTPT CLINIC VISIT: HCPCS | Mod: PN,RTG | Performed by: STUDENT IN AN ORGANIZED HEALTH CARE EDUCATION/TRAINING PROGRAM

## 2022-03-09 ASSESSMENT — PAIN SCALES - GENERAL: PAINLEVEL: NO PAIN (0)

## 2022-03-09 NOTE — PROGRESS NOTES
"Viviana is a 56 year old who is being evaluated via a billable video visit.      How would you like to obtain your AVS? MyChart  If the video visit is dropped, the invitation should be resent by: Text to cell phone: [phone]  Will anyone else be joining your video visit? No      Video Start Time: 1:06 PM  Video-Visit Details    Type of service:  Video Visit    Video End Time:1:26 PM    Originating Location (pt. Location): Home    Distant Location (provider location):  [facility]     Platform used for Video Visit: [employer]      [facility] Hepatology clinic    Date of Visit: 3/9/2022    Reason for referral: Liver Transplant Evaluation    Subjective: Ms. 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 CT showed cirrhosis without a mass, ascites and a pleural effusion. No prior history of liver disease - states she had an US fall 2018, was not told what it showed, but later was told she had \"fatty liver\". Denies being told LFTs elevated in the past. Underwent thoracentesis and paracentesis that were negative for SBP. She apparently had normal A1AT, AMA, AMA, Ferritin. Ceruloplasmin was low at 14, 24 hour urine copper < 1.0 micrograms/L. No KF rings seen with ophthalmology. She was started on diuretics, fluid resolved with lasix 40/nik 50. She tells me that she was diagnosed with PORTER. After this hospitalization, was sober for until Fall 2019 - around that time she had a few drinks and stopped. States she drank again 5/2020 - had a couple of drinks celebrating son's Bday.     States she abused alcohol after each divorce (1998, 2012 - drinking \"a lot\"), and then would drink regularly after that - ranging from nothing to a few " "drinks a week. Last drink. No DUIs. Did outpatient treatment (2003) because ex- felt she was drinking too much, states that he projected his drinking onto her, did this to appease him. And then AA for several years. Was sober between [id number], started drinking 6 months after her divorce. She has had shakes, no withdrawal. States she was drinking socially prior to 3/2019.  CD Eval done recommend she attend 6 individual psychotherapy sessions, attend 2 weekly support group meetings    Readmitted 5/2020 with jaundice and abdominal distention after drinking related to her son's birthday. Na was 118, down from 140 1/2020. Thought to have alcoholic hepatitis - TBR 10.4 (3 1/2020), , ALT 38, Alk Phos 102, INR 1.9. Patient reported she had been sober except for two mikes hard lemonades prior to admission. Started on steroids. Diuretics held (no ascites) and Na improved. Diuretics restarted at lower dose as an outpatient (lasix 20, nik 25).     BR increased again 8/2020 to 11.5, up from 3.8 6/2020. US with Doppler showed cirrhosis. She denied alcohol use. She was readmitted 9/2020 with SOB, found to have a large pleural effusion - tapped felt to be HH. MELD 26. CT showed an indeterminate liver lesion - 15 mm. Diuretics held for KWABENA, then resumed.     Had MRI 1/19/21 to follow up LR3 lesion - showed 1.4 cm LR 2 lesion. Also showed ascites and pleural effusion    She has a long history of hematuria - history of recurrent kidney stones and medullary sponge kidney. When she last saw Nephrology they noted she did not have evidence of medullary sponge kidney on IVP in 2001. Currently having some flank pain, denies s/s of UTI.     Interval Events:   - Doing well  - Had another UTI with \"intense pain\" - got 7 days of antibiotics, now doing well. Did not think this was typical of kidney stone pain she has had in the bad  - Will have variable edema in her legs - taking lasix 60 mg daily and spironolactone 50 mg daily. " Does not think her abdomen is more distended  - No GI bleeding or confusion    ROS: 14 point ROS negative except for positives noted in HPI.    PMHx:  Past Medical History:   Diagnosis Date     Alcoholic cirrhosis (H)      Arthritis      Congestive heart failure (H)     Cervical CA      Depressive disorder      Heart murmur      Hyponatremia      Nephrolithiasis        PSHx:  Past Surgical History:   Procedure Laterality Date     CHOLECYSTECTOMY  2001    patient reported     CV CORONARY ANGIOGRAM N/A 02/05/2021    Procedure: CV CORONARY ANGIOGRAM;  Surgeon: Deyvi Parham MD;  Location: [facility]     CV RIGHT HEART CATH MEASUREMENTS RECORDED N/A 02/05/2021    Procedure: CV RIGHT HEART CATH;  Surgeon: Deyvi Parham MD;  Location: [facility]     HYSTERECTOMY  2009    Total hysterectomy     LITHOTRIPSY      multiple times, patient reported       FamHx:  Family History   Problem Relation Age of Onset     Substance Abuse Brother      Depression Sister      Anxiety Disorder Sister      Substance Abuse Sister      Depression Sister      Anxiety Disorder Sister      Depression Sister      Anxiety Disorder Sister      Autism Spectrum Disorder Son    No history of liver disease    SocHx:  Social History     Socioeconomic History     Marital status:      Spouse name: Not on file     Number of children: Not on file     Years of education: Not on file     Highest education level: Not on file   Occupational History     Not on file   Tobacco Use     Smoking status: Former Smoker     Types: Cigarettes     Smokeless tobacco: Never Used   Substance and Sexual Activity     Alcohol use: Not Currently     Comment: Last ETOH 2/21/21.     Drug use: Never     Sexual activity: Not on file   Other Topics Concern     Parent/sibling w/ CABG, MI or angioplasty before 65F 55M? Not Asked   Social History Narrative     Not on file     Social Determinants of Health     Financial Resource Strain: Not on  file   Food Insecurity: Not on file   Transportation Needs: Not on file   Physical Activity: Not on file   Stress: Not on file   Social Connections: Not on file   Intimate Partner Violence: Not on file   Housing Stability: Not on file   4 sons - ex- has 17 year old (high functioning autistic). 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    Medications:  Current Outpatient Medications   Medication     buPROPion (WELLBUTRIN XL) 150 MG 24 hr tablet     busPIRone (BUSPAR) 15 MG tablet     calcium carbonate-vitamin D (OSCAL W/D) 500-200 MG-UNIT tablet     diphenhydrAMINE HCl (BENADRYL ALLERGY PO)     ferrous sulfate (FEROSUL) 325 (65 Fe) MG tablet     folic acid (FOLVITE) 1 MG tablet     furosemide (LASIX) 40 MG tablet     ibuprofen (ADVIL/MOTRIN) 200 MG tablet     LORazepam (ATIVAN) 0.5 MG tablet     omeprazole (PRILOSEC) 20 MG DR capsule     pregabalin (LYRICA) 100 MG capsule     spironolactone (ALDACTONE) 50 MG tablet     traZODone (DESYREL) 100 MG tablet     vitamin D3 (CHOLECALCIFEROL) 50 mcg (2000 units) tablet     bisacodyl (DULCOLAX) 5 MG EC tablet     levofloxacin (LEVAQUIN) 750 MG tablet     magnesium citrate solution     polyethylene glycol (GOLYTELY) 236 g suspension     potassium chloride ER (KLOR-CON M) 20 MEQ CR tablet     UNABLE TO FIND     vitamin D2 (ERGOCALCIFEROL) 61143 units (1250 mcg) capsule     No current facility-administered medications for this visit.     Ativan was just for MRI     Allergies:     Allergies   Allergen Reactions     Codeine      Diazepam      Morphine      PN: LW Reaction: Nausea     Penicillins      PN: LW Reaction: Rash, Generalized. Reportedly had a rash with this as a child and has tolerated Augmentin since per patient. Tolerated Zosyn 3/12/19 in [facility].        Objective:  There were no vitals taken for this visit.  Constitutional: pleasant woman in NAD  Eyes: non icteric  Respiratory: Normal respiratory  excursion   MSK: normal range of motion of visualized extremities  Skin: no jaundice  Psychiatric: normal mood and orientation    Labs:  Last Comprehensive Metabolic Panel:  Sodium   Date Value Ref Range Status   02/21/2022 142 133 - 144 mmol/L Final   06/11/2021 142 133 - 144 mmol/L Final     Potassium   Date Value Ref Range Status   02/21/2022 4.1 3.4 - 5.3 mmol/L Final   06/11/2021 3.6 3.4 - 5.3 mmol/L Final     Chloride   Date Value Ref Range Status   02/21/2022 109 94 - 109 mmol/L Final   06/11/2021 108 94 - 109 mmol/L Final     Carbon Dioxide   Date Value Ref Range Status   06/11/2021 23 20 - 32 mmol/L Final     Carbon Dioxide (CO2)   Date Value Ref Range Status   02/21/2022 26 20 - 32 mmol/L Final     Anion Gap   Date Value Ref Range Status   02/21/2022 7 3 - 14 mmol/L Final   06/11/2021 11 3 - 14 mmol/L Final     Glucose   Date Value Ref Range Status   02/21/2022 99 70 - 99 mg/dL Final   06/11/2021 87 70 - 99 mg/dL Final     Urea Nitrogen   Date Value Ref Range Status   02/21/2022 18 7 - 30 mg/dL Final   06/11/2021 12 7 - 30 mg/dL Final     Creatinine   Date Value Ref Range Status   02/21/2022 0.97 0.52 - 1.04 mg/dL Final   06/11/2021 0.95 0.52 - 1.04 mg/dL Final     GFR Estimate   Date Value Ref Range Status   02/21/2022 69 >60 mL/min/1.73m2 Final     Comment:     Effective December 21, 2021 eGFRcr in adults is calculated using the 2021 CKD-EPI creatinine equation which includes age and gender (Donnell ge al., NEJM, DOI: 10.1056/TXVZes1319583)   06/11/2021 68 >60 mL/min/[1.73_m2] Final     Comment:     Non  GFR Calc  Starting 12/18/2018, serum creatinine based estimated GFR (eGFR) will be   calculated using the Chronic Kidney Disease Epidemiology Collaboration   (CKD-EPI) equation.       Calcium   Date Value Ref Range Status   02/21/2022 9.7 8.5 - 10.1 mg/dL Final   06/11/2021 8.7 8.5 - 10.1 mg/dL Final     Bilirubin Total   Date Value Ref Range Status   02/21/2022 4.2 (H) 0.2 - 1.3 mg/dL  Final   06/11/2021 6.6 (H) 0.2 - 1.3 mg/dL Final     Alkaline Phosphatase   Date Value Ref Range Status   02/21/2022 208 (H) 40 - 150 U/L Final   06/11/2021 208 (H) 40 - 150 U/L Final     ALT   Date Value Ref Range Status   02/21/2022 31 0 - 50 U/L Final   06/11/2021 32 0 - 50 U/L Final     AST   Date Value Ref Range Status   02/21/2022 47 (H) 0 - 45 U/L Final   06/11/2021 54 (H) 0 - 45 U/L Final     Lab Results   Component Value Date    WBC 8.9 03/01/2021     Lab Results   Component Value Date    RBC 3.20 03/01/2021     Lab Results   Component Value Date    HGB 11.2 03/01/2021     Lab Results   Component Value Date    HCT 33.6 03/01/2021     No components found for: MCT  Lab Results   Component Value Date     03/01/2021     Lab Results   Component Value Date    MCH 35.0 03/01/2021     Lab Results   Component Value Date    MCHC 33.3 03/01/2021     Lab Results   Component Value Date    RDW 16.3 03/01/2021     Lab Results   Component Value Date     03/01/2021     MELD-Na score: 16 at 2/21/2022  3:48 PM  MELD score: 16 at 2/21/2022  3:48 PM  Calculated from:  Serum Creatinine: 0.97 mg/dL (Using min of 1 mg/dL) at 2/21/2022  3:48 PM  Serum Sodium: 142 mmol/L (Using max of 137 mmol/L) at 2/21/2022  3:48 PM  Total Bilirubin: 4.2 mg/dL at 2/21/2022  3:48 PM  INR(ratio): 1.47 at 2/21/2022  3:48 PM  Age: 56 years    Imaging:    MRI Liver 5/2021    IMPRESSION:    1. Cirrhosis and evidence of portal hypertension including  splenomegaly, ascites, and portosystemic collateral vessels.  2. No suspicious arterial enhancing focal liver lesion.   3. Unchanged 1.4 cm nonenhancing T2 hyperintense observation in  segment 8 since 9/3/2020. LI-RADS 2.  4. Based on this exam only, the patient is within Bebo criteria.    Endoscopy:    EGD 1/2020 showed normal esophagus, small HH, mild congestive gastropathy    Assessment/Plan: Ms. 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 Liver disease complicated by fluid overload in the past - ascites and HH. She has a LR 3 lesion seen on MRI 9/2020 - follow up MRI 1/2021 and 5/2021 showing LR2 lesion.     Liver evaluation was closed in the past due to positive Peth. She has since been sober and has engaged in alcohol counseling. Discussed re opening liver transplant evaluation, she does not want to receive her COVID vaccine which is a requirement to be listed, so not opening her evaluation again. In addition, she has clinically improved with a lower MELD.     - Continue complete sobriety from alcohol  - Continue diuretics at current dose - mainly for LE edema  - HCC screening: CT 10/2021 without masses, AFP chronically elevated but downtrending. She has a LR 3 lesion seen on MRI 9/2020 - follow up MRI 1/2021 and 5/2021 showing LR2 lesion. Ordered RUQ US for HCC Screening  - Ordered pelvic US for pelvic fluid/cyst - follow up originally done for LT evaluation  - Recommend discussing with PCP follow up CT 6-12 months  - Has upcoming screening colonoscopy and EGD    Razia Bundy MD MSc  Transplant Hepatologist  [facility]    Approximately 20 minutes was spent for the visit with 20 minutes of non face-to-face time were spent in review of the patient's medical record on the day of the visit. This included review of previous: clinic visits, hospital records, lab results, imaging studies, and documentation.  The findings from this review are summarized in the above note.

## 2022-03-09 NOTE — LETTER
"  3/9/2022    RE: Viviana Schaefer  [address]  [facility] [address]    Dear Colleague,    Thank you for referring your patient, Viviana Schaefer, to the [facility] [place]. Please see a copy of my visit note below.    Viviana is a 56 year old who is being evaluated via a billable video visit.      How would you like to obtain your AVS? MyChart  If the video visit is dropped, the invitation should be resent by: Text to cell phone: [phone]  Will anyone else be joining your video visit? No      Video Start Time: 1:06 PM  Video-Visit Details    Type of service:  Video Visit    Video End Time:1:26 PM    Originating Location (pt. Location): Home    Distant Location (provider location):  [facility] [place]     Platform used for Video Visit: [employer]      [facility] Hepatology clinic    Date of Visit: 3/9/2022    Reason for referral: Liver Transplant Evaluation    Subjective: Ms. 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 CT showed cirrhosis without a mass, ascites and a pleural effusion. No prior history of liver disease - states she had an US fall 2018, was not told what it showed, but later was told she had \"fatty liver\". Denies being told LFTs elevated in the past. Underwent thoracentesis and paracentesis that were negative for SBP. She apparently had normal A1AT, AMA, AMA, Ferritin. Ceruloplasmin was low at 14, 24 hour urine copper < 1.0 micrograms/L. No KF rings seen with ophthalmology. She was started on diuretics, fluid resolved with lasix 40/nik 50. She tells me that she was diagnosed with PORTER. After this hospitalization, was sober for until Fall 2019 - around that time she had a few " "drinks and stopped. States she drank again 5/2020 - had a couple of drinks celebrating son's Bday.     States she abused alcohol after each divorce (1998, 2012 - drinking \"a lot\"), and then would drink regularly after that - ranging from nothing to a few drinks a week. Last drink. No DUIs. Did outpatient treatment (2003) because ex- felt she was drinking too much, states that he projected his drinking onto her, did this to appease him. And then AA for several years. Was sober between 5125-2986, started drinking 6 months after her divorce. She has had shakes, no withdrawal. States she was drinking socially prior to 3/2019.  CD Darline done recommend she attend 6 individual psychotherapy sessions, attend 2 weekly support group meetings    Readmitted 5/2020 with jaundice and abdominal distention after drinking related to her son's birthday. Na was 118, down from 140 1/2020. Thought to have alcoholic hepatitis - TBR 10.4 (3 1/2020), , ALT 38, Alk Phos 102, INR 1.9. Patient reported she had been sober except for two mikes hard lemonades prior to admission. Started on steroids. Diuretics held (no ascites) and Na improved. Diuretics restarted at lower dose as an outpatient (lasix 20, nik 25).     BR increased again 8/2020 to 11.5, up from 3.8 6/2020. US with Doppler showed cirrhosis. She denied alcohol use. She was readmitted 9/2020 with SOB, found to have a large pleural effusion - tapped felt to be HH. MELD 26. CT showed an indeterminate liver lesion - 15 mm. Diuretics held for KWABENA, then resumed.     Had MRI 1/19/21 to follow up LR3 lesion - showed 1.4 cm LR 2 lesion. Also showed ascites and pleural effusion    She has a long history of hematuria - history of recurrent kidney stones and medullary sponge kidney. When she last saw Nephrology they noted she did not have evidence of medullary sponge kidney on IVP in 2001. Currently having some flank pain, denies s/s of UTI.     Interval Events:   - Doing well  - " "Had another UTI with \"intense pain\" - got 7 days of antibiotics, now doing well. Did not think this was typical of kidney stone pain she has had in the bad  - Will have variable edema in her legs - taking lasix 60 mg daily and spironolactone 50 mg daily. Does not think her abdomen is more distended  - No GI bleeding or confusion    ROS: 14 point ROS negative except for positives noted in HPI.    PMHx:  Past Medical History:   Diagnosis Date     Alcoholic cirrhosis (H)      Arthritis      Congestive heart failure (H)     Cervical CA      Depressive disorder      Heart murmur      Hyponatremia      Nephrolithiasis        PSHx:  Past Surgical History:   Procedure Laterality Date     CHOLECYSTECTOMY  2001    patient reported     CV CORONARY ANGIOGRAM N/A 02/05/2021    Procedure: CV CORONARY ANGIOGRAM;  Surgeon: Deyvi Parham MD;  Location: [facility]     CV RIGHT HEART CATH MEASUREMENTS RECORDED N/A 02/05/2021    Procedure: CV RIGHT HEART CATH;  Surgeon: Deyvi Parham MD;  Location: [facility]     HYSTERECTOMY  2009    Total hysterectomy     LITHOTRIPSY      multiple times, patient reported       FamHx:  Family History   Problem Relation Age of Onset     Substance Abuse Brother      Depression Sister      Anxiety Disorder Sister      Substance Abuse Sister      Depression Sister      Anxiety Disorder Sister      Depression Sister      Anxiety Disorder Sister      Autism Spectrum Disorder Son    No history of liver disease    SocHx:  Social History     Socioeconomic History     Marital status:      Spouse name: Not on file     Number of children: Not on file     Years of education: Not on file     Highest education level: Not on file   Occupational History     Not on file   Tobacco Use     Smoking status: Former Smoker     Types: Cigarettes     Smokeless tobacco: Never Used   Substance and Sexual Activity     Alcohol use: Not Currently     Comment: Last ETOH 2/21/21.     Drug " use: Never     Sexual activity: Not on file   Other Topics Concern     Parent/sibling w/ CABG, MI or angioplasty before 65F 55M? Not Asked   Social History Narrative     Not on file     Social Determinants of Health     Financial Resource Strain: Not on file   Food Insecurity: Not on file   Transportation Needs: Not on file   Physical Activity: Not on file   Stress: Not on file   Social Connections: Not on file   Intimate Partner Violence: Not on file   Housing Stability: Not on file   4 sons - ex- has 17 year old (high functioning autistic). 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    Medications:  Current Outpatient Medications   Medication     buPROPion (WELLBUTRIN XL) 150 MG 24 hr tablet     busPIRone (BUSPAR) 15 MG tablet     calcium carbonate-vitamin D (OSCAL W/D) 500-200 MG-UNIT tablet     diphenhydrAMINE HCl (BENADRYL ALLERGY PO)     ferrous sulfate (FEROSUL) 325 (65 Fe) MG tablet     folic acid (FOLVITE) 1 MG tablet     furosemide (LASIX) 40 MG tablet     ibuprofen (ADVIL/MOTRIN) 200 MG tablet     LORazepam (ATIVAN) 0.5 MG tablet     omeprazole (PRILOSEC) 20 MG DR capsule     pregabalin (LYRICA) 100 MG capsule     spironolactone (ALDACTONE) 50 MG tablet     traZODone (DESYREL) 100 MG tablet     vitamin D3 (CHOLECALCIFEROL) 50 mcg (2000 units) tablet     bisacodyl (DULCOLAX) 5 MG EC tablet     levofloxacin (LEVAQUIN) 750 MG tablet     magnesium citrate solution     polyethylene glycol (GOLYTELY) 236 g suspension     potassium chloride ER (KLOR-CON M) 20 MEQ CR tablet     UNABLE TO FIND     vitamin D2 (ERGOCALCIFEROL) 44242 units (1250 mcg) capsule     No current facility-administered medications for this visit.     Ativan was just for MRI     Allergies:     Allergies   Allergen Reactions     Codeine      Diazepam      Morphine      PN: LW Reaction: Nausea     Penicillins      PN: LW Reaction: Rash, Generalized. Reportedly had a rash with this as a child and  has tolerated Augmentin since per patient. Tolerated Zosyn 3/12/19 in [facility].        Objective:  There were no vitals taken for this visit.  Constitutional: pleasant woman in NAD  Eyes: non icteric  Respiratory: Normal respiratory excursion   MSK: normal range of motion of visualized extremities  Skin: no jaundice  Psychiatric: normal mood and orientation    Labs:  Last Comprehensive Metabolic Panel:  Sodium   Date Value Ref Range Status   02/21/2022 142 133 - 144 mmol/L Final   06/11/2021 142 133 - 144 mmol/L Final     Potassium   Date Value Ref Range Status   02/21/2022 4.1 3.4 - 5.3 mmol/L Final   06/11/2021 3.6 3.4 - 5.3 mmol/L Final     Chloride   Date Value Ref Range Status   02/21/2022 109 94 - 109 mmol/L Final   06/11/2021 108 94 - 109 mmol/L Final     Carbon Dioxide   Date Value Ref Range Status   06/11/2021 23 20 - 32 mmol/L Final     Carbon Dioxide (CO2)   Date Value Ref Range Status   02/21/2022 26 20 - 32 mmol/L Final     Anion Gap   Date Value Ref Range Status   02/21/2022 7 3 - 14 mmol/L Final   06/11/2021 11 3 - 14 mmol/L Final     Glucose   Date Value Ref Range Status   02/21/2022 99 70 - 99 mg/dL Final   06/11/2021 87 70 - 99 mg/dL Final     Urea Nitrogen   Date Value Ref Range Status   02/21/2022 18 7 - 30 mg/dL Final   06/11/2021 12 7 - 30 mg/dL Final     Creatinine   Date Value Ref Range Status   02/21/2022 0.97 0.52 - 1.04 mg/dL Final   06/11/2021 0.95 0.52 - 1.04 mg/dL Final     GFR Estimate   Date Value Ref Range Status   02/21/2022 69 >60 mL/min/1.73m2 Final     Comment:     Effective December 21, 2021 eGFRcr in adults is calculated using the 2021 CKD-EPI creatinine equation which includes age and gender (Donnell ge al., NEJ, DOI: 10.1056/TSZDwz3395467)   06/11/2021 68 >60 mL/min/[1.73_m2] Final     Comment:     Non  GFR Calc  Starting 12/18/2018, serum creatinine based estimated GFR (eGFR) will be   calculated using the Chronic Kidney Disease Epidemiology  Collaboration   (CKD-EPI) equation.       Calcium   Date Value Ref Range Status   02/21/2022 9.7 8.5 - 10.1 mg/dL Final   06/11/2021 8.7 8.5 - 10.1 mg/dL Final     Bilirubin Total   Date Value Ref Range Status   02/21/2022 4.2 (H) 0.2 - 1.3 mg/dL Final   06/11/2021 6.6 (H) 0.2 - 1.3 mg/dL Final     Alkaline Phosphatase   Date Value Ref Range Status   02/21/2022 208 (H) 40 - 150 U/L Final   06/11/2021 208 (H) 40 - 150 U/L Final     ALT   Date Value Ref Range Status   02/21/2022 31 0 - 50 U/L Final   06/11/2021 32 0 - 50 U/L Final     AST   Date Value Ref Range Status   02/21/2022 47 (H) 0 - 45 U/L Final   06/11/2021 54 (H) 0 - 45 U/L Final     Lab Results   Component Value Date    WBC 8.9 03/01/2021     Lab Results   Component Value Date    RBC 3.20 03/01/2021     Lab Results   Component Value Date    HGB 11.2 03/01/2021     Lab Results   Component Value Date    HCT 33.6 03/01/2021     No components found for: MCT  Lab Results   Component Value Date     03/01/2021     Lab Results   Component Value Date    MCH 35.0 03/01/2021     Lab Results   Component Value Date    MCHC 33.3 03/01/2021     Lab Results   Component Value Date    RDW 16.3 03/01/2021     Lab Results   Component Value Date     03/01/2021     MELD-Na score: 16 at 2/21/2022  3:48 PM  MELD score: 16 at 2/21/2022  3:48 PM  Calculated from:  Serum Creatinine: 0.97 mg/dL (Using min of 1 mg/dL) at 2/21/2022  3:48 PM  Serum Sodium: 142 mmol/L (Using max of 137 mmol/L) at 2/21/2022  3:48 PM  Total Bilirubin: 4.2 mg/dL at 2/21/2022  3:48 PM  INR(ratio): 1.47 at 2/21/2022  3:48 PM  Age: 56 years    Imaging:    MRI Liver 5/2021    IMPRESSION:    1. Cirrhosis and evidence of portal hypertension including  splenomegaly, ascites, and portosystemic collateral vessels.  2. No suspicious arterial enhancing focal liver lesion.   3. Unchanged 1.4 cm nonenhancing T2 hyperintense observation in  segment 8 since 9/3/2020. LI-RADS 2.  4. Based on this exam only,  the patient is within [place] criteria.    Endoscopy:    EGD 1/2020 showed normal esophagus, small HH, mild congestive gastropathy    Assessment/Plan: Ms. 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 Liver disease complicated by fluid overload in the past - ascites and HH. She has a LR 3 lesion seen on MRI 9/2020 - follow up MRI 1/2021 and 5/2021 showing LR2 lesion.     Liver evaluation was closed in the past due to positive Peth. She has since been sober and has engaged in alcohol counseling. Discussed re opening liver transplant evaluation, she does not want to receive her COVID vaccine which is a requirement to be listed, so not opening her evaluation again. In addition, she has clinically improved with a lower MELD.     - Continue complete sobriety from alcohol  - Continue diuretics at current dose - mainly for LE edema  - HCC screening: CT 10/2021 without masses, AFP chronically elevated but downtrending. She has a LR 3 lesion seen on MRI 9/2020 - follow up MRI 1/2021 and 5/2021 showing LR2 lesion. Ordered RUQ US for HCC Screening  - Ordered pelvic US for pelvic fluid/cyst - follow up originally done for LT evaluation  - Recommend discussing with PCP follow up CT 6-12 months  - Has upcoming screening colonoscopy and EGD    Razia Bundy MD MSc  Transplant Hepatologist  [facility]    Approximately 20 minutes was spent for the visit with 20 minutes of non face-to-face time were spent in review of the patient's medical record on the day of the visit. This included review of previous: clinic visits, hospital records, lab results, imaging studies, and documentation.  The findings from this review are summarized in the above note.

## 2022-03-14 DIAGNOSIS — Z11.59 ENCOUNTER FOR SCREENING FOR OTHER VIRAL DISEASES: Primary | ICD-10-CM

## 2022-03-28 ENCOUNTER — TELEPHONE (OUTPATIENT)
Dept: GASTROENTEROLOGY | Facility: CLINIC | Age: 57
End: 2022-03-28
Payer: COMMERCIAL

## 2022-03-28 NOTE — TELEPHONE ENCOUNTER
Caller: Viviana Schaefer    Procedure: colon and egd    Date, Location, and Surgeon of Procedure Cancelled: 4/28/22 [facility]    Ordering Provider:Gregor    Reason for cancel (please be detailed, any staff messages or encounters to note?): patient going to do with her GI md at [facility]        Rescheduled:      If rescheduled:    Date:    Location:   Note any change or update to original order/sedation:

## 2022-05-13 ENCOUNTER — ANCILLARY PROCEDURE (OUTPATIENT)
Dept: ULTRASOUND IMAGING | Facility: CLINIC | Age: 57
End: 2022-05-13
Attending: STUDENT IN AN ORGANIZED HEALTH CARE EDUCATION/TRAINING PROGRAM
Payer: COMMERCIAL

## 2022-05-13 DIAGNOSIS — N83.201 CYST OF RIGHT OVARY: ICD-10-CM

## 2022-05-13 PROCEDURE — 76856 US EXAM PELVIC COMPLETE: CPT | Performed by: RADIOLOGY

## 2022-06-17 ENCOUNTER — DOCUMENTATION ONLY (OUTPATIENT)
Dept: TRANSPLANT | Facility: CLINIC | Age: 57
End: 2022-06-17
Payer: COMMERCIAL

## 2022-06-17 NOTE — PROGRESS NOTES
From: Brooklyn Swanson, MSW  Sent: 6/16/2022   3:27 PM CDT  To: Landon Cleaning Jr. RN  Subject: RE: Checking in                                  She completed outpatient CD treatment in Oct 2021. She is engaged in AA. PEths since 3/1/21 have been negative.    I don't need to see her unless we are putting her back in eval.      JL

## 2022-08-24 ENCOUNTER — TELEPHONE (OUTPATIENT)
Dept: GASTROENTEROLOGY | Facility: CLINIC | Age: 57
End: 2022-08-24

## 2022-08-24 NOTE — TELEPHONE ENCOUNTER
Called patient to schedule EGD/Colon.  Patient has moved to [place] and will be switching her healthcare to there and [facility].

## 2023-04-23 ENCOUNTER — HEALTH MAINTENANCE LETTER (OUTPATIENT)
Age: 58
End: 2023-04-23

## 2023-09-24 NOTE — ADDENDUM NOTE
Encounter addended by: Peri Haynes LADC on: 9/9/2021 10:45 AM   Actions taken: Clinical Note Signed
Patient requests all Lab, Cardiology, and Radiology Results on their Discharge Instructions

## 2024-02-10 ENCOUNTER — HEALTH MAINTENANCE LETTER (OUTPATIENT)
Age: 59
End: 2024-02-10

## 2024-06-29 ENCOUNTER — HEALTH MAINTENANCE LETTER (OUTPATIENT)
Age: 59
End: 2024-06-29

## 2024-09-18 NOTE — LETTER
12/16/2021    Viviana Schaefer  [address]  [facility] [address]      Dear Viviana,    Thank you for your interest in the [facility]. We look forward to being a part of your care team and assisting you through the transplant process.    As we discussed, your transplant coordinator is Landon Cleaning Jr. (Liver).  You may call your coordinator at any time with questions or concerns call [phone].    Please complete the following.    1. Fill out and return the enclosed forms    Authorization for Electronic Communication    Authorization to Discuss Protected Health Information    Authorization for Release of Protected Health Information    Authorization for Care Everywhere Release of Information    2. Sign up for:    [employer], access to your electronic medical record (see enclosed pamphlet)    [employer], a transplant education website    You can use these tools to learn more about your transplant, communicate with your care team, and track your medical details      Sincerely,  Solid Organ Transplant  [facility]    cc: PCP   CPM/PAT Evaluation       Name: Radha Redmond (Radha Redmond)  /Age: 1954/70 y.o.     In-Person       Chief Complaint: Colonoscopy    HPI: Radha Redmond is a 70 year old female with a history of diabetes, hypothyroid, hypertension, VASU, asthma, and osteoarthritis. She is scheduled to have a colonoscopy. She states she had a colonoscopy in the past where polyps were removed. She denies blood in the stool, changes in bowel habits, and unintentional weight loss. She denies fever, chills, nausea, vomiting, chest pain, sob, dizziness, and palpitations.    Past Medical History:   Diagnosis Date    Age-related nuclear cataract of both eyes     Drusen (degenerative) of macula, bilateral     Dry eye syndrome of bilateral lacrimal glands     Personal history of other diseases of the circulatory system     History of coronary artery disease    Personal history of other diseases of the circulatory system     History of hypertension    Personal history of other diseases of the respiratory system     History of asthma    Personal history of other endocrine, nutritional and metabolic disease     History of diabetes mellitus    Personal history of other endocrine, nutritional and metabolic disease     History of high cholesterol    Personal history of other malignant neoplasm of skin     History of basal cell cancer    Type 2 diabetes mellitus with other diabetic kidney complication (Multi)     Type 2 diabetes mellitus with other kidney complication    Unspecified disorder of refraction        Past Surgical History:   Procedure Laterality Date    COLONOSCOPY W/ BIOPSIES  2018    Dr. Banks    OTHER SURGICAL HISTORY  2021    Nasal polypectomy    OTHER SURGICAL HISTORY  2021    Knee surgery    OTHER SURGICAL HISTORY  2021    Thyroidectomy    OTHER SURGICAL HISTORY  2021    Appendectomy    OTHER SURGICAL HISTORY  2021    Hip surgery    OTHER SURGICAL HISTORY  1989    Umbilical  "Hernia repair    OTHER SURGICAL HISTORY  2021    Anal surgery       Social History     Tobacco Use    Smoking status: Former     Current packs/day: 0.00     Types: Cigarettes     Quit date: 1988     Years since quittin.7     Passive exposure: Never    Smokeless tobacco: Never   Substance Use Topics    Alcohol use: Yes     Alcohol/week: 1.0 standard drink of alcohol     Types: 1 Glasses of wine per week     Social History     Substance and Sexual Activity   Drug Use Never         Family History   Problem Relation Name Age of Onset    Diabetes type II Mother      Prostate cancer Father      Arthritis Father      Stroke Son      Aneurysm Other children        Allergies   Allergen Reactions    Aspirin Other and Unknown     ASTHMA ATTACK    Nsaids (Non-Steroidal Anti-Inflammatory Drug) Other and Unknown     ASTHMA ATTACK    Statins-Hmg-Coa Reductase Inhibitors Other     \"ELEVATED LIVER ENZYMES\"    Ezetimibe Unknown     myositis     Current Outpatient Medications   Medication Sig Dispense Refill    acetaminophen (TylenoL) 325 mg tablet Take 1 tablet (325 mg) by mouth every 4 hours.      fluticasone (Flonase) 50 mcg/actuation nasal spray INSTILL 2 SPRAYS IN EACH NOSTRIL EVERY DAY PLEASE MAKE AN APPOINTMENT WITH DR. BAER NASALLY DAILY 30 DAYS 48 mL 1    gabapentin (Neurontin) 600 mg tablet TAKE 1 TABLET (600 MG) BY MOUTH 2 TIMES A DAY. 180 tablet 0    Jardiance 10 mg Take 1 tablet (10 mg) by mouth once daily.      metFORMIN (Glucophage) 500 mg tablet TAKE 1 TABLET BY MOUTH TWICE A DAY WITH MEALS FOR 90 DAYS 180 tablet 1    spironolactone (Aldactone) 25 mg tablet TAKE 1/2 TABLET BY MOUTH TWICE A DAY 90 tablet 1    torsemide (Demadex) 100 mg tablet TAKE 1/2 TABLET BY MOUTH EVERY DAY 45 tablet 1    albuterol (Ventolin HFA) 90 mcg/actuation inhaler 2 PUFFS AS NEEDED INHALATION EVERY 4 HRS 30 DAY(S) (Patient not taking: Reported on 2024) 1 g 5    apixaban (Eliquis) 2.5 mg tablet Take 1 tablet (2.5 mg) by " mouth 2 times a day.      cholecalciferol (Vitamin D-3) 25 MCG (1000 UT) tablet Take 1 tablet (25 mcg) by mouth once daily.      dapagliflozin propanediol (Farxiga) 10 mg Take 1 tablet (10 mg) by mouth once daily.      FLUoxetine (PROzac) 40 mg capsule TAKE 1 CAPSULE BY MOUTH EVERY DAY FOR 90 DAYS 90 capsule 1    fluticasone furoate (Arnuity Ellipta) 100 mcg/actuation inhaler Inhale 1 puff once daily. (Patient not taking: Reported on 7/24/2024) 14 each 0    levothyroxine (Synthroid, Levoxyl) 150 mcg tablet TAKE 1 TABLET BY MOUTH EVERY DAY 90 tablet 1    multivit-minerals/folic acid (WOMEN'S MULTIVITAMIN GUMMIES ORAL) Take by mouth.       No current facility-administered medications for this visit.       Review of Systems   Constitutional: Negative.    HENT: Negative.     Eyes: Negative.    Respiratory: Negative.     Cardiovascular: Negative.    Gastrointestinal:  Positive for constipation (pt states she increased her intake of fiber that helps).   Endocrine: Negative.    Genitourinary: Negative.    Musculoskeletal:  Positive for gait problem (Hx of bilateral knee replacements and right hip surgery.).   Skin: Negative.    Allergic/Immunologic: Negative.    Hematological: Negative.    Psychiatric/Behavioral: Negative.                Physical Exam  Vitals reviewed.   Constitutional:       Appearance: Normal appearance. She is obese.   HENT:      Head: Normocephalic and atraumatic.      Nose: Nose normal.      Mouth/Throat:      Mouth: Mucous membranes are moist.      Pharynx: Oropharynx is clear.   Eyes:      Extraocular Movements: Extraocular movements intact.      Conjunctiva/sclera: Conjunctivae normal.   Cardiovascular:      Rate and Rhythm: Normal rate and regular rhythm.      Pulses: Normal pulses.      Heart sounds: Normal heart sounds.   Pulmonary:      Effort: Pulmonary effort is normal.      Breath sounds: Normal breath sounds.   Abdominal:      General: Bowel sounds are normal.      Palpations: Abdomen is  "soft.   Genitourinary:     Comments: Assessment referred to physician    Musculoskeletal:      Cervical back: Normal range of motion and neck supple.      Right lower leg: Edema present.      Left lower leg: Edema present.   Skin:     General: Skin is warm and dry.   Neurological:      General: No focal deficit present.      Mental Status: She is alert and oriented to person, place, and time.   Psychiatric:         Mood and Affect: Mood normal.         Behavior: Behavior normal.         Thought Content: Thought content normal.         Judgment: Judgment normal.          PAT AIRWAY:   Airway:     Mallampati::  III    TM distance::  >3 FB    Neck ROM::  Full  normal      /61   Pulse 65   Temp 35.8 °C (96.4 °F) (Temporal)   Resp 18   Ht 1.626 m (5' 4\")   Wt 120 kg (264 lb 5.3 oz)   LMP  (LMP Unknown)   SpO2 99%   BMI 45.37 kg/m²     ASA: III  INGRIS: 4.0%  RCRI: 0.4%  DASI Risk Score      Flowsheet Row Most Recent Value   DASI SCORE 36.7   METS Score (Will be calculated only when all the questions are answered) 7.3          Caprini DVT Assessment    No data to display       Modified Frailty Index    No data to display       CHADS2 Stroke Risk  Current as of about an hour ago        N/A 3 to 100%: High Risk   2 to < 3%: Medium Risk   0 to < 2%: Low Risk     Last Change: N/A          This score determines the patient's risk of having a stroke if the patient has atrial fibrillation.        This score is not applicable to this patient. Components are not calculated.          Revised Cardiac Risk Index      Flowsheet Row Most Recent Value   Revised Cardiac Risk Calculator 0          Apfel Simplified Score    No data to display       Risk Analysis Index Results This Encounter    No data found in the last 1 encounters.       Stop Bang Score      Flowsheet Row Most Recent Value   Do you snore loudly? 0  [H/O VASU. USES CPAP]   Do you often feel tired or fatigued after your sleep? 0   Has anyone ever observed you " stop breathing in your sleep? 0   Do you have or are you being treated for high blood pressure? 1   Recent BMI (Calculated) 45.4   Is BMI greater than 35 kg/m2? 1=Yes   Age older than 50 years old? 1=Yes   Is your neck circumference greater than 17 inches (Male) or 16 inches (Female)? 0   Gender - Male 0=No   STOP-BANG Total Score 3            Assessment and Plan:     History of polyps: Colonoscopy  Diabetes: Hemoglobin A1C 6.9  7/15/24  Hypothyroid: Thyroidectomy 2013 TSH 7/15/24  1.37  Hypertension  Asthma  VASU: patient states she wears C-PAP  Osteoarthritis  BMI: 45.37      EKG completed in PAT  LABS 7/15/24    ECHO 10/28/19 (scanned in media under wrong date 1/30/23)    Kira Bloom, CLAUDIA-CNP

## (undated) DEVICE — Device

## (undated) DEVICE — MANIFOLD KIT ANGIO AUTOMATED 014613

## (undated) DEVICE — PACK HEART LEFT CUSTOM

## (undated) DEVICE — CATH ANGIO SUPERTORQUE PLUS JL4 6FRX100CM 533620

## (undated) DEVICE — CATH ANGIO SUPERTORQUE PLUS JR4 6FRX100CM 533621

## (undated) DEVICE — SLEEVE TR BAND RADIAL COMPRESSION DEVICE 24CM TRB24-REG

## (undated) DEVICE — KIT HAND CONTROL ACIST 014644 AR-P54

## (undated) DEVICE — INTRO SHEATH 7FRX25CM PINNACLE RSS706

## (undated) DEVICE — 0.035IN X 260CM, EMERALD DIAGNOSTIC GUIDEWIRE, FIXED-CORE PTFE COATED, STANDARD, 3MM EXCHANGE J-TIP (EA/1)

## (undated) DEVICE — SHTH INTRO 0.021IN ID 6FR DIA

## (undated) DEVICE — FASTENER CATH BALLOON CLAMPX2 STATLOCK 0684-00-493

## (undated) DEVICE — INTRO SHEATH 7FRX10CM PINNACLE RSS702

## (undated) DEVICE — TUBING PRESSURE 30"

## (undated) RX ORDER — SODIUM CHLORIDE 9 MG/ML
INJECTION, SOLUTION INTRAVENOUS
Status: DISPENSED
Start: 2021-02-05